# Patient Record
Sex: FEMALE | Race: WHITE | Employment: OTHER | URBAN - METROPOLITAN AREA
[De-identification: names, ages, dates, MRNs, and addresses within clinical notes are randomized per-mention and may not be internally consistent; named-entity substitution may affect disease eponyms.]

---

## 2018-04-03 ENCOUNTER — HOSPITAL ENCOUNTER (OUTPATIENT)
Age: 63
Discharge: HOME | End: 2018-04-05
Payer: COMMERCIAL

## 2018-04-03 PROCEDURE — 87075 CULTR BACTERIA EXCEPT BLOOD: CPT

## 2018-04-03 PROCEDURE — 87070 CULTURE OTHR SPECIMN AEROBIC: CPT

## 2018-04-05 ENCOUNTER — HOSPITAL ENCOUNTER (OUTPATIENT)
Age: 63
Discharge: HOME | End: 2018-04-07
Payer: COMMERCIAL

## 2018-04-05 LAB
ORGANISM: ABNORMAL
WOUND/ABSCESS: ABNORMAL
WOUND/ABSCESS: ABNORMAL

## 2018-04-05 PROCEDURE — 87102 FUNGUS ISOLATION CULTURE: CPT

## 2018-04-05 PROCEDURE — 87075 CULTR BACTERIA EXCEPT BLOOD: CPT

## 2018-04-05 PROCEDURE — 87070 CULTURE OTHR SPECIMN AEROBIC: CPT

## 2018-04-05 PROCEDURE — 87186 SC STD MICRODIL/AGAR DIL: CPT

## 2018-04-05 PROCEDURE — 87205 SMEAR GRAM STAIN: CPT

## 2018-04-06 LAB — GRAM STAIN ORDERABLE: NORMAL

## 2018-04-07 LAB
ANAEROBIC CULTURE: NORMAL
ANAEROBIC CULTURE: NORMAL

## 2018-04-08 LAB
ORGANISM: ABNORMAL
WOUND/ABSCESS: ABNORMAL
WOUND/ABSCESS: ABNORMAL

## 2018-04-09 ENCOUNTER — ANESTHESIA EVENT (OUTPATIENT)
Dept: OPERATING ROOM | Age: 63
DRG: 629 | End: 2018-04-09
Payer: COMMERCIAL

## 2018-04-09 ENCOUNTER — HOSPITAL ENCOUNTER (INPATIENT)
Age: 63
LOS: 2 days | Discharge: HOME HEALTH | DRG: 629 | End: 2018-04-11
Attending: INTERNAL MEDICINE | Admitting: INTERNAL MEDICINE
Payer: COMMERCIAL

## 2018-04-09 PROBLEM — L03.90 CELLULITIS: Status: ACTIVE | Noted: 2018-04-09

## 2018-04-09 LAB
C-REACTIVE PROTEIN: 17.3 MG/DL (ref 0–0.4)
METER GLUCOSE: 201 MG/DL (ref 70–110)
METER GLUCOSE: 232 MG/DL (ref 70–110)
METER GLUCOSE: 295 MG/DL (ref 70–110)
SEDIMENTATION RATE, ERYTHROCYTE: 103 MM/HR (ref 0–20)
TOTAL CK: 81 U/L (ref 20–180)

## 2018-04-09 PROCEDURE — 76937 US GUIDE VASCULAR ACCESS: CPT

## 2018-04-09 PROCEDURE — 86140 C-REACTIVE PROTEIN: CPT

## 2018-04-09 PROCEDURE — C1751 CATH, INF, PER/CENT/MIDLINE: HCPCS

## 2018-04-09 PROCEDURE — 36569 INSJ PICC 5 YR+ W/O IMAGING: CPT

## 2018-04-09 PROCEDURE — 85651 RBC SED RATE NONAUTOMATED: CPT

## 2018-04-09 PROCEDURE — 6370000000 HC RX 637 (ALT 250 FOR IP): Performed by: INTERNAL MEDICINE

## 2018-04-09 PROCEDURE — 82962 GLUCOSE BLOOD TEST: CPT

## 2018-04-09 PROCEDURE — 82550 ASSAY OF CK (CPK): CPT

## 2018-04-09 PROCEDURE — 1200000000 HC SEMI PRIVATE

## 2018-04-09 PROCEDURE — 36415 COLL VENOUS BLD VENIPUNCTURE: CPT

## 2018-04-09 PROCEDURE — 6360000002 HC RX W HCPCS: Performed by: INTERNAL MEDICINE

## 2018-04-09 PROCEDURE — 2500000003 HC RX 250 WO HCPCS: Performed by: SPECIALIST

## 2018-04-09 PROCEDURE — 36592 COLLECT BLOOD FROM PICC: CPT

## 2018-04-09 PROCEDURE — 2580000003 HC RX 258: Performed by: INTERNAL MEDICINE

## 2018-04-09 RX ORDER — LISINOPRIL 2.5 MG/1
2.5 TABLET ORAL DAILY
COMMUNITY
End: 2018-05-14 | Stop reason: ALTCHOICE

## 2018-04-09 RX ORDER — MORPHINE SULFATE 2 MG/ML
2 INJECTION, SOLUTION INTRAMUSCULAR; INTRAVENOUS EVERY 4 HOURS PRN
Status: DISCONTINUED | OUTPATIENT
Start: 2018-04-09 | End: 2018-04-11 | Stop reason: HOSPADM

## 2018-04-09 RX ORDER — DEXTROSE MONOHYDRATE 50 MG/ML
100 INJECTION, SOLUTION INTRAVENOUS PRN
Status: DISCONTINUED | OUTPATIENT
Start: 2018-04-09 | End: 2018-04-11 | Stop reason: HOSPADM

## 2018-04-09 RX ORDER — HEPARIN SODIUM (PORCINE) LOCK FLUSH IV SOLN 100 UNIT/ML 100 UNIT/ML
3 SOLUTION INTRAVENOUS EVERY 12 HOURS SCHEDULED
Status: DISCONTINUED | OUTPATIENT
Start: 2018-04-09 | End: 2018-04-11 | Stop reason: HOSPADM

## 2018-04-09 RX ORDER — SODIUM CHLORIDE 0.9 % (FLUSH) 0.9 %
10 SYRINGE (ML) INJECTION PRN
Status: CANCELLED | OUTPATIENT
Start: 2018-04-12

## 2018-04-09 RX ORDER — GLIPIZIDE 5 MG/1
10 TABLET, FILM COATED, EXTENDED RELEASE ORAL DAILY
Status: DISCONTINUED | OUTPATIENT
Start: 2018-04-09 | End: 2018-04-11 | Stop reason: HOSPADM

## 2018-04-09 RX ORDER — POTASSIUM CHLORIDE 20MEQ/15ML
40 LIQUID (ML) ORAL PRN
Status: DISCONTINUED | OUTPATIENT
Start: 2018-04-09 | End: 2018-04-11 | Stop reason: HOSPADM

## 2018-04-09 RX ORDER — DEXTROSE MONOHYDRATE 25 G/50ML
12.5 INJECTION, SOLUTION INTRAVENOUS PRN
Status: DISCONTINUED | OUTPATIENT
Start: 2018-04-09 | End: 2018-04-11 | Stop reason: HOSPADM

## 2018-04-09 RX ORDER — HEPARIN SODIUM (PORCINE) LOCK FLUSH IV SOLN 100 UNIT/ML 100 UNIT/ML
3 SOLUTION INTRAVENOUS PRN
Status: DISCONTINUED | OUTPATIENT
Start: 2018-04-09 | End: 2018-04-11 | Stop reason: HOSPADM

## 2018-04-09 RX ORDER — HYDROCODONE BITARTRATE AND ACETAMINOPHEN 5; 325 MG/1; MG/1
2 TABLET ORAL EVERY 4 HOURS PRN
Status: DISCONTINUED | OUTPATIENT
Start: 2018-04-09 | End: 2018-04-11 | Stop reason: HOSPADM

## 2018-04-09 RX ORDER — GABAPENTIN 100 MG/1
100 CAPSULE ORAL 2 TIMES DAILY
Status: DISCONTINUED | OUTPATIENT
Start: 2018-04-09 | End: 2018-04-11 | Stop reason: HOSPADM

## 2018-04-09 RX ORDER — SODIUM BICARBONATE 650 MG/1
650 TABLET ORAL 2 TIMES DAILY
Status: DISCONTINUED | OUTPATIENT
Start: 2018-04-09 | End: 2018-04-11 | Stop reason: HOSPADM

## 2018-04-09 RX ORDER — DAPTOMYCIN 50 MG/ML
450 INJECTION, POWDER, LYOPHILIZED, FOR SOLUTION INTRAVENOUS EVERY 24 HOURS
COMMUNITY
End: 2018-06-04 | Stop reason: ALTCHOICE

## 2018-04-09 RX ORDER — NICOTINE POLACRILEX 4 MG
15 LOZENGE BUCCAL PRN
Status: DISCONTINUED | OUTPATIENT
Start: 2018-04-09 | End: 2018-04-11 | Stop reason: HOSPADM

## 2018-04-09 RX ORDER — LISINOPRIL 5 MG/1
2.5 TABLET ORAL DAILY
Status: DISCONTINUED | OUTPATIENT
Start: 2018-04-09 | End: 2018-04-11 | Stop reason: HOSPADM

## 2018-04-09 RX ORDER — HYDROCODONE BITARTRATE AND ACETAMINOPHEN 5; 325 MG/1; MG/1
1 TABLET ORAL EVERY 4 HOURS PRN
Status: DISCONTINUED | OUTPATIENT
Start: 2018-04-09 | End: 2018-04-11 | Stop reason: HOSPADM

## 2018-04-09 RX ORDER — ACETAMINOPHEN 325 MG/1
650 TABLET ORAL EVERY 4 HOURS PRN
Status: DISCONTINUED | OUTPATIENT
Start: 2018-04-09 | End: 2018-04-10 | Stop reason: SDUPTHER

## 2018-04-09 RX ORDER — LIDOCAINE HYDROCHLORIDE 10 MG/ML
5 INJECTION, SOLUTION EPIDURAL; INFILTRATION; INTRACAUDAL; PERINEURAL ONCE
Status: COMPLETED | OUTPATIENT
Start: 2018-04-09 | End: 2018-04-09

## 2018-04-09 RX ORDER — SODIUM CHLORIDE 0.9 % (FLUSH) 0.9 %
10 SYRINGE (ML) INJECTION PRN
Status: DISCONTINUED | OUTPATIENT
Start: 2018-04-09 | End: 2018-04-11 | Stop reason: HOSPADM

## 2018-04-09 RX ORDER — HEPARIN SODIUM (PORCINE) LOCK FLUSH IV SOLN 100 UNIT/ML 100 UNIT/ML
1 SOLUTION INTRAVENOUS PRN
Status: DISCONTINUED | OUTPATIENT
Start: 2018-04-09 | End: 2018-04-11 | Stop reason: HOSPADM

## 2018-04-09 RX ORDER — HEPARIN SODIUM (PORCINE) LOCK FLUSH IV SOLN 100 UNIT/ML 100 UNIT/ML
500 SOLUTION INTRAVENOUS PRN
Status: CANCELLED | OUTPATIENT
Start: 2018-04-12

## 2018-04-09 RX ORDER — SODIUM BICARBONATE 325 MG/1
650 TABLET ORAL 2 TIMES DAILY
COMMUNITY

## 2018-04-09 RX ORDER — SODIUM CHLORIDE 0.9 % (FLUSH) 0.9 %
20 SYRINGE (ML) INJECTION PRN
Status: CANCELLED | OUTPATIENT
Start: 2018-04-12

## 2018-04-09 RX ORDER — SODIUM CHLORIDE 0.9 % (FLUSH) 0.9 %
10 SYRINGE (ML) INJECTION EVERY 12 HOURS SCHEDULED
Status: DISCONTINUED | OUTPATIENT
Start: 2018-04-09 | End: 2018-04-10 | Stop reason: SDUPTHER

## 2018-04-09 RX ORDER — POTASSIUM CHLORIDE 20 MEQ/1
40 TABLET, EXTENDED RELEASE ORAL PRN
Status: DISCONTINUED | OUTPATIENT
Start: 2018-04-09 | End: 2018-04-11 | Stop reason: HOSPADM

## 2018-04-09 RX ORDER — POTASSIUM CHLORIDE 7.45 MG/ML
10 INJECTION INTRAVENOUS PRN
Status: DISCONTINUED | OUTPATIENT
Start: 2018-04-09 | End: 2018-04-11 | Stop reason: HOSPADM

## 2018-04-09 RX ORDER — HEPARIN SODIUM (PORCINE) LOCK FLUSH IV SOLN 100 UNIT/ML 100 UNIT/ML
1 SOLUTION INTRAVENOUS EVERY 12 HOURS SCHEDULED
Status: DISCONTINUED | OUTPATIENT
Start: 2018-04-09 | End: 2018-04-11 | Stop reason: HOSPADM

## 2018-04-09 RX ORDER — ONDANSETRON 2 MG/ML
4 INJECTION INTRAMUSCULAR; INTRAVENOUS EVERY 6 HOURS PRN
Status: DISCONTINUED | OUTPATIENT
Start: 2018-04-09 | End: 2018-04-11 | Stop reason: HOSPADM

## 2018-04-09 RX ORDER — LEVOTHYROXINE SODIUM 0.05 MG/1
50 TABLET ORAL DAILY
Status: DISCONTINUED | OUTPATIENT
Start: 2018-04-09 | End: 2018-04-11 | Stop reason: HOSPADM

## 2018-04-09 RX ORDER — SODIUM CHLORIDE 9 MG/ML
INJECTION, SOLUTION INTRAVENOUS CONTINUOUS
Status: DISCONTINUED | OUTPATIENT
Start: 2018-04-09 | End: 2018-04-11 | Stop reason: HOSPADM

## 2018-04-09 RX ORDER — SODIUM CHLORIDE 0.9 % (FLUSH) 0.9 %
10 SYRINGE (ML) INJECTION PRN
Status: DISCONTINUED | OUTPATIENT
Start: 2018-04-09 | End: 2018-04-10 | Stop reason: SDUPTHER

## 2018-04-09 RX ADMIN — Medication 100 UNITS: at 21:56

## 2018-04-09 RX ADMIN — GABAPENTIN 100 MG: 100 CAPSULE ORAL at 21:49

## 2018-04-09 RX ADMIN — SODIUM CHLORIDE: 9 INJECTION, SOLUTION INTRAVENOUS at 18:12

## 2018-04-09 RX ADMIN — Medication 10 ML: at 21:56

## 2018-04-09 RX ADMIN — METFORMIN HYDROCHLORIDE 1000 MG: 1000 TABLET ORAL at 17:11

## 2018-04-09 RX ADMIN — SODIUM BICARBONATE 650 MG: 650 TABLET ORAL at 21:49

## 2018-04-09 RX ADMIN — INSULIN LISPRO 3 UNITS: 100 INJECTION, SOLUTION INTRAVENOUS; SUBCUTANEOUS at 21:56

## 2018-04-09 RX ADMIN — LIDOCAINE HYDROCHLORIDE 5 ML: 10 INJECTION, SOLUTION EPIDURAL; INFILTRATION; INTRACAUDAL; PERINEURAL at 17:01

## 2018-04-09 RX ADMIN — Medication 10 ML: at 18:16

## 2018-04-09 ASSESSMENT — PAIN DESCRIPTION - ORIENTATION: ORIENTATION: LEFT

## 2018-04-09 ASSESSMENT — PAIN - FUNCTIONAL ASSESSMENT: PAIN_FUNCTIONAL_ASSESSMENT: 0-10

## 2018-04-09 ASSESSMENT — PAIN DESCRIPTION - PAIN TYPE: TYPE: NEUROPATHIC PAIN

## 2018-04-09 ASSESSMENT — PAIN SCALES - GENERAL
PAINLEVEL_OUTOF10: 0
PAINLEVEL_OUTOF10: 0

## 2018-04-09 ASSESSMENT — PAIN DESCRIPTION - LOCATION: LOCATION: FOOT

## 2018-04-09 ASSESSMENT — PAIN DESCRIPTION - DESCRIPTORS: DESCRIPTORS: ACHING;DULL

## 2018-04-10 ENCOUNTER — ANESTHESIA (OUTPATIENT)
Dept: OPERATING ROOM | Age: 63
DRG: 629 | End: 2018-04-10
Payer: COMMERCIAL

## 2018-04-10 VITALS — SYSTOLIC BLOOD PRESSURE: 117 MMHG | DIASTOLIC BLOOD PRESSURE: 66 MMHG | OXYGEN SATURATION: 95 %

## 2018-04-10 LAB
BASOPHILS ABSOLUTE: 0.06 E9/L (ref 0–0.2)
BASOPHILS RELATIVE PERCENT: 0.6 % (ref 0–2)
EOSINOPHILS ABSOLUTE: 0.48 E9/L (ref 0.05–0.5)
EOSINOPHILS RELATIVE PERCENT: 4.5 % (ref 0–6)
HCT VFR BLD CALC: 31.7 % (ref 34–48)
HEMOGLOBIN: 10.4 G/DL (ref 11.5–15.5)
IMMATURE GRANULOCYTES #: 0.07 E9/L
IMMATURE GRANULOCYTES %: 0.7 % (ref 0–5)
LYMPHOCYTES ABSOLUTE: 2.85 E9/L (ref 1.5–4)
LYMPHOCYTES RELATIVE PERCENT: 26.6 % (ref 20–42)
MCH RBC QN AUTO: 32.9 PG (ref 26–35)
MCHC RBC AUTO-ENTMCNC: 32.8 % (ref 32–34.5)
MCV RBC AUTO: 100.3 FL (ref 80–99.9)
METER GLUCOSE: 102 MG/DL (ref 70–110)
METER GLUCOSE: 117 MG/DL (ref 70–110)
METER GLUCOSE: 184 MG/DL (ref 70–110)
METER GLUCOSE: 230 MG/DL (ref 70–110)
MONOCYTES ABSOLUTE: 0.87 E9/L (ref 0.1–0.95)
MONOCYTES RELATIVE PERCENT: 8.1 % (ref 2–12)
NEUTROPHILS ABSOLUTE: 6.38 E9/L (ref 1.8–7.3)
NEUTROPHILS RELATIVE PERCENT: 59.5 % (ref 43–80)
PDW BLD-RTO: 12.1 FL (ref 11.5–15)
PLATELET # BLD: 273 E9/L (ref 130–450)
PMV BLD AUTO: 9.6 FL (ref 7–12)
RBC # BLD: 3.16 E12/L (ref 3.5–5.5)
WBC # BLD: 10.7 E9/L (ref 4.5–11.5)

## 2018-04-10 PROCEDURE — 2709999900 HC NON-CHARGEABLE SUPPLY: Performed by: PODIATRIST

## 2018-04-10 PROCEDURE — 2580000003 HC RX 258: Performed by: INTERNAL MEDICINE

## 2018-04-10 PROCEDURE — 7100000011 HC PHASE II RECOVERY - ADDTL 15 MIN: Performed by: PODIATRIST

## 2018-04-10 PROCEDURE — 85025 COMPLETE CBC W/AUTO DIFF WBC: CPT

## 2018-04-10 PROCEDURE — 6370000000 HC RX 637 (ALT 250 FOR IP): Performed by: INTERNAL MEDICINE

## 2018-04-10 PROCEDURE — 6360000002 HC RX W HCPCS

## 2018-04-10 PROCEDURE — 2580000003 HC RX 258: Performed by: PODIATRIST

## 2018-04-10 PROCEDURE — 2500000003 HC RX 250 WO HCPCS: Performed by: PODIATRIST

## 2018-04-10 PROCEDURE — G8988 SELF CARE GOAL STATUS: HCPCS

## 2018-04-10 PROCEDURE — 88311 DECALCIFY TISSUE: CPT

## 2018-04-10 PROCEDURE — 36592 COLLECT BLOOD FROM PICC: CPT

## 2018-04-10 PROCEDURE — 87205 SMEAR GRAM STAIN: CPT

## 2018-04-10 PROCEDURE — 87077 CULTURE AEROBIC IDENTIFY: CPT

## 2018-04-10 PROCEDURE — 88307 TISSUE EXAM BY PATHOLOGIST: CPT

## 2018-04-10 PROCEDURE — 02HV33Z INSERTION OF INFUSION DEVICE INTO SUPERIOR VENA CAVA, PERCUTANEOUS APPROACH: ICD-10-PCS | Performed by: SPECIALIST

## 2018-04-10 PROCEDURE — 3600000002 HC SURGERY LEVEL 2 BASE: Performed by: PODIATRIST

## 2018-04-10 PROCEDURE — 1200000000 HC SEMI PRIVATE

## 2018-04-10 PROCEDURE — 6360000002 HC RX W HCPCS: Performed by: NURSE ANESTHETIST, CERTIFIED REGISTERED

## 2018-04-10 PROCEDURE — 87070 CULTURE OTHR SPECIMN AEROBIC: CPT

## 2018-04-10 PROCEDURE — 7100000010 HC PHASE II RECOVERY - FIRST 15 MIN: Performed by: PODIATRIST

## 2018-04-10 PROCEDURE — 3600000012 HC SURGERY LEVEL 2 ADDTL 15MIN: Performed by: PODIATRIST

## 2018-04-10 PROCEDURE — C1751 CATH, INF, PER/CENT/MIDLINE: HCPCS

## 2018-04-10 PROCEDURE — 36569 INSJ PICC 5 YR+ W/O IMAGING: CPT

## 2018-04-10 PROCEDURE — 3700000001 HC ADD 15 MINUTES (ANESTHESIA): Performed by: PODIATRIST

## 2018-04-10 PROCEDURE — 2580000003 HC RX 258: Performed by: SPECIALIST

## 2018-04-10 PROCEDURE — 76937 US GUIDE VASCULAR ACCESS: CPT

## 2018-04-10 PROCEDURE — 82962 GLUCOSE BLOOD TEST: CPT

## 2018-04-10 PROCEDURE — 87186 SC STD MICRODIL/AGAR DIL: CPT

## 2018-04-10 PROCEDURE — 0QBM0ZX EXCISION OF LEFT TARSAL, OPEN APPROACH, DIAGNOSTIC: ICD-10-PCS | Performed by: PODIATRIST

## 2018-04-10 PROCEDURE — 3700000000 HC ANESTHESIA ATTENDED CARE: Performed by: PODIATRIST

## 2018-04-10 PROCEDURE — 97165 OT EVAL LOW COMPLEX 30 MIN: CPT

## 2018-04-10 PROCEDURE — G8987 SELF CARE CURRENT STATUS: HCPCS

## 2018-04-10 PROCEDURE — 6360000002 HC RX W HCPCS: Performed by: SPECIALIST

## 2018-04-10 PROCEDURE — 36415 COLL VENOUS BLD VENIPUNCTURE: CPT

## 2018-04-10 PROCEDURE — 2500000003 HC RX 250 WO HCPCS: Performed by: NURSE ANESTHETIST, CERTIFIED REGISTERED

## 2018-04-10 RX ORDER — SODIUM CHLORIDE 0.9 % (FLUSH) 0.9 %
10 SYRINGE (ML) INJECTION PRN
Status: DISCONTINUED | OUTPATIENT
Start: 2018-04-10 | End: 2018-04-11 | Stop reason: HOSPADM

## 2018-04-10 RX ORDER — DOCUSATE SODIUM 100 MG/1
100 CAPSULE, LIQUID FILLED ORAL 2 TIMES DAILY
Status: DISCONTINUED | OUTPATIENT
Start: 2018-04-10 | End: 2018-04-11 | Stop reason: HOSPADM

## 2018-04-10 RX ORDER — MORPHINE SULFATE 2 MG/ML
2 INJECTION, SOLUTION INTRAMUSCULAR; INTRAVENOUS EVERY 5 MIN PRN
Status: DISCONTINUED | OUTPATIENT
Start: 2018-04-10 | End: 2018-04-10 | Stop reason: HOSPADM

## 2018-04-10 RX ORDER — BUPIVACAINE HYDROCHLORIDE 5 MG/ML
INJECTION, SOLUTION EPIDURAL; INTRACAUDAL PRN
Status: DISCONTINUED | OUTPATIENT
Start: 2018-04-10 | End: 2018-04-10 | Stop reason: HOSPADM

## 2018-04-10 RX ORDER — GLYCOPYRROLATE 0.2 MG/ML
INJECTION INTRAMUSCULAR; INTRAVENOUS PRN
Status: DISCONTINUED | OUTPATIENT
Start: 2018-04-10 | End: 2018-04-10 | Stop reason: SDUPTHER

## 2018-04-10 RX ORDER — HYDROCODONE BITARTRATE AND ACETAMINOPHEN 5; 325 MG/1; MG/1
2 TABLET ORAL PRN
Status: DISCONTINUED | OUTPATIENT
Start: 2018-04-10 | End: 2018-04-10 | Stop reason: HOSPADM

## 2018-04-10 RX ORDER — MORPHINE SULFATE 2 MG/ML
1 INJECTION, SOLUTION INTRAMUSCULAR; INTRAVENOUS EVERY 5 MIN PRN
Status: DISCONTINUED | OUTPATIENT
Start: 2018-04-10 | End: 2018-04-10 | Stop reason: HOSPADM

## 2018-04-10 RX ORDER — SODIUM CHLORIDE 0.9 % (FLUSH) 0.9 %
10 SYRINGE (ML) INJECTION EVERY 12 HOURS SCHEDULED
Status: DISCONTINUED | OUTPATIENT
Start: 2018-04-10 | End: 2018-04-11 | Stop reason: HOSPADM

## 2018-04-10 RX ORDER — DOCUSATE SODIUM 100 MG/1
CAPSULE, LIQUID FILLED ORAL
Status: DISPENSED
Start: 2018-04-10 | End: 2018-04-10

## 2018-04-10 RX ORDER — MEPERIDINE HYDROCHLORIDE 25 MG/ML
12.5 INJECTION INTRAMUSCULAR; INTRAVENOUS; SUBCUTANEOUS EVERY 5 MIN PRN
Status: DISCONTINUED | OUTPATIENT
Start: 2018-04-10 | End: 2018-04-10 | Stop reason: HOSPADM

## 2018-04-10 RX ORDER — HYDROCODONE BITARTRATE AND ACETAMINOPHEN 5; 325 MG/1; MG/1
1 TABLET ORAL PRN
Status: DISCONTINUED | OUTPATIENT
Start: 2018-04-10 | End: 2018-04-10 | Stop reason: HOSPADM

## 2018-04-10 RX ORDER — MIDAZOLAM HYDROCHLORIDE 1 MG/ML
INJECTION INTRAMUSCULAR; INTRAVENOUS PRN
Status: DISCONTINUED | OUTPATIENT
Start: 2018-04-10 | End: 2018-04-10 | Stop reason: SDUPTHER

## 2018-04-10 RX ORDER — PROPOFOL 10 MG/ML
INJECTION, EMULSION INTRAVENOUS PRN
Status: DISCONTINUED | OUTPATIENT
Start: 2018-04-10 | End: 2018-04-10 | Stop reason: SDUPTHER

## 2018-04-10 RX ORDER — FENTANYL CITRATE 50 UG/ML
INJECTION, SOLUTION INTRAMUSCULAR; INTRAVENOUS PRN
Status: DISCONTINUED | OUTPATIENT
Start: 2018-04-10 | End: 2018-04-10 | Stop reason: SDUPTHER

## 2018-04-10 RX ORDER — PROPOFOL 10 MG/ML
INJECTION, EMULSION INTRAVENOUS CONTINUOUS PRN
Status: DISCONTINUED | OUTPATIENT
Start: 2018-04-10 | End: 2018-04-10 | Stop reason: SDUPTHER

## 2018-04-10 RX ORDER — ACETAMINOPHEN 325 MG/1
650 TABLET ORAL EVERY 4 HOURS PRN
Status: DISCONTINUED | OUTPATIENT
Start: 2018-04-10 | End: 2018-04-11 | Stop reason: HOSPADM

## 2018-04-10 RX ORDER — PROMETHAZINE HYDROCHLORIDE 25 MG/ML
6.25 INJECTION, SOLUTION INTRAMUSCULAR; INTRAVENOUS EVERY 10 MIN PRN
Status: DISCONTINUED | OUTPATIENT
Start: 2018-04-10 | End: 2018-04-10 | Stop reason: HOSPADM

## 2018-04-10 RX ADMIN — GLIPIZIDE 10 MG: 5 TABLET, FILM COATED, EXTENDED RELEASE ORAL at 09:57

## 2018-04-10 RX ADMIN — GABAPENTIN 100 MG: 100 CAPSULE ORAL at 20:15

## 2018-04-10 RX ADMIN — LISINOPRIL 2.5 MG: 5 TABLET ORAL at 09:33

## 2018-04-10 RX ADMIN — Medication 10 ML: at 09:31

## 2018-04-10 RX ADMIN — DAPTOMYCIN 500 MG: 500 INJECTION, POWDER, LYOPHILIZED, FOR SOLUTION INTRAVENOUS at 06:58

## 2018-04-10 RX ADMIN — PROPOFOL 50 MCG/KG/MIN: 10 INJECTION, EMULSION INTRAVENOUS at 07:36

## 2018-04-10 RX ADMIN — SODIUM BICARBONATE 650 MG: 650 TABLET ORAL at 09:56

## 2018-04-10 RX ADMIN — METFORMIN HYDROCHLORIDE 1000 MG: 1000 TABLET ORAL at 16:34

## 2018-04-10 RX ADMIN — FENTANYL CITRATE 50 MCG: 50 INJECTION, SOLUTION INTRAMUSCULAR; INTRAVENOUS at 07:45

## 2018-04-10 RX ADMIN — SODIUM BICARBONATE 650 MG: 650 TABLET ORAL at 20:16

## 2018-04-10 RX ADMIN — MIDAZOLAM HYDROCHLORIDE 2 MG: 1 INJECTION, SOLUTION INTRAMUSCULAR; INTRAVENOUS at 07:29

## 2018-04-10 RX ADMIN — METFORMIN HYDROCHLORIDE 1000 MG: 1000 TABLET ORAL at 09:33

## 2018-04-10 RX ADMIN — Medication 10 ML: at 09:36

## 2018-04-10 RX ADMIN — ENOXAPARIN SODIUM 40 MG: 40 INJECTION, SOLUTION INTRAVENOUS; SUBCUTANEOUS at 09:58

## 2018-04-10 RX ADMIN — Medication 0.2 MG: at 07:29

## 2018-04-10 RX ADMIN — Medication 10 ML: at 20:22

## 2018-04-10 RX ADMIN — GABAPENTIN 100 MG: 100 CAPSULE ORAL at 09:56

## 2018-04-10 RX ADMIN — PROPOFOL 50 MG: 10 INJECTION, EMULSION INTRAVENOUS at 07:36

## 2018-04-10 RX ADMIN — FENTANYL CITRATE 50 MCG: 50 INJECTION, SOLUTION INTRAMUSCULAR; INTRAVENOUS at 07:36

## 2018-04-10 ASSESSMENT — PULMONARY FUNCTION TESTS
PIF_VALUE: 1
PIF_VALUE: 1
PIF_VALUE: 0
PIF_VALUE: 1
PIF_VALUE: 0
PIF_VALUE: 1
PIF_VALUE: 0
PIF_VALUE: 1

## 2018-04-10 ASSESSMENT — PAIN DESCRIPTION - PAIN TYPE
TYPE: SURGICAL PAIN
TYPE: SURGICAL PAIN

## 2018-04-10 ASSESSMENT — PAIN SCALES - GENERAL
PAINLEVEL_OUTOF10: 0

## 2018-04-11 VITALS
WEIGHT: 196 LBS | RESPIRATION RATE: 16 BRPM | SYSTOLIC BLOOD PRESSURE: 127 MMHG | BODY MASS INDEX: 30.76 KG/M2 | HEIGHT: 67 IN | DIASTOLIC BLOOD PRESSURE: 70 MMHG | OXYGEN SATURATION: 94 % | HEART RATE: 96 BPM | TEMPERATURE: 98.1 F

## 2018-04-11 LAB
ANION GAP SERPL CALCULATED.3IONS-SCNC: 14 MMOL/L (ref 7–16)
BASOPHILS ABSOLUTE: 0.04 E9/L (ref 0–0.2)
BASOPHILS RELATIVE PERCENT: 0.3 % (ref 0–2)
BUN BLDV-MCNC: 18 MG/DL (ref 8–23)
CALCIUM SERPL-MCNC: 9.1 MG/DL (ref 8.6–10.2)
CHLORIDE BLD-SCNC: 102 MMOL/L (ref 98–107)
CO2: 22 MMOL/L (ref 22–29)
CREAT SERPL-MCNC: 1 MG/DL (ref 0.5–1)
EOSINOPHILS ABSOLUTE: 0.5 E9/L (ref 0.05–0.5)
EOSINOPHILS RELATIVE PERCENT: 3.7 % (ref 0–6)
GFR AFRICAN AMERICAN: >60
GFR NON-AFRICAN AMERICAN: 56 ML/MIN/1.73
GLUCOSE BLD-MCNC: 86 MG/DL (ref 74–109)
HCT VFR BLD CALC: 18.7 % (ref 34–48)
HCT VFR BLD CALC: 30.3 % (ref 34–48)
HEMOGLOBIN: 10.1 G/DL (ref 11.5–15.5)
HEMOGLOBIN: 5.9 G/DL (ref 11.5–15.5)
IMMATURE GRANULOCYTES #: 0.12 E9/L
IMMATURE GRANULOCYTES %: 0.9 % (ref 0–5)
LYMPHOCYTES ABSOLUTE: 2.96 E9/L (ref 1.5–4)
LYMPHOCYTES RELATIVE PERCENT: 21.7 % (ref 20–42)
MCH RBC QN AUTO: 32.2 PG (ref 26–35)
MCHC RBC AUTO-ENTMCNC: 31.6 % (ref 32–34.5)
MCV RBC AUTO: 102.2 FL (ref 80–99.9)
METER GLUCOSE: 156 MG/DL (ref 70–110)
METER GLUCOSE: 252 MG/DL (ref 70–110)
METER GLUCOSE: 98 MG/DL (ref 70–110)
MONOCYTES ABSOLUTE: 1.2 E9/L (ref 0.1–0.95)
MONOCYTES RELATIVE PERCENT: 8.8 % (ref 2–12)
NEUTROPHILS ABSOLUTE: 8.81 E9/L (ref 1.8–7.3)
NEUTROPHILS RELATIVE PERCENT: 64.6 % (ref 43–80)
PDW BLD-RTO: 12.1 FL (ref 11.5–15)
PLATELET # BLD: 354 E9/L (ref 130–450)
PMV BLD AUTO: 9.9 FL (ref 7–12)
POTASSIUM SERPL-SCNC: 3.9 MMOL/L (ref 3.5–5)
RBC # BLD: 1.83 E12/L (ref 3.5–5.5)
SODIUM BLD-SCNC: 138 MMOL/L (ref 132–146)
WBC # BLD: 13.6 E9/L (ref 4.5–11.5)

## 2018-04-11 PROCEDURE — G8979 MOBILITY GOAL STATUS: HCPCS

## 2018-04-11 PROCEDURE — 97530 THERAPEUTIC ACTIVITIES: CPT

## 2018-04-11 PROCEDURE — 6370000000 HC RX 637 (ALT 250 FOR IP): Performed by: INTERNAL MEDICINE

## 2018-04-11 PROCEDURE — 6360000002 HC RX W HCPCS: Performed by: SPECIALIST

## 2018-04-11 PROCEDURE — 2580000003 HC RX 258: Performed by: PODIATRIST

## 2018-04-11 PROCEDURE — 2580000003 HC RX 258: Performed by: SPECIALIST

## 2018-04-11 PROCEDURE — 36415 COLL VENOUS BLD VENIPUNCTURE: CPT

## 2018-04-11 PROCEDURE — 2580000003 HC RX 258: Performed by: INTERNAL MEDICINE

## 2018-04-11 PROCEDURE — 85025 COMPLETE CBC W/AUTO DIFF WBC: CPT

## 2018-04-11 PROCEDURE — 6360000002 HC RX W HCPCS: Performed by: INTERNAL MEDICINE

## 2018-04-11 PROCEDURE — 80048 BASIC METABOLIC PNL TOTAL CA: CPT

## 2018-04-11 PROCEDURE — 36592 COLLECT BLOOD FROM PICC: CPT

## 2018-04-11 PROCEDURE — 85018 HEMOGLOBIN: CPT

## 2018-04-11 PROCEDURE — 97162 PT EVAL MOD COMPLEX 30 MIN: CPT

## 2018-04-11 PROCEDURE — 82962 GLUCOSE BLOOD TEST: CPT

## 2018-04-11 PROCEDURE — 97605 NEG PRS WND THER DME<=50SQCM: CPT

## 2018-04-11 PROCEDURE — G8978 MOBILITY CURRENT STATUS: HCPCS

## 2018-04-11 PROCEDURE — 85014 HEMATOCRIT: CPT

## 2018-04-11 RX ADMIN — METFORMIN HYDROCHLORIDE 1000 MG: 1000 TABLET ORAL at 08:23

## 2018-04-11 RX ADMIN — GLIPIZIDE 10 MG: 5 TABLET, FILM COATED, EXTENDED RELEASE ORAL at 08:22

## 2018-04-11 RX ADMIN — SODIUM CHLORIDE: 9 INJECTION, SOLUTION INTRAVENOUS at 16:34

## 2018-04-11 RX ADMIN — DAPTOMYCIN 500 MG: 500 INJECTION, POWDER, LYOPHILIZED, FOR SOLUTION INTRAVENOUS at 06:16

## 2018-04-11 RX ADMIN — SODIUM CHLORIDE: 9 INJECTION, SOLUTION INTRAVENOUS at 02:40

## 2018-04-11 RX ADMIN — LEVOTHYROXINE SODIUM 50 MCG: 50 TABLET ORAL at 06:15

## 2018-04-11 RX ADMIN — ENOXAPARIN SODIUM 40 MG: 40 INJECTION, SOLUTION INTRAVENOUS; SUBCUTANEOUS at 08:33

## 2018-04-11 RX ADMIN — GABAPENTIN 100 MG: 100 CAPSULE ORAL at 08:23

## 2018-04-11 RX ADMIN — SODIUM BICARBONATE 650 MG: 650 TABLET ORAL at 08:23

## 2018-04-11 RX ADMIN — LISINOPRIL 2.5 MG: 5 TABLET ORAL at 08:23

## 2018-04-11 RX ADMIN — Medication 300 UNITS: at 18:38

## 2018-04-11 RX ADMIN — Medication 10 ML: at 18:38

## 2018-04-11 RX ADMIN — METFORMIN HYDROCHLORIDE 1000 MG: 1000 TABLET ORAL at 16:27

## 2018-04-11 ASSESSMENT — PAIN SCALES - GENERAL: PAINLEVEL_OUTOF10: 0

## 2018-04-13 LAB
CULTURE SURGICAL: ABNORMAL
GRAM STAIN RESULT: ABNORMAL
GRAM STAIN RESULT: ABNORMAL
ORGANISM: ABNORMAL
ORGANISM: ABNORMAL

## 2018-05-07 ENCOUNTER — HOSPITAL ENCOUNTER (OUTPATIENT)
Dept: WOUND CARE | Age: 63
Discharge: HOME | End: 2018-05-07
Payer: COMMERCIAL

## 2018-05-07 ENCOUNTER — HOSPITAL ENCOUNTER (OUTPATIENT)
Age: 63
Discharge: HOME | End: 2018-05-09
Payer: COMMERCIAL

## 2018-05-07 VITALS
BODY MASS INDEX: 26.53 KG/M2 | RESPIRATION RATE: 16 BRPM | DIASTOLIC BLOOD PRESSURE: 80 MMHG | TEMPERATURE: 98.5 F | WEIGHT: 169 LBS | SYSTOLIC BLOOD PRESSURE: 142 MMHG | HEIGHT: 67 IN | HEART RATE: 74 BPM

## 2018-05-07 DIAGNOSIS — M86.9 OSTEOMYELITIS OF ANKLE AND FOOT (HCC): Primary | ICD-10-CM

## 2018-05-07 LAB
BASOPHILS ABSOLUTE: 0.1 E9/L (ref 0–0.2)
BASOPHILS RELATIVE PERCENT: 0.9 % (ref 0–2)
EOSINOPHILS ABSOLUTE: 0.73 E9/L (ref 0.05–0.5)
EOSINOPHILS RELATIVE PERCENT: 6.4 % (ref 0–6)
FUNGUS (MYCOLOGY) CULTURE: NORMAL
FUNGUS STAIN: NORMAL
HCT VFR BLD CALC: 36.7 % (ref 34–48)
HEMOGLOBIN: 11.8 G/DL (ref 11.5–15.5)
IMMATURE GRANULOCYTES #: 0.11 E9/L
IMMATURE GRANULOCYTES %: 1 % (ref 0–5)
LYMPHOCYTES ABSOLUTE: 2.43 E9/L (ref 1.5–4)
LYMPHOCYTES RELATIVE PERCENT: 21.2 % (ref 20–42)
MCH RBC QN AUTO: 31.8 PG (ref 26–35)
MCHC RBC AUTO-ENTMCNC: 32.2 % (ref 32–34.5)
MCV RBC AUTO: 98.9 FL (ref 80–99.9)
MONOCYTES ABSOLUTE: 0.9 E9/L (ref 0.1–0.95)
MONOCYTES RELATIVE PERCENT: 7.8 % (ref 2–12)
NEUTROPHILS ABSOLUTE: 7.2 E9/L (ref 1.8–7.3)
NEUTROPHILS RELATIVE PERCENT: 62.7 % (ref 43–80)
PDW BLD-RTO: 12.2 FL (ref 11.5–15)
PLATELET # BLD: 344 E9/L (ref 130–450)
PMV BLD AUTO: 9.4 FL (ref 7–12)
RBC # BLD: 3.71 E12/L (ref 3.5–5.5)
WBC # BLD: 11.5 E9/L (ref 4.5–11.5)

## 2018-05-07 PROCEDURE — 82565 ASSAY OF CREATININE: CPT

## 2018-05-07 PROCEDURE — 11042 DBRDMT SUBQ TIS 1ST 20SQCM/<: CPT

## 2018-05-07 PROCEDURE — 85025 COMPLETE CBC W/AUTO DIFF WBC: CPT

## 2018-05-07 PROCEDURE — 99214 OFFICE O/P EST MOD 30 MIN: CPT

## 2018-05-07 PROCEDURE — 84520 ASSAY OF UREA NITROGEN: CPT

## 2018-05-07 PROCEDURE — 85651 RBC SED RATE NONAUTOMATED: CPT

## 2018-05-07 PROCEDURE — 97607 NEG PRS WND THR NDME<=50SQCM: CPT

## 2018-05-07 PROCEDURE — 86140 C-REACTIVE PROTEIN: CPT

## 2018-05-07 PROCEDURE — 80076 HEPATIC FUNCTION PANEL: CPT

## 2018-05-07 PROCEDURE — 82550 ASSAY OF CK (CPK): CPT

## 2018-05-08 LAB
ALBUMIN SERPL-MCNC: 3.7 G/DL (ref 3.5–5.2)
ALP BLD-CCNC: 76 U/L (ref 35–104)
ALT SERPL-CCNC: 16 U/L (ref 0–32)
AST SERPL-CCNC: 21 U/L (ref 0–31)
BILIRUB SERPL-MCNC: 0.3 MG/DL (ref 0–1.2)
BILIRUBIN DIRECT: <0.2 MG/DL (ref 0–0.3)
BILIRUBIN, INDIRECT: NORMAL MG/DL (ref 0–1)
BUN BLDV-MCNC: 19 MG/DL (ref 8–23)
C-REACTIVE PROTEIN: 4.2 MG/DL (ref 0–0.4)
CREAT SERPL-MCNC: 1.2 MG/DL (ref 0.5–1)
GFR AFRICAN AMERICAN: 55
GFR NON-AFRICAN AMERICAN: 45 ML/MIN/1.73
SEDIMENTATION RATE, ERYTHROCYTE: 97 MM/HR (ref 0–20)
TOTAL CK: 235 U/L (ref 20–180)
TOTAL PROTEIN: 7.1 G/DL (ref 6.4–8.3)

## 2018-05-09 ENCOUNTER — HOSPITAL ENCOUNTER (OUTPATIENT)
Dept: HYPERBARIC MEDICINE | Age: 63
Setting detail: THERAPIES SERIES
Discharge: HOME | End: 2018-05-09
Payer: COMMERCIAL

## 2018-05-09 VITALS
SYSTOLIC BLOOD PRESSURE: 155 MMHG | HEART RATE: 84 BPM | TEMPERATURE: 98.7 F | DIASTOLIC BLOOD PRESSURE: 77 MMHG | RESPIRATION RATE: 20 BRPM

## 2018-05-09 DIAGNOSIS — L97.426 DIABETIC ULCER OF LEFT HEEL ASSOCIATED WITH TYPE 2 DIABETES MELLITUS, WITH BONE INVOLVEMENT WITHOUT EVIDENCE OF NECROSIS (HCC): ICD-10-CM

## 2018-05-09 DIAGNOSIS — E11.621 DIABETIC ULCER OF LEFT HEEL ASSOCIATED WITH TYPE 2 DIABETES MELLITUS, WITH BONE INVOLVEMENT WITHOUT EVIDENCE OF NECROSIS (HCC): ICD-10-CM

## 2018-05-09 PROCEDURE — 99244 OFF/OP CNSLTJ NEW/EST MOD 40: CPT | Performed by: NURSE PRACTITIONER

## 2018-05-09 PROCEDURE — 99203 OFFICE O/P NEW LOW 30 MIN: CPT

## 2018-05-11 ENCOUNTER — HOSPITAL ENCOUNTER (OUTPATIENT)
Dept: INTERVENTIONAL RADIOLOGY/VASCULAR | Age: 63
Discharge: HOME | End: 2018-05-13
Payer: COMMERCIAL

## 2018-05-11 ENCOUNTER — HOSPITAL ENCOUNTER (OUTPATIENT)
Age: 63
Discharge: HOME | End: 2018-05-13
Payer: COMMERCIAL

## 2018-05-11 DIAGNOSIS — M86.9 OSTEOMYELITIS OF ANKLE AND FOOT (HCC): ICD-10-CM

## 2018-05-11 LAB
ALBUMIN SERPL-MCNC: 3.7 G/DL (ref 3.5–5.2)
ALP BLD-CCNC: 66 U/L (ref 35–104)
ALT SERPL-CCNC: 17 U/L (ref 0–32)
ANION GAP SERPL CALCULATED.3IONS-SCNC: 15 MMOL/L (ref 7–16)
AST SERPL-CCNC: 23 U/L (ref 0–31)
BASOPHILS ABSOLUTE: 0.08 E9/L (ref 0–0.2)
BASOPHILS RELATIVE PERCENT: 0.7 % (ref 0–2)
BILIRUB SERPL-MCNC: 0.3 MG/DL (ref 0–1.2)
BILIRUBIN DIRECT: <0.2 MG/DL (ref 0–0.3)
BILIRUBIN, INDIRECT: NORMAL MG/DL (ref 0–1)
BUN BLDV-MCNC: 17 MG/DL (ref 8–23)
CALCIUM SERPL-MCNC: 10.5 MG/DL (ref 8.6–10.2)
CHLORIDE BLD-SCNC: 97 MMOL/L (ref 98–107)
CO2: 27 MMOL/L (ref 22–29)
CREAT SERPL-MCNC: 1.1 MG/DL (ref 0.5–1)
EOSINOPHILS ABSOLUTE: 0.54 E9/L (ref 0.05–0.5)
EOSINOPHILS RELATIVE PERCENT: 4.7 % (ref 0–6)
GFR AFRICAN AMERICAN: >60
GFR NON-AFRICAN AMERICAN: 50 ML/MIN/1.73
GLUCOSE BLD-MCNC: 209 MG/DL (ref 74–109)
HCT VFR BLD CALC: 36.8 % (ref 34–48)
HEMOGLOBIN: 11.8 G/DL (ref 11.5–15.5)
IMMATURE GRANULOCYTES #: 0.06 E9/L
IMMATURE GRANULOCYTES %: 0.5 % (ref 0–5)
LYMPHOCYTES ABSOLUTE: 1.79 E9/L (ref 1.5–4)
LYMPHOCYTES RELATIVE PERCENT: 15.5 % (ref 20–42)
MCH RBC QN AUTO: 31.6 PG (ref 26–35)
MCHC RBC AUTO-ENTMCNC: 32.1 % (ref 32–34.5)
MCV RBC AUTO: 98.7 FL (ref 80–99.9)
MONOCYTES ABSOLUTE: 0.71 E9/L (ref 0.1–0.95)
MONOCYTES RELATIVE PERCENT: 6.2 % (ref 2–12)
NEUTROPHILS ABSOLUTE: 8.35 E9/L (ref 1.8–7.3)
NEUTROPHILS RELATIVE PERCENT: 72.4 % (ref 43–80)
PDW BLD-RTO: 12.4 FL (ref 11.5–15)
PLATELET # BLD: 323 E9/L (ref 130–450)
PMV BLD AUTO: 9.9 FL (ref 7–12)
POTASSIUM SERPL-SCNC: 4.6 MMOL/L (ref 3.5–5)
RBC # BLD: 3.73 E12/L (ref 3.5–5.5)
SODIUM BLD-SCNC: 139 MMOL/L (ref 132–146)
TOTAL CK: 263 U/L (ref 20–180)
TOTAL PROTEIN: 7 G/DL (ref 6.4–8.3)
WBC # BLD: 11.5 E9/L (ref 4.5–11.5)

## 2018-05-11 PROCEDURE — 80076 HEPATIC FUNCTION PANEL: CPT

## 2018-05-11 PROCEDURE — 82550 ASSAY OF CK (CPK): CPT

## 2018-05-11 PROCEDURE — 85025 COMPLETE CBC W/AUTO DIFF WBC: CPT

## 2018-05-11 PROCEDURE — 80048 BASIC METABOLIC PNL TOTAL CA: CPT

## 2018-05-11 PROCEDURE — 93923 UPR/LXTR ART STDY 3+ LVLS: CPT

## 2018-05-14 ENCOUNTER — HOSPITAL ENCOUNTER (OUTPATIENT)
Dept: WOUND CARE | Age: 63
Discharge: HOME | End: 2018-05-14
Payer: COMMERCIAL

## 2018-05-14 VITALS
HEIGHT: 67 IN | TEMPERATURE: 98.2 F | WEIGHT: 170 LBS | DIASTOLIC BLOOD PRESSURE: 80 MMHG | BODY MASS INDEX: 26.68 KG/M2 | RESPIRATION RATE: 20 BRPM | HEART RATE: 104 BPM | SYSTOLIC BLOOD PRESSURE: 154 MMHG

## 2018-05-14 DIAGNOSIS — L97.426 DIABETIC ULCER OF LEFT HEEL ASSOCIATED WITH TYPE 2 DIABETES MELLITUS, WITH BONE INVOLVEMENT WITHOUT EVIDENCE OF NECROSIS (HCC): ICD-10-CM

## 2018-05-14 DIAGNOSIS — E11.621 DIABETIC ULCER OF LEFT HEEL ASSOCIATED WITH TYPE 2 DIABETES MELLITUS, WITH BONE INVOLVEMENT WITHOUT EVIDENCE OF NECROSIS (HCC): ICD-10-CM

## 2018-05-14 PROCEDURE — 11042 DBRDMT SUBQ TIS 1ST 20SQCM/<: CPT

## 2018-05-14 PROCEDURE — 97607 NEG PRS WND THR NDME<=50SQCM: CPT

## 2018-05-14 RX ORDER — LACTOBACILLUS RHAMNOSUS GG 15B CELL
1 CAPSULE, SPRINKLE ORAL DAILY
COMMUNITY
End: 2019-01-14 | Stop reason: ALTCHOICE

## 2018-05-21 ENCOUNTER — HOSPITAL ENCOUNTER (OUTPATIENT)
Dept: WOUND CARE | Age: 63
Discharge: HOME | End: 2018-05-21
Payer: COMMERCIAL

## 2018-05-21 VITALS
SYSTOLIC BLOOD PRESSURE: 150 MMHG | BODY MASS INDEX: 26.68 KG/M2 | HEART RATE: 92 BPM | RESPIRATION RATE: 16 BRPM | WEIGHT: 170 LBS | DIASTOLIC BLOOD PRESSURE: 80 MMHG | TEMPERATURE: 98.5 F | HEIGHT: 67 IN

## 2018-05-21 DIAGNOSIS — E11.621 DIABETIC ULCER OF LEFT HEEL ASSOCIATED WITH TYPE 2 DIABETES MELLITUS, WITH BONE INVOLVEMENT WITHOUT EVIDENCE OF NECROSIS (HCC): ICD-10-CM

## 2018-05-21 DIAGNOSIS — L97.426 DIABETIC ULCER OF LEFT HEEL ASSOCIATED WITH TYPE 2 DIABETES MELLITUS, WITH BONE INVOLVEMENT WITHOUT EVIDENCE OF NECROSIS (HCC): ICD-10-CM

## 2018-05-21 PROCEDURE — 97607 NEG PRS WND THR NDME<=50SQCM: CPT

## 2018-05-21 PROCEDURE — 11042 DBRDMT SUBQ TIS 1ST 20SQCM/<: CPT

## 2018-05-28 ENCOUNTER — HOSPITAL ENCOUNTER (OUTPATIENT)
Age: 63
Discharge: HOME | End: 2018-05-30
Payer: COMMERCIAL

## 2018-05-28 PROCEDURE — 80076 HEPATIC FUNCTION PANEL: CPT

## 2018-05-28 PROCEDURE — 85025 COMPLETE CBC W/AUTO DIFF WBC: CPT

## 2018-05-28 PROCEDURE — 82565 ASSAY OF CREATININE: CPT

## 2018-05-28 PROCEDURE — 84520 ASSAY OF UREA NITROGEN: CPT

## 2018-05-28 PROCEDURE — 85651 RBC SED RATE NONAUTOMATED: CPT

## 2018-05-28 PROCEDURE — 82550 ASSAY OF CK (CPK): CPT

## 2018-05-28 PROCEDURE — 86140 C-REACTIVE PROTEIN: CPT

## 2018-05-29 ENCOUNTER — HOSPITAL ENCOUNTER (OUTPATIENT)
Dept: HYPERBARIC MEDICINE | Age: 63
Setting detail: THERAPIES SERIES
Discharge: HOME | End: 2018-05-29
Payer: COMMERCIAL

## 2018-05-29 VITALS
HEART RATE: 92 BPM | RESPIRATION RATE: 20 BRPM | SYSTOLIC BLOOD PRESSURE: 168 MMHG | DIASTOLIC BLOOD PRESSURE: 88 MMHG | TEMPERATURE: 98.8 F

## 2018-05-29 DIAGNOSIS — E11.621 DIABETIC ULCER OF LEFT HEEL ASSOCIATED WITH TYPE 2 DIABETES MELLITUS, WITH BONE INVOLVEMENT WITHOUT EVIDENCE OF NECROSIS (HCC): ICD-10-CM

## 2018-05-29 DIAGNOSIS — L97.426 DIABETIC ULCER OF LEFT HEEL ASSOCIATED WITH TYPE 2 DIABETES MELLITUS, WITH BONE INVOLVEMENT WITHOUT EVIDENCE OF NECROSIS (HCC): ICD-10-CM

## 2018-05-29 LAB
ALBUMIN SERPL-MCNC: 3.8 G/DL (ref 3.5–5.2)
ALP BLD-CCNC: 70 U/L (ref 35–104)
ALT SERPL-CCNC: 13 U/L (ref 0–32)
AST SERPL-CCNC: 16 U/L (ref 0–31)
BASOPHILS ABSOLUTE: 0.07 E9/L (ref 0–0.2)
BASOPHILS RELATIVE PERCENT: 0.6 % (ref 0–2)
BILIRUB SERPL-MCNC: 0.2 MG/DL (ref 0–1.2)
BILIRUBIN DIRECT: <0.2 MG/DL (ref 0–0.3)
BILIRUBIN, INDIRECT: NORMAL MG/DL (ref 0–1)
BUN BLDV-MCNC: 15 MG/DL (ref 8–23)
C-REACTIVE PROTEIN: 1.6 MG/DL (ref 0–0.4)
CREAT SERPL-MCNC: 1 MG/DL (ref 0.5–1)
EOSINOPHILS ABSOLUTE: 0.41 E9/L (ref 0.05–0.5)
EOSINOPHILS RELATIVE PERCENT: 3.8 % (ref 0–6)
GFR AFRICAN AMERICAN: >60
GFR NON-AFRICAN AMERICAN: 56 ML/MIN/1.73
HCT VFR BLD CALC: 35.8 % (ref 34–48)
HEMOGLOBIN: 11.6 G/DL (ref 11.5–15.5)
IMMATURE GRANULOCYTES #: 0.05 E9/L
IMMATURE GRANULOCYTES %: 0.5 % (ref 0–5)
LYMPHOCYTES ABSOLUTE: 2.81 E9/L (ref 1.5–4)
LYMPHOCYTES RELATIVE PERCENT: 26 % (ref 20–42)
MCH RBC QN AUTO: 32.4 PG (ref 26–35)
MCHC RBC AUTO-ENTMCNC: 32.4 % (ref 32–34.5)
MCV RBC AUTO: 100 FL (ref 80–99.9)
METER GLUCOSE: 115 MG/DL (ref 70–110)
METER GLUCOSE: 134 MG/DL (ref 70–110)
MONOCYTES ABSOLUTE: 0.84 E9/L (ref 0.1–0.95)
MONOCYTES RELATIVE PERCENT: 7.8 % (ref 2–12)
NEUTROPHILS ABSOLUTE: 6.62 E9/L (ref 1.8–7.3)
NEUTROPHILS RELATIVE PERCENT: 61.3 % (ref 43–80)
PDW BLD-RTO: 12.6 FL (ref 11.5–15)
PLATELET # BLD: 282 E9/L (ref 130–450)
PMV BLD AUTO: 9.7 FL (ref 7–12)
RBC # BLD: 3.58 E12/L (ref 3.5–5.5)
SEDIMENTATION RATE, ERYTHROCYTE: 68 MM/HR (ref 0–20)
TOTAL CK: 93 U/L (ref 20–180)
TOTAL PROTEIN: 7.1 G/DL (ref 6.4–8.3)
WBC # BLD: 10.8 E9/L (ref 4.5–11.5)

## 2018-05-29 PROCEDURE — 82962 GLUCOSE BLOOD TEST: CPT

## 2018-05-29 PROCEDURE — 99183 HYPERBARIC OXYGEN THERAPY: CPT | Performed by: SURGERY

## 2018-05-29 PROCEDURE — G0277 HBOT, FULL BODY CHAMBER, 30M: HCPCS

## 2018-05-30 ENCOUNTER — HOSPITAL ENCOUNTER (OUTPATIENT)
Dept: HYPERBARIC MEDICINE | Age: 63
Setting detail: THERAPIES SERIES
Discharge: HOME | End: 2018-05-30
Payer: COMMERCIAL

## 2018-05-30 VITALS
RESPIRATION RATE: 20 BRPM | HEART RATE: 76 BPM | SYSTOLIC BLOOD PRESSURE: 175 MMHG | TEMPERATURE: 98.5 F | DIASTOLIC BLOOD PRESSURE: 95 MMHG

## 2018-05-30 DIAGNOSIS — L97.426 DIABETIC ULCER OF LEFT HEEL ASSOCIATED WITH TYPE 2 DIABETES MELLITUS, WITH BONE INVOLVEMENT WITHOUT EVIDENCE OF NECROSIS (HCC): ICD-10-CM

## 2018-05-30 DIAGNOSIS — E11.621 DIABETIC ULCER OF LEFT HEEL ASSOCIATED WITH TYPE 2 DIABETES MELLITUS, WITH BONE INVOLVEMENT WITHOUT EVIDENCE OF NECROSIS (HCC): ICD-10-CM

## 2018-05-30 LAB
METER GLUCOSE: 126 MG/DL (ref 70–110)
METER GLUCOSE: 150 MG/DL (ref 70–110)

## 2018-05-30 PROCEDURE — G0277 HBOT, FULL BODY CHAMBER, 30M: HCPCS

## 2018-05-30 PROCEDURE — 99183 HYPERBARIC OXYGEN THERAPY: CPT | Performed by: SURGERY

## 2018-05-30 PROCEDURE — 82962 GLUCOSE BLOOD TEST: CPT

## 2018-05-31 ENCOUNTER — HOSPITAL ENCOUNTER (OUTPATIENT)
Dept: HYPERBARIC MEDICINE | Age: 63
Setting detail: THERAPIES SERIES
Discharge: HOME | End: 2018-05-31
Payer: COMMERCIAL

## 2018-05-31 VITALS
SYSTOLIC BLOOD PRESSURE: 162 MMHG | RESPIRATION RATE: 20 BRPM | HEART RATE: 84 BPM | DIASTOLIC BLOOD PRESSURE: 84 MMHG | TEMPERATURE: 98.5 F

## 2018-05-31 DIAGNOSIS — E11.621 DIABETIC ULCER OF LEFT HEEL ASSOCIATED WITH TYPE 2 DIABETES MELLITUS, WITH BONE INVOLVEMENT WITHOUT EVIDENCE OF NECROSIS (HCC): ICD-10-CM

## 2018-05-31 DIAGNOSIS — L97.426 DIABETIC ULCER OF LEFT HEEL ASSOCIATED WITH TYPE 2 DIABETES MELLITUS, WITH BONE INVOLVEMENT WITHOUT EVIDENCE OF NECROSIS (HCC): ICD-10-CM

## 2018-05-31 LAB
METER GLUCOSE: 133 MG/DL (ref 70–110)
METER GLUCOSE: 201 MG/DL (ref 70–110)

## 2018-05-31 PROCEDURE — 82962 GLUCOSE BLOOD TEST: CPT

## 2018-05-31 PROCEDURE — G0277 HBOT, FULL BODY CHAMBER, 30M: HCPCS

## 2018-05-31 PROCEDURE — 99183 HYPERBARIC OXYGEN THERAPY: CPT | Performed by: SURGERY

## 2018-06-01 ENCOUNTER — HOSPITAL ENCOUNTER (OUTPATIENT)
Dept: HYPERBARIC MEDICINE | Age: 63
Setting detail: THERAPIES SERIES
Discharge: HOME | End: 2018-06-01
Payer: COMMERCIAL

## 2018-06-01 VITALS
RESPIRATION RATE: 20 BRPM | HEART RATE: 84 BPM | SYSTOLIC BLOOD PRESSURE: 159 MMHG | TEMPERATURE: 98.3 F | DIASTOLIC BLOOD PRESSURE: 84 MMHG

## 2018-06-01 DIAGNOSIS — E11.621 DIABETIC ULCER OF LEFT HEEL ASSOCIATED WITH TYPE 2 DIABETES MELLITUS, WITH BONE INVOLVEMENT WITHOUT EVIDENCE OF NECROSIS (HCC): ICD-10-CM

## 2018-06-01 DIAGNOSIS — L97.426 DIABETIC ULCER OF LEFT HEEL ASSOCIATED WITH TYPE 2 DIABETES MELLITUS, WITH BONE INVOLVEMENT WITHOUT EVIDENCE OF NECROSIS (HCC): ICD-10-CM

## 2018-06-01 LAB
METER GLUCOSE: 134 MG/DL (ref 70–110)
METER GLUCOSE: 195 MG/DL (ref 70–110)

## 2018-06-01 PROCEDURE — G0277 HBOT, FULL BODY CHAMBER, 30M: HCPCS

## 2018-06-01 PROCEDURE — 99183 HYPERBARIC OXYGEN THERAPY: CPT | Performed by: SURGERY

## 2018-06-01 PROCEDURE — 82962 GLUCOSE BLOOD TEST: CPT

## 2018-06-01 PROCEDURE — 99183 HYPERBARIC OXYGEN THERAPY: CPT

## 2018-06-04 ENCOUNTER — HOSPITAL ENCOUNTER (OUTPATIENT)
Dept: HYPERBARIC MEDICINE | Age: 63
Setting detail: THERAPIES SERIES
Discharge: HOME | End: 2018-06-04
Payer: COMMERCIAL

## 2018-06-04 ENCOUNTER — HOSPITAL ENCOUNTER (OUTPATIENT)
Dept: WOUND CARE | Age: 63
Discharge: HOME | End: 2018-06-04
Payer: COMMERCIAL

## 2018-06-04 VITALS
BODY MASS INDEX: 27.03 KG/M2 | TEMPERATURE: 98.3 F | RESPIRATION RATE: 18 BRPM | SYSTOLIC BLOOD PRESSURE: 138 MMHG | WEIGHT: 170 LBS | HEART RATE: 80 BPM | DIASTOLIC BLOOD PRESSURE: 80 MMHG

## 2018-06-04 VITALS
HEART RATE: 84 BPM | RESPIRATION RATE: 20 BRPM | SYSTOLIC BLOOD PRESSURE: 178 MMHG | DIASTOLIC BLOOD PRESSURE: 97 MMHG | TEMPERATURE: 98.5 F

## 2018-06-04 DIAGNOSIS — L97.426 DIABETIC ULCER OF LEFT HEEL ASSOCIATED WITH TYPE 2 DIABETES MELLITUS, WITH BONE INVOLVEMENT WITHOUT EVIDENCE OF NECROSIS (HCC): ICD-10-CM

## 2018-06-04 DIAGNOSIS — E11.621 DIABETIC ULCER OF LEFT HEEL ASSOCIATED WITH TYPE 2 DIABETES MELLITUS, WITH BONE INVOLVEMENT WITHOUT EVIDENCE OF NECROSIS (HCC): ICD-10-CM

## 2018-06-04 LAB
METER GLUCOSE: 218 MG/DL (ref 70–110)
METER GLUCOSE: 287 MG/DL (ref 70–110)

## 2018-06-04 PROCEDURE — 97607 NEG PRS WND THR NDME<=50SQCM: CPT

## 2018-06-04 PROCEDURE — G0277 HBOT, FULL BODY CHAMBER, 30M: HCPCS

## 2018-06-04 PROCEDURE — 11042 DBRDMT SUBQ TIS 1ST 20SQCM/<: CPT

## 2018-06-04 PROCEDURE — 99183 HYPERBARIC OXYGEN THERAPY: CPT

## 2018-06-04 PROCEDURE — 99183 HYPERBARIC OXYGEN THERAPY: CPT | Performed by: SURGERY

## 2018-06-04 PROCEDURE — 82962 GLUCOSE BLOOD TEST: CPT

## 2018-06-04 RX ORDER — PRAVASTATIN SODIUM 20 MG
40 TABLET ORAL NIGHTLY
COMMUNITY
End: 2018-09-10 | Stop reason: ALTCHOICE

## 2018-06-05 ENCOUNTER — HOSPITAL ENCOUNTER (OUTPATIENT)
Dept: HYPERBARIC MEDICINE | Age: 63
Setting detail: THERAPIES SERIES
Discharge: HOME | End: 2018-06-05
Payer: COMMERCIAL

## 2018-06-05 VITALS
HEART RATE: 84 BPM | DIASTOLIC BLOOD PRESSURE: 98 MMHG | SYSTOLIC BLOOD PRESSURE: 168 MMHG | TEMPERATURE: 98.2 F | RESPIRATION RATE: 20 BRPM

## 2018-06-05 DIAGNOSIS — L97.426 DIABETIC ULCER OF LEFT HEEL ASSOCIATED WITH TYPE 2 DIABETES MELLITUS, WITH BONE INVOLVEMENT WITHOUT EVIDENCE OF NECROSIS (HCC): ICD-10-CM

## 2018-06-05 DIAGNOSIS — E11.621 DIABETIC ULCER OF LEFT HEEL ASSOCIATED WITH TYPE 2 DIABETES MELLITUS, WITH BONE INVOLVEMENT WITHOUT EVIDENCE OF NECROSIS (HCC): ICD-10-CM

## 2018-06-05 LAB
METER GLUCOSE: 213 MG/DL (ref 70–110)
METER GLUCOSE: 284 MG/DL (ref 70–110)

## 2018-06-05 PROCEDURE — G0277 HBOT, FULL BODY CHAMBER, 30M: HCPCS

## 2018-06-05 PROCEDURE — 82962 GLUCOSE BLOOD TEST: CPT

## 2018-06-05 PROCEDURE — 99183 HYPERBARIC OXYGEN THERAPY: CPT | Performed by: SURGERY

## 2018-06-05 PROCEDURE — 99183 HYPERBARIC OXYGEN THERAPY: CPT

## 2018-06-06 ENCOUNTER — HOSPITAL ENCOUNTER (OUTPATIENT)
Dept: HYPERBARIC MEDICINE | Age: 63
Setting detail: THERAPIES SERIES
Discharge: HOME | End: 2018-06-06
Payer: COMMERCIAL

## 2018-06-06 VITALS
RESPIRATION RATE: 20 BRPM | SYSTOLIC BLOOD PRESSURE: 149 MMHG | DIASTOLIC BLOOD PRESSURE: 77 MMHG | TEMPERATURE: 98.1 F | HEART RATE: 84 BPM

## 2018-06-06 DIAGNOSIS — E11.621 DIABETIC ULCER OF LEFT HEEL ASSOCIATED WITH TYPE 2 DIABETES MELLITUS, WITH BONE INVOLVEMENT WITHOUT EVIDENCE OF NECROSIS (HCC): ICD-10-CM

## 2018-06-06 DIAGNOSIS — L97.426 DIABETIC ULCER OF LEFT HEEL ASSOCIATED WITH TYPE 2 DIABETES MELLITUS, WITH BONE INVOLVEMENT WITHOUT EVIDENCE OF NECROSIS (HCC): ICD-10-CM

## 2018-06-06 LAB
METER GLUCOSE: 166 MG/DL (ref 70–110)
METER GLUCOSE: 267 MG/DL (ref 70–110)

## 2018-06-06 PROCEDURE — 99183 HYPERBARIC OXYGEN THERAPY: CPT

## 2018-06-06 PROCEDURE — 99183 HYPERBARIC OXYGEN THERAPY: CPT | Performed by: SURGERY

## 2018-06-06 PROCEDURE — G0277 HBOT, FULL BODY CHAMBER, 30M: HCPCS

## 2018-06-06 PROCEDURE — 82962 GLUCOSE BLOOD TEST: CPT

## 2018-06-07 ENCOUNTER — HOSPITAL ENCOUNTER (OUTPATIENT)
Dept: HYPERBARIC MEDICINE | Age: 63
Setting detail: THERAPIES SERIES
Discharge: HOME | End: 2018-06-07
Payer: COMMERCIAL

## 2018-06-07 VITALS
HEART RATE: 84 BPM | SYSTOLIC BLOOD PRESSURE: 160 MMHG | RESPIRATION RATE: 20 BRPM | TEMPERATURE: 98.9 F | DIASTOLIC BLOOD PRESSURE: 80 MMHG

## 2018-06-07 DIAGNOSIS — L97.426 DIABETIC ULCER OF LEFT HEEL ASSOCIATED WITH TYPE 2 DIABETES MELLITUS, WITH BONE INVOLVEMENT WITHOUT EVIDENCE OF NECROSIS (HCC): ICD-10-CM

## 2018-06-07 DIAGNOSIS — E11.621 DIABETIC ULCER OF LEFT HEEL ASSOCIATED WITH TYPE 2 DIABETES MELLITUS, WITH BONE INVOLVEMENT WITHOUT EVIDENCE OF NECROSIS (HCC): ICD-10-CM

## 2018-06-07 LAB
METER GLUCOSE: 140 MG/DL (ref 70–110)
METER GLUCOSE: 151 MG/DL (ref 70–110)

## 2018-06-07 PROCEDURE — G0277 HBOT, FULL BODY CHAMBER, 30M: HCPCS

## 2018-06-07 PROCEDURE — 99183 HYPERBARIC OXYGEN THERAPY: CPT | Performed by: SURGERY

## 2018-06-07 PROCEDURE — 99183 HYPERBARIC OXYGEN THERAPY: CPT

## 2018-06-07 PROCEDURE — 82962 GLUCOSE BLOOD TEST: CPT

## 2018-06-08 ENCOUNTER — HOSPITAL ENCOUNTER (OUTPATIENT)
Dept: HYPERBARIC MEDICINE | Age: 63
Setting detail: THERAPIES SERIES
Discharge: HOME | End: 2018-06-08
Payer: COMMERCIAL

## 2018-06-08 VITALS
RESPIRATION RATE: 20 BRPM | TEMPERATURE: 98.5 F | SYSTOLIC BLOOD PRESSURE: 147 MMHG | DIASTOLIC BLOOD PRESSURE: 78 MMHG | HEART RATE: 84 BPM

## 2018-06-08 DIAGNOSIS — E11.621 DIABETIC ULCER OF LEFT HEEL ASSOCIATED WITH TYPE 2 DIABETES MELLITUS, WITH BONE INVOLVEMENT WITHOUT EVIDENCE OF NECROSIS (HCC): ICD-10-CM

## 2018-06-08 DIAGNOSIS — L97.426 DIABETIC ULCER OF LEFT HEEL ASSOCIATED WITH TYPE 2 DIABETES MELLITUS, WITH BONE INVOLVEMENT WITHOUT EVIDENCE OF NECROSIS (HCC): ICD-10-CM

## 2018-06-08 LAB
METER GLUCOSE: 201 MG/DL (ref 70–110)
METER GLUCOSE: 232 MG/DL (ref 70–110)

## 2018-06-08 PROCEDURE — 82962 GLUCOSE BLOOD TEST: CPT

## 2018-06-08 PROCEDURE — G0277 HBOT, FULL BODY CHAMBER, 30M: HCPCS

## 2018-06-08 PROCEDURE — 99183 HYPERBARIC OXYGEN THERAPY: CPT | Performed by: SURGERY

## 2018-06-08 PROCEDURE — 99183 HYPERBARIC OXYGEN THERAPY: CPT

## 2018-06-11 ENCOUNTER — HOSPITAL ENCOUNTER (OUTPATIENT)
Dept: WOUND CARE | Age: 63
Discharge: HOME | End: 2018-06-11
Payer: COMMERCIAL

## 2018-06-11 ENCOUNTER — HOSPITAL ENCOUNTER (OUTPATIENT)
Dept: HYPERBARIC MEDICINE | Age: 63
Setting detail: THERAPIES SERIES
Discharge: HOME | End: 2018-06-11
Payer: COMMERCIAL

## 2018-06-11 VITALS
RESPIRATION RATE: 16 BRPM | BODY MASS INDEX: 26.68 KG/M2 | TEMPERATURE: 98.2 F | HEIGHT: 67 IN | WEIGHT: 170 LBS | HEART RATE: 84 BPM | SYSTOLIC BLOOD PRESSURE: 106 MMHG | DIASTOLIC BLOOD PRESSURE: 66 MMHG

## 2018-06-11 VITALS
SYSTOLIC BLOOD PRESSURE: 154 MMHG | HEART RATE: 84 BPM | DIASTOLIC BLOOD PRESSURE: 83 MMHG | TEMPERATURE: 98.7 F | RESPIRATION RATE: 20 BRPM

## 2018-06-11 DIAGNOSIS — L97.426 DIABETIC ULCER OF LEFT HEEL ASSOCIATED WITH TYPE 2 DIABETES MELLITUS, WITH BONE INVOLVEMENT WITHOUT EVIDENCE OF NECROSIS (HCC): ICD-10-CM

## 2018-06-11 DIAGNOSIS — E11.621 DIABETIC ULCER OF LEFT HEEL ASSOCIATED WITH TYPE 2 DIABETES MELLITUS, WITH BONE INVOLVEMENT WITHOUT EVIDENCE OF NECROSIS (HCC): ICD-10-CM

## 2018-06-11 LAB
METER GLUCOSE: 180 MG/DL (ref 70–110)
METER GLUCOSE: 220 MG/DL (ref 70–110)

## 2018-06-11 PROCEDURE — 97607 NEG PRS WND THR NDME<=50SQCM: CPT

## 2018-06-11 PROCEDURE — G0277 HBOT, FULL BODY CHAMBER, 30M: HCPCS

## 2018-06-11 PROCEDURE — 82962 GLUCOSE BLOOD TEST: CPT

## 2018-06-11 PROCEDURE — 11042 DBRDMT SUBQ TIS 1ST 20SQCM/<: CPT

## 2018-06-11 PROCEDURE — 99183 HYPERBARIC OXYGEN THERAPY: CPT

## 2018-06-11 PROCEDURE — 99183 HYPERBARIC OXYGEN THERAPY: CPT | Performed by: SURGERY

## 2018-06-12 ENCOUNTER — HOSPITAL ENCOUNTER (OUTPATIENT)
Dept: HYPERBARIC MEDICINE | Age: 63
Setting detail: THERAPIES SERIES
Discharge: HOME | End: 2018-06-12
Payer: COMMERCIAL

## 2018-06-12 VITALS
SYSTOLIC BLOOD PRESSURE: 123 MMHG | TEMPERATURE: 98 F | DIASTOLIC BLOOD PRESSURE: 73 MMHG | HEART RATE: 84 BPM | RESPIRATION RATE: 20 BRPM

## 2018-06-12 DIAGNOSIS — E11.621 DIABETIC ULCER OF LEFT HEEL ASSOCIATED WITH TYPE 2 DIABETES MELLITUS, WITH BONE INVOLVEMENT WITHOUT EVIDENCE OF NECROSIS (HCC): ICD-10-CM

## 2018-06-12 DIAGNOSIS — L97.426 DIABETIC ULCER OF LEFT HEEL ASSOCIATED WITH TYPE 2 DIABETES MELLITUS, WITH BONE INVOLVEMENT WITHOUT EVIDENCE OF NECROSIS (HCC): ICD-10-CM

## 2018-06-12 LAB
METER GLUCOSE: 172 MG/DL (ref 70–110)
METER GLUCOSE: 206 MG/DL (ref 70–110)

## 2018-06-12 PROCEDURE — 99183 HYPERBARIC OXYGEN THERAPY: CPT

## 2018-06-12 PROCEDURE — 99183 HYPERBARIC OXYGEN THERAPY: CPT | Performed by: SURGERY

## 2018-06-12 PROCEDURE — 82962 GLUCOSE BLOOD TEST: CPT

## 2018-06-12 PROCEDURE — G0277 HBOT, FULL BODY CHAMBER, 30M: HCPCS

## 2018-06-13 ENCOUNTER — HOSPITAL ENCOUNTER (OUTPATIENT)
Dept: HYPERBARIC MEDICINE | Age: 63
Setting detail: THERAPIES SERIES
Discharge: HOME | End: 2018-06-13
Payer: COMMERCIAL

## 2018-06-13 VITALS
DIASTOLIC BLOOD PRESSURE: 80 MMHG | TEMPERATURE: 98.2 F | RESPIRATION RATE: 20 BRPM | HEART RATE: 80 BPM | SYSTOLIC BLOOD PRESSURE: 119 MMHG

## 2018-06-13 DIAGNOSIS — E11.621 DIABETIC ULCER OF LEFT HEEL ASSOCIATED WITH TYPE 2 DIABETES MELLITUS, WITH BONE INVOLVEMENT WITHOUT EVIDENCE OF NECROSIS (HCC): ICD-10-CM

## 2018-06-13 DIAGNOSIS — L97.426 DIABETIC ULCER OF LEFT HEEL ASSOCIATED WITH TYPE 2 DIABETES MELLITUS, WITH BONE INVOLVEMENT WITHOUT EVIDENCE OF NECROSIS (HCC): ICD-10-CM

## 2018-06-13 LAB
METER GLUCOSE: 162 MG/DL (ref 70–110)
METER GLUCOSE: 239 MG/DL (ref 70–110)

## 2018-06-13 PROCEDURE — 99183 HYPERBARIC OXYGEN THERAPY: CPT | Performed by: SURGERY

## 2018-06-13 PROCEDURE — G0277 HBOT, FULL BODY CHAMBER, 30M: HCPCS

## 2018-06-13 PROCEDURE — 82962 GLUCOSE BLOOD TEST: CPT

## 2018-06-13 PROCEDURE — 99183 HYPERBARIC OXYGEN THERAPY: CPT

## 2018-06-14 ENCOUNTER — HOSPITAL ENCOUNTER (OUTPATIENT)
Dept: HYPERBARIC MEDICINE | Age: 63
Setting detail: THERAPIES SERIES
Discharge: HOME | End: 2018-06-14
Payer: COMMERCIAL

## 2018-06-14 VITALS
HEART RATE: 84 BPM | SYSTOLIC BLOOD PRESSURE: 90 MMHG | DIASTOLIC BLOOD PRESSURE: 77 MMHG | RESPIRATION RATE: 20 BRPM | TEMPERATURE: 98.1 F

## 2018-06-14 DIAGNOSIS — L97.426 DIABETIC ULCER OF LEFT HEEL ASSOCIATED WITH TYPE 2 DIABETES MELLITUS, WITH BONE INVOLVEMENT WITHOUT EVIDENCE OF NECROSIS (HCC): ICD-10-CM

## 2018-06-14 DIAGNOSIS — E11.621 DIABETIC ULCER OF LEFT HEEL ASSOCIATED WITH TYPE 2 DIABETES MELLITUS, WITH BONE INVOLVEMENT WITHOUT EVIDENCE OF NECROSIS (HCC): ICD-10-CM

## 2018-06-14 LAB
METER GLUCOSE: 131 MG/DL (ref 70–110)
METER GLUCOSE: 155 MG/DL (ref 70–110)

## 2018-06-14 PROCEDURE — G0277 HBOT, FULL BODY CHAMBER, 30M: HCPCS

## 2018-06-14 PROCEDURE — 99183 HYPERBARIC OXYGEN THERAPY: CPT

## 2018-06-14 PROCEDURE — 82962 GLUCOSE BLOOD TEST: CPT

## 2018-06-14 PROCEDURE — 99183 HYPERBARIC OXYGEN THERAPY: CPT | Performed by: SURGERY

## 2018-06-15 ENCOUNTER — HOSPITAL ENCOUNTER (OUTPATIENT)
Dept: HYPERBARIC MEDICINE | Age: 63
Setting detail: THERAPIES SERIES
Discharge: HOME | End: 2018-06-15
Payer: COMMERCIAL

## 2018-06-15 VITALS
RESPIRATION RATE: 20 BRPM | DIASTOLIC BLOOD PRESSURE: 80 MMHG | HEART RATE: 76 BPM | TEMPERATURE: 98.3 F | SYSTOLIC BLOOD PRESSURE: 151 MMHG

## 2018-06-15 DIAGNOSIS — E11.621 DIABETIC ULCER OF LEFT HEEL ASSOCIATED WITH TYPE 2 DIABETES MELLITUS, WITH BONE INVOLVEMENT WITHOUT EVIDENCE OF NECROSIS (HCC): ICD-10-CM

## 2018-06-15 DIAGNOSIS — L97.426 DIABETIC ULCER OF LEFT HEEL ASSOCIATED WITH TYPE 2 DIABETES MELLITUS, WITH BONE INVOLVEMENT WITHOUT EVIDENCE OF NECROSIS (HCC): ICD-10-CM

## 2018-06-15 LAB
METER GLUCOSE: 152 MG/DL (ref 70–110)
METER GLUCOSE: 224 MG/DL (ref 70–110)

## 2018-06-15 PROCEDURE — 99183 HYPERBARIC OXYGEN THERAPY: CPT

## 2018-06-15 PROCEDURE — 82962 GLUCOSE BLOOD TEST: CPT

## 2018-06-15 PROCEDURE — 99183 HYPERBARIC OXYGEN THERAPY: CPT | Performed by: SURGERY

## 2018-06-15 PROCEDURE — G0277 HBOT, FULL BODY CHAMBER, 30M: HCPCS

## 2018-06-18 ENCOUNTER — HOSPITAL ENCOUNTER (OUTPATIENT)
Dept: WOUND CARE | Age: 63
Discharge: HOME | End: 2018-06-18
Payer: COMMERCIAL

## 2018-06-18 ENCOUNTER — HOSPITAL ENCOUNTER (OUTPATIENT)
Dept: HYPERBARIC MEDICINE | Age: 63
Setting detail: THERAPIES SERIES
Discharge: HOME | End: 2018-06-18
Payer: COMMERCIAL

## 2018-06-18 VITALS
DIASTOLIC BLOOD PRESSURE: 86 MMHG | RESPIRATION RATE: 20 BRPM | HEART RATE: 80 BPM | SYSTOLIC BLOOD PRESSURE: 131 MMHG | TEMPERATURE: 98.4 F

## 2018-06-18 DIAGNOSIS — L97.426 DIABETIC ULCER OF LEFT HEEL ASSOCIATED WITH TYPE 2 DIABETES MELLITUS, WITH BONE INVOLVEMENT WITHOUT EVIDENCE OF NECROSIS (HCC): ICD-10-CM

## 2018-06-18 DIAGNOSIS — E11.621 DIABETIC ULCER OF LEFT HEEL ASSOCIATED WITH TYPE 2 DIABETES MELLITUS, WITH BONE INVOLVEMENT WITHOUT EVIDENCE OF NECROSIS (HCC): ICD-10-CM

## 2018-06-18 LAB
METER GLUCOSE: 164 MG/DL (ref 70–110)
METER GLUCOSE: 233 MG/DL (ref 70–110)

## 2018-06-18 PROCEDURE — 97607 NEG PRS WND THR NDME<=50SQCM: CPT

## 2018-06-18 PROCEDURE — 82962 GLUCOSE BLOOD TEST: CPT

## 2018-06-18 PROCEDURE — 11042 DBRDMT SUBQ TIS 1ST 20SQCM/<: CPT

## 2018-06-18 PROCEDURE — 99183 HYPERBARIC OXYGEN THERAPY: CPT | Performed by: SURGERY

## 2018-06-18 PROCEDURE — 99183 HYPERBARIC OXYGEN THERAPY: CPT

## 2018-06-18 PROCEDURE — G0277 HBOT, FULL BODY CHAMBER, 30M: HCPCS

## 2018-06-19 ENCOUNTER — HOSPITAL ENCOUNTER (OUTPATIENT)
Dept: HYPERBARIC MEDICINE | Age: 63
Setting detail: THERAPIES SERIES
Discharge: HOME | End: 2018-06-19
Payer: COMMERCIAL

## 2018-06-19 VITALS
HEART RATE: 84 BPM | DIASTOLIC BLOOD PRESSURE: 85 MMHG | SYSTOLIC BLOOD PRESSURE: 147 MMHG | RESPIRATION RATE: 20 BRPM | TEMPERATURE: 97.8 F

## 2018-06-19 DIAGNOSIS — E11.621 DIABETIC ULCER OF LEFT HEEL ASSOCIATED WITH TYPE 2 DIABETES MELLITUS, WITH BONE INVOLVEMENT WITHOUT EVIDENCE OF NECROSIS (HCC): ICD-10-CM

## 2018-06-19 DIAGNOSIS — L97.426 DIABETIC ULCER OF LEFT HEEL ASSOCIATED WITH TYPE 2 DIABETES MELLITUS, WITH BONE INVOLVEMENT WITHOUT EVIDENCE OF NECROSIS (HCC): ICD-10-CM

## 2018-06-19 LAB
METER GLUCOSE: 127 MG/DL (ref 70–110)
METER GLUCOSE: 184 MG/DL (ref 70–110)

## 2018-06-19 PROCEDURE — G0277 HBOT, FULL BODY CHAMBER, 30M: HCPCS

## 2018-06-19 PROCEDURE — 99183 HYPERBARIC OXYGEN THERAPY: CPT

## 2018-06-19 PROCEDURE — 82962 GLUCOSE BLOOD TEST: CPT

## 2018-06-19 RX ORDER — ASCORBIC ACID 500 MG
500 TABLET ORAL DAILY
COMMUNITY

## 2018-06-20 ENCOUNTER — HOSPITAL ENCOUNTER (OUTPATIENT)
Dept: HYPERBARIC MEDICINE | Age: 63
Setting detail: THERAPIES SERIES
Discharge: HOME | End: 2018-06-20
Payer: COMMERCIAL

## 2018-06-20 VITALS
RESPIRATION RATE: 20 BRPM | DIASTOLIC BLOOD PRESSURE: 80 MMHG | HEART RATE: 76 BPM | TEMPERATURE: 98.5 F | SYSTOLIC BLOOD PRESSURE: 147 MMHG

## 2018-06-20 DIAGNOSIS — L97.426 DIABETIC ULCER OF LEFT HEEL ASSOCIATED WITH TYPE 2 DIABETES MELLITUS, WITH BONE INVOLVEMENT WITHOUT EVIDENCE OF NECROSIS (HCC): ICD-10-CM

## 2018-06-20 DIAGNOSIS — E11.621 DIABETIC ULCER OF LEFT HEEL ASSOCIATED WITH TYPE 2 DIABETES MELLITUS, WITH BONE INVOLVEMENT WITHOUT EVIDENCE OF NECROSIS (HCC): ICD-10-CM

## 2018-06-20 LAB
METER GLUCOSE: 120 MG/DL (ref 70–110)
METER GLUCOSE: 183 MG/DL (ref 70–110)

## 2018-06-20 PROCEDURE — 99183 HYPERBARIC OXYGEN THERAPY: CPT

## 2018-06-20 PROCEDURE — G0277 HBOT, FULL BODY CHAMBER, 30M: HCPCS

## 2018-06-20 PROCEDURE — 82962 GLUCOSE BLOOD TEST: CPT

## 2018-06-20 PROCEDURE — 99183 HYPERBARIC OXYGEN THERAPY: CPT | Performed by: SURGERY

## 2018-06-21 ENCOUNTER — HOSPITAL ENCOUNTER (OUTPATIENT)
Dept: HYPERBARIC MEDICINE | Age: 63
Setting detail: THERAPIES SERIES
Discharge: HOME | End: 2018-06-21
Payer: COMMERCIAL

## 2018-06-21 VITALS
RESPIRATION RATE: 20 BRPM | HEART RATE: 80 BPM | TEMPERATURE: 98.3 F | DIASTOLIC BLOOD PRESSURE: 75 MMHG | SYSTOLIC BLOOD PRESSURE: 156 MMHG

## 2018-06-21 DIAGNOSIS — L97.426 DIABETIC ULCER OF LEFT HEEL ASSOCIATED WITH TYPE 2 DIABETES MELLITUS, WITH BONE INVOLVEMENT WITHOUT EVIDENCE OF NECROSIS (HCC): ICD-10-CM

## 2018-06-21 DIAGNOSIS — E11.621 DIABETIC ULCER OF LEFT HEEL ASSOCIATED WITH TYPE 2 DIABETES MELLITUS, WITH BONE INVOLVEMENT WITHOUT EVIDENCE OF NECROSIS (HCC): ICD-10-CM

## 2018-06-21 LAB
METER GLUCOSE: 119 MG/DL (ref 70–110)
METER GLUCOSE: 165 MG/DL (ref 70–110)

## 2018-06-21 PROCEDURE — 82962 GLUCOSE BLOOD TEST: CPT

## 2018-06-21 PROCEDURE — 99183 HYPERBARIC OXYGEN THERAPY: CPT

## 2018-06-21 PROCEDURE — G0277 HBOT, FULL BODY CHAMBER, 30M: HCPCS

## 2018-06-21 PROCEDURE — 99183 HYPERBARIC OXYGEN THERAPY: CPT | Performed by: SURGERY

## 2018-06-22 ENCOUNTER — HOSPITAL ENCOUNTER (OUTPATIENT)
Dept: HYPERBARIC MEDICINE | Age: 63
Setting detail: THERAPIES SERIES
Discharge: HOME | End: 2018-06-22
Payer: COMMERCIAL

## 2018-06-22 VITALS
DIASTOLIC BLOOD PRESSURE: 77 MMHG | TEMPERATURE: 98.2 F | SYSTOLIC BLOOD PRESSURE: 147 MMHG | RESPIRATION RATE: 16 BRPM | HEART RATE: 76 BPM

## 2018-06-22 DIAGNOSIS — E11.621 DIABETIC ULCER OF LEFT HEEL ASSOCIATED WITH TYPE 2 DIABETES MELLITUS, WITH BONE INVOLVEMENT WITHOUT EVIDENCE OF NECROSIS (HCC): ICD-10-CM

## 2018-06-22 DIAGNOSIS — L97.426 DIABETIC ULCER OF LEFT HEEL ASSOCIATED WITH TYPE 2 DIABETES MELLITUS, WITH BONE INVOLVEMENT WITHOUT EVIDENCE OF NECROSIS (HCC): ICD-10-CM

## 2018-06-22 LAB — METER GLUCOSE: 175 MG/DL (ref 70–110)

## 2018-06-22 PROCEDURE — 99183 HYPERBARIC OXYGEN THERAPY: CPT

## 2018-06-22 PROCEDURE — G0277 HBOT, FULL BODY CHAMBER, 30M: HCPCS

## 2018-06-22 PROCEDURE — 82962 GLUCOSE BLOOD TEST: CPT

## 2018-06-22 PROCEDURE — 99183 HYPERBARIC OXYGEN THERAPY: CPT | Performed by: SURGERY

## 2018-06-25 ENCOUNTER — HOSPITAL ENCOUNTER (OUTPATIENT)
Dept: HYPERBARIC MEDICINE | Age: 63
Setting detail: THERAPIES SERIES
Discharge: HOME | End: 2018-06-25
Payer: COMMERCIAL

## 2018-06-25 ENCOUNTER — HOSPITAL ENCOUNTER (OUTPATIENT)
Dept: WOUND CARE | Age: 63
Discharge: HOME | End: 2018-06-25
Payer: COMMERCIAL

## 2018-06-25 VITALS
TEMPERATURE: 98 F | DIASTOLIC BLOOD PRESSURE: 74 MMHG | HEART RATE: 76 BPM | WEIGHT: 170 LBS | BODY MASS INDEX: 27.32 KG/M2 | SYSTOLIC BLOOD PRESSURE: 124 MMHG | HEIGHT: 66 IN | RESPIRATION RATE: 18 BRPM

## 2018-06-25 VITALS
SYSTOLIC BLOOD PRESSURE: 165 MMHG | DIASTOLIC BLOOD PRESSURE: 80 MMHG | HEART RATE: 72 BPM | TEMPERATURE: 98.3 F | RESPIRATION RATE: 20 BRPM

## 2018-06-25 DIAGNOSIS — L97.426 DIABETIC ULCER OF LEFT HEEL ASSOCIATED WITH TYPE 2 DIABETES MELLITUS, WITH BONE INVOLVEMENT WITHOUT EVIDENCE OF NECROSIS (HCC): ICD-10-CM

## 2018-06-25 DIAGNOSIS — E11.621 DIABETIC ULCER OF LEFT HEEL ASSOCIATED WITH TYPE 2 DIABETES MELLITUS, WITH BONE INVOLVEMENT WITHOUT EVIDENCE OF NECROSIS (HCC): ICD-10-CM

## 2018-06-25 DIAGNOSIS — L97.422 NON-PRESSURE CHRONIC ULCER OF LEFT HEEL AND MIDFOOT WITH FAT LAYER EXPOSED (HCC): ICD-10-CM

## 2018-06-25 DIAGNOSIS — E11.42 DIABETIC POLYNEUROPATHY ASSOCIATED WITH TYPE 2 DIABETES MELLITUS (HCC): ICD-10-CM

## 2018-06-25 LAB
METER GLUCOSE: 130 MG/DL (ref 70–110)
METER GLUCOSE: 154 MG/DL (ref 70–110)

## 2018-06-25 PROCEDURE — G0277 HBOT, FULL BODY CHAMBER, 30M: HCPCS

## 2018-06-25 PROCEDURE — 99183 HYPERBARIC OXYGEN THERAPY: CPT

## 2018-06-25 PROCEDURE — 99183 HYPERBARIC OXYGEN THERAPY: CPT | Performed by: SURGERY

## 2018-06-25 PROCEDURE — 97607 NEG PRS WND THR NDME<=50SQCM: CPT

## 2018-06-25 PROCEDURE — 11042 DBRDMT SUBQ TIS 1ST 20SQCM/<: CPT

## 2018-06-25 PROCEDURE — 82962 GLUCOSE BLOOD TEST: CPT

## 2018-06-26 ENCOUNTER — HOSPITAL ENCOUNTER (OUTPATIENT)
Dept: HYPERBARIC MEDICINE | Age: 63
Setting detail: THERAPIES SERIES
Discharge: HOME | End: 2018-06-26
Payer: COMMERCIAL

## 2018-06-26 VITALS
RESPIRATION RATE: 20 BRPM | TEMPERATURE: 97.9 F | DIASTOLIC BLOOD PRESSURE: 77 MMHG | SYSTOLIC BLOOD PRESSURE: 140 MMHG | HEART RATE: 80 BPM

## 2018-06-26 DIAGNOSIS — E11.621 DIABETIC ULCER OF LEFT HEEL ASSOCIATED WITH TYPE 2 DIABETES MELLITUS, WITH BONE INVOLVEMENT WITHOUT EVIDENCE OF NECROSIS (HCC): ICD-10-CM

## 2018-06-26 DIAGNOSIS — L97.426 DIABETIC ULCER OF LEFT HEEL ASSOCIATED WITH TYPE 2 DIABETES MELLITUS, WITH BONE INVOLVEMENT WITHOUT EVIDENCE OF NECROSIS (HCC): ICD-10-CM

## 2018-06-26 LAB
METER GLUCOSE: 126 MG/DL (ref 70–110)
METER GLUCOSE: 176 MG/DL (ref 70–110)

## 2018-06-26 PROCEDURE — G0277 HBOT, FULL BODY CHAMBER, 30M: HCPCS

## 2018-06-26 PROCEDURE — 82962 GLUCOSE BLOOD TEST: CPT

## 2018-06-26 PROCEDURE — 99183 HYPERBARIC OXYGEN THERAPY: CPT | Performed by: SURGERY

## 2018-06-26 PROCEDURE — 99183 HYPERBARIC OXYGEN THERAPY: CPT

## 2018-06-27 ENCOUNTER — HOSPITAL ENCOUNTER (OUTPATIENT)
Dept: HYPERBARIC MEDICINE | Age: 63
Setting detail: THERAPIES SERIES
Discharge: HOME | End: 2018-06-27
Payer: COMMERCIAL

## 2018-06-27 VITALS
DIASTOLIC BLOOD PRESSURE: 84 MMHG | HEART RATE: 84 BPM | TEMPERATURE: 98.2 F | SYSTOLIC BLOOD PRESSURE: 149 MMHG | RESPIRATION RATE: 20 BRPM

## 2018-06-27 DIAGNOSIS — L97.426 DIABETIC ULCER OF LEFT HEEL ASSOCIATED WITH TYPE 2 DIABETES MELLITUS, WITH BONE INVOLVEMENT WITHOUT EVIDENCE OF NECROSIS (HCC): ICD-10-CM

## 2018-06-27 DIAGNOSIS — E11.621 DIABETIC ULCER OF LEFT HEEL ASSOCIATED WITH TYPE 2 DIABETES MELLITUS, WITH BONE INVOLVEMENT WITHOUT EVIDENCE OF NECROSIS (HCC): ICD-10-CM

## 2018-06-27 LAB
METER GLUCOSE: 124 MG/DL (ref 70–110)
METER GLUCOSE: 191 MG/DL (ref 70–110)

## 2018-06-27 PROCEDURE — 99183 HYPERBARIC OXYGEN THERAPY: CPT

## 2018-06-27 PROCEDURE — 99183 HYPERBARIC OXYGEN THERAPY: CPT | Performed by: SURGERY

## 2018-06-27 PROCEDURE — 82962 GLUCOSE BLOOD TEST: CPT

## 2018-06-27 PROCEDURE — G0277 HBOT, FULL BODY CHAMBER, 30M: HCPCS

## 2018-06-28 ENCOUNTER — HOSPITAL ENCOUNTER (OUTPATIENT)
Dept: HYPERBARIC MEDICINE | Age: 63
Setting detail: THERAPIES SERIES
Discharge: HOME | End: 2018-06-28
Payer: COMMERCIAL

## 2018-06-28 VITALS
RESPIRATION RATE: 20 BRPM | HEART RATE: 72 BPM | DIASTOLIC BLOOD PRESSURE: 85 MMHG | SYSTOLIC BLOOD PRESSURE: 148 MMHG | WEIGHT: 170 LBS | BODY MASS INDEX: 27.32 KG/M2 | HEIGHT: 66 IN | TEMPERATURE: 98.5 F

## 2018-06-28 DIAGNOSIS — E11.621 DIABETIC ULCER OF LEFT HEEL ASSOCIATED WITH TYPE 2 DIABETES MELLITUS, WITH BONE INVOLVEMENT WITHOUT EVIDENCE OF NECROSIS (HCC): ICD-10-CM

## 2018-06-28 DIAGNOSIS — L97.426 DIABETIC ULCER OF LEFT HEEL ASSOCIATED WITH TYPE 2 DIABETES MELLITUS, WITH BONE INVOLVEMENT WITHOUT EVIDENCE OF NECROSIS (HCC): ICD-10-CM

## 2018-06-28 LAB
METER GLUCOSE: 148 MG/DL (ref 70–110)
METER GLUCOSE: 220 MG/DL (ref 70–110)

## 2018-06-28 PROCEDURE — 99183 HYPERBARIC OXYGEN THERAPY: CPT | Performed by: SURGERY

## 2018-06-28 PROCEDURE — 82962 GLUCOSE BLOOD TEST: CPT

## 2018-06-28 PROCEDURE — 99183 HYPERBARIC OXYGEN THERAPY: CPT

## 2018-06-28 PROCEDURE — G0277 HBOT, FULL BODY CHAMBER, 30M: HCPCS

## 2018-06-29 ENCOUNTER — HOSPITAL ENCOUNTER (OUTPATIENT)
Dept: HYPERBARIC MEDICINE | Age: 63
Setting detail: THERAPIES SERIES
Discharge: HOME | End: 2018-06-29
Payer: COMMERCIAL

## 2018-06-29 VITALS
SYSTOLIC BLOOD PRESSURE: 99 MMHG | HEART RATE: 84 BPM | RESPIRATION RATE: 20 BRPM | TEMPERATURE: 98.3 F | DIASTOLIC BLOOD PRESSURE: 75 MMHG

## 2018-06-29 DIAGNOSIS — L97.426 DIABETIC ULCER OF LEFT HEEL ASSOCIATED WITH TYPE 2 DIABETES MELLITUS, WITH BONE INVOLVEMENT WITHOUT EVIDENCE OF NECROSIS (HCC): ICD-10-CM

## 2018-06-29 DIAGNOSIS — E11.621 DIABETIC ULCER OF LEFT HEEL ASSOCIATED WITH TYPE 2 DIABETES MELLITUS, WITH BONE INVOLVEMENT WITHOUT EVIDENCE OF NECROSIS (HCC): ICD-10-CM

## 2018-06-29 LAB
METER GLUCOSE: 132 MG/DL (ref 70–110)
METER GLUCOSE: 188 MG/DL (ref 70–110)

## 2018-06-29 PROCEDURE — 82962 GLUCOSE BLOOD TEST: CPT

## 2018-06-29 PROCEDURE — 99183 HYPERBARIC OXYGEN THERAPY: CPT | Performed by: SURGERY

## 2018-06-29 PROCEDURE — G0277 HBOT, FULL BODY CHAMBER, 30M: HCPCS

## 2018-06-29 PROCEDURE — 99183 HYPERBARIC OXYGEN THERAPY: CPT

## 2018-07-02 ENCOUNTER — HOSPITAL ENCOUNTER (OUTPATIENT)
Dept: WOUND CARE | Age: 63
Discharge: HOME | End: 2018-07-02
Payer: COMMERCIAL

## 2018-07-02 ENCOUNTER — HOSPITAL ENCOUNTER (OUTPATIENT)
Dept: HYPERBARIC MEDICINE | Age: 63
Setting detail: THERAPIES SERIES
Discharge: HOME | End: 2018-07-02
Payer: COMMERCIAL

## 2018-07-02 VITALS
WEIGHT: 170 LBS | HEART RATE: 88 BPM | TEMPERATURE: 98 F | RESPIRATION RATE: 18 BRPM | SYSTOLIC BLOOD PRESSURE: 136 MMHG | HEIGHT: 66 IN | DIASTOLIC BLOOD PRESSURE: 74 MMHG | BODY MASS INDEX: 27.32 KG/M2

## 2018-07-02 VITALS
RESPIRATION RATE: 20 BRPM | DIASTOLIC BLOOD PRESSURE: 89 MMHG | SYSTOLIC BLOOD PRESSURE: 165 MMHG | HEART RATE: 80 BPM | TEMPERATURE: 98.6 F

## 2018-07-02 DIAGNOSIS — E11.621 DIABETIC ULCER OF LEFT HEEL ASSOCIATED WITH TYPE 2 DIABETES MELLITUS, WITH BONE INVOLVEMENT WITHOUT EVIDENCE OF NECROSIS (HCC): ICD-10-CM

## 2018-07-02 DIAGNOSIS — L97.426 DIABETIC ULCER OF LEFT HEEL ASSOCIATED WITH TYPE 2 DIABETES MELLITUS, WITH BONE INVOLVEMENT WITHOUT EVIDENCE OF NECROSIS (HCC): ICD-10-CM

## 2018-07-02 LAB
METER GLUCOSE: 124 MG/DL (ref 70–110)
METER GLUCOSE: 197 MG/DL (ref 70–110)

## 2018-07-02 PROCEDURE — 11042 DBRDMT SUBQ TIS 1ST 20SQCM/<: CPT

## 2018-07-02 PROCEDURE — G0277 HBOT, FULL BODY CHAMBER, 30M: HCPCS

## 2018-07-02 PROCEDURE — 82962 GLUCOSE BLOOD TEST: CPT

## 2018-07-02 PROCEDURE — 99183 HYPERBARIC OXYGEN THERAPY: CPT | Performed by: SURGERY

## 2018-07-02 NOTE — PROGRESS NOTES
Wound Healing Center Followup Visit Note    Referring Physician : Kye Fernandez DO  Rajwinder Hernandez  MEDICAL RECORD NUMBER:  [id number]  AGE: 70 y.o. GENDER: female  : 1955  EPISODE DATE:  2018    Subjective:     Chief Complaint   Patient presents with    Wound Check     left heel      HISTORY of PRESENT ILLNESS JANE Viera is a 70 y.o. female who presents today for wound/ulcer evaluation. History of Wound Context:  Follow up visit and debridement.      Wound/Ulcer Pain Timing/Severity: none  Quality of pain: N/A  Severity:  0 / 10   Modifying Factors: None  Associated Signs/Symptoms: drainage and numbness    Ulcer Identification:  Ulcer Type: diabetic and non-healing surgical  Contributing Factors: diabetes    Diabetic/Pressure/Non Pressure Ulcers only:  Ulcer: Non-Pressure ulcer, fat layer exposed    Wound: Nonhealing surgical due to infection        PAST MEDICAL HISTORY      Diagnosis Date    Abnormal EKG     Diabetes mellitus ([address]     Hx of blood clots     Hyperlipemia     Thyroid disease      Past Surgical History:   Procedure Laterality Date    DILATION AND CURETTAGE OF UTERUS      ECHO COMPL W DOP COLOR FLOW  2013         ECHOCARDIOGRAM TRANSESOPHAGEAL  2013         FOOT SURGERY  13    i&d left foot and ankle with bone biopsy    HYSTERECTOMY      OTHER SURGICAL HISTORY Left 04/10/2018    debridement and bone biopsy heel    MN DEEP DISSEC FOOT INFEC,1 BURSA Left 4/10/2018    LEFT HEEL DEBRIDEMENT, BONE BIOPSY performed by Juan José Hendrickson DPM at [facility] OR    WISDOM TOOTH EXTRACTION       Family History   Problem Relation Age of Onset    Alzheimer's Disease Mother     Other Father     Other Maternal Grandfather         aneurysm     Social History   Substance Use Topics    Smoking status: Never Smoker    Smokeless tobacco: Never Used    Alcohol use No     Allergies   Allergen Reactions    Codeine Nausea And Vomiting     Current Outpatient Prescriptions on File Prior to Encounter   Medication Sig Dispense Refill    vitamin C (ASCORBIC ACID) 500 MG tablet Take 500 mg by mouth 2 times daily       pravastatin (PRAVACHOL) 20 MG tablet Take 20 mg by mouth daily      Lactobacillus Rhamnosus, GG, ([facility]MMUNITY SUPPORT) CAPS Take 1 capsule by mouth daily      sodium bicarbonate 325 MG tablet Take 650 mg by mouth 2 times daily      Cholecalciferol (VITAMIN D3) 2000 UNITS CAPS Take 4,000 Units by mouth daily       glipiZIDE (GLUCOTROL) 2.5 MG CR tablet Take 10 mg by mouth daily       gabapentin (NEURONTIN) 100 MG capsule Take 100 mg by mouth 2 times daily. After breakfast      acetaminophen 650 MG TABS Take 650 mg by mouth every 4 hours as needed (Fever >100.5 F (38 C)). 120 tablet     metformin (GLUCOPHAGE) 500 MG tablet Take 1,000 mg by mouth 2 times daily (with meals)       levothyroxine (SYNTHROID) 25 MCG tablet Take 50 mcg by mouth daily        No current facility-administered medications on file prior to encounter. REVIEW OF SYSTEMS See HPI    Objective:    /74   Pulse 88   Temp 98 °F (36.7 °C) (Oral)   Resp 18   Ht 5' 6\" (1.676 m)   Wt 170 lb (77.1 kg)   BMI 27.44 kg/m²   Wt Readings from Last 3 Encounters:   07/02/18 170 lb (77.1 kg)   06/28/18 170 lb (77.1 kg)   06/25/18 170 lb (77.1 kg)     PHYSICAL EXAM  CONSTITUTIONAL:   Awake, alert, cooperative   EYES:  lids and lashes normal   ENT: external ears and nose without lesions   NECK:  supple, symmetrical, trachea midline   SKIN:  Open wound Present    Assessment:     Problem List Items Addressed This Visit     None        Procedure Note  Indications:  Based on my examination of this patient's wound(s)/ulcer(s) today, debridement is required to promote healing and evaluate the wound base.     Performed by: Abby Barnett DPM    Consent obtained:  Yes    Time out taken:  Yes    Pain Control: Anesthetic  Anesthetic: None     Debridement:Excisional Debridement    Using curette and tissue nippers the wound(s)/ulcer(s) was/were sharply debrided down through and including the removal of subcutaneous tissue. Devitalized Tissue Debrided:  fibrin, biofilm, slough, exudate and callus to stimulate bleeding to promote healing, post debridement good bleeding base and wound edges noted    Pre Debridement Measurements:  Are located in the Wound/Ulcer Documentation Flow Sheet    Wound/Ulcer #: 1    Post Debridement Measurements:  Wound/Ulcer Descriptions are Pre Debridement except measurements:    Negative Pressure Wound Therapy Foot Left; Lower (Active)   Number of days: 1687       Negative Pressure Wound Therapy Heel Left (Active)   Number of days: 81       Wound 08/20/13 Heel Left (Active)   Number of days: 6707       Wound 11/16/13 Other (Comment) Ankle Left; Outer Red, swollen, shiny, hot area of outer L ankle (Active)   Number of days: 1689       Wound 04/09/18 Heel Left small round  (Active)   Number of days: 84       Wound 05/07/18 Other (Comment) Heel Plantar #1 (acquired 4-6-18) Grade 3 (Active)   Wound Image   7/2/2018  1:18 PM   Wound Type Wound 5/7/2018  2:09 PM   Wound Diabetic Lopez 3 5/7/2018  2:09 PM   Dressing Status Clean;Dry; Intact 6/25/2018  2:11 PM   Dressing Changed Changed/New 6/25/2018  2:11 PM   Dressing/Treatment Vacuum dressing 6/25/2018  2:11 PM   Wound Cleansed Rinsed/Irrigated with saline 6/25/2018  2:11 PM   Wound Length (cm) 3.3 cm 7/2/2018  2:03 PM   Wound Width (cm) 1.9 cm 7/2/2018  2:03 PM   Wound Depth (cm)  2.3 7/2/2018  2:03 PM   Calculated Wound Size (cm^2) (l*w) 6.27 cm^2 7/2/2018  2:03 PM   Change in Wound Size % (l*w) 18.04 7/2/2018  2:03 PM   Distance Tunneling (cm) 2.5 cm 6/11/2018  1:22 PM   Tunneling Position ___ O'Clock 6 6/11/2018  1:22 PM   Undermining Starts ___ O'Clock 9 7/2/2018  1:18 PM   Undermining Ends___ O'Clock 3 7/2/2018  1:18 PM   Undermining Maxium Distance (cm) 0.7 7/2/2018  1:18 PM   Wound Assessment Red;Yellow 7/2/2018 give a 24 hour notice. Thank you.)    Physician signature:__________________________      If you experience any of the following, please call the [facility] [address] during business hours:    * Increase in Pain  * Temperature over 101  * Increase in drainage from your wound  * Drainage with a foul odor  * Bleeding  * Increase in swelling  * Need for compression bandage changes due to slippage, breakthrough drainage. If you need medical attention outside of the business hours of the [address] [place] [address] [address] please contact your PCP or go to the nearest emergency room.         Electronically signed by Barbi Milan DPM on 7/2/2018 at 2:07 PM

## 2018-07-02 NOTE — PLAN OF CARE
Problem: Wound:  Goal: Will show signs of wound healing; wound closure and no evidence of infection  Will show signs of wound healing; wound closure and no evidence of infection   Outcome: Ongoing      Problem: Falls - Risk of:  Goal: Will remain free from falls  Will remain free from falls   Outcome: Ongoing      Problem: Blood Glucose:  Goal: Ability to maintain appropriate glucose levels will improve  Ability to maintain appropriate glucose levels will improve   Outcome: Ongoing

## 2018-07-03 ENCOUNTER — HOSPITAL ENCOUNTER (OUTPATIENT)
Dept: HYPERBARIC MEDICINE | Age: 63
Setting detail: THERAPIES SERIES
Discharge: HOME | End: 2018-07-03
Payer: COMMERCIAL

## 2018-07-03 VITALS
SYSTOLIC BLOOD PRESSURE: 166 MMHG | RESPIRATION RATE: 20 BRPM | DIASTOLIC BLOOD PRESSURE: 96 MMHG | HEART RATE: 80 BPM | TEMPERATURE: 98 F

## 2018-07-03 DIAGNOSIS — L97.426 DIABETIC ULCER OF LEFT HEEL ASSOCIATED WITH TYPE 2 DIABETES MELLITUS, WITH BONE INVOLVEMENT WITHOUT EVIDENCE OF NECROSIS (HCC): ICD-10-CM

## 2018-07-03 DIAGNOSIS — E11.621 DIABETIC ULCER OF LEFT HEEL ASSOCIATED WITH TYPE 2 DIABETES MELLITUS, WITH BONE INVOLVEMENT WITHOUT EVIDENCE OF NECROSIS (HCC): ICD-10-CM

## 2018-07-03 LAB
METER GLUCOSE: 228 MG/DL (ref 70–110)
METER GLUCOSE: 342 MG/DL (ref 70–110)

## 2018-07-03 PROCEDURE — 99183 HYPERBARIC OXYGEN THERAPY: CPT | Performed by: SURGERY

## 2018-07-03 PROCEDURE — 82962 GLUCOSE BLOOD TEST: CPT

## 2018-07-03 PROCEDURE — G0277 HBOT, FULL BODY CHAMBER, 30M: HCPCS

## 2018-07-04 ENCOUNTER — APPOINTMENT (OUTPATIENT)
Dept: HYPERBARIC MEDICINE | Age: 63
End: 2018-07-04
Payer: COMMERCIAL

## 2018-07-05 ENCOUNTER — HOSPITAL ENCOUNTER (OUTPATIENT)
Dept: HYPERBARIC MEDICINE | Age: 63
Setting detail: THERAPIES SERIES
Discharge: HOME | End: 2018-07-05
Payer: COMMERCIAL

## 2018-07-05 VITALS
DIASTOLIC BLOOD PRESSURE: 75 MMHG | RESPIRATION RATE: 20 BRPM | TEMPERATURE: 98.2 F | SYSTOLIC BLOOD PRESSURE: 147 MMHG | HEART RATE: 88 BPM

## 2018-07-05 DIAGNOSIS — E11.621 DIABETIC ULCER OF LEFT HEEL ASSOCIATED WITH TYPE 2 DIABETES MELLITUS, WITH BONE INVOLVEMENT WITHOUT EVIDENCE OF NECROSIS (HCC): ICD-10-CM

## 2018-07-05 DIAGNOSIS — L97.426 DIABETIC ULCER OF LEFT HEEL ASSOCIATED WITH TYPE 2 DIABETES MELLITUS, WITH BONE INVOLVEMENT WITHOUT EVIDENCE OF NECROSIS (HCC): ICD-10-CM

## 2018-07-05 LAB — METER GLUCOSE: 241 MG/DL (ref 70–110)

## 2018-07-05 PROCEDURE — 99183 HYPERBARIC OXYGEN THERAPY: CPT | Performed by: SURGERY

## 2018-07-05 PROCEDURE — 82962 GLUCOSE BLOOD TEST: CPT

## 2018-07-05 PROCEDURE — G0277 HBOT, FULL BODY CHAMBER, 30M: HCPCS

## 2018-07-05 NOTE — PROGRESS NOTES
[facility]  Hyperbaric Oxygen Therapy   Progress Note      NAME: Elder Martell  MEDICAL RECORD NUMBER:  [id number]  AGE: 70 y.o. GENDER: female  : 1955  EPISODE DATE:  2018     Subjective     HBO Treatment Number: 26 out of Total Treatments: 40    HBO Diagnosis:               Indications: Lower Extremity Diabetic Wound ___(site) (Left Foot)    Safety checks performed prior to treatment. See doc flowsheets for documentation. Objective           Recent Labs      18   1220  18   0930   GLUMET  228*  241*       Pre treatment Vital Signs       Temp: 98.6 °F (37 °C)     Pulse: 92     Resp: 20     BP: (!) 162/90       Post treatment Vital Signs  Temp: 98.2 °F (36.8 °C)  Pulse: 88  Resp: 20  BP: (!) 147/75      Assessment        Physical Exam:  General Appearance:  alert and oriented to person, place and time, well-developed and well-nourished, in no acute distress    ENT:  tympanic membranes intact bilaterally    Post Assessment per Physician/Provider  Right Tympanic Membrane Normal    Left Tympanic Membrane Normal    Pulmonary/Chest:  clear to auscultation bilaterally- no wheezes, rales or rhonchi, normal air movement, no respiratory distress    Cardiovascular:  regular rate and rhythm    Chamber #: [id number]    Treatment Start Time: 1001     Pressure Reached Time: 1009    RANDY Rate: 2  Number of Air Breaks:  Treatment Status: No Air break          Decompression Time: 1139   Treatment End Time: 1156    Length of Treatment: 90 Minutes    Symptoms Noted During Treatment: None    Adverse Event: no      I was present on these premises and immediately available to furnish assistance & direction throughout the procedure. Plan          Elder Martell is a 70 y.o. female  did successfully complete today's hyperbaric oxygen treatment at [address] [place] [address].     In my clinical judgement, ongoing HBO therapy is  necessary at this time, given a

## 2018-07-06 ENCOUNTER — HOSPITAL ENCOUNTER (OUTPATIENT)
Dept: HYPERBARIC MEDICINE | Age: 63
Setting detail: THERAPIES SERIES
Discharge: HOME | End: 2018-07-06
Payer: COMMERCIAL

## 2018-07-06 VITALS
SYSTOLIC BLOOD PRESSURE: 176 MMHG | DIASTOLIC BLOOD PRESSURE: 97 MMHG | RESPIRATION RATE: 20 BRPM | HEART RATE: 108 BPM | TEMPERATURE: 97.9 F

## 2018-07-06 DIAGNOSIS — L97.426 DIABETIC ULCER OF LEFT HEEL ASSOCIATED WITH TYPE 2 DIABETES MELLITUS, WITH BONE INVOLVEMENT WITHOUT EVIDENCE OF NECROSIS (HCC): ICD-10-CM

## 2018-07-06 DIAGNOSIS — E11.621 DIABETIC ULCER OF LEFT HEEL ASSOCIATED WITH TYPE 2 DIABETES MELLITUS, WITH BONE INVOLVEMENT WITHOUT EVIDENCE OF NECROSIS (HCC): ICD-10-CM

## 2018-07-06 LAB
METER GLUCOSE: 191 MG/DL (ref 70–110)
METER GLUCOSE: 292 MG/DL (ref 70–110)

## 2018-07-06 PROCEDURE — 82962 GLUCOSE BLOOD TEST: CPT

## 2018-07-06 PROCEDURE — 99183 HYPERBARIC OXYGEN THERAPY: CPT | Performed by: SURGERY

## 2018-07-06 PROCEDURE — G0277 HBOT, FULL BODY CHAMBER, 30M: HCPCS

## 2018-07-06 NOTE — PROGRESS NOTES
[facility]  Hyperbaric Oxygen Therapy   Progress Note    NAME: Ana Wynn  MEDICAL RECORD NUMBER:  [id number]  AGE: 70 y.o. GENDER: female  : 1955  EPISODE DATE:  2018   Subjective   HBO Treatment Number: 27 out of Total Treatments: 40  HBO Diagnosis:   Jamari Marvin 3  Indications: Lower Extremity Diabetic Wound ___(site) (Left Foot)  Safety checks performed prior to treatment. See doc flowsheets for documentation. Objective         Recent Labs      18   0927  18   1212   GLUMET  292*  191*     Pre treatment Vital Signs       Temp: 97.8 °F (36.6 °C)     Pulse: 84     Resp: 16     BP: 125/77     Post treatment Vital Signs  Temp: 97.9 °F (36.6 °C)  Pulse: 108  Resp: 20  BP: (!) 176/97  Assessment      Physical Exam:  General Appearance:  alert and oriented to person, place and time, well-developed and well-nourished, in no acute distress  ENT:  tympanic membranes intact bilaterally  Pulmonary/Chest:  clear to auscultation bilaterally- no wheezes, rales or rhonchi, normal air movement, no respiratory distress  Cardiovascular:  regular rate and rhythm  Chamber #: [id number]  Treatment Start Time: 1019     Pressure Reached Time: 1028  RANDY Rate: 2  Number of Air Breaks:  Treatment Status: No Air break     Decompression Time: 1159      Length of Treatment: 90 Minutes       Adverse Event: no    I was present on these premises and immediately available to furnish assistance & direction throughout the procedure. Plan      Ana Wynn is a 70 y.o. female  did successfully complete today's hyperbaric oxygen treatment at [address] [place] [address]. In my clinical judgement, ongoing HBO therapy is  necessary at this time, given a threat to patient function, limb or life from the current condition. Supervision and attendance of Hyperbaric Oxygen Therapy provided. Continue HBO treatment as outlined in the treatment plan.     Hyperbaric Oxygen: Barber Veda Hernandez tolerated Treatment Number: 32 well today without complications. Discharge Instructions were explained and given to Ms. Hernandez     Electronically signed by Cassidy Trotter MD on 7/6/2018 at 2:14 PM

## 2018-07-09 ENCOUNTER — HOSPITAL ENCOUNTER (OUTPATIENT)
Dept: WOUND CARE | Age: 63
Discharge: HOME | End: 2018-07-09
Payer: COMMERCIAL

## 2018-07-09 ENCOUNTER — HOSPITAL ENCOUNTER (OUTPATIENT)
Dept: HYPERBARIC MEDICINE | Age: 63
Setting detail: THERAPIES SERIES
Discharge: HOME | End: 2018-07-09
Payer: COMMERCIAL

## 2018-07-09 VITALS
RESPIRATION RATE: 20 BRPM | HEART RATE: 88 BPM | DIASTOLIC BLOOD PRESSURE: 88 MMHG | TEMPERATURE: 98.5 F | SYSTOLIC BLOOD PRESSURE: 160 MMHG

## 2018-07-09 VITALS
HEIGHT: 66 IN | RESPIRATION RATE: 20 BRPM | HEART RATE: 84 BPM | SYSTOLIC BLOOD PRESSURE: 122 MMHG | WEIGHT: 170 LBS | DIASTOLIC BLOOD PRESSURE: 68 MMHG | TEMPERATURE: 98.1 F | BODY MASS INDEX: 27.32 KG/M2

## 2018-07-09 DIAGNOSIS — E11.621 DIABETIC ULCER OF LEFT HEEL ASSOCIATED WITH TYPE 2 DIABETES MELLITUS, WITH BONE INVOLVEMENT WITHOUT EVIDENCE OF NECROSIS (HCC): ICD-10-CM

## 2018-07-09 DIAGNOSIS — L97.426 DIABETIC ULCER OF LEFT HEEL ASSOCIATED WITH TYPE 2 DIABETES MELLITUS, WITH BONE INVOLVEMENT WITHOUT EVIDENCE OF NECROSIS (HCC): ICD-10-CM

## 2018-07-09 LAB
METER GLUCOSE: 156 MG/DL (ref 70–110)
METER GLUCOSE: 213 MG/DL (ref 70–110)

## 2018-07-09 PROCEDURE — 99183 HYPERBARIC OXYGEN THERAPY: CPT | Performed by: SURGERY

## 2018-07-09 PROCEDURE — 11042 DBRDMT SUBQ TIS 1ST 20SQCM/<: CPT

## 2018-07-09 PROCEDURE — 82962 GLUCOSE BLOOD TEST: CPT

## 2018-07-09 PROCEDURE — G0277 HBOT, FULL BODY CHAMBER, 30M: HCPCS

## 2018-07-09 NOTE — PROGRESS NOTES
Wound Healing Center Followup Visit Note    Referring Physician : DO Brett Hall Radha Hernandez  MEDICAL RECORD NUMBER:  [id number]  AGE: 70 y.o. GENDER: female  : 1955  EPISODE DATE:  2018    Subjective:     Chief Complaint   Patient presents with    Wound Check     patient here for treatment of left heel ulcer      HISTORY of PRESENT ILLNESS HPI   Maurita Dance is a 70 y.o. female who presents today for wound/ulcer evaluation. History of Wound Context:  Follow up visit and debridement.      Wound/Ulcer Pain Timing/Severity: none  Quality of pain: N/A  Severity:  0 / 10   Modifying Factors: None  Associated Signs/Symptoms: none    Ulcer Identification:  Ulcer Type: non-healing surgical, neuropathic and refractory osteomyelitis  Contributing Factors: diabetes    Diabetic/Pressure/Non Pressure Ulcers only:  Ulcer: Diabetic ulcer, fat layer exposed    Wound: External surgical dehiscence        PAST MEDICAL HISTORY      Diagnosis Date    Abnormal EKG     Diabetes mellitus ([address]     Hx of blood clots     Hyperlipemia     Thyroid disease      Past Surgical History:   Procedure Laterality Date    DILATION AND CURETTAGE OF UTERUS      ECHO COMPL W DOP COLOR FLOW  2013         ECHOCARDIOGRAM TRANSESOPHAGEAL  2013         FOOT SURGERY  13    i&d left foot and ankle with bone biopsy    HYSTERECTOMY      OTHER SURGICAL HISTORY Left 04/10/2018    debridement and bone biopsy heel    CT DEEP DISSEC FOOT INFEC,1 BURSA Left 4/10/2018    LEFT HEEL DEBRIDEMENT, BONE BIOPSY performed [facility] OR    WISDOM TOOTH EXTRACTION       Family History   Problem Relation Age of Onset    Alzheimer's Disease Mother     Other Father     Other Maternal Grandfather         aneurysm     Social History   Substance Use Topics    Smoking status: Never Smoker    Smokeless tobacco: Never Used    Alcohol use No     Allergies   Allergen Reactions    Codeine Nausea PM   Drainage Amount Large 7/9/2018  1:08 PM   Drainage Description Serosanguinous 7/9/2018  1:08 PM   Odor None 7/9/2018  1:08 PM   Kaya-wound Assessment Maceration 7/9/2018  1:08 PM   Time out Yes 7/9/2018  1:47 PM   Procedural Pain 1 7/9/2018  1:47 PM   Post procedural Pain 0 7/9/2018  1:47 PM   Number of days: 63       Diabetic Ulcer 11/16/13 Foot Other (Comment) small reddened circular area with pinpoint opening with drainage (Active)   Number of days: 1696       Incision 11/17/13 Foot Left (Active)   Number of days: 1695       Incision 04/10/18 Foot Left (Active)   Number of days: 90     Percent of Wound/Ulcer Debrided: 100%    Total Surface Area Debrided:  20 sq cm     Estimated Blood Loss:  Minimal  Hemostasis Achieved:  by pressure    Procedural Pain:  0  / 10   Post Procedural Pain:  0 / 10     Response to treatment:  Well tolerated by patient. Plan:   Treatment Note please see attached Discharge Instructions    Written patient dismissal instructions given to patient and signed by patient or POA. Discharge Instructions       Visit Discharge/Physician Orders     Discharge condition: Stable     Assessment of pain at discharge:no     Anesthetic used: 2% lidocaine gel     Discharge to: Home     Left via:Private automobile     Accompanied by:  spouse     ECF/HHA: [facility]     Dressing Orders: LEFT HEEL ULCER-Cleanse with vashe when available, apply KCI wound vac at 125 mmhg continuous. Change  Mon- Wed- Fri.    PLEASE INCLUDE AREA OF DEPTH  AND UNDERMINIG (no white foam)      Treatment Orders:Eat a diet high in protein and vitamin C. Take a multiple vitamin daily unless contraindicated.     Continue Extended HBO     Keep all pressure off of heel     Keep heel clean and dry      Dr Sharmin Medrano ID     [id number] [facility] [facility] followup visit :  1 WEEK ___________________________________  (Please note your next appointment above and if you are unable to keep, kindly give a 24 hour notice.  Thank you.)     Physician signature:__________________________        If you experience any of the following, please call the [facility] [address] during business hours:     * Increase in Pain  * Temperature over 101  * Increase in drainage from your wound  * Drainage with a foul odor  * Bleeding  * Increase in swelling  * Need for compression bandage changes due to slippage, breakthrough drainage.     If you need medical attention outside of the business hours of the [address] please contact your PCP or go to the nearest emergency room.         Electronically signed by Jayashree Hebert DPM on 7/9/2018 at 1:56 PM

## 2018-07-10 ENCOUNTER — HOSPITAL ENCOUNTER (OUTPATIENT)
Dept: HYPERBARIC MEDICINE | Age: 63
Setting detail: THERAPIES SERIES
Discharge: HOME | End: 2018-07-10
Payer: COMMERCIAL

## 2018-07-10 VITALS
RESPIRATION RATE: 20 BRPM | TEMPERATURE: 98 F | DIASTOLIC BLOOD PRESSURE: 95 MMHG | HEART RATE: 84 BPM | SYSTOLIC BLOOD PRESSURE: 163 MMHG

## 2018-07-10 DIAGNOSIS — E11.621 DIABETIC ULCER OF LEFT HEEL ASSOCIATED WITH TYPE 2 DIABETES MELLITUS, WITH BONE INVOLVEMENT WITHOUT EVIDENCE OF NECROSIS (HCC): ICD-10-CM

## 2018-07-10 DIAGNOSIS — L97.426 DIABETIC ULCER OF LEFT HEEL ASSOCIATED WITH TYPE 2 DIABETES MELLITUS, WITH BONE INVOLVEMENT WITHOUT EVIDENCE OF NECROSIS (HCC): ICD-10-CM

## 2018-07-10 LAB
METER GLUCOSE: 186 MG/DL (ref 70–110)
METER GLUCOSE: 270 MG/DL (ref 70–110)

## 2018-07-10 PROCEDURE — 99183 HYPERBARIC OXYGEN THERAPY: CPT | Performed by: SURGERY

## 2018-07-10 PROCEDURE — 82962 GLUCOSE BLOOD TEST: CPT

## 2018-07-10 PROCEDURE — G0277 HBOT, FULL BODY CHAMBER, 30M: HCPCS

## 2018-07-10 NOTE — PROGRESS NOTES
[facility]  Hyperbaric Oxygen Therapy   Progress Note    NAME: Elder Martell  MEDICAL RECORD NUMBER:  [id number]  AGE: 70 y.o. GENDER: female  : 1955  EPISODE DATE:  2018   Subjective   HBO Treatment Number: 28 out of Total Treatments: 40  HBO Diagnosis:   Ashia Andrew  Indications: Lower Extremity Diabetic Wound ___(site) (left foot)  Safety checks performed prior to treatment. See doc flowsheets for documentation. Objective         Recent Labs      18   1206  07/10/18   0930   GLUMET  156*  270*     Pre treatment Vital Signs       Temp: 98.5 °F (36.9 °C)     Pulse: 84     Resp: 20     BP: (!) 158/88     Post treatment Vital Signs  Temp: 98.5 °F (36.9 °C)  Pulse: 88  Resp: 20  BP: (!) 160/88  Assessment      Physical Exam:  General Appearance:  alert and oriented to person, place and time, well-developed and well-nourished, in no acute distress  ENT:  tympanic membranes intact bilaterally  Pulmonary/Chest:  clear to auscultation bilaterally- no wheezes, rales or rhonchi, normal air movement, no respiratory distress  Cardiovascular:  regular rate and rhythm  Chamber #: [id number]  Treatment Start Time: 1013     Pressure Reached Time: 1025  RANDY Rate: 2  Number of Air Breaks:  Treatment Status: No Air break     Decompression Time: 1156   Treatment End Time: 1210  Length of Treatment: 90 Minutes  Symptoms Noted During Treatment: None    Adverse Event: no    I was present on these premises and immediately available to furnish assistance & direction throughout the procedure. Plan      Elder Martell is a 70 y.o. female  did successfully complete today's hyperbaric oxygen treatment at [address] [place] [address]. In my clinical judgement, ongoing HBO therapy is  necessary at this time, given a threat to patient function, limb or life from the current condition. Supervision and attendance of Hyperbaric Oxygen Therapy provided.   Continue HBO

## 2018-07-10 NOTE — PROGRESS NOTES
attendance of Hyperbaric Oxygen Therapy provided. Continue HBO treatment as outlined in the treatment plan. Hyperbaric Oxygen: Charlotte Hernandez tolerated Treatment Number: 34 well today without complications. Discharge Instructions were explained and given to Ms. Hernandez by HBOT staff    Electronically signed by Jade Pantoja MD on 7/10/2018 at 4:49 PM

## 2018-07-11 ENCOUNTER — HOSPITAL ENCOUNTER (OUTPATIENT)
Dept: HYPERBARIC MEDICINE | Age: 63
Setting detail: THERAPIES SERIES
Discharge: HOME | End: 2018-07-11
Payer: COMMERCIAL

## 2018-07-11 VITALS
RESPIRATION RATE: 20 BRPM | TEMPERATURE: 98.1 F | HEART RATE: 84 BPM | SYSTOLIC BLOOD PRESSURE: 146 MMHG | DIASTOLIC BLOOD PRESSURE: 76 MMHG

## 2018-07-11 DIAGNOSIS — E11.621 DIABETIC ULCER OF LEFT HEEL ASSOCIATED WITH TYPE 2 DIABETES MELLITUS, WITH BONE INVOLVEMENT WITHOUT EVIDENCE OF NECROSIS (HCC): ICD-10-CM

## 2018-07-11 DIAGNOSIS — L97.426 DIABETIC ULCER OF LEFT HEEL ASSOCIATED WITH TYPE 2 DIABETES MELLITUS, WITH BONE INVOLVEMENT WITHOUT EVIDENCE OF NECROSIS (HCC): ICD-10-CM

## 2018-07-11 LAB
METER GLUCOSE: 190 MG/DL (ref 70–110)
METER GLUCOSE: 253 MG/DL (ref 70–110)

## 2018-07-11 PROCEDURE — 99183 HYPERBARIC OXYGEN THERAPY: CPT | Performed by: SURGERY

## 2018-07-11 PROCEDURE — G0277 HBOT, FULL BODY CHAMBER, 30M: HCPCS

## 2018-07-11 PROCEDURE — 82962 GLUCOSE BLOOD TEST: CPT

## 2018-07-12 ENCOUNTER — HOSPITAL ENCOUNTER (OUTPATIENT)
Dept: HYPERBARIC MEDICINE | Age: 63
Setting detail: THERAPIES SERIES
Discharge: HOME | End: 2018-07-12
Payer: COMMERCIAL

## 2018-07-12 VITALS
DIASTOLIC BLOOD PRESSURE: 80 MMHG | HEART RATE: 72 BPM | SYSTOLIC BLOOD PRESSURE: 175 MMHG | TEMPERATURE: 97.9 F | RESPIRATION RATE: 20 BRPM

## 2018-07-12 DIAGNOSIS — L97.426 DIABETIC ULCER OF LEFT HEEL ASSOCIATED WITH TYPE 2 DIABETES MELLITUS, WITH BONE INVOLVEMENT WITHOUT EVIDENCE OF NECROSIS (HCC): ICD-10-CM

## 2018-07-12 DIAGNOSIS — E11.621 DIABETIC ULCER OF LEFT HEEL ASSOCIATED WITH TYPE 2 DIABETES MELLITUS, WITH BONE INVOLVEMENT WITHOUT EVIDENCE OF NECROSIS (HCC): ICD-10-CM

## 2018-07-12 LAB
METER GLUCOSE: 206 MG/DL (ref 70–110)
METER GLUCOSE: 287 MG/DL (ref 70–110)

## 2018-07-12 PROCEDURE — G0277 HBOT, FULL BODY CHAMBER, 30M: HCPCS

## 2018-07-12 PROCEDURE — 99183 HYPERBARIC OXYGEN THERAPY: CPT | Performed by: SURGERY

## 2018-07-12 PROCEDURE — 82962 GLUCOSE BLOOD TEST: CPT

## 2018-07-13 ENCOUNTER — HOSPITAL ENCOUNTER (OUTPATIENT)
Dept: HYPERBARIC MEDICINE | Age: 63
Setting detail: THERAPIES SERIES
Discharge: HOME | End: 2018-07-13
Payer: COMMERCIAL

## 2018-07-13 VITALS
RESPIRATION RATE: 20 BRPM | HEART RATE: 84 BPM | TEMPERATURE: 98.6 F | SYSTOLIC BLOOD PRESSURE: 160 MMHG | DIASTOLIC BLOOD PRESSURE: 72 MMHG

## 2018-07-13 DIAGNOSIS — E11.621 DIABETIC ULCER OF LEFT HEEL ASSOCIATED WITH TYPE 2 DIABETES MELLITUS, WITH BONE INVOLVEMENT WITHOUT EVIDENCE OF NECROSIS (HCC): ICD-10-CM

## 2018-07-13 DIAGNOSIS — L97.426 DIABETIC ULCER OF LEFT HEEL ASSOCIATED WITH TYPE 2 DIABETES MELLITUS, WITH BONE INVOLVEMENT WITHOUT EVIDENCE OF NECROSIS (HCC): ICD-10-CM

## 2018-07-13 LAB
METER GLUCOSE: 156 MG/DL (ref 70–110)
METER GLUCOSE: 226 MG/DL (ref 70–110)

## 2018-07-13 PROCEDURE — 82962 GLUCOSE BLOOD TEST: CPT

## 2018-07-13 PROCEDURE — 99183 HYPERBARIC OXYGEN THERAPY: CPT | Performed by: SURGERY

## 2018-07-13 PROCEDURE — G0277 HBOT, FULL BODY CHAMBER, 30M: HCPCS

## 2018-07-13 NOTE — PROGRESS NOTES
attendance of Hyperbaric Oxygen Therapy provided. Continue HBO treatment as outlined in the treatment plan. Hyperbaric Oxygen: Karis Hernandez tolerated Treatment Number: 28 well today without complications. Discharge Instructions were explained and given to Ms. Hernandez by HBOT staff    Electronically signed by Pee Maya MD on 7/12/2018 at 9:09 PM

## 2018-07-14 NOTE — PROGRESS NOTES
[facility]  Hyperbaric Oxygen Therapy   Progress Note    NAME: Maty Mancia  MEDICAL RECORD NUMBER:  [id number]  AGE: 70 y.o. GENDER: female  : 1955  EPISODE DATE:  2018   Subjective   HBO Treatment Number: 33 out of Total Treatments: 40  HBO Diagnosis:   Abigail Crews: Abigail Crews 3  Indications:  (non healing diabetic wound of the left foot)  Safety checks performed prior to treatment by HBOT staff. See doc flowsheets for documentation. Objective         Recent Labs      18   0936  18   1220   GLUMET  226*  156*     Pre treatment Vital Signs       Temp: 98.5 °F (36.9 °C)     Pulse: 76     Resp: 20     BP: (!) 152/96     Post treatment Vital Signs  Temp: 98.6 °F (37 °C)  Pulse: 84  Resp: 20  BP: (!) 160/72  Assessment      Physical Exam:  General Appearance:  alert and oriented to person, place and time, well-developed and well-nourished, in no acute distress  ENT:  tympanic membranes intact bilaterally, normal color, normal light reflex bilaterally  Pulmonary/Chest:  clear to auscultation bilaterally- no wheezes, rales or rhonchi, normal air movement, no respiratory distress  Cardiovascular:  regular rate and rhythm  Chamber #: [id number]  Treatment Start Time: 1023     Pressure Reached Time: 1033  RANDY Rate: 2  Number of Air Breaks:  Treatment Status: No Air break     Decompression Time: 1207   Treatment End Time: 1227  Length of Treatment: 90 Minutes  Symptoms Noted During Treatment: None    Adverse Event: no    I was present on these premises and immediately available to furnish assistance & direction throughout the procedure. Plan      Maty Mancia is a 70 y.o. female  did successfully complete today's hyperbaric oxygen treatment at [address] [place] [address]. In my clinical judgement, ongoing HBO therapy is  necessary at this time, given a threat to patient function, limb or life from the current condition.       Supervision and attendance

## 2018-07-16 ENCOUNTER — HOSPITAL ENCOUNTER (OUTPATIENT)
Dept: WOUND CARE | Age: 63
Discharge: HOME | End: 2018-07-16
Payer: COMMERCIAL

## 2018-07-16 ENCOUNTER — HOSPITAL ENCOUNTER (OUTPATIENT)
Dept: HYPERBARIC MEDICINE | Age: 63
Setting detail: THERAPIES SERIES
Discharge: HOME | End: 2018-07-16
Payer: COMMERCIAL

## 2018-07-16 VITALS
HEIGHT: 66 IN | RESPIRATION RATE: 20 BRPM | HEART RATE: 96 BPM | TEMPERATURE: 99.2 F | WEIGHT: 170 LBS | BODY MASS INDEX: 27.32 KG/M2 | SYSTOLIC BLOOD PRESSURE: 120 MMHG | DIASTOLIC BLOOD PRESSURE: 60 MMHG

## 2018-07-16 VITALS
TEMPERATURE: 97.8 F | DIASTOLIC BLOOD PRESSURE: 91 MMHG | SYSTOLIC BLOOD PRESSURE: 116 MMHG | HEART RATE: 76 BPM | RESPIRATION RATE: 20 BRPM

## 2018-07-16 DIAGNOSIS — L97.426 DIABETIC ULCER OF LEFT HEEL ASSOCIATED WITH TYPE 2 DIABETES MELLITUS, WITH BONE INVOLVEMENT WITHOUT EVIDENCE OF NECROSIS (HCC): ICD-10-CM

## 2018-07-16 DIAGNOSIS — E11.621 DIABETIC ULCER OF LEFT HEEL ASSOCIATED WITH TYPE 2 DIABETES MELLITUS, WITH BONE INVOLVEMENT WITHOUT EVIDENCE OF NECROSIS (HCC): ICD-10-CM

## 2018-07-16 LAB
METER GLUCOSE: 244 MG/DL (ref 70–110)
METER GLUCOSE: 262 MG/DL (ref 70–110)

## 2018-07-16 PROCEDURE — 99183 HYPERBARIC OXYGEN THERAPY: CPT | Performed by: SURGERY

## 2018-07-16 PROCEDURE — G0277 HBOT, FULL BODY CHAMBER, 30M: HCPCS

## 2018-07-16 PROCEDURE — 82962 GLUCOSE BLOOD TEST: CPT

## 2018-07-16 PROCEDURE — 97607 NEG PRS WND THR NDME<=50SQCM: CPT

## 2018-07-16 PROCEDURE — 11042 DBRDMT SUBQ TIS 1ST 20SQCM/<: CPT

## 2018-07-16 NOTE — PROGRESS NOTES
[facility]  Hyperbaric Oxygen Therapy   Progress Note    NAME: Pamella Loving  MEDICAL RECORD NUMBER:  [id number]  AGE: 70 y.o. GENDER: female  : 1955  EPISODE DATE:  2018   Subjective   HBO Treatment Number: 33 out of Total Treatments: 40  HBO Diagnosis:   Wolf Andrew  Indications: Lower Extremity Diabetic Wound ___(site) (Left Foot)  Safety checks performed prior to treatment. See doc flowsheets for documentation. Objective         Recent Labs      18   0933  18   1210   GLUMET  262*  244*     Pre treatment Vital Signs       Temp: 99.2 °F (37.3 °C)     Pulse: 76     Resp: 20     BP: 135/84     Post treatment Vital Signs  Temp: 97.8 °F (36.6 °C)  Pulse: 76  Resp: 20  BP: (!) 116/91  Assessment      Physical Exam:  General Appearance:  alert and oriented to person, place and time, well-developed and well-nourished, in no acute distress  ENT:  tympanic membranes intact bilaterally  Pulmonary/Chest:  clear to auscultation bilaterally- no wheezes, rales or rhonchi, normal air movement, no respiratory distress  Cardiovascular:  regular rate and rhythm  Chamber #: [id number]  Treatment Start Time: 1018     Pressure Reached Time: 1025  RANDY Rate: 2  Number of Air Breaks:  Treatment Status: No Air break     Decompression Time: 1157   Treatment End Time: 1215  Length of Treatment: 90 Minutes  Symptoms Noted During Treatment: None    Adverse Event: yes    I was present on these premises and immediately available to furnish assistance & direction throughout the procedure. Plan      Pamella Loving is a 70 y.o. female  did successfully complete today's hyperbaric oxygen treatment at [address] [place] [address]. In my clinical judgement, ongoing HBO therapy is  necessary at this time, given a threat to patient function, limb or life from the current condition. Supervision and attendance of Hyperbaric Oxygen Therapy provided.   Continue HBO treatment as outlined in the treatment plan. Hyperbaric Oxygen: Sharla Hernandez tolerated Treatment Number: 35 well today without complications. Discharge Instructions were explained and given to Ms.  Mary     Electronically signed by Nilda Eaton MD on 7/16/2018 at 5:11 PM

## 2018-07-16 NOTE — PROGRESS NOTES
Wound Healing Center Followup Visit Note    Referring Physician : DO Svitlana Pretty Mary  MEDICAL RECORD NUMBER:  [id number]  AGE: 70 y.o. GENDER: female  : 1955  EPISODE DATE:  2018    Subjective:     Chief Complaint   Patient presents with    Wound Check     patient here for treatment of left plantar heel ulcer      HISTORY of PRESENT ILLNESS JANE Rodas is a 70 y.o. female who presents today for wound/ulcer evaluation. History of Wound Context:  Follow up and debridement.      Wound/Ulcer Pain Timing/Severity: none  Quality of pain: N/A  Severity:  0 / 10   Modifying Factors: None  Associated Signs/Symptoms: drainage    Ulcer Identification:  Ulcer Type: non-healing surgical, neuropathic and refractory osteomyelitis  Contributing Factors: diabetes and     Diabetic/Pressure/Non Pressure Ulcers only:  Ulcer: Non-Pressure ulcer, fat layer exposed    Wound: Nonhealing surgical due to infection        PAST MEDICAL HISTORY      Diagnosis Date    Abnormal EKG     Diabetes mellitus ([address]     Hx of blood clots     Hyperlipemia     Thyroid disease      Past Surgical History:   Procedure Laterality Date    DILATION AND CURETTAGE OF UTERUS      ECHO COMPL W DOP COLOR FLOW  2013         ECHOCARDIOGRAM TRANSESOPHAGEAL  2013         FOOT SURGERY  13    i&d left foot and ankle with bone biopsy    HYSTERECTOMY      OTHER SURGICAL HISTORY Left 04/10/2018    debridement and bone biopsy heel    CT DEEP DISSEC FOOT INFEC,1 BURSA Left 4/10/2018    LEFT HEEL DEBRIDEMENT, BONE BIOPSY performed [facility] Francois Kaplan DPM [facility] OR    WISDOM TOOTH EXTRACTION       Family History   Problem Relation Age of Onset    Alzheimer's Disease Mother     Other Father     Other Maternal Grandfather         aneurysm     Social History   Substance Use Topics    Smoking status: Never Smoker    Smokeless tobacco: Never Used    Alcohol use No     Allergies   Allergen Reactions    Codeine Nausea And Vomiting     Current Outpatient Prescriptions on File Prior to Encounter   Medication Sig Dispense Refill    vitamin C (ASCORBIC ACID) 500 MG tablet Take 500 mg by mouth 2 times daily       pravastatin (PRAVACHOL) 20 MG tablet Take 20 mg by mouth daily      Lactobacillus Rhamnosus, GG, (CULTUREE IMMUNITY SUPPORT) CAPS Take 1 capsule by mouth daily      sodium bicarbonate 325 MG tablet Take 650 mg by mouth 2 times daily      Cholecalciferol (VITAMIN D3) 2000 UNITS CAPS Take 4,000 Units by mouth daily       glipiZIDE (GLUCOTROL) 2.5 MG CR tablet Take 10 mg by mouth daily       gabapentin (NEURONTIN) 100 MG capsule Take 100 mg by mouth 2 times daily. After breakfast      acetaminophen 650 MG TABS Take 650 mg by mouth every 4 hours as needed (Fever >100.5 F (38 C)). 120 tablet     metformin (GLUCOPHAGE) 500 MG tablet Take 1,000 mg by mouth 2 times daily (with meals)       levothyroxine (SYNTHROID) 25 MCG tablet Take 50 mcg by mouth daily        No current facility-administered medications on file prior to encounter.         REVIEW OF SYSTEMS See HPI    Objective:    /60   Pulse 96   Temp 99.2 °F (37.3 °C) (Oral)   Resp 20   Ht 5' 6\" (1.676 m)   Wt 170 lb (77.1 kg)   BMI 27.44 kg/m²   Wt Readings from Last 3 Encounters:   07/16/18 170 lb (77.1 kg)   07/09/18 170 lb (77.1 kg)   07/02/18 170 lb (77.1 kg)     PHYSICAL EXAM  CONSTITUTIONAL:   Awake, alert, cooperative   EYES:  lids and lashes normal   ENT: external ears and nose without lesions   NECK:  supple, symmetrical, trachea midline   SKIN:  Open wound Present    Assessment:     Problem List Items Addressed This Visit     Diabetic ulcer of left heel associated with type 2 diabetes mellitus, with bone involvement without evidence of necrosis [facility])        Procedure Note  Indications:  Based on my examination of this patient's wound(s)/ulcer(s) today, debridement is required to promote healing and evaluate the wound base.    Performed by: Radha Gaffney DPM    Consent obtained:  Yes    Time out taken:  Yes    Pain Control: Anesthetic  Anesthetic: None     Debridement:Excisional Debridement    Using tissue nippers the wound(s)/ulcer(s) was/were sharply debrided down through and including the removal of subcutaneous tissue. Devitalized Tissue Debrided:  fibrin, biofilm and slough to stimulate bleeding to promote healing, post debridement good bleeding base and wound edges noted    Pre Debridement Measurements:  Are located in the Wound/Ulcer Documentation Flow Sheet    Wound/Ulcer #: 1    Post Debridement Measurements:  Wound/Ulcer Descriptions are Pre Debridement except measurements:    Negative Pressure Wound Therapy Foot Left; Lower (Active)   Number of days: 1701       Negative Pressure Wound Therapy Heel Left (Active)   Number of days: 95       Wound 08/20/13 Heel Left (Active)   Number of days: 5348       Wound 11/16/13 Other (Comment) Ankle Left; Outer Red, swollen, shiny, hot area of outer L ankle (Active)   Number of days: 1703       Wound 04/09/18 Heel Left small round  (Active)   Number of days: 98       Wound 05/07/18 Other (Comment) Heel Plantar;Left #1 (acquired 4-6-18) Grade 3 (Active)   Wound Image   7/2/2018  1:18 PM   Wound Type Wound 5/7/2018  2:09 PM   Wound Diabetic Lopez 3 5/7/2018  2:09 PM   Dressing Status Clean;Dry; Intact 7/9/2018  2:12 PM   Dressing Changed Changed/New 7/9/2018  2:12 PM   Dressing/Treatment Vacuum dressing 7/9/2018  2:12 PM   Wound Cleansed Wound cleanser 7/16/2018  1:10 PM   Wound Length (cm) 3.3 cm 7/16/2018  1:42 PM   Wound Width (cm) 2.4 cm 7/16/2018  1:42 PM   Wound Depth (cm)  1.8 7/16/2018  1:42 PM   Calculated Wound Size (cm^2) (l*w) 7.92 cm^2 7/16/2018  1:42 PM   Change in Wound Size % (l*w) -3.53 7/16/2018  1:42 PM   Distance Tunneling (cm) 2.5 cm 6/11/2018  1:22 PM   Tunneling Position ___ O'Clock 6 6/11/2018  1:22 PM   Undermining Starts ___ O'Clock 8 7/16/2018  1:10 PM   Undermining Ends___ O'Clock 11 7/16/2018  1:10 PM   Undermining Maxium Distance (cm) 1.5 7/16/2018  1:10 PM   Wound Assessment Pink;Red;Yellow 7/16/2018  1:10 PM   Drainage Amount Large 7/16/2018  1:10 PM   Drainage Description Serosanguinous 7/16/2018  1:10 PM   Odor None 7/16/2018  1:10 PM   Kaya-wound Assessment Dark edges; Maceration; White 7/16/2018  1:10 PM   Time out Yes 7/16/2018  1:41 PM   Procedural Pain 1 7/9/2018  1:47 PM   Post procedural Pain 0 7/9/2018  1:47 PM   Number of days: 70       Diabetic Ulcer 11/16/13 Foot Other (Comment) small reddened circular area with pinpoint opening with drainage (Active)   Number of days: 1703       Incision 11/17/13 Foot Left (Active)   Number of days: 1702       Incision 04/10/18 Foot Left (Active)   Number of days: 97     Percent of Wound/Ulcer Debrided: 100%    Total Surface Area Debrided:  20 sq cm     Estimated Blood Loss:  Minimal  Hemostasis Achieved:  by pressure    Procedural Pain:  0  / 10   Post Procedural Pain:  0 / 10     Response to treatment:  Well tolerated by patient. Plan:   Treatment Note please see attached Discharge Instructions    Written patient dismissal instructions given to patient and signed by patient or POA. Discharge Instructions       Visit Discharge/Physician Orders     Discharge condition: Stable     Assessment of pain at discharge:no     Anesthetic used: 2% lidocaine gel     Discharge to: Home     Left via:Private automobile     Accompanied by:  spouse     ECF/HHA: [facility]     Dressing Orders: LEFT HEEL ULCER-Cleanse with vashe when available, apply KCI wound vac at 125 mmhg continuous. Change  Mon- Wed- Fri.    PLEASE INCLUDE AREA OF DEPTH  AND UNDERMINIG (no white foam)      Treatment Orders:Eat a diet high in protein and vitamin C.  Take a multiple vitamin daily unless contraindicated.     Continue Extended HBO     Keep all pressure off of heel     Keep heel clean and dry      Dr Odell Weathers ID 8/6/18     [address] [place] [address],[address]

## 2018-07-17 ENCOUNTER — HOSPITAL ENCOUNTER (OUTPATIENT)
Dept: HYPERBARIC MEDICINE | Age: 63
Setting detail: THERAPIES SERIES
Discharge: HOME | End: 2018-07-17
Payer: COMMERCIAL

## 2018-07-17 VITALS
SYSTOLIC BLOOD PRESSURE: 139 MMHG | TEMPERATURE: 99.2 F | DIASTOLIC BLOOD PRESSURE: 72 MMHG | RESPIRATION RATE: 20 BRPM | HEART RATE: 88 BPM

## 2018-07-17 DIAGNOSIS — L97.426 DIABETIC ULCER OF LEFT HEEL ASSOCIATED WITH TYPE 2 DIABETES MELLITUS, WITH BONE INVOLVEMENT WITHOUT EVIDENCE OF NECROSIS (HCC): ICD-10-CM

## 2018-07-17 DIAGNOSIS — E11.621 DIABETIC ULCER OF LEFT HEEL ASSOCIATED WITH TYPE 2 DIABETES MELLITUS, WITH BONE INVOLVEMENT WITHOUT EVIDENCE OF NECROSIS (HCC): ICD-10-CM

## 2018-07-17 LAB
METER GLUCOSE: 327 MG/DL (ref 70–110)
METER GLUCOSE: 345 MG/DL (ref 70–110)

## 2018-07-17 PROCEDURE — 99183 HYPERBARIC OXYGEN THERAPY: CPT | Performed by: SURGERY

## 2018-07-17 PROCEDURE — G0277 HBOT, FULL BODY CHAMBER, 30M: HCPCS

## 2018-07-17 PROCEDURE — 82962 GLUCOSE BLOOD TEST: CPT

## 2018-07-17 NOTE — PROGRESS NOTES
Hyperbaric Oxygen Therapy provided. Continue HBO treatment as outlined in the treatment plan. Hyperbaric Oxygen: Baldo Hernandez tolerated Treatment Number: 28 well today without complications. Discharge Instructions were explained and given to Ms. Hernandez by [facility] staff    Electronically signed by Hari Chase MD on 7/17/2018 at 12:38 PM

## 2018-07-18 ENCOUNTER — HOSPITAL ENCOUNTER (OUTPATIENT)
Dept: HYPERBARIC MEDICINE | Age: 63
Setting detail: THERAPIES SERIES
Discharge: HOME | End: 2018-07-18
Payer: COMMERCIAL

## 2018-07-18 VITALS
DIASTOLIC BLOOD PRESSURE: 89 MMHG | RESPIRATION RATE: 20 BRPM | HEART RATE: 104 BPM | TEMPERATURE: 99.3 F | SYSTOLIC BLOOD PRESSURE: 150 MMHG

## 2018-07-18 DIAGNOSIS — L97.426 DIABETIC ULCER OF LEFT HEEL ASSOCIATED WITH TYPE 2 DIABETES MELLITUS, WITH BONE INVOLVEMENT WITHOUT EVIDENCE OF NECROSIS (HCC): ICD-10-CM

## 2018-07-18 DIAGNOSIS — E11.621 DIABETIC ULCER OF LEFT HEEL ASSOCIATED WITH TYPE 2 DIABETES MELLITUS, WITH BONE INVOLVEMENT WITHOUT EVIDENCE OF NECROSIS (HCC): ICD-10-CM

## 2018-07-18 LAB
METER GLUCOSE: 246 MG/DL (ref 70–110)
METER GLUCOSE: 247 MG/DL (ref 70–110)

## 2018-07-18 PROCEDURE — 82962 GLUCOSE BLOOD TEST: CPT

## 2018-07-18 PROCEDURE — 99183 HYPERBARIC OXYGEN THERAPY: CPT | Performed by: SURGERY

## 2018-07-18 PROCEDURE — G0277 HBOT, FULL BODY CHAMBER, 30M: HCPCS

## 2018-07-19 ENCOUNTER — HOSPITAL ENCOUNTER (OUTPATIENT)
Dept: HYPERBARIC MEDICINE | Age: 63
Setting detail: THERAPIES SERIES
Discharge: HOME | End: 2018-07-19
Payer: COMMERCIAL

## 2018-07-19 VITALS
DIASTOLIC BLOOD PRESSURE: 80 MMHG | TEMPERATURE: 99.4 F | RESPIRATION RATE: 20 BRPM | HEART RATE: 84 BPM | SYSTOLIC BLOOD PRESSURE: 127 MMHG

## 2018-07-19 DIAGNOSIS — E11.621 DIABETIC ULCER OF LEFT HEEL ASSOCIATED WITH TYPE 2 DIABETES MELLITUS, WITH BONE INVOLVEMENT WITHOUT EVIDENCE OF NECROSIS (HCC): ICD-10-CM

## 2018-07-19 DIAGNOSIS — L97.426 DIABETIC ULCER OF LEFT HEEL ASSOCIATED WITH TYPE 2 DIABETES MELLITUS, WITH BONE INVOLVEMENT WITHOUT EVIDENCE OF NECROSIS (HCC): ICD-10-CM

## 2018-07-19 LAB
METER GLUCOSE: 199 MG/DL (ref 70–110)
METER GLUCOSE: 207 MG/DL (ref 70–110)

## 2018-07-19 PROCEDURE — 99183 HYPERBARIC OXYGEN THERAPY: CPT | Performed by: SURGERY

## 2018-07-19 PROCEDURE — 82962 GLUCOSE BLOOD TEST: CPT

## 2018-07-19 PROCEDURE — G0277 HBOT, FULL BODY CHAMBER, 30M: HCPCS

## 2018-07-19 NOTE — PROGRESS NOTES
Hyperbaric Oxygen Therapy provided. Continue HBO treatment as outlined in the treatment plan. Hyperbaric Oxygen: Emmie Guicho Hernandez tolerated Treatment Number: 40 well today without complications. Discharge Instructions were explained and given to Ms. Hernandez by [facility] staff    Electronically signed by Cathy Kam MD on 7/19/2018 at 3:08 PM

## 2018-07-20 ENCOUNTER — HOSPITAL ENCOUNTER (OUTPATIENT)
Dept: HYPERBARIC MEDICINE | Age: 63
Setting detail: THERAPIES SERIES
Discharge: HOME | End: 2018-07-20
Payer: COMMERCIAL

## 2018-07-20 VITALS
TEMPERATURE: 99.2 F | DIASTOLIC BLOOD PRESSURE: 71 MMHG | SYSTOLIC BLOOD PRESSURE: 115 MMHG | RESPIRATION RATE: 20 BRPM | HEART RATE: 84 BPM

## 2018-07-20 DIAGNOSIS — E11.621 DIABETIC ULCER OF LEFT HEEL ASSOCIATED WITH TYPE 2 DIABETES MELLITUS, WITH BONE INVOLVEMENT WITHOUT EVIDENCE OF NECROSIS (HCC): ICD-10-CM

## 2018-07-20 DIAGNOSIS — L97.426 DIABETIC ULCER OF LEFT HEEL ASSOCIATED WITH TYPE 2 DIABETES MELLITUS, WITH BONE INVOLVEMENT WITHOUT EVIDENCE OF NECROSIS (HCC): ICD-10-CM

## 2018-07-20 LAB
METER GLUCOSE: 132 MG/DL (ref 70–110)
METER GLUCOSE: 172 MG/DL (ref 70–110)

## 2018-07-20 PROCEDURE — G0277 HBOT, FULL BODY CHAMBER, 30M: HCPCS

## 2018-07-20 PROCEDURE — 82962 GLUCOSE BLOOD TEST: CPT

## 2018-07-20 NOTE — PROGRESS NOTES
[facility]  Hyperbaric Oxygen Therapy   Progress Note    NAME: Renee Enriquez  MEDICAL RECORD NUMBER:  [id number]  AGE: 70 y.o. GENDER: female  : 1955  EPISODE DATE:  2018   Subjective   HBO Treatment Number: 36 out of Total Treatments: 40  HBO Diagnosis:   Deya Can: Deya Can 3  Indications:  (non healing diabetic wound of theleft foot)  Safety checks performed prior to treatment. See doc flowsheets for documentation. Objective         Recent Labs      18   0949  18   1217   GLUMET  172*  132*     Pre treatment Vital Signs       Temp: 98.8 °F (37.1 °C)     Pulse: 76     Resp: 20     BP: 123/82     Post treatment Vital Signs  Temp: 99.3 °F (37.4 °C)  Pulse: 104  Resp: 20  BP: (!) 150/89  Assessment      Physical Exam:  General Appearance:  alert and oriented to person, place and time, well-developed and well-nourished, in no acute distress  ENT:  tympanic membranes intact bilaterally  Pulmonary/Chest:  clear to auscultation bilaterally- no wheezes, rales or rhonchi, normal air movement, no respiratory distress  Cardiovascular:  regular rate and rhythm  Chamber #: [id number]  Treatment Start Time: 0943     Pressure Reached Time: 0954  RANDY Rate: 2  Number of Air Breaks:        Decompression Time: 1125   Treatment End Time: 4037  Length of Treatment: 90 Minutes  Symptoms Noted During Treatment: None    Adverse Event: no    I was present on these premises and immediately available to furnish assistance & direction throughout the procedure. Plan      Renee Enriquez is a 70 y.o. female  did successfully complete today's hyperbaric oxygen treatment at [address] [place] [address]. In my clinical judgement, ongoing HBO therapy is  necessary at this time, given a threat to patient function, limb or life from the current condition. Supervision and attendance of Hyperbaric Oxygen Therapy provided.   Continue HBO treatment as outlined in the treatment

## 2018-07-23 ENCOUNTER — HOSPITAL ENCOUNTER (OUTPATIENT)
Dept: WOUND CARE | Age: 63
Discharge: HOME | End: 2018-07-23
Payer: COMMERCIAL

## 2018-07-23 ENCOUNTER — HOSPITAL ENCOUNTER (OUTPATIENT)
Dept: HYPERBARIC MEDICINE | Age: 63
Setting detail: THERAPIES SERIES
Discharge: HOME | End: 2018-07-23
Payer: COMMERCIAL

## 2018-07-23 VITALS
WEIGHT: 170 LBS | HEIGHT: 66 IN | HEART RATE: 98 BPM | DIASTOLIC BLOOD PRESSURE: 70 MMHG | SYSTOLIC BLOOD PRESSURE: 120 MMHG | RESPIRATION RATE: 18 BRPM | TEMPERATURE: 97 F | BODY MASS INDEX: 27.32 KG/M2

## 2018-07-23 VITALS
SYSTOLIC BLOOD PRESSURE: 133 MMHG | DIASTOLIC BLOOD PRESSURE: 72 MMHG | RESPIRATION RATE: 20 BRPM | HEART RATE: 76 BPM | TEMPERATURE: 98.7 F

## 2018-07-23 DIAGNOSIS — L97.426 DIABETIC ULCER OF LEFT HEEL ASSOCIATED WITH TYPE 2 DIABETES MELLITUS, WITH BONE INVOLVEMENT WITHOUT EVIDENCE OF NECROSIS (HCC): ICD-10-CM

## 2018-07-23 DIAGNOSIS — E11.621 DIABETIC ULCER OF LEFT HEEL ASSOCIATED WITH TYPE 2 DIABETES MELLITUS, WITH BONE INVOLVEMENT WITHOUT EVIDENCE OF NECROSIS (HCC): ICD-10-CM

## 2018-07-23 LAB
METER GLUCOSE: 135 MG/DL (ref 70–110)
METER GLUCOSE: 147 MG/DL (ref 70–110)

## 2018-07-23 PROCEDURE — 97607 NEG PRS WND THR NDME<=50SQCM: CPT

## 2018-07-23 PROCEDURE — 82962 GLUCOSE BLOOD TEST: CPT

## 2018-07-23 PROCEDURE — G0277 HBOT, FULL BODY CHAMBER, 30M: HCPCS

## 2018-07-23 PROCEDURE — 11042 DBRDMT SUBQ TIS 1ST 20SQCM/<: CPT

## 2018-07-23 PROCEDURE — 99183 HYPERBARIC OXYGEN THERAPY: CPT | Performed by: SURGERY

## 2018-07-23 ASSESSMENT — PAIN SCALES - GENERAL: PAINLEVEL_OUTOF10: 0

## 2018-07-23 NOTE — PROGRESS NOTES
Wound Healing Center Followup Visit Note    Referring Physician : DO Abhinav More Loganas Mary  MEDICAL RECORD NUMBER:  [id number]  AGE: 70 y.o. GENDER: female  : 1955  EPISODE DATE:  2018    Subjective:     Chief Complaint   Patient presents with    Wound Check     lt heel      HISTORY of PRESENT ILLNESS HPI   Ned Smith is a 70 y.o. female who presents today for wound/ulcer evaluation.    History of Wound Context:  Follow up and debridement     Wound/Ulcer Pain Timing/Severity: none  Quality of pain: N/A  Severity:  0 / 10   Modifying Factors: None  Associated Signs/Symptoms: drainage    Ulcer Identification:  Ulcer Type: diabetic and non-healing surgical  Contributing Factors: diabetes and Chronic refractory osteomyelitis    Diabetic/Pressure/Non Pressure Ulcers only:  Ulcer: Diabetic ulcer, fat layer exposed    Wound: Nonhealing surgical due to infection        PAST MEDICAL HISTORY      Diagnosis Date    Abnormal EKG     Diabetes mellitus ([address]     Hx of blood clots     Hyperlipemia     Thyroid disease      Past Surgical History:   Procedure Laterality Date    DILATION AND CURETTAGE OF UTERUS      ECHO COMPL W DOP COLOR FLOW  2013         ECHOCARDIOGRAM TRANSESOPHAGEAL  2013         FOOT SURGERY  13    i&d left foot and ankle with bone biopsy    HYSTERECTOMY      OTHER SURGICAL HISTORY Left 04/10/2018    debridement and bone biopsy heel    LA DEEP DISSEC FOOT INFEC,1 BURSA Left 4/10/2018    LEFT HEEL DEBRIDEMENT, BONE BIOPSY performed by Cherri Antony DPM at [facility] OR    WISDOM TOOTH EXTRACTION       Family History   Problem Relation Age of Onset    Alzheimer's Disease Mother     Other Father     Other Maternal Grandfather         aneurysm     Social History   Substance Use Topics    Smoking status: Never Smoker    Smokeless tobacco: Never Used    Alcohol use No     Allergies   Allergen Reactions    Codeine Nausea And Vomiting     Current Outpatient Prescriptions on File Prior to Encounter   Medication Sig Dispense Refill    vitamin C (ASCORBIC ACID) 500 MG tablet Take 500 mg by mouth 2 times daily       pravastatin (PRAVACHOL) 20 MG tablet Take 20 mg by mouth daily      Lactobacillus Rhamnosus, GG, (CULTUREE IMMUNITY SUPPORT) CAPS Take 1 capsule by mouth daily      sodium bicarbonate 325 MG tablet Take 650 mg by mouth 2 times daily      Cholecalciferol (VITAMIN D3) 2000 UNITS CAPS Take 4,000 Units by mouth daily       glipiZIDE (GLUCOTROL) 2.5 MG CR tablet Take 10 mg by mouth daily       gabapentin (NEURONTIN) 100 MG capsule Take 100 mg by mouth 2 times daily. After breakfast      acetaminophen 650 MG TABS Take 650 mg by mouth every 4 hours as needed (Fever >100.5 F (38 C)). 120 tablet     metformin (GLUCOPHAGE) 500 MG tablet Take 1,000 mg by mouth 2 times daily (with meals)       levothyroxine (SYNTHROID) 25 MCG tablet Take 50 mcg by mouth daily        No current facility-administered medications on file prior to encounter. 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     Performed by: RENATO Guillermo obtained:  Yes    Time out taken:  Yes    Pain Control: Anesthetic  Anesthetic: 2% Lidocaine Gel Topical     Debridement:Excisional Debridement    Using curette and tissue nippers the wound(s)/ulcer(s) was/were sharply debrided down through and including the removal of subcutaneous tissue. Devitalized Tissue Debrided:  fibrin, biofilm, slough and necrotic/eschar to stimulate bleeding to promote healing, post debridement good bleeding base and wound edges noted    Pre Debridement Measurements:  Are located in the Wound/Ulcer Documentation Flow Sheet    Wound/Ulcer #: 1    Post Debridement Measurements:  Wound/Ulcer Descriptions are Pre Debridement except measurements:    Negative Pressure Wound Therapy Foot Left; Lower (Active)   Number of days: 1708       Negative Pressure Wound Therapy Heel Left (Active)   Number of days: 102       Wound 08/20/13 Heel Left (Active)   Number of days: 5578       Wound 11/16/13 Other (Comment) Ankle Left; Outer Red, swollen, shiny, hot area of outer L ankle (Active)   Number of days: 1710       Wound 04/09/18 Heel Left small round  (Active)   Number of days: 105       Wound 05/07/18 Other (Comment) Heel Plantar;Left #1 (acquired 4-6-18) Grade 3 (Active)   Wound Image   7/23/2018  1:31 PM   Wound Type Wound 5/7/2018  2:09 PM   Wound Diabetic Lopez 3 5/7/2018  2:09 PM   Dressing Status Clean;Dry; Intact 7/16/2018  2:14 PM   Dressing Changed Changed/New 7/16/2018  2:14 PM   Dressing/Treatment Vacuum dressing 7/16/2018  2:14 PM   Wound Cleansed Rinsed/Irrigated with saline 7/16/2018  2:14 PM   Wound Length (cm) 3.8 cm 7/23/2018  1:52 PM   Wound Width (cm) 2.6 cm 7/23/2018  1:52 PM   Wound Depth (cm)  1.6 7/23/2018  1:52 PM   Calculated Wound Size (cm^2) (l*w) 9.88 cm^2 7/23/2018  1:52 PM   Change in Wound Size % (l*w) -29.15 7/23/2018  1:52 PM   Distance Tunneling (cm) 2.5 cm 6/11/2018  1:22 PM   Tunneling Position ___ O'Clock 6 6/11/2018  1:22 PM   Undermining Starts ___ O'Clock 6 7/23/2018  1:31 PM   Undermining Ends___ O'Clock 8 7/23/2018  1:31 PM   Undermining Maxium Distance (cm) 0.6 7/23/2018  1:31 PM   Wound Assessment Black; Bleeding;Dark edges;Fragile;Red;Pink 7/23/2018  1:31 PM   Drainage Amount Large 7/23/2018  1:31 PM   Drainage Description Serosanguinous 7/23/2018  1:31 PM   Odor None 7/23/2018  1:31 PM   Exposed structure Bone 7/23/2018  1:31 PM   Kaya-wound Assessment Dark edges; Maceration; White 7/23/2018  1:31 PM   Time out Yes 7/23/2018  1:52 PM   Procedural Pain 1 7/9/2018  1:47 PM   Post procedural Pain 0 7/9/2018  1:47 PM   Number of days: 77       Diabetic Ulcer 11/16/13 Foot Other (Comment) small reddened circular area with pinpoint opening with drainage (Active)   Number of days: 1710       Incision 11/17/13 Foot Left (Active)   Number of days: 1709       Incision 04/10/18 Foot Left (Active)   Number of days: 104     Percent of Wound/Ulcer Debrided: 100%    Total Surface Area Debrided:  30 sq cm     Estimated Blood Loss:  Minimal  Hemostasis Achieved:  by pressure    Procedural Pain:  0  / 10   Post Procedural Pain:  0 / 10     Response to treatment:  Well tolerated by patient. Plan:   Treatment Note please see attached Discharge Instructions    Written patient dismissal instructions given to patient and signed by patient or POA. Discharge Instructions       Visit Discharge/Physician Orders     Discharge condition: Stable     Assessment of pain at discharge:no     Anesthetic used: 2% lidocaine gel     Discharge to: Home     Left via:Private automobile     Accompanied by:  spouse     ECF/HHA: [facility]     Dressing Orders: LEFT HEEL ULCER-Cleanse with vashe when available, apply KCI wound vac at 125 mmhg continuous. Change  Mon- Wed- Fri.    PLEASE INCLUDE AREA OF DEPTH  AND UNDERMINIG (no white foam)      Treatment Orders:Eat a diet high in protein and vitamin C.  Take a multiple vitamin daily unless contraindicated.     Continue Extended HBO     Keep all pressure off of heel     Keep heel clean and dry      Dr Eliane Puente ID 8/6/18     [facility] followup visit :  1 WEEK ___________________________________  (Please note your next appointment above and if you are unable to keep, kindly give a 24 hour notice. Thank you.)     Physician signature:__________________________        If you experience any of the following, please call the [facility] during business hours:     * Increase in Pain  * Temperature over 101  * Increase in drainage from your wound  * Drainage with a foul odor  * Bleeding  * Increase in swelling  * Need for compression bandage changes due to slippage, breakthrough drainage.     If you need medical attention outside of the business hours of the [facility] please contact your PCP or go to the nearest emergency room.         Electronically signed by Victor Manuel Powell DPM on 7/23/2018 at 1:57 PM

## 2018-07-23 NOTE — PROGRESS NOTES
treatment as outlined in the treatment plan. Hyperbaric Oxygen: Baldo BEATTY Billbhumi tolerated Treatment Number: 44 well today without complications. Discharge Instructions were explained and given to Ms.  Rashadbhumi     Electronically signed by Anola Burkitt, MD on 7/23/2018 at 3:04 PM

## 2018-07-24 ENCOUNTER — HOSPITAL ENCOUNTER (OUTPATIENT)
Dept: HYPERBARIC MEDICINE | Age: 63
Setting detail: THERAPIES SERIES
Discharge: HOME | End: 2018-07-24
Payer: COMMERCIAL

## 2018-07-24 VITALS
SYSTOLIC BLOOD PRESSURE: 135 MMHG | RESPIRATION RATE: 20 BRPM | HEART RATE: 80 BPM | TEMPERATURE: 98.6 F | DIASTOLIC BLOOD PRESSURE: 73 MMHG

## 2018-07-24 DIAGNOSIS — E11.621 DIABETIC ULCER OF LEFT HEEL ASSOCIATED WITH TYPE 2 DIABETES MELLITUS, WITH BONE INVOLVEMENT WITHOUT EVIDENCE OF NECROSIS (HCC): ICD-10-CM

## 2018-07-24 DIAGNOSIS — L97.426 DIABETIC ULCER OF LEFT HEEL ASSOCIATED WITH TYPE 2 DIABETES MELLITUS, WITH BONE INVOLVEMENT WITHOUT EVIDENCE OF NECROSIS (HCC): ICD-10-CM

## 2018-07-24 LAB
METER GLUCOSE: 137 MG/DL (ref 70–110)
METER GLUCOSE: 175 MG/DL (ref 70–110)

## 2018-07-24 PROCEDURE — G0277 HBOT, FULL BODY CHAMBER, 30M: HCPCS

## 2018-07-24 PROCEDURE — 99183 HYPERBARIC OXYGEN THERAPY: CPT | Performed by: SURGERY

## 2018-07-24 PROCEDURE — 82962 GLUCOSE BLOOD TEST: CPT

## 2018-07-25 ENCOUNTER — HOSPITAL ENCOUNTER (OUTPATIENT)
Dept: HYPERBARIC MEDICINE | Age: 63
Setting detail: THERAPIES SERIES
Discharge: HOME | End: 2018-07-25
Payer: COMMERCIAL

## 2018-07-25 VITALS
TEMPERATURE: 98.4 F | DIASTOLIC BLOOD PRESSURE: 73 MMHG | RESPIRATION RATE: 20 BRPM | HEART RATE: 76 BPM | SYSTOLIC BLOOD PRESSURE: 138 MMHG

## 2018-07-25 DIAGNOSIS — E11.621 DIABETIC ULCER OF LEFT HEEL ASSOCIATED WITH TYPE 2 DIABETES MELLITUS, WITH BONE INVOLVEMENT WITHOUT EVIDENCE OF NECROSIS (HCC): ICD-10-CM

## 2018-07-25 DIAGNOSIS — L97.426 DIABETIC ULCER OF LEFT HEEL ASSOCIATED WITH TYPE 2 DIABETES MELLITUS, WITH BONE INVOLVEMENT WITHOUT EVIDENCE OF NECROSIS (HCC): ICD-10-CM

## 2018-07-25 LAB
METER GLUCOSE: 149 MG/DL (ref 70–110)
METER GLUCOSE: 190 MG/DL (ref 70–110)

## 2018-07-25 PROCEDURE — 82962 GLUCOSE BLOOD TEST: CPT

## 2018-07-25 PROCEDURE — 99183 HYPERBARIC OXYGEN THERAPY: CPT | Performed by: SURGERY

## 2018-07-25 PROCEDURE — G0277 HBOT, FULL BODY CHAMBER, 30M: HCPCS

## 2018-07-25 NOTE — PROGRESS NOTES
[facility]  Hyperbaric Oxygen Therapy   Progress Note    NAME: Eloise Levin  MEDICAL RECORD NUMBER:  [id number]  AGE: 70 y.o. GENDER: female  : 1955  EPISODE DATE:  2018   Subjective   HBO Treatment Number: 40 out of Total Treatments: 40  HBO Diagnosis:   Katerina Andrew  Indications: Lower Extremity Diabetic Wound ___(site) (Left Foot)  Safety checks performed prior to treatment by [facility] staff. See doc flowsheets for documentation. Objective         Recent Labs      18   0939  18   1210   GLUMET  175*  137*     Pre treatment Vital Signs       Temp: 98.7 °F (37.1 °C)     Pulse: 84     Resp: 20     BP: (!) 150/71     Post treatment Vital Signs  Temp: 98.6 °F (37 °C)  Pulse: 80  Resp: 20  BP: 135/73  Assessment      Physical Exam:  General Appearance:  alert and oriented to person, place and time, well-developed and well-nourished, in no acute distress  ENT:  tympanic membranes intact bilaterally, normal color, normal light reflex bilaterally  Pulmonary/Chest:  clear to auscultation bilaterally- no wheezes, rales or rhonchi, normal air movement, no respiratory distress  Cardiovascular:  regular rate and rhythm  Chamber #: [id number]  Treatment Start Time: 1014     Pressure Reached Time: 1025  RANDY Rate: 2  Number of Air Breaks:  Treatment Status: No Air break     Decompression Time: 1155   Treatment End Time: 7316  Length of Treatment: 90 Minutes  Symptoms Noted During Treatment: None    Adverse Event: no    I was present on these premises and immediately available to furnish assistance & direction throughout the procedure. Plan      Eloise Levin is a 70 y.o. female  did successfully complete today's hyperbaric oxygen treatment at [address] [place] [address]. In my clinical judgement, ongoing HBO therapy is  necessary at this time, given a threat to patient function, limb or life from the current condition.       Supervision and attendance of Hyperbaric Oxygen Therapy provided. Continue HBO treatment as outlined in the treatment plan. Hyperbaric Oxygen: Diana Leigha Hernandez tolerated Treatment Number: 36 well today without complications. Discharge Instructions were explained and given to Ms. Hrenandez by [facility] staff    Electronically signed by Rosana De MD on 7/24/2018 at 6:24 AM

## 2018-07-25 NOTE — PROGRESS NOTES
Sally Rizo Corycelestine Leon 6  Hyperbaric Oxygen Therapy   Progress Note    NAME: Lorin Cisneros  MEDICAL RECORD NUMBER:  [id number]  AGE: 70 y.o. GENDER: female  : 1955  EPISODE DATE:  2018   Subjective   HBO Treatment Number: 45 out of Total Treatments: 40  HBO Diagnosis:   Elida Mark: Elida Mark 3  Indications:  (non healing diabetic wound of theleft foot)  Safety checks performed prior to treatment by HBOT staff. See doc flowsheets for documentation. Objective         Recent Labs      18   0939  18   1210   GLUMET  175*  137*     Pre treatment Vital Signs       Temp: 99.2 °F (37.3 °C)     Pulse: 84     Resp: 20     BP: 115/71     Post treatment Vital Signs  Temp: 99.2 °F (37.3 °C)  Pulse: 84  Resp: 20  BP: 115/71  Assessment      Physical Exam:  General Appearance:  alert and oriented to person, place and time, well-developed and well-nourished, in no acute distress  ENT:  tympanic membranes intact bilaterally, normal color, normal light reflex bilaterally  Pulmonary/Chest:  clear to auscultation bilaterally- no wheezes, rales or rhonchi, normal air movement, no respiratory distress  Cardiovascular:  regular rate and rhythm  Chamber #: [id number]  Treatment Start Time: 1025     Pressure Reached Time: 1033  RANDY Rate: 2  Number of Air Breaks:  Treatment Status: No Air break     Decompression Time: 1204      Length of Treatment: 90 Minutes       Adverse Event: no    I was present on these premises and immediately available to furnish assistance & direction throughout the procedure. Plan      Lorin Cisneros is a 70 y.o. female  did successfully complete today's hyperbaric oxygen treatment at [address] [place] [address]. In my clinical judgement, ongoing HBO therapy is  necessary at this time, given a threat to patient function, limb or life from the current condition. Supervision and attendance of Hyperbaric Oxygen Therapy provided.   Continue HBO treatment as outlined in the treatment plan. Hyperbaric Oxygen: Sandrayl Remy Hernandez tolerated Treatment Number: 45 well today without complications. Discharge Instructions were explained and given to Ms. Hernandez by HBOT staff    Electronically signed by Mimi Serna MD on 7/20/2018 at 6:28 AM

## 2018-07-26 ENCOUNTER — HOSPITAL ENCOUNTER (OUTPATIENT)
Dept: HYPERBARIC MEDICINE | Age: 63
Setting detail: THERAPIES SERIES
Discharge: HOME | End: 2018-07-26
Payer: COMMERCIAL

## 2018-07-26 VITALS
DIASTOLIC BLOOD PRESSURE: 69 MMHG | TEMPERATURE: 98.7 F | SYSTOLIC BLOOD PRESSURE: 137 MMHG | RESPIRATION RATE: 20 BRPM | HEART RATE: 80 BPM

## 2018-07-26 DIAGNOSIS — E11.621 DIABETIC ULCER OF LEFT HEEL ASSOCIATED WITH TYPE 2 DIABETES MELLITUS, WITH BONE INVOLVEMENT WITHOUT EVIDENCE OF NECROSIS (HCC): ICD-10-CM

## 2018-07-26 DIAGNOSIS — L97.426 DIABETIC ULCER OF LEFT HEEL ASSOCIATED WITH TYPE 2 DIABETES MELLITUS, WITH BONE INVOLVEMENT WITHOUT EVIDENCE OF NECROSIS (HCC): ICD-10-CM

## 2018-07-26 LAB
METER GLUCOSE: 156 MG/DL (ref 70–110)
METER GLUCOSE: 165 MG/DL (ref 70–110)

## 2018-07-26 PROCEDURE — 82962 GLUCOSE BLOOD TEST: CPT

## 2018-07-26 PROCEDURE — G0277 HBOT, FULL BODY CHAMBER, 30M: HCPCS

## 2018-07-27 ENCOUNTER — HOSPITAL ENCOUNTER (OUTPATIENT)
Dept: HYPERBARIC MEDICINE | Age: 63
Setting detail: THERAPIES SERIES
Discharge: HOME | End: 2018-07-27
Payer: COMMERCIAL

## 2018-07-27 VITALS
RESPIRATION RATE: 20 BRPM | HEART RATE: 84 BPM | TEMPERATURE: 98.6 F | DIASTOLIC BLOOD PRESSURE: 78 MMHG | SYSTOLIC BLOOD PRESSURE: 144 MMHG

## 2018-07-27 DIAGNOSIS — E11.621 DIABETIC ULCER OF LEFT HEEL ASSOCIATED WITH TYPE 2 DIABETES MELLITUS, WITH BONE INVOLVEMENT WITHOUT EVIDENCE OF NECROSIS (HCC): ICD-10-CM

## 2018-07-27 DIAGNOSIS — L97.426 DIABETIC ULCER OF LEFT HEEL ASSOCIATED WITH TYPE 2 DIABETES MELLITUS, WITH BONE INVOLVEMENT WITHOUT EVIDENCE OF NECROSIS (HCC): ICD-10-CM

## 2018-07-27 LAB
METER GLUCOSE: 212 MG/DL (ref 70–110)
METER GLUCOSE: 242 MG/DL (ref 70–110)

## 2018-07-27 PROCEDURE — 99183 HYPERBARIC OXYGEN THERAPY: CPT | Performed by: SURGERY

## 2018-07-27 PROCEDURE — G0277 HBOT, FULL BODY CHAMBER, 30M: HCPCS

## 2018-07-27 PROCEDURE — 82962 GLUCOSE BLOOD TEST: CPT

## 2018-07-27 NOTE — PROGRESS NOTES
Darin Sotomayor is a 70 y.o. female has been receiving hyperbaric oxygen treatment for management of: Indication  Indications: Lower Extremity Diabetic Wound ___(site) (right foot)  Rolan DoloresSheree Homans 3. Ms. Fanny Peralta has completed Treatment Number: 43 out of a treatment protocol of Total Treatments: 43.    Problem List:  Patient Active Problem List   Diagnosis Code    Hypothyroidism E03.9    Diabetic foot ulcer ([address] E11.621, L97.509    Osteomyelitis of ankle and foot ([address] M86.9    Diabetes mellitus, type 2 ([address] E11.9    Staphylococcus aureus bacteremia R78.81    Hypokalemia E87.6    Abnormal EKG R94.31    Thyroid disease E07.9    Diabetes mellitus ([address] E11.9    Hyperlipemia E78.5    Cellulitis L03.90    Diabetic ulcer of left heel associated with type 2 diabetes mellitus, with bone involvement without evidence of necrosis ([address] E11.621, L97.426    Non-pressure chronic ulcer of left heel and midfoot with fat layer exposed ([address] L97.422    Diabetic polyneuropathy ([address] E11.42       Patients ID was verified, Hyperbaric oxygen therapy plan of care,  Patient history, outpatient fall risk assessment, nutritional needs and daily medications were reviewed, addressed and updated as needed. Pt is tolerating hyperbaric oxygen therapy well, without complications.          Isidro Salinas  7/26/2018  5:24 PM
Oxygen Therapy provided. Continue HBO treatment as outlined in the treatment plan. Hyperbaric Oxygen: Baldo Hernandez tolerated Treatment Number: 15 well today without complications. Discharge Instructions were explained and given to Ms. Hernandez by HBOT staff    Electronically signed by Hari Chase MD on 7/26/2018 at 11:17 PM

## 2018-07-27 NOTE — PROGRESS NOTES
Sally Leon 6  Hyperbaric Oxygen Therapy   Progress Note    NAME: Ana Wynn  MEDICAL RECORD NUMBER:  [id number]  AGE: 70 y.o. GENDER: female  : 1955  EPISODE DATE:  2018   Subjective   HBO Treatment Number: 37 out of Total Treatments: 43  HBO Diagnosis:   Jamari Kraft: Jamari Kraft 3  Indications:  (non healing diabetic wound of theleft foot)  Safety checks performed prior to treatment by HBOT staff. See doc flowsheets for documentation. Objective         Recent Labs      18   0950  18   1209   GLUMET  242*  212*     Pre treatment Vital Signs       Temp: 98.6 °F (37 °C)     Pulse: 84     Resp: 20     BP: (!) 144/78     Post treatment Vital Signs  Temp: 98.6 °F (37 °C)  Pulse: 84  Resp: 20  BP: (!) 144/78  Assessment      Physical Exam:  General Appearance:  alert and oriented to person, place and time, well-developed and well-nourished, in no acute distress  ENT:  tympanic membranes intact bilaterally, normal color, normal light reflex bilaterally  Pulmonary/Chest:  clear to auscultation bilaterally- no wheezes, rales or rhonchi, normal air movement, no respiratory distress  Cardiovascular:  regular rate and rhythm  Chamber #: [id number]  Treatment Start Time: 1020     Pressure Reached Time: 1030  RANDY Rate: 2  Number of Air Breaks:  Treatment Status: No Air break     Decompression Time: 1159   Treatment End Time: 9194  Length of Treatment: 90 Minutes  Symptoms Noted During Treatment: None    Adverse Event: no    I was present on these premises and immediately available to furnish assistance & direction throughout the procedure. Plan      Ana Wynn is a 70 y.o. female  did successfully complete today's hyperbaric oxygen treatment at [address] [place] [address]. In my clinical judgement, ongoing HBO therapy is  necessary at this time, given a threat to patient function, limb or life from the current condition.       Supervision and attendance of Hyperbaric Oxygen Therapy provided. Continue HBO treatment as outlined in the treatment plan. Hyperbaric Oxygen: Jim Brittle A Billock tolerated Treatment Number: 37 well today without complications. Discharge Instructions were explained and given to Ms. Hernandez by HBOT staff    Electronically signed by Contreras Rose MD on 7/27/2018 at 1:39 PM

## 2018-07-30 ENCOUNTER — HOSPITAL ENCOUNTER (OUTPATIENT)
Dept: WOUND CARE | Age: 63
Discharge: HOME | End: 2018-07-30
Payer: COMMERCIAL

## 2018-07-30 ENCOUNTER — HOSPITAL ENCOUNTER (OUTPATIENT)
Dept: GENERAL RADIOLOGY | Age: 63
Discharge: HOME | End: 2018-08-01
Payer: COMMERCIAL

## 2018-07-30 ENCOUNTER — HOSPITAL ENCOUNTER (OUTPATIENT)
Age: 63
Discharge: HOME | End: 2018-08-01
Payer: COMMERCIAL

## 2018-07-30 VITALS
DIASTOLIC BLOOD PRESSURE: 68 MMHG | HEART RATE: 88 BPM | TEMPERATURE: 98.7 F | SYSTOLIC BLOOD PRESSURE: 142 MMHG | HEIGHT: 66 IN | RESPIRATION RATE: 18 BRPM

## 2018-07-30 DIAGNOSIS — E11.621 DIABETIC ULCER OF LEFT HEEL ASSOCIATED WITH TYPE 2 DIABETES MELLITUS, WITH BONE INVOLVEMENT WITHOUT EVIDENCE OF NECROSIS (HCC): ICD-10-CM

## 2018-07-30 DIAGNOSIS — M86.9 OSTEOMYELITIS OF ANKLE AND FOOT (HCC): ICD-10-CM

## 2018-07-30 DIAGNOSIS — L97.426 DIABETIC ULCER OF LEFT HEEL ASSOCIATED WITH TYPE 2 DIABETES MELLITUS, WITH BONE INVOLVEMENT WITHOUT EVIDENCE OF NECROSIS (HCC): ICD-10-CM

## 2018-07-30 DIAGNOSIS — M86.9 OSTEOMYELITIS OF ANKLE AND FOOT (HCC): Primary | ICD-10-CM

## 2018-07-30 PROCEDURE — 88307 TISSUE EXAM BY PATHOLOGIST: CPT

## 2018-07-30 PROCEDURE — 88311 DECALCIFY TISSUE: CPT

## 2018-07-30 PROCEDURE — 73630 X-RAY EXAM OF FOOT: CPT

## 2018-07-30 PROCEDURE — 11044 DBRDMT BONE 1ST 20 SQ CM/<: CPT

## 2018-07-30 NOTE — PROGRESS NOTES
Yes    Pain Control:       Debridement:Excisional Debridement    Using tissue nippers and rongeur the wound(s)/ulcer(s) was/were sharply debrided down through and including the removal of bone. Devitalized Tissue Debrided:  slough and necrotic/eschar to stimulate bleeding to promote healing, post debridement good bleeding base and wound edges noted    Pre Debridement Measurements:  Are located in the Wound/Ulcer Documentation Flow Sheet    Wound/Ulcer #: 1    Post Debridement Measurements:  Wound/Ulcer Descriptions are Pre Debridement except measurements:    Negative Pressure Wound Therapy Foot Left; Lower (Active)   Number of days: 1715       Negative Pressure Wound Therapy Heel Left (Active)   Number of days: 109       Wound 08/20/13 Heel Left (Active)   Number of days: 2924       Wound 11/16/13 Other (Comment) Ankle Left; Outer Red, swollen, shiny, hot area of outer L ankle (Active)   Number of days: 1717       Wound 04/09/18 Heel Left small round  (Active)   Number of days: 112       Wound 05/07/18 Other (Comment) Heel Plantar;Left #1 (acquired 4-6-18) Grade 3 (Active)   Wound Image   7/23/2018  1:31 PM   Wound Type Wound 5/7/2018  2:09 PM   Wound Diabetic Lopez 3 5/7/2018  2:09 PM   Dressing Status Clean;Dry; Intact 7/23/2018  2:38 PM   Dressing Changed Changed/New 7/23/2018  2:38 PM   Dressing/Treatment Vacuum dressing 7/23/2018  2:38 PM   Wound Cleansed Rinsed/Irrigated with saline; Wound cleanser 7/23/2018  2:38 PM   Wound Length (cm) 4.3 cm 7/30/2018  1:45 PM   Wound Width (cm) 3.5 cm 7/30/2018  1:45 PM   Wound Depth (cm)  1.6 7/30/2018  1:45 PM   Calculated Wound Size (cm^2) (l*w) 15.05 cm^2 7/30/2018  1:45 PM   Change in Wound Size % (l*w) -96.73 7/30/2018  1:45 PM   Distance Tunneling (cm) 2.5 cm 6/11/2018  1:22 PM   Tunneling Position ___ O'Clock 6 6/11/2018  1:22 PM   Undermining Starts ___ O'Clock 6 7/30/2018  1:19 PM   Undermining Ends___ O'Clock 8 7/30/2018  1:19 PM   Undermining Maxium Distance (cm) 1.3 7/30/2018  1:19 PM   Wound Assessment Dutta;Red 7/30/2018  1:19 PM   Drainage Amount Large 7/30/2018  1:19 PM   Drainage Description Serosanguinous 7/30/2018  1:19 PM   Odor Mild 7/30/2018  1:19 PM   Exposed structure Bone 7/30/2018  1:19 PM   Kaya-wound Assessment Calloused; White 7/30/2018  1:19 PM   Time out Yes 7/30/2018  1:42 PM   Procedural Pain 1 7/9/2018  1:47 PM   Post procedural Pain 0 7/9/2018  1:47 PM   Number of days: 84       Diabetic Ulcer 11/16/13 Foot Other (Comment) small reddened circular area with pinpoint opening with drainage (Active)   Number of days: 1717       Incision 11/17/13 Foot Left (Active)   Number of days: 1716       Incision 04/10/18 Foot Left (Active)   Number of days: 111     Percent of Wound/Ulcer Debrided: 100%    Total Surface Area Debrided:  20 sq cm     Estimated Blood Loss:  Minimal  Hemostasis Achieved:  by pressure    Procedural Pain:  0  / 10   Post Procedural Pain:  0 / 10     Response to treatment:  Well tolerated by patient. Plan:   Treatment Note please see attached Discharge Instructions  Bone biopsy taken on debridement. Xrays ordered and patient pending partial calcanectomy with attempt to partially close the wound. Written patient dismissal instructions given to patient and signed by patient or POA. Discharge Instructions         Visit Discharge/Physician Orders     Discharge condition: Stable     Assessment of pain at discharge:no     Anesthetic used: 2% lidocaine gel     Discharge to: Home     Left via:Private automobile     Accompanied by:  spouse     ECF/HHA: [facility]     Dressing Orders: LEFT HEEL ULCER-Cleanse with vashe when available, apply KCI wound vac at 125 mmhg continuous. Change  Mon- Wed- Fri.    PLEASE INCLUDE AREA OF DEPTH  AND UNDERMINIG (no white foam)      Treatment Orders:Eat a diet high in protein and vitamin C.  Take a multiple vitamin daily unless contraindicated.     BONE BIOPSY OBTAINED AT [address] [place] [address],[address] 7/30/18     SCHEDULE

## 2018-07-30 NOTE — PLAN OF CARE
Problem: Falls - Risk of:  Goal: Will remain free from falls  Will remain free from falls   Outcome: Ongoing      Problem: Wound:  Goal: Will show signs of wound healing; wound closure and no evidence of infection  Will show signs of wound healing; wound closure and no evidence of infection   Outcome: Ongoing      Problem: Blood Glucose:  Goal: Ability to maintain appropriate glucose levels will improve  Ability to maintain appropriate glucose levels will improve   Outcome: Ongoing

## 2018-07-31 ENCOUNTER — HOSPITAL ENCOUNTER (OUTPATIENT)
Age: 63
Discharge: HOME | End: 2018-08-02
Payer: COMMERCIAL

## 2018-07-31 ENCOUNTER — HOSPITAL ENCOUNTER (OUTPATIENT)
Dept: HYPERBARIC MEDICINE | Age: 63
Setting detail: THERAPIES SERIES
Discharge: HOME | End: 2018-07-31
Payer: COMMERCIAL

## 2018-07-31 ENCOUNTER — HOSPITAL ENCOUNTER (OUTPATIENT)
Dept: ULTRASOUND IMAGING | Age: 63
Discharge: HOME | End: 2018-08-02
Payer: COMMERCIAL

## 2018-07-31 VITALS
HEART RATE: 88 BPM | RESPIRATION RATE: 20 BRPM | TEMPERATURE: 98.6 F | SYSTOLIC BLOOD PRESSURE: 134 MMHG | DIASTOLIC BLOOD PRESSURE: 74 MMHG

## 2018-07-31 DIAGNOSIS — L97.426 DIABETIC ULCER OF LEFT HEEL ASSOCIATED WITH TYPE 2 DIABETES MELLITUS, WITH BONE INVOLVEMENT WITHOUT EVIDENCE OF NECROSIS (HCC): ICD-10-CM

## 2018-07-31 DIAGNOSIS — E11.621 DIABETIC ULCER OF LEFT HEEL ASSOCIATED WITH TYPE 2 DIABETES MELLITUS, WITH BONE INVOLVEMENT WITHOUT EVIDENCE OF NECROSIS (HCC): ICD-10-CM

## 2018-07-31 DIAGNOSIS — M79.669 PAIN OF LOWER LEG, UNSPECIFIED LATERALITY: ICD-10-CM

## 2018-07-31 LAB
METER GLUCOSE: 135 MG/DL (ref 70–110)
METER GLUCOSE: 185 MG/DL (ref 70–110)

## 2018-07-31 PROCEDURE — 82962 GLUCOSE BLOOD TEST: CPT

## 2018-07-31 PROCEDURE — 99183 HYPERBARIC OXYGEN THERAPY: CPT | Performed by: SURGERY

## 2018-07-31 PROCEDURE — 93970 EXTREMITY STUDY: CPT

## 2018-07-31 PROCEDURE — G0277 HBOT, FULL BODY CHAMBER, 30M: HCPCS

## 2018-07-31 NOTE — PROGRESS NOTES
Sally Leon 6  Hyperbaric Oxygen Therapy   Progress Note    NAME: Darin Sotomayor  MEDICAL RECORD NUMBER:  [id number]  AGE: 70 y.o. GENDER: female  : 1955  EPISODE DATE:  2018   Subjective   HBO Treatment Number: 40 out of Total Treatments: 60  HBO Diagnosis:   Gorge Heather: Gorge Heather 3  Indications:  (non healing diabetic wound of the left foot)  Safety checks performed prior to treatment by HBOT staff. See doc flowsheets for documentation. Objective         Recent Labs      18   0948  18   1155   GLUMET  185*  135*     Pre treatment Vital Signs       Temp: 98.4 °F (36.9 °C)     Pulse: 96     Resp: 20     BP: (!) 140/69     Post treatment Vital Signs  Temp: 98.6 °F (37 °C)  Pulse: 88  Resp: 20  BP: 134/74  Assessment      Physical Exam:  General Appearance:  alert and oriented to person, place and time, well-developed and well-nourished, in no acute distress  ENT:  tympanic membranes intact bilaterally, normal color, normal light reflex bilaterally  Pulmonary/Chest:  clear to auscultation bilaterally- no wheezes, rales or rhonchi, normal air movement, no respiratory distress  Cardiovascular:  regular rate and rhythm  Chamber #: [id number]  Treatment Start Time: 1004        RANDY Rate: 2  Number of Air Breaks:  Treatment Status: No Air break     Decompression Time: 1144   Treatment End Time: 5073  Length of Treatment: 90 Minutes  Symptoms Noted During Treatment: None    Adverse Event: no    I was present on these premises and immediately available to furnish assistance & direction throughout the procedure. Plan      Darin Sotomayor is a 70 y.o. female  did successfully complete today's hyperbaric oxygen treatment at [address] [place] [address]. In my clinical judgement, ongoing HBO therapy is  necessary at this time, given a threat to patient function, limb or life from the current condition.       Supervision and attendance of Hyperbaric Oxygen Therapy

## 2018-08-01 ENCOUNTER — TELEPHONE (OUTPATIENT)
Dept: CARDIOLOGY CLINIC | Age: 63
End: 2018-08-01

## 2018-08-01 ENCOUNTER — HOSPITAL ENCOUNTER (OUTPATIENT)
Dept: HYPERBARIC MEDICINE | Age: 63
Setting detail: THERAPIES SERIES
Discharge: HOME | End: 2018-08-01
Payer: COMMERCIAL

## 2018-08-01 VITALS
RESPIRATION RATE: 20 BRPM | HEART RATE: 96 BPM | DIASTOLIC BLOOD PRESSURE: 94 MMHG | SYSTOLIC BLOOD PRESSURE: 118 MMHG | TEMPERATURE: 98.9 F

## 2018-08-01 DIAGNOSIS — L97.426 DIABETIC ULCER OF LEFT HEEL ASSOCIATED WITH TYPE 2 DIABETES MELLITUS, WITH BONE INVOLVEMENT WITHOUT EVIDENCE OF NECROSIS (HCC): ICD-10-CM

## 2018-08-01 DIAGNOSIS — E11.621 DIABETIC ULCER OF LEFT HEEL ASSOCIATED WITH TYPE 2 DIABETES MELLITUS, WITH BONE INVOLVEMENT WITHOUT EVIDENCE OF NECROSIS (HCC): ICD-10-CM

## 2018-08-01 LAB
METER GLUCOSE: 175 MG/DL (ref 70–110)
METER GLUCOSE: 232 MG/DL (ref 70–110)

## 2018-08-01 PROCEDURE — G0277 HBOT, FULL BODY CHAMBER, 30M: HCPCS

## 2018-08-01 PROCEDURE — 82962 GLUCOSE BLOOD TEST: CPT

## 2018-08-01 PROCEDURE — 99183 HYPERBARIC OXYGEN THERAPY: CPT

## 2018-08-02 ENCOUNTER — OFFICE VISIT (OUTPATIENT)
Dept: CARDIOLOGY CLINIC | Age: 63
End: 2018-08-02
Payer: COMMERCIAL

## 2018-08-02 ENCOUNTER — HOSPITAL ENCOUNTER (OUTPATIENT)
Dept: HYPERBARIC MEDICINE | Age: 63
Setting detail: THERAPIES SERIES
Discharge: HOME | End: 2018-08-02
Payer: COMMERCIAL

## 2018-08-02 VITALS
HEART RATE: 94 BPM | WEIGHT: 170 LBS | BODY MASS INDEX: 27.32 KG/M2 | HEIGHT: 66 IN | SYSTOLIC BLOOD PRESSURE: 122 MMHG | DIASTOLIC BLOOD PRESSURE: 70 MMHG | RESPIRATION RATE: 16 BRPM

## 2018-08-02 VITALS
TEMPERATURE: 98.5 F | HEART RATE: 76 BPM | DIASTOLIC BLOOD PRESSURE: 74 MMHG | RESPIRATION RATE: 20 BRPM | SYSTOLIC BLOOD PRESSURE: 137 MMHG

## 2018-08-02 DIAGNOSIS — M86.9 OSTEOMYELITIS, UNSPECIFIED SITE, UNSPECIFIED TYPE (HCC): ICD-10-CM

## 2018-08-02 DIAGNOSIS — L97.426 DIABETIC ULCER OF LEFT HEEL ASSOCIATED WITH TYPE 2 DIABETES MELLITUS, WITH BONE INVOLVEMENT WITHOUT EVIDENCE OF NECROSIS (HCC): ICD-10-CM

## 2018-08-02 DIAGNOSIS — E11.621 DIABETIC ULCER OF LEFT HEEL ASSOCIATED WITH TYPE 2 DIABETES MELLITUS, WITH BONE INVOLVEMENT WITHOUT EVIDENCE OF NECROSIS (HCC): ICD-10-CM

## 2018-08-02 DIAGNOSIS — R94.31 ABNORMAL EKG: ICD-10-CM

## 2018-08-02 DIAGNOSIS — Z01.810 PRE-OPERATIVE CARDIOVASCULAR EXAMINATION: Primary | ICD-10-CM

## 2018-08-02 LAB
METER GLUCOSE: 109 MG/DL (ref 70–110)
METER GLUCOSE: 132 MG/DL (ref 70–110)

## 2018-08-02 PROCEDURE — 99183 HYPERBARIC OXYGEN THERAPY: CPT

## 2018-08-02 PROCEDURE — 99183 HYPERBARIC OXYGEN THERAPY: CPT | Performed by: NURSE PRACTITIONER

## 2018-08-02 PROCEDURE — 82962 GLUCOSE BLOOD TEST: CPT

## 2018-08-02 PROCEDURE — G0277 HBOT, FULL BODY CHAMBER, 30M: HCPCS

## 2018-08-02 PROCEDURE — 99204 OFFICE O/P NEW MOD 45 MIN: CPT | Performed by: INTERNAL MEDICINE

## 2018-08-02 PROCEDURE — 93000 ELECTROCARDIOGRAM COMPLETE: CPT | Performed by: INTERNAL MEDICINE

## 2018-08-02 NOTE — PROGRESS NOTES
Sally Rizo Corycorrine Leon 6  Hyperbaric Oxygen Therapy   Progress Note    NAME: Alfonso Harley  MEDICAL RECORD NUMBER:  [id number]  AGE: 70 y.o. GENDER: female  : 1955  EPISODE DATE:  2018   Subjective   HBO Treatment Number: 55 out of Total Treatments: 60  HBO Diagnosis:   Noris Hubbard Many 3  Indications: Lower Extremity Diabetic Wound ___(site) (Left foot)  Safety checks performed prior to treatment. See doc flowsheets for documentation. Objective         Recent Labs      18   1215  18   1419   GLUMET  109  132*     Pre treatment Vital Signs       Temp: 98.1 °F (36.7 °C)     Pulse: 88     Resp: 16     BP: 124/70     Post treatment Vital Signs  Temp: 98.5 °F (36.9 °C)  Pulse: 76  Resp: 20  BP: 137/74  Assessment      Physical Exam:  General Appearance:  alert and oriented to person, place and time, well-developed and well-nourished, in no acute distress  ENT:  tympanic membranes intact bilaterally  Pulmonary/Chest:  clear to auscultation bilaterally- no wheezes, rales or rhonchi, normal air movement, no respiratory distress  Cardiovascular:  normal  Chamber #: [id number]  Treatment Start Time: 1225     Pressure Reached Time: 1235  RANDY Rate: 2  Number of Air Breaks:  Treatment Status: No Air break     Decompression Time: 1406   Treatment End Time: 1416  Length of Treatment: 90 Minutes  Symptoms Noted During Treatment: None    Adverse Event: no    I was present on these premises and immediately available to furnish assistance & direction throughout the procedure. Plan      Alfonso Harley is a 70 y.o. female  did successfully complete today's hyperbaric oxygen treatment at [address] [place] [address]. In my clinical judgement, ongoing HBO therapy is  necessary at this time, given a threat to patient function, limb or life from the current condition. Supervision and attendance of Hyperbaric Oxygen Therapy provided.   Continue HBO treatment as outlined in

## 2018-08-03 ENCOUNTER — HOSPITAL ENCOUNTER (OUTPATIENT)
Dept: HYPERBARIC MEDICINE | Age: 63
Setting detail: THERAPIES SERIES
Discharge: HOME | End: 2018-08-03
Payer: COMMERCIAL

## 2018-08-03 VITALS
SYSTOLIC BLOOD PRESSURE: 152 MMHG | HEART RATE: 76 BPM | DIASTOLIC BLOOD PRESSURE: 76 MMHG | TEMPERATURE: 98.7 F | RESPIRATION RATE: 20 BRPM

## 2018-08-03 DIAGNOSIS — L97.426 DIABETIC ULCER OF LEFT HEEL ASSOCIATED WITH TYPE 2 DIABETES MELLITUS, WITH BONE INVOLVEMENT WITHOUT EVIDENCE OF NECROSIS (HCC): ICD-10-CM

## 2018-08-03 DIAGNOSIS — E11.621 DIABETIC ULCER OF LEFT HEEL ASSOCIATED WITH TYPE 2 DIABETES MELLITUS, WITH BONE INVOLVEMENT WITHOUT EVIDENCE OF NECROSIS (HCC): ICD-10-CM

## 2018-08-03 LAB
METER GLUCOSE: 130 MG/DL (ref 70–110)
METER GLUCOSE: 160 MG/DL (ref 70–110)

## 2018-08-03 PROCEDURE — 99183 HYPERBARIC OXYGEN THERAPY: CPT

## 2018-08-03 PROCEDURE — 99183 HYPERBARIC OXYGEN THERAPY: CPT | Performed by: SURGERY

## 2018-08-03 PROCEDURE — 82962 GLUCOSE BLOOD TEST: CPT

## 2018-08-03 PROCEDURE — G0277 HBOT, FULL BODY CHAMBER, 30M: HCPCS

## 2018-08-06 ENCOUNTER — HOSPITAL ENCOUNTER (OUTPATIENT)
Dept: HYPERBARIC MEDICINE | Age: 63
Setting detail: THERAPIES SERIES
Discharge: HOME | End: 2018-08-06
Payer: COMMERCIAL

## 2018-08-06 ENCOUNTER — HOSPITAL ENCOUNTER (OUTPATIENT)
Dept: WOUND CARE | Age: 63
Discharge: HOME | End: 2018-08-06
Payer: COMMERCIAL

## 2018-08-06 VITALS
BODY MASS INDEX: 27.32 KG/M2 | TEMPERATURE: 98.1 F | RESPIRATION RATE: 18 BRPM | HEART RATE: 88 BPM | SYSTOLIC BLOOD PRESSURE: 132 MMHG | HEIGHT: 66 IN | WEIGHT: 170 LBS | DIASTOLIC BLOOD PRESSURE: 80 MMHG

## 2018-08-06 VITALS
SYSTOLIC BLOOD PRESSURE: 158 MMHG | TEMPERATURE: 98.5 F | RESPIRATION RATE: 20 BRPM | HEART RATE: 80 BPM | DIASTOLIC BLOOD PRESSURE: 87 MMHG

## 2018-08-06 DIAGNOSIS — L97.426 DIABETIC ULCER OF LEFT HEEL ASSOCIATED WITH TYPE 2 DIABETES MELLITUS, WITH BONE INVOLVEMENT WITHOUT EVIDENCE OF NECROSIS (HCC): ICD-10-CM

## 2018-08-06 DIAGNOSIS — E11.621 DIABETIC ULCER OF LEFT HEEL ASSOCIATED WITH TYPE 2 DIABETES MELLITUS, WITH BONE INVOLVEMENT WITHOUT EVIDENCE OF NECROSIS (HCC): ICD-10-CM

## 2018-08-06 LAB
METER GLUCOSE: 133 MG/DL (ref 70–110)
METER GLUCOSE: 153 MG/DL (ref 70–110)

## 2018-08-06 PROCEDURE — G0277 HBOT, FULL BODY CHAMBER, 30M: HCPCS

## 2018-08-06 PROCEDURE — 11042 DBRDMT SUBQ TIS 1ST 20SQCM/<: CPT

## 2018-08-06 PROCEDURE — 82962 GLUCOSE BLOOD TEST: CPT

## 2018-08-06 PROCEDURE — 99183 HYPERBARIC OXYGEN THERAPY: CPT | Performed by: SURGERY

## 2018-08-06 PROCEDURE — 99183 HYPERBARIC OXYGEN THERAPY: CPT

## 2018-08-06 PROCEDURE — 11044 DBRDMT BONE 1ST 20 SQ CM/<: CPT

## 2018-08-06 NOTE — PROGRESS NOTES
Social History   Substance Use Topics    Smoking status: Never Smoker    Smokeless tobacco: Never Used    Alcohol use No     Allergies   Allergen Reactions    Codeine Nausea And Vomiting     Current Outpatient Prescriptions on File Prior to Encounter   Medication Sig Dispense Refill    gabapentin (NEURONTIN) 300 MG capsule Take 300 mg by mouth 2 times daily. Orval Opitz glipiZIDE (GLUCOTROL XL) 10 MG extended release tablet Take 1 tablet by mouth daily      VELTASSA 8.4 g PACK Take 1 packet by mouth daily  1    vitamin C (ASCORBIC ACID) 500 MG tablet Take 500 mg by mouth daily       pravastatin (PRAVACHOL) 20 MG tablet Take 40 mg by mouth nightly ALTERNATES 20MG AND 40MG EVERY OTHER DAY      Lactobacillus Rhamnosus, GG, ([facility] IMMUNITY SUPPORT) CAPS Take 1 capsule by mouth daily      sodium bicarbonate 325 MG tablet Take 650 mg by mouth 2 times daily       Cholecalciferol (VITAMIN D3) 2000 UNITS CAPS Take 4,000 Units by mouth daily       acetaminophen 650 MG TABS Take 650 mg by mouth every 4 hours as needed (Fever >100.5 F (38 C)). 120 tablet     metformin (GLUCOPHAGE) 500 MG tablet Take 1,000 mg by mouth 2 times daily (with meals)       levothyroxine (SYNTHROID) 25 MCG tablet Take 50 mcg by mouth daily        No current facility-administered medications on file prior to encounter. REVIEW OF SYSTEMS See HPI    Objective:    /80   Pulse 88   Temp 98.1 °F (36.7 °C) (Oral)   Resp 18   Ht 5' 6\" (1.676 m)   Wt 170 lb (77.1 kg)   BMI 27.44 kg/m²   Wt Readings from Last 3 Encounters:   08/06/18 170 lb (77.1 kg)   08/02/18 170 lb (77.1 kg)   07/23/18 170 lb (77.1 kg)     PHYSICAL EXAM  CONSTITUTIONAL:   Awake, alert, cooperative   EYES:  lids and lashes normal   ENT: external ears and nose without lesions   NECK:  supple, symmetrical, trachea midline   SKIN:  Open wound Plantar left heel with exposed bone, devitalized nonviable soft tissue and bone appreciated approximately 20%.  There is no purulence however there is a slight odor. No fluctuance or crepitus. Vascular intact. Protective sensation diminished. Assessment:     Diabetic with left heel ulcer. Neuropathy. Procedure Note  Indications:  Based on my examination of this patient's wound(s)/ulcer(s) today, debridement is required to promote healing and evaluate the wound base. Performed by: Blue Miller DPM    Consent obtained:  Yes    Time out taken:  Yes    Pain Control: Anesthetic  Anesthetic: None     Debridement:Excisional Debridement    Using curette the wound(s)/ulcer(s) was/were sharply debrided down through and including the removal of bone. Devitalized Tissue Debrided:  fibrin, biofilm and slough to stimulate bleeding to promote healing, post debridement good bleeding base and wound edges noted    Pre Debridement Measurements:  Are located in the Wound/Ulcer Documentation Flow Sheet    Wound/Ulcer #: 1    Post Debridement Measurements:  Wound/Ulcer Descriptions are Pre Debridement except measurements:    Negative Pressure Wound Therapy Foot Left; Lower (Active)   Number of days: 1722       Negative Pressure Wound Therapy Heel Left (Active)   Number of days: 116       Wound 08/20/13 Heel Left (Active)   Number of days: 3401       Wound 11/16/13 Other (Comment) Ankle Left; Outer Red, swollen, shiny, hot area of outer L ankle (Active)   Number of days: 1724       Wound 04/09/18 Heel Left small round  (Active)   Number of days: 119       Wound 05/07/18 Other (Comment) Heel Plantar;Left #1 (acquired 4-6-18) Grade 3 (Active)   Wound Image   7/23/2018  1:31 PM   Wound Type Wound 5/7/2018  2:09 PM   Wound Diabetic Lopez 3 5/7/2018  2:09 PM   Dressing Status Intact 7/30/2018  2:40 PM   Dressing Changed Changed/New 7/30/2018  2:40 PM   Dressing/Treatment Vacuum dressing 7/30/2018  2:40 PM   Wound Cleansed Rinsed/Irrigated with saline 7/30/2018  2:40 PM   Wound Length (cm) 4.3 cm 8/6/2018  1:34 PM   Wound Width (cm) 2.8 cm

## 2018-08-06 NOTE — PLAN OF CARE
Problem: Wound:  Goal: Will show signs of wound healing; wound closure and no evidence of infection  Will show signs of wound healing; wound closure and no evidence of infection   Outcome: Ongoing      Problem: Blood Glucose:  Goal: Ability to maintain appropriate glucose levels will improve  Ability to maintain appropriate glucose levels will improve   Outcome: Ongoing      Problem: Falls - Risk of:  Goal: Will remain free from falls  Will remain free from falls   Outcome: Ongoing

## 2018-08-07 ENCOUNTER — HOSPITAL ENCOUNTER (OUTPATIENT)
Dept: HYPERBARIC MEDICINE | Age: 63
Setting detail: THERAPIES SERIES
Discharge: HOME | End: 2018-08-07
Payer: COMMERCIAL

## 2018-08-07 VITALS
TEMPERATURE: 98.2 F | HEART RATE: 80 BPM | RESPIRATION RATE: 20 BRPM | SYSTOLIC BLOOD PRESSURE: 144 MMHG | DIASTOLIC BLOOD PRESSURE: 77 MMHG

## 2018-08-07 DIAGNOSIS — L97.426 DIABETIC ULCER OF LEFT HEEL ASSOCIATED WITH TYPE 2 DIABETES MELLITUS, WITH BONE INVOLVEMENT WITHOUT EVIDENCE OF NECROSIS (HCC): ICD-10-CM

## 2018-08-07 DIAGNOSIS — E11.621 DIABETIC ULCER OF LEFT HEEL ASSOCIATED WITH TYPE 2 DIABETES MELLITUS, WITH BONE INVOLVEMENT WITHOUT EVIDENCE OF NECROSIS (HCC): ICD-10-CM

## 2018-08-07 LAB
METER GLUCOSE: 170 MG/DL (ref 70–110)
METER GLUCOSE: 203 MG/DL (ref 70–110)

## 2018-08-07 PROCEDURE — 99183 HYPERBARIC OXYGEN THERAPY: CPT | Performed by: SURGERY

## 2018-08-07 PROCEDURE — 82962 GLUCOSE BLOOD TEST: CPT

## 2018-08-07 PROCEDURE — G0277 HBOT, FULL BODY CHAMBER, 30M: HCPCS

## 2018-08-07 PROCEDURE — 99183 HYPERBARIC OXYGEN THERAPY: CPT

## 2018-08-07 NOTE — PROGRESS NOTES
treatment as outlined in the treatment plan. Hyperbaric Oxygen: Cassidy Rosaura Hernandez tolerated Treatment Number: 39 well today without complications. Discharge Instructions were explained and given to Ms. Hernandez     Electronically signed by Deandra Gillespie MD on 8/1/2018 at 6:30 PM

## 2018-08-08 ENCOUNTER — HOSPITAL ENCOUNTER (OUTPATIENT)
Dept: HYPERBARIC MEDICINE | Age: 63
Setting detail: THERAPIES SERIES
Discharge: HOME | End: 2018-08-08
Payer: COMMERCIAL

## 2018-08-08 VITALS
HEART RATE: 72 BPM | RESPIRATION RATE: 20 BRPM | TEMPERATURE: 98.3 F | SYSTOLIC BLOOD PRESSURE: 124 MMHG | DIASTOLIC BLOOD PRESSURE: 90 MMHG

## 2018-08-08 DIAGNOSIS — L97.426 DIABETIC ULCER OF LEFT HEEL ASSOCIATED WITH TYPE 2 DIABETES MELLITUS, WITH BONE INVOLVEMENT WITHOUT EVIDENCE OF NECROSIS (HCC): ICD-10-CM

## 2018-08-08 DIAGNOSIS — E11.621 DIABETIC ULCER OF LEFT HEEL ASSOCIATED WITH TYPE 2 DIABETES MELLITUS, WITH BONE INVOLVEMENT WITHOUT EVIDENCE OF NECROSIS (HCC): ICD-10-CM

## 2018-08-08 LAB
METER GLUCOSE: 215 MG/DL (ref 70–110)
METER GLUCOSE: 247 MG/DL (ref 70–110)

## 2018-08-08 PROCEDURE — 82962 GLUCOSE BLOOD TEST: CPT

## 2018-08-08 PROCEDURE — G0277 HBOT, FULL BODY CHAMBER, 30M: HCPCS

## 2018-08-08 PROCEDURE — 99183 HYPERBARIC OXYGEN THERAPY: CPT

## 2018-08-08 PROCEDURE — 99183 HYPERBARIC OXYGEN THERAPY: CPT | Performed by: SURGERY

## 2018-08-08 NOTE — PROGRESS NOTES
Sally Rizo Corycorrine Leon 6  Hyperbaric Oxygen Therapy   Progress Note    NAME: Maurita Dance  MEDICAL RECORD NUMBER:  [id number]  AGE: 70 y.o. GENDER: female  : 1955  EPISODE DATE:  2018   Subjective   HBO Treatment Number: 52 out of Total Treatments: 60  HBO Diagnosis:   Ellamae Motto: Ellamae Motto 3  Indications:  (non healing diabetic wound of the left foot)  Safety checks performed prior to treatment. See doc flowsheets for documentation. Objective         Recent Labs      18   0937  18   1204   GLUMET  247*  215*     Pre treatment Vital Signs       Temp: 98.5 °F (36.9 °C)     Pulse: 80     Resp: 20     BP: (!) 158/87     Post treatment Vital Signs  Temp: 98.5 °F (36.9 °C)  Pulse: 80  Resp: 20  BP: (!) 158/87  Assessment      Physical Exam:  General Appearance:  alert and oriented to person, place and time, well-developed and well-nourished, in no acute distress  ENT:  tympanic membranes intact bilaterally  Pulmonary/Chest:  clear to auscultation bilaterally- no wheezes, rales or rhonchi, normal air movement, no respiratory distress  Cardiovascular:  regular rate and rhythm  Chamber #: [id number]  Treatment Start Time: 1011     Pressure Reached Time: 1024  RANDY Rate: 2  Number of Air Breaks:  Treatment Status: No Air break     Decompression Time: 1156      Length of Treatment: 90 Minutes       Adverse Event: no    I was present on these premises and immediately available to furnish assistance & direction throughout the procedure. Plan      Maurita Dance is a 70 y.o. female  did successfully complete today's hyperbaric oxygen treatment at [address] [place] [address]. In my clinical judgement, ongoing HBO therapy is  necessary at this time, given a threat to patient function, limb or life from the current condition. Supervision and attendance of Hyperbaric Oxygen Therapy provided. Continue HBO treatment as outlined in the treatment plan.     Hyperbaric Oxygen: Yee Rangel ROGERIO Hernandez tolerated Treatment Number: 52 well today without complications. Discharge Instructions were explained and given to Ms. Hernandez     Electronically signed by Elier Anderson MD on 8/6/2018 at 4:53 PM

## 2018-08-08 NOTE — PROGRESS NOTES
Sally Leon 6  Hyperbaric Oxygen Therapy   Progress Note    NAME: Eavns John  MEDICAL RECORD NUMBER:  [id number]  AGE: 70 y.o. GENDER: female  : 1955  EPISODE DATE:  2018   Subjective   HBO Treatment Number: 48 out of Total Treatments: 60  HBO Diagnosis:   Tamar Calix Xiomaras 3  Indications: Lower Extremity Diabetic Wound ___(site) (Left Heel)  Safety checks performed prior to treatment by HBOT staff. See doc flowsheets for documentation. Objective         Recent Labs      18   0933  18   1204   GLUMET  203*  170*     Pre treatment Vital Signs       Temp: 98 °F (36.7 °C)     Pulse: 76     Resp: 20     BP: (!) 148/86     Post treatment Vital Signs  Temp: 98.2 °F (36.8 °C)  Pulse: 80  Resp: 20  BP: (!) 144/77  Assessment      Physical Exam:  General Appearance:  alert and oriented to person, place and time, well-developed and well-nourished, in no acute distress  ENT:  tympanic membranes intact bilaterally, normal color, normal light reflex bilaterally  Pulmonary/Chest:  clear to auscultation bilaterally- no wheezes, rales or rhonchi, normal air movement, no respiratory distress  Cardiovascular:  regular rate and rhythm  Chamber #: [id number]  Treatment Start Time: 1010     Pressure Reached Time: 1023  RANDY Rate: 2  Number of Air Breaks:  Treatment Status: No Air break     Decompression Time: 1154   Treatment End Time: 1205  Length of Treatment: 90 Minutes  Symptoms Noted During Treatment: None    Adverse Event: no    I was present on these premises and immediately available to furnish assistance & direction throughout the procedure. Plan      Evans John is a 70 y.o. female  did successfully complete today's hyperbaric oxygen treatment at [address] [place] [address]. In my clinical judgement, ongoing HBO therapy is  necessary at this time, given a threat to patient function, limb or life from the current condition.       Supervision and

## 2018-08-09 ENCOUNTER — HOSPITAL ENCOUNTER (OUTPATIENT)
Dept: HYPERBARIC MEDICINE | Age: 63
Setting detail: THERAPIES SERIES
Discharge: HOME | End: 2018-08-09
Payer: COMMERCIAL

## 2018-08-09 VITALS
DIASTOLIC BLOOD PRESSURE: 77 MMHG | HEART RATE: 96 BPM | RESPIRATION RATE: 20 BRPM | TEMPERATURE: 98.9 F | SYSTOLIC BLOOD PRESSURE: 145 MMHG

## 2018-08-09 DIAGNOSIS — E11.621 DIABETIC ULCER OF LEFT HEEL ASSOCIATED WITH TYPE 2 DIABETES MELLITUS, WITH BONE INVOLVEMENT WITHOUT EVIDENCE OF NECROSIS (HCC): ICD-10-CM

## 2018-08-09 DIAGNOSIS — L97.426 DIABETIC ULCER OF LEFT HEEL ASSOCIATED WITH TYPE 2 DIABETES MELLITUS, WITH BONE INVOLVEMENT WITHOUT EVIDENCE OF NECROSIS (HCC): ICD-10-CM

## 2018-08-09 LAB
METER GLUCOSE: 178 MG/DL (ref 70–110)
METER GLUCOSE: 190 MG/DL (ref 70–110)
METER GLUCOSE: 194 MG/DL (ref 70–110)

## 2018-08-09 PROCEDURE — G0277 HBOT, FULL BODY CHAMBER, 30M: HCPCS

## 2018-08-09 PROCEDURE — 99183 HYPERBARIC OXYGEN THERAPY: CPT

## 2018-08-09 PROCEDURE — 82962 GLUCOSE BLOOD TEST: CPT

## 2018-08-10 ENCOUNTER — HOSPITAL ENCOUNTER (OUTPATIENT)
Dept: CARDIOLOGY | Age: 63
Discharge: HOME | End: 2018-08-10
Payer: COMMERCIAL

## 2018-08-10 ENCOUNTER — HOSPITAL ENCOUNTER (OUTPATIENT)
Dept: HYPERBARIC MEDICINE | Age: 63
Setting detail: THERAPIES SERIES
Discharge: HOME | End: 2018-08-10
Payer: COMMERCIAL

## 2018-08-10 VITALS
DIASTOLIC BLOOD PRESSURE: 70 MMHG | SYSTOLIC BLOOD PRESSURE: 116 MMHG | HEART RATE: 110 BPM | WEIGHT: 170 LBS | HEIGHT: 66 IN | BODY MASS INDEX: 27.32 KG/M2

## 2018-08-10 VITALS
DIASTOLIC BLOOD PRESSURE: 76 MMHG | HEART RATE: 84 BPM | RESPIRATION RATE: 20 BRPM | TEMPERATURE: 97.8 F | SYSTOLIC BLOOD PRESSURE: 133 MMHG

## 2018-08-10 DIAGNOSIS — L97.426 DIABETIC ULCER OF LEFT HEEL ASSOCIATED WITH TYPE 2 DIABETES MELLITUS, WITH BONE INVOLVEMENT WITHOUT EVIDENCE OF NECROSIS (HCC): ICD-10-CM

## 2018-08-10 DIAGNOSIS — E11.621 DIABETIC ULCER OF LEFT HEEL ASSOCIATED WITH TYPE 2 DIABETES MELLITUS, WITH BONE INVOLVEMENT WITHOUT EVIDENCE OF NECROSIS (HCC): ICD-10-CM

## 2018-08-10 DIAGNOSIS — M86.9 OSTEOMYELITIS, UNSPECIFIED SITE, UNSPECIFIED TYPE (HCC): ICD-10-CM

## 2018-08-10 DIAGNOSIS — Z01.810 PRE-OPERATIVE CARDIOVASCULAR EXAMINATION: Primary | ICD-10-CM

## 2018-08-10 DIAGNOSIS — R94.31 ABNORMAL EKG: ICD-10-CM

## 2018-08-10 LAB
METER GLUCOSE: 223 MG/DL (ref 70–110)
METER GLUCOSE: 305 MG/DL (ref 70–110)

## 2018-08-10 PROCEDURE — A9500 TC99M SESTAMIBI: HCPCS | Performed by: INTERNAL MEDICINE

## 2018-08-10 PROCEDURE — 78452 HT MUSCLE IMAGE SPECT MULT: CPT

## 2018-08-10 PROCEDURE — 3430000000 HC RX DIAGNOSTIC RADIOPHARMACEUTICAL: Performed by: INTERNAL MEDICINE

## 2018-08-10 PROCEDURE — 99183 HYPERBARIC OXYGEN THERAPY: CPT

## 2018-08-10 PROCEDURE — 93017 CV STRESS TEST TRACING ONLY: CPT

## 2018-08-10 PROCEDURE — 2580000003 HC RX 258: Performed by: INTERNAL MEDICINE

## 2018-08-10 PROCEDURE — G0277 HBOT, FULL BODY CHAMBER, 30M: HCPCS

## 2018-08-10 PROCEDURE — 6360000002 HC RX W HCPCS: Performed by: INTERNAL MEDICINE

## 2018-08-10 PROCEDURE — 99183 HYPERBARIC OXYGEN THERAPY: CPT | Performed by: SURGERY

## 2018-08-10 PROCEDURE — 82962 GLUCOSE BLOOD TEST: CPT

## 2018-08-10 PROCEDURE — 93306 TTE W/DOPPLER COMPLETE: CPT | Performed by: PSYCHIATRY & NEUROLOGY

## 2018-08-10 RX ORDER — SODIUM CHLORIDE 0.9 % (FLUSH) 0.9 %
10 SYRINGE (ML) INJECTION PRN
Status: DISCONTINUED | OUTPATIENT
Start: 2018-08-10 | End: 2018-08-11 | Stop reason: HOSPADM

## 2018-08-10 RX ADMIN — Medication 31.2 MILLICURIE: at 08:05

## 2018-08-10 RX ADMIN — Medication 9.7 MILLICURIE: at 06:45

## 2018-08-10 RX ADMIN — Medication 10 ML: at 06:46

## 2018-08-10 RX ADMIN — REGADENOSON 0.4 MG: 0.08 INJECTION, SOLUTION INTRAVENOUS at 08:05

## 2018-08-10 RX ADMIN — Medication 10 ML: at 08:05

## 2018-08-10 RX ADMIN — Medication 10 ML: at 08:06

## 2018-08-10 NOTE — PROGRESS NOTES
[facility]  Hyperbaric Oxygen Therapy   Progress Note    NAME: Pamella Loving  MEDICAL RECORD NUMBER:  [id number]  AGE: 70 y.o. GENDER: female  : 1955  EPISODE DATE:  2018   Subjective   HBO Treatment Number: 52 out of Total Treatments: 60  HBO Diagnosis:   Wolf Andrew  Indications: Lower Extremity Diabetic Wound ___(site) (left foot)  Safety checks performed prior to treatment. See doc flowsheets for documentation. Objective         Recent Labs      18   1156  08/10/18   1122   GLUMET  190*  305*     Pre treatment Vital Signs       Temp: 98.5 °F (36.9 °C)     Pulse: 88     Resp: 20     BP: 106/81     Post treatment Vital Signs  Temp: 98.3 °F (36.8 °C)  Pulse: 72  Resp: 20  BP: (!) 124/90  Assessment      Physical Exam:  General Appearance:  alert and oriented to person, place and time, well-developed and well-nourished, in no acute distress  ENT:  tympanic membranes intact bilaterally  Pulmonary/Chest:  clear to auscultation bilaterally- no wheezes, rales or rhonchi, normal air movement, no respiratory distress  Cardiovascular:  regular rate and rhythm  Chamber #: [id number]  Treatment Start Time: 1012     Pressure Reached Time: 1020  RANDY Rate: 2  Number of Air Breaks:  Treatment Status: No Air break     Decompression Time: 1152   Treatment End Time: 1202  Length of Treatment: 90 Minutes  Symptoms Noted During Treatment: None    Adverse Event: no    I was present on these premises and immediately available to furnish assistance & direction throughout the procedure. Plan      Pamella Loving is a 70 y.o. female  did successfully complete today's hyperbaric oxygen treatment at [address] [place] [address]. In my clinical judgement, ongoing HBO therapy is  necessary at this time, given a threat to patient function, limb or life from the current condition. Supervision and attendance of Hyperbaric Oxygen Therapy provided.   Continue HBO

## 2018-08-10 NOTE — PROCEDURES
abnormal extracardiac radioactivity and soft tissue diaphragmatic attenuation. The gated SPECT stress imaging in the short, vertical long, and horizontal long axis demonstrated normal homogeneous tracer distribution throughout the myocardium. The resting images show no change. Gated SPECT left ventricular ejection fraction was calculated to be 81%, with normal myocardial thickening and wall motion. Impression:    1. ECG during the infusion did not change. The myocardial perfusion imaging was normal.  2. Overall left ventricular systolic function was normal without regional wall motion abnormalities. 3. Low risk pre-operative pharmacologic stress test.  4. Compared to prior study results of 1/15/2015, no significant changes noted. [facility]      Electronically signed by Dong Martínez MD on 8/10/18 at 3:21 PM

## 2018-08-13 ENCOUNTER — HOSPITAL ENCOUNTER (OUTPATIENT)
Dept: WOUND CARE | Age: 63
Discharge: HOME | End: 2018-08-13
Payer: COMMERCIAL

## 2018-08-13 ENCOUNTER — HOSPITAL ENCOUNTER (OUTPATIENT)
Dept: HYPERBARIC MEDICINE | Age: 63
Setting detail: THERAPIES SERIES
Discharge: HOME | End: 2018-08-13
Payer: COMMERCIAL

## 2018-08-13 VITALS
DIASTOLIC BLOOD PRESSURE: 75 MMHG | HEART RATE: 84 BPM | RESPIRATION RATE: 20 BRPM | SYSTOLIC BLOOD PRESSURE: 135 MMHG | TEMPERATURE: 98.8 F

## 2018-08-13 VITALS
DIASTOLIC BLOOD PRESSURE: 70 MMHG | BODY MASS INDEX: 27.32 KG/M2 | RESPIRATION RATE: 20 BRPM | HEIGHT: 66 IN | SYSTOLIC BLOOD PRESSURE: 130 MMHG | HEART RATE: 80 BPM | TEMPERATURE: 99.5 F | WEIGHT: 170 LBS

## 2018-08-13 DIAGNOSIS — E11.621 DIABETIC ULCER OF LEFT HEEL ASSOCIATED WITH TYPE 2 DIABETES MELLITUS, WITH BONE INVOLVEMENT WITHOUT EVIDENCE OF NECROSIS (HCC): ICD-10-CM

## 2018-08-13 DIAGNOSIS — L97.426 DIABETIC ULCER OF LEFT HEEL ASSOCIATED WITH TYPE 2 DIABETES MELLITUS, WITH BONE INVOLVEMENT WITHOUT EVIDENCE OF NECROSIS (HCC): ICD-10-CM

## 2018-08-13 LAB
METER GLUCOSE: 151 MG/DL (ref 70–110)
METER GLUCOSE: 175 MG/DL (ref 70–110)

## 2018-08-13 PROCEDURE — 82962 GLUCOSE BLOOD TEST: CPT

## 2018-08-13 PROCEDURE — 87075 CULTR BACTERIA EXCEPT BLOOD: CPT

## 2018-08-13 PROCEDURE — 11042 DBRDMT SUBQ TIS 1ST 20SQCM/<: CPT

## 2018-08-13 PROCEDURE — 99183 HYPERBARIC OXYGEN THERAPY: CPT | Performed by: SURGERY

## 2018-08-13 PROCEDURE — 99183 HYPERBARIC OXYGEN THERAPY: CPT

## 2018-08-13 PROCEDURE — 87205 SMEAR GRAM STAIN: CPT

## 2018-08-13 PROCEDURE — G0277 HBOT, FULL BODY CHAMBER, 30M: HCPCS

## 2018-08-13 PROCEDURE — 87186 SC STD MICRODIL/AGAR DIL: CPT

## 2018-08-13 PROCEDURE — 87070 CULTURE OTHR SPECIMN AEROBIC: CPT

## 2018-08-13 NOTE — PROGRESS NOTES
W[facility] Healing Center Followup Visit Note    Referring Physician : DO America Bob [facility]  MEDICAL RECORD NUMBER:  [id number]  AGE: 70 y.o. GENDER: female  : 1955  EPISODE DATE:  2018    Subjective:     Chief Complaint   Patient presents with    Wound Check     pateint here for treatment of left foot ulcer      HISTORY of PRESENT ILLNESS HPI   Eloise Levin is a 70 y.o. female who presents today for wound/ulcer evaluation.    History of Wound Context:  Follow up and debridement     Wound/Ulcer Pain Timing/Severity: none  Quality of pain: N/A  Severity:  0 / 10   Modifying Factors: None  Associated Signs/Symptoms: drainage and odor    Ulcer Identification:  Ulcer Type: refractory osteomyelitis  Contributing Factors: none    Diabetic/Pressure/Non Pressure Ulcers only:  Ulcer: Diabetic ulcer, bone necrosis    Wound: Nonhealing surgical due to infection        PAST MEDICAL HISTORY      Diagnosis Date    Abnormal EKG     Diabetes mellitus ([address]     Hx of blood clots     PE, FROM TAKING BC PILLS    Hyperlipemia     Thyroid disease      Past Surgical History:   Procedure Laterality Date    COLONOSCOPY      DILATION AND CURETTAGE OF UTERUS      ECHO COMPL W DOP COLOR FLOW  2013         ECHOCARDIOGRAM TRANSESOPHAGEAL  2013         FOOT SURGERY  13    i&d left foot and ankle with bone biopsy    HYSTERECTOMY      OTHER SURGICAL HISTORY Left 04/10/2018    debridement and bone biopsy heel    UT DEEP DISSEC FOOT INFEC,1 BURSA Left 4/10/2018    LEFT HEEL DEBRIDEMENT, BONE BIOPSY performed by Zully Robles DPM at [facility] OR    WISDOM TOOTH EXTRACTION       Family History   Problem Relation Age of Onset    Alzheimer's Disease Mother     Other Father         pacemaker    Other Maternal Grandfather         aneurysm     Social History   Substance Use Topics    Smoking status: Never Smoker    Smokeless tobacco: Never Used    Alcohol use No     Allergies Allergen Reactions    Codeine Nausea And Vomiting     Current Outpatient Prescriptions on File Prior to Encounter   Medication Sig Dispense Refill    gabapentin (NEURONTIN) 300 MG capsule Take 300 mg by mouth 2 times daily. Lorren Lesches glipiZIDE (GLUCOTROL XL) 10 MG extended release tablet Take 1 tablet by mouth daily      VELTASSA 8.4 g PACK Take 1 packet by mouth daily  1    vitamin C (ASCORBIC ACID) 500 MG tablet Take 500 mg by mouth daily       pravastatin (PRAVACHOL) 20 MG tablet Take 40 mg by mouth nightly ALTERNATES 20MG AND 40MG EVERY OTHER DAY      Lactobacillus Rhamnosus, GG, ([facility] IMMUNITY SUPPORT) CAPS Take 1 capsule by mouth daily      sodium bicarbonate 325 MG tablet Take 650 mg by mouth 2 times daily       Cholecalciferol (VITAMIN D3) 2000 UNITS CAPS Take 4,000 Units by mouth daily       acetaminophen 650 MG TABS Take 650 mg by mouth every 4 hours as needed (Fever >100.5 F (38 C)). 120 tablet     metformin (GLUCOPHAGE) 500 MG tablet Take 1,000 mg by mouth 2 times daily (with meals)       levothyroxine (SYNTHROID) 25 MCG tablet Take 50 mcg by mouth daily        No current facility-administered medications on file prior to encounter.         REVIEW OF SYSTEMS See HPI    Objective:    /70   Pulse 80   Temp 99.5 °F (37.5 °C) (Oral)   Resp 20   Ht 5' 6\" (1.676 m)   Wt 170 lb (77.1 kg)   BMI 27.44 kg/m²   Wt Readings from Last 3 Encounters:   08/13/18 170 lb (77.1 kg)   08/10/18 170 lb (77.1 kg)   08/06/18 170 lb (77.1 kg)     PHYSICAL EXAM  CONSTITUTIONAL:   Awake, alert, cooperative   EYES:  lids and lashes normal   ENT: external ears and nose without lesions   NECK:  supple, symmetrical, trachea midline   SKIN:  Open wound Present    Assessment:     Problem List Items Addressed This Visit     None        Procedure Note  Indications:  Based on my examination of this patient's wound(s)/ulcer(s) today, debridement is required to promote healing and evaluate the wound contraindicated.   CULTURE OBTAINED AT WOUND CARE 8/13/18    DR. CHAPMAN 8/9/18- POSSIBLE PICC LINE     SCHEDULE OUT PATIENT SURGERY WITH DR. RODRIGUES WHEN CLEARED BY CARDIOLOGY     Continue Extended HBO     Keep all pressure off of heel     Keep heel clean and dry     WCC followup visit :  1 WEEK - LILIA______________________  (Please note your next appointment above and if you are unable to keep, kindly give a 24 hour notice.  Thank you.)     Physician signature:__________________________        If you experience any of the following, please call the [facility] during business hours:     * Increase in Pain  * Temperature over 101  * Increase in drainage from your wound  * Drainage with a foul odor  * Bleeding  * Increase in swelling  * Need for compression bandage changes due to slippage, breakthrough drainage.     If you need medical attention outside of the business hours of the [facility] please contact your PCP or go to the nearest emergency room.                    Electronically signed by Radha Gaffney DPM on 8/13/2018 at 2:22 PM

## 2018-08-14 ENCOUNTER — HOSPITAL ENCOUNTER (OUTPATIENT)
Dept: HYPERBARIC MEDICINE | Age: 63
Setting detail: THERAPIES SERIES
Discharge: HOME | End: 2018-08-14
Payer: COMMERCIAL

## 2018-08-14 VITALS
RESPIRATION RATE: 20 BRPM | TEMPERATURE: 99 F | HEART RATE: 80 BPM | DIASTOLIC BLOOD PRESSURE: 63 MMHG | SYSTOLIC BLOOD PRESSURE: 115 MMHG

## 2018-08-14 DIAGNOSIS — E11.621 DIABETIC ULCER OF LEFT HEEL ASSOCIATED WITH TYPE 2 DIABETES MELLITUS, WITH BONE INVOLVEMENT WITHOUT EVIDENCE OF NECROSIS (HCC): ICD-10-CM

## 2018-08-14 DIAGNOSIS — L97.426 DIABETIC ULCER OF LEFT HEEL ASSOCIATED WITH TYPE 2 DIABETES MELLITUS, WITH BONE INVOLVEMENT WITHOUT EVIDENCE OF NECROSIS (HCC): ICD-10-CM

## 2018-08-14 LAB — METER GLUCOSE: 198 MG/DL (ref 70–110)

## 2018-08-14 PROCEDURE — 99183 HYPERBARIC OXYGEN THERAPY: CPT | Performed by: SURGERY

## 2018-08-14 PROCEDURE — 99183 HYPERBARIC OXYGEN THERAPY: CPT

## 2018-08-14 PROCEDURE — 82962 GLUCOSE BLOOD TEST: CPT

## 2018-08-14 PROCEDURE — G0277 HBOT, FULL BODY CHAMBER, 30M: HCPCS

## 2018-08-14 NOTE — PROGRESS NOTES
remainder of the day due to having had anesthesia. PARKING INSTRUCTIONS:   [x] Arrival Time 0515  · [x] Parking lot I is located on [facility] (the corner of [facility] and [facility]). To enter, press the button and the gate will lift. A free token will be provided to exit the lot. One car per patient is allowed to park in this lot. All other cars are to park on [address] [place] [address] Street either in the parking garage or the handicap lot. [] Free  parking is available on [address] [place] [address]. · [] To reach the [facility] lobby from [address] [place] [address], upon entering the hospital, take elevator B to the 3rd floor. EDUCATION INSTRUCTIONS:      [] Knee or hip replacement booklet & exercise pamphlets given. [] Leigh Murphy placed in chart. [] Pre-admission Testing educational folder given  [] Incentive Spirometry,coughing & deep breathing exercises reviewed. []Medication information sheet(s)   []Fluoroscopy-Xray used in surgery reviewed with patient. Educational pamphlet placed in chart. []Pain: Post-op pain is normal and to be expected. You will be asked to rate your pain from 0-10(a zero is not acceptable-education is needed). Your post-op pain goal is:  [] Ask your nurse for your pain medication. [] Joint camp offered. [] Joint replacement booklets given. []Other     MEDICATION INSTRUCTIONS:   [x]Bring a complete list of your medications, please write the last time you took the medicine, give this list to the nurse. [x] Take the following medications the morning of surgery with 1-2 ounces of water: see medication list  [x] Stop herbal supplements and vitamins 5 days before your surgery. [] DO NOT take any diabetic medicine the morning of surgery. Follow instructions for insulin the day before surgery. [] If you are diabetic and your blood sugar is low or you feel symptomatic, you may drink 1-2 ounces of apple juice or take a glucose tablet.   The morning of your procedure, you may call the pre-op area if you have concerns about your blood sugar [phone]. [] Use your inhalers the morning of surgery. Bring your emergency inhaler with you day of surgery. [] Follow physician instructions regarding any blood thinners you may be taking. WHAT TO EXPECT:  [x] The day of surgery you will be greeted and  checked in by the [facility] .  A nurse will greet you in accordance to the time you are needed in the pre-op area to prepare you for surgery. Please do not be discouraged if you are not greeted in the order you arrive as there are many variables that are involved in patient preparation. Your patience is greatly appreciated as you wait for your nurse. Please bring in items such as: books, magazines, newspapers, electronics, or any other items  to occupy your time in the waiting area. [x]  Delays may occur with surgery and staff will make a sincere effort to keep you informed of delays. If any delays occur with your procedure, we apologize ahead of time for your inconvenience as we recognize the value of your time.

## 2018-08-15 ENCOUNTER — HOSPITAL ENCOUNTER (OUTPATIENT)
Dept: HYPERBARIC MEDICINE | Age: 63
Setting detail: THERAPIES SERIES
Discharge: HOME | End: 2018-08-15
Payer: COMMERCIAL

## 2018-08-15 ENCOUNTER — ANESTHESIA EVENT (OUTPATIENT)
Dept: OPERATING ROOM | Age: 63
End: 2018-08-15
Payer: COMMERCIAL

## 2018-08-15 VITALS
DIASTOLIC BLOOD PRESSURE: 73 MMHG | TEMPERATURE: 98.7 F | RESPIRATION RATE: 20 BRPM | HEART RATE: 80 BPM | SYSTOLIC BLOOD PRESSURE: 140 MMHG

## 2018-08-15 DIAGNOSIS — E11.621 DIABETIC ULCER OF LEFT HEEL ASSOCIATED WITH TYPE 2 DIABETES MELLITUS, WITH BONE INVOLVEMENT WITHOUT EVIDENCE OF NECROSIS (HCC): ICD-10-CM

## 2018-08-15 DIAGNOSIS — L97.426 DIABETIC ULCER OF LEFT HEEL ASSOCIATED WITH TYPE 2 DIABETES MELLITUS, WITH BONE INVOLVEMENT WITHOUT EVIDENCE OF NECROSIS (HCC): ICD-10-CM

## 2018-08-15 LAB
ANAEROBIC CULTURE: NORMAL
METER GLUCOSE: 172 MG/DL (ref 70–110)
METER GLUCOSE: 187 MG/DL (ref 70–110)

## 2018-08-15 PROCEDURE — 99183 HYPERBARIC OXYGEN THERAPY: CPT | Performed by: SURGERY

## 2018-08-15 PROCEDURE — 82962 GLUCOSE BLOOD TEST: CPT

## 2018-08-15 PROCEDURE — G0277 HBOT, FULL BODY CHAMBER, 30M: HCPCS

## 2018-08-15 PROCEDURE — 99183 HYPERBARIC OXYGEN THERAPY: CPT

## 2018-08-16 ENCOUNTER — HOSPITAL ENCOUNTER (OUTPATIENT)
Age: 63
Setting detail: OUTPATIENT SURGERY
Discharge: HOME | End: 2018-08-16
Attending: PODIATRIST | Admitting: PODIATRIST
Payer: COMMERCIAL

## 2018-08-16 ENCOUNTER — ANESTHESIA (OUTPATIENT)
Dept: OPERATING ROOM | Age: 63
End: 2018-08-16
Payer: COMMERCIAL

## 2018-08-16 ENCOUNTER — PREP FOR PROCEDURE (OUTPATIENT)
Dept: SURGERY | Age: 63
End: 2018-08-16

## 2018-08-16 ENCOUNTER — HOSPITAL ENCOUNTER (OUTPATIENT)
Dept: HYPERBARIC MEDICINE | Age: 63
Setting detail: THERAPIES SERIES
Discharge: HOME | End: 2018-08-16
Payer: COMMERCIAL

## 2018-08-16 VITALS — SYSTOLIC BLOOD PRESSURE: 118 MMHG | OXYGEN SATURATION: 100 % | DIASTOLIC BLOOD PRESSURE: 65 MMHG

## 2018-08-16 VITALS
SYSTOLIC BLOOD PRESSURE: 118 MMHG | HEART RATE: 84 BPM | RESPIRATION RATE: 20 BRPM | DIASTOLIC BLOOD PRESSURE: 65 MMHG | TEMPERATURE: 98.5 F

## 2018-08-16 VITALS
TEMPERATURE: 96.9 F | HEIGHT: 66 IN | WEIGHT: 170 LBS | RESPIRATION RATE: 16 BRPM | HEART RATE: 94 BPM | OXYGEN SATURATION: 97 % | SYSTOLIC BLOOD PRESSURE: 117 MMHG | DIASTOLIC BLOOD PRESSURE: 67 MMHG | BODY MASS INDEX: 27.32 KG/M2

## 2018-08-16 DIAGNOSIS — E11.621 DIABETIC ULCER OF LEFT HEEL ASSOCIATED WITH TYPE 2 DIABETES MELLITUS, WITH BONE INVOLVEMENT WITHOUT EVIDENCE OF NECROSIS (HCC): ICD-10-CM

## 2018-08-16 DIAGNOSIS — Z01.818 PREOP TESTING: Primary | ICD-10-CM

## 2018-08-16 DIAGNOSIS — L97.426 DIABETIC ULCER OF LEFT HEEL ASSOCIATED WITH TYPE 2 DIABETES MELLITUS, WITH BONE INVOLVEMENT WITHOUT EVIDENCE OF NECROSIS (HCC): ICD-10-CM

## 2018-08-16 LAB
METER GLUCOSE: 206 MG/DL (ref 70–110)
METER GLUCOSE: 274 MG/DL (ref 70–110)
METER GLUCOSE: 294 MG/DL (ref 70–110)

## 2018-08-16 PROCEDURE — 82962 GLUCOSE BLOOD TEST: CPT

## 2018-08-16 PROCEDURE — 87070 CULTURE OTHR SPECIMN AEROBIC: CPT

## 2018-08-16 PROCEDURE — 87102 FUNGUS ISOLATION CULTURE: CPT

## 2018-08-16 PROCEDURE — 99183 HYPERBARIC OXYGEN THERAPY: CPT

## 2018-08-16 PROCEDURE — 2500000003 HC RX 250 WO HCPCS: Performed by: PODIATRIST

## 2018-08-16 PROCEDURE — 87186 SC STD MICRODIL/AGAR DIL: CPT

## 2018-08-16 PROCEDURE — 2709999900 HC NON-CHARGEABLE SUPPLY: Performed by: PODIATRIST

## 2018-08-16 PROCEDURE — 87205 SMEAR GRAM STAIN: CPT

## 2018-08-16 PROCEDURE — 7100000010 HC PHASE II RECOVERY - FIRST 15 MIN: Performed by: PODIATRIST

## 2018-08-16 PROCEDURE — 6360000002 HC RX W HCPCS

## 2018-08-16 PROCEDURE — 87075 CULTR BACTERIA EXCEPT BLOOD: CPT

## 2018-08-16 PROCEDURE — 2580000003 HC RX 258: Performed by: ANESTHESIOLOGY

## 2018-08-16 PROCEDURE — 87077 CULTURE AEROBIC IDENTIFY: CPT

## 2018-08-16 PROCEDURE — G0277 HBOT, FULL BODY CHAMBER, 30M: HCPCS

## 2018-08-16 PROCEDURE — 88311 DECALCIFY TISSUE: CPT

## 2018-08-16 PROCEDURE — 3700000001 HC ADD 15 MINUTES (ANESTHESIA): Performed by: PODIATRIST

## 2018-08-16 PROCEDURE — 3600000005 HC SURGERY LEVEL 5 BASE: Performed by: PODIATRIST

## 2018-08-16 PROCEDURE — 3700000000 HC ANESTHESIA ATTENDED CARE: Performed by: PODIATRIST

## 2018-08-16 PROCEDURE — 7100000011 HC PHASE II RECOVERY - ADDTL 15 MIN: Performed by: PODIATRIST

## 2018-08-16 PROCEDURE — 99183 HYPERBARIC OXYGEN THERAPY: CPT | Performed by: SURGERY

## 2018-08-16 PROCEDURE — 3600000015 HC SURGERY LEVEL 5 ADDTL 15MIN: Performed by: PODIATRIST

## 2018-08-16 PROCEDURE — 88304 TISSUE EXAM BY PATHOLOGIST: CPT

## 2018-08-16 RX ORDER — ONDANSETRON 2 MG/ML
4 INJECTION INTRAMUSCULAR; INTRAVENOUS
Status: DISCONTINUED | OUTPATIENT
Start: 2018-08-16 | End: 2018-08-16 | Stop reason: HOSPADM

## 2018-08-16 RX ORDER — FENTANYL CITRATE 50 UG/ML
25 INJECTION, SOLUTION INTRAMUSCULAR; INTRAVENOUS EVERY 5 MIN PRN
Status: DISCONTINUED | OUTPATIENT
Start: 2018-08-16 | End: 2018-08-16 | Stop reason: HOSPADM

## 2018-08-16 RX ORDER — MIDAZOLAM HYDROCHLORIDE 1 MG/ML
INJECTION INTRAMUSCULAR; INTRAVENOUS PRN
Status: DISCONTINUED | OUTPATIENT
Start: 2018-08-16 | End: 2018-08-16 | Stop reason: SDUPTHER

## 2018-08-16 RX ORDER — SODIUM CHLORIDE 9 MG/ML
INJECTION, SOLUTION INTRAVENOUS CONTINUOUS
Status: DISCONTINUED | OUTPATIENT
Start: 2018-08-16 | End: 2018-08-16 | Stop reason: HOSPADM

## 2018-08-16 RX ORDER — CEFAZOLIN SODIUM 1 G/3ML
INJECTION, POWDER, FOR SOLUTION INTRAMUSCULAR; INTRAVENOUS PRN
Status: DISCONTINUED | OUTPATIENT
Start: 2018-08-16 | End: 2018-08-16 | Stop reason: SDUPTHER

## 2018-08-16 RX ORDER — SODIUM CHLORIDE 0.9 % (FLUSH) 0.9 %
10 SYRINGE (ML) INJECTION EVERY 12 HOURS SCHEDULED
Status: CANCELLED | OUTPATIENT
Start: 2018-08-16 | End: 2019-08-16

## 2018-08-16 RX ORDER — BUPIVACAINE HYDROCHLORIDE 5 MG/ML
INJECTION, SOLUTION EPIDURAL; INTRACAUDAL PRN
Status: DISCONTINUED | OUTPATIENT
Start: 2018-08-16 | End: 2018-08-16 | Stop reason: HOSPADM

## 2018-08-16 RX ORDER — FENTANYL CITRATE 50 UG/ML
50 INJECTION, SOLUTION INTRAMUSCULAR; INTRAVENOUS EVERY 5 MIN PRN
Status: DISCONTINUED | OUTPATIENT
Start: 2018-08-16 | End: 2018-08-16 | Stop reason: HOSPADM

## 2018-08-16 RX ORDER — ONDANSETRON 2 MG/ML
4 INJECTION INTRAMUSCULAR; INTRAVENOUS EVERY 6 HOURS PRN
Status: CANCELLED | OUTPATIENT
Start: 2018-08-16 | End: 2019-08-16

## 2018-08-16 RX ORDER — SODIUM CHLORIDE 0.9 % (FLUSH) 0.9 %
10 SYRINGE (ML) INJECTION PRN
Status: CANCELLED | OUTPATIENT
Start: 2018-08-16 | End: 2019-08-16

## 2018-08-16 RX ORDER — ACETAMINOPHEN 325 MG/1
650 TABLET ORAL EVERY 4 HOURS PRN
Status: CANCELLED | OUTPATIENT
Start: 2018-08-16 | End: 2019-08-16

## 2018-08-16 RX ORDER — PROPOFOL 10 MG/ML
INJECTION, EMULSION INTRAVENOUS CONTINUOUS PRN
Status: DISCONTINUED | OUTPATIENT
Start: 2018-08-16 | End: 2018-08-16 | Stop reason: SDUPTHER

## 2018-08-16 RX ORDER — LIDOCAINE HYDROCHLORIDE 10 MG/ML
INJECTION, SOLUTION INFILTRATION; PERINEURAL PRN
Status: DISCONTINUED | OUTPATIENT
Start: 2018-08-16 | End: 2018-08-16 | Stop reason: HOSPADM

## 2018-08-16 RX ORDER — FENTANYL CITRATE 50 UG/ML
INJECTION, SOLUTION INTRAMUSCULAR; INTRAVENOUS PRN
Status: DISCONTINUED | OUTPATIENT
Start: 2018-08-16 | End: 2018-08-16 | Stop reason: SDUPTHER

## 2018-08-16 RX ADMIN — FENTANYL CITRATE 25 MCG: 50 INJECTION, SOLUTION INTRAMUSCULAR; INTRAVENOUS at 07:03

## 2018-08-16 RX ADMIN — MIDAZOLAM HYDROCHLORIDE 2 MG: 1 INJECTION, SOLUTION INTRAMUSCULAR; INTRAVENOUS at 06:57

## 2018-08-16 RX ADMIN — CEFAZOLIN 2000 MG: 1 INJECTION, POWDER, FOR SOLUTION INTRAVENOUS at 07:03

## 2018-08-16 RX ADMIN — SODIUM CHLORIDE: 9 INJECTION, SOLUTION INTRAVENOUS at 06:00

## 2018-08-16 RX ADMIN — PROPOFOL 100 MCG/KG/MIN: 10 INJECTION, EMULSION INTRAVENOUS at 07:03

## 2018-08-16 ASSESSMENT — PULMONARY FUNCTION TESTS
PIF_VALUE: 0

## 2018-08-16 ASSESSMENT — PAIN SCALES - GENERAL: PAINLEVEL_OUTOF10: 0

## 2018-08-16 ASSESSMENT — LIFESTYLE VARIABLES: SMOKING_STATUS: 0

## 2018-08-16 ASSESSMENT — PAIN DESCRIPTION - PAIN TYPE: TYPE: SURGICAL PAIN

## 2018-08-16 ASSESSMENT — PAIN - FUNCTIONAL ASSESSMENT: PAIN_FUNCTIONAL_ASSESSMENT: 0-10

## 2018-08-16 NOTE — ANESTHESIA PRE PROCEDURE
Department of Anesthesiology  Preprocedure Note       Name:  Lizeth Members   Age:  70 y.o.  :  1955                                          MRN:  [id number]         Date:  2018      Surgeon: Primitivo [place]):  Finesse Green DPM    Procedure: Procedure(s):  PARTIAL CALCANECTOMY LEFT FOOT, EXCISIONAL DEBRIDEMENT MUSCLE LEFT FOOT    Medications prior to admission:   Prior to Admission medications    Medication Sig Start Date End Date Taking? Authorizing Provider   gabapentin (NEURONTIN) 300 MG capsule Take 300 mg by mouth 2 times daily. . 5/3/18  Yes Historical Provider, MD   glipiZIDE (GLUCOTROL XL) 10 MG extended release tablet Take 1 tablet by mouth daily 18  Yes Historical Provider, MD   VELTASSA 8.4 g PACK Take 1 packet by mouth daily 18  Yes Historical Provider, MD   vitamin C (ASCORBIC ACID) 500 MG tablet Take 500 mg by mouth daily    Yes Historical Provider, MD   pravastatin (PRAVACHOL) 20 MG tablet Take 40 mg by mouth nightly ALTERNATES 20MG AND 40MG EVERY OTHER DAY   Yes Historical Provider, MD   Lactobacillus Rhamnosus, GG, (CULTURELLE IMMUNITY SUPPORT) CAPS Take 1 capsule by mouth daily   Yes Historical Provider, MD   sodium bicarbonate 325 MG tablet Take 650 mg by mouth 2 times daily    Yes Historical Provider, MD   Cholecalciferol (VITAMIN D3) 2000 UNITS CAPS Take 4,000 Units by mouth daily    Yes Historical Provider, MD   acetaminophen 650 MG TABS Take 650 mg by mouth every 4 hours as needed (Fever >100.5 F (38 C)).  13  Yes Flora Szymanski MD   metformin (GLUCOPHAGE) 500 MG tablet Take 1,000 mg by mouth 2 times daily (with meals)    Yes Historical Provider, MD   levothyroxine (SYNTHROID) 25 MCG tablet Take 50 mcg by mouth daily    Yes Historical Provider, MD       Current medications:    Current Facility-Administered Medications   Medication Dose Route Frequency Provider Last Rate Last Dose    0.9 % sodium chloride infusion   Intravenous Continuous Deborah Doty

## 2018-08-16 NOTE — PROGRESS NOTES
[facility]  Hyperbaric Oxygen Therapy   Progress Note    NAME: Talib Pizarro  MEDICAL RECORD NUMBER:  [id number]  AGE: 70 y.o. GENDER: female  : 1955  EPISODE DATE:  8/10/2018   Subjective   HBO Treatment Number: 46 out of Total Treatments: 60  HBO Diagnosis:   Rolanda Inman: Rolanda Inman 3  Indications:  (non healing diabetic wound of the left foot heal area)  Safety checks performed prior to treatment by HBOT staff. See doc flowsheets for documentation. Objective         Recent Labs      18   0937  18   1219   GLUMET  274*  294*     Pre treatment Vital Signs       Temp: 99.2 °F (37.3 °C)     Pulse: 84     Resp: 20     BP: 124/77     Post treatment Vital Signs  Temp: 97.8 °F (36.6 °C)  Pulse: 84  Resp: 20  BP: 133/76  Assessment      Physical Exam:  General Appearance:  alert and oriented to person, place and time, well-developed and well-nourished, in no acute distress  ENT:  tympanic membranes intact bilaterally, normal color, normal light reflex bilaterally  Pulmonary/Chest:  clear to auscultation bilaterally- no wheezes, rales or rhonchi, normal air movement, no respiratory distress  Cardiovascular:  regular rate and rhythm  Chamber #: [id number]  Treatment Start Time: 1218     Pressure Reached Time: 1227  RANDY Rate: 2  Number of Air Breaks:  Treatment Status: No Air break     Decompression Time: 1358   Treatment End Time: 1415  Length of Treatment: 90 Minutes  Symptoms Noted During Treatment: None    Adverse Event: no    I was present on these premises and immediately available to furnish assistance & direction throughout the procedure. Plan      Talib Pizarro is a 70 y.o. female  did successfully complete today's hyperbaric oxygen treatment at [address] [place] [address]. In my clinical judgement, ongoing HBO therapy is  necessary at this time, given a threat to patient function, limb or life from the current condition.       Supervision and attendance of Hyperbaric Oxygen Therapy provided. Continue HBO treatment as outlined in the treatment plan. Hyperbaric Oxygen: Sharla Hernandez tolerated Treatment Number: 00 well today without complications. Discharge Instructions were explained and given to Ms. Hernandez by HBOT staff    Electronically signed by Taco Vargas MD on 8/10/2018 at 2:53 PM

## 2018-08-16 NOTE — PROGRESS NOTES
[facility]  Hyperbaric Oxygen Therapy   Progress Note    NAME: Ady Haddad  MEDICAL RECORD NUMBER:  [id number]  AGE: 70 y.o. GENDER: female  : 1955  EPISODE DATE:  2018   Subjective   HBO Treatment Number: 48 out of Total Treatments: 60  HBO Diagnosis:   Ale Benzer: Ale Frazier 3  Indications:  (non healing diabetic wound of the left foot. heel area)  Safety checks performed prior to treatment by HBOT staff. See doc flowsheets for documentation. Objective         Recent Labs      18   0937  18   1219   GLUMET  274*  294*     Pre treatment Vital Signs       Temp: 99.3 °F (37.4 °C)     Pulse: 80     Resp: 20     BP: (!) 140/77     Post treatment Vital Signs  Temp: 99 °F (37.2 °C)  Pulse: 80  Resp: 20  BP: 115/63  Assessment      Physical Exam:  General Appearance:  alert and oriented to person, place and time, well-developed and well-nourished, in no acute distress  ENT:  tympanic membranes intact bilaterally, normal color, normal light reflex bilaterally  Pulmonary/Chest:  clear to auscultation bilaterally- no wheezes, rales or rhonchi, normal air movement, no respiratory distress  Cardiovascular:  regular rate and rhythm  Chamber #: [id number]  Treatment Start Time: 1029     Pressure Reached Time: 1038  RANDY Rate: 2  Number of Air Breaks:  Treatment Status: No Air break     Decompression Time: 1206   Treatment End Time: 1229  Length of Treatment: 90 Minutes  Symptoms Noted During Treatment: None    Adverse Event: no    I was present on these premises and immediately available to furnish assistance & direction throughout the procedure. Plan      Ady Haddad is a 70 y.o. female  did successfully complete today's hyperbaric oxygen treatment at [address] [place] [address]. In my clinical judgement, ongoing HBO therapy is  necessary at this time, given a threat to patient function, limb or life from the current condition.       Supervision and attendance of Hyperbaric Oxygen Therapy provided. Continue HBO treatment as outlined in the treatment plan. Hyperbaric Oxygen: Tess Hernandez tolerated Treatment Number: 48 well today without complications. Discharge Instructions were explained and given to Ms. Hernandez by HBOT staff    Electronically signed by Holley Velazco MD on 8/14/2018 at 2:49 PM

## 2018-08-17 ENCOUNTER — HOSPITAL ENCOUNTER (OUTPATIENT)
Dept: HYPERBARIC MEDICINE | Age: 63
Setting detail: THERAPIES SERIES
Discharge: HOME | End: 2018-08-17
Payer: COMMERCIAL

## 2018-08-17 VITALS
DIASTOLIC BLOOD PRESSURE: 72 MMHG | SYSTOLIC BLOOD PRESSURE: 142 MMHG | HEART RATE: 80 BPM | RESPIRATION RATE: 20 BRPM | TEMPERATURE: 98.3 F

## 2018-08-17 DIAGNOSIS — E11.621 DIABETIC ULCER OF LEFT HEEL ASSOCIATED WITH TYPE 2 DIABETES MELLITUS, WITH BONE INVOLVEMENT WITHOUT EVIDENCE OF NECROSIS (HCC): ICD-10-CM

## 2018-08-17 DIAGNOSIS — L97.426 DIABETIC ULCER OF LEFT HEEL ASSOCIATED WITH TYPE 2 DIABETES MELLITUS, WITH BONE INVOLVEMENT WITHOUT EVIDENCE OF NECROSIS (HCC): ICD-10-CM

## 2018-08-17 LAB
GRAM STAIN ORDERABLE: NORMAL
GRAM STAIN RESULT: ABNORMAL
METER GLUCOSE: 254 MG/DL (ref 70–110)
ORGANISM: ABNORMAL
WOUND/ABSCESS: ABNORMAL

## 2018-08-17 PROCEDURE — 99183 HYPERBARIC OXYGEN THERAPY: CPT

## 2018-08-17 PROCEDURE — 82962 GLUCOSE BLOOD TEST: CPT

## 2018-08-17 PROCEDURE — 99183 HYPERBARIC OXYGEN THERAPY: CPT | Performed by: SURGERY

## 2018-08-17 PROCEDURE — G0277 HBOT, FULL BODY CHAMBER, 30M: HCPCS

## 2018-08-18 LAB — ANAEROBIC CULTURE: NORMAL

## 2018-08-20 ENCOUNTER — HOSPITAL ENCOUNTER (OUTPATIENT)
Dept: WOUND CARE | Age: 63
Discharge: HOME | End: 2018-08-20
Payer: COMMERCIAL

## 2018-08-20 ENCOUNTER — HOSPITAL ENCOUNTER (OUTPATIENT)
Dept: HYPERBARIC MEDICINE | Age: 63
Setting detail: THERAPIES SERIES
Discharge: HOME | End: 2018-08-20
Payer: COMMERCIAL

## 2018-08-20 VITALS
DIASTOLIC BLOOD PRESSURE: 70 MMHG | WEIGHT: 170 LBS | TEMPERATURE: 98.3 F | SYSTOLIC BLOOD PRESSURE: 138 MMHG | HEIGHT: 66 IN | BODY MASS INDEX: 27.32 KG/M2 | RESPIRATION RATE: 18 BRPM | HEART RATE: 72 BPM

## 2018-08-20 VITALS
SYSTOLIC BLOOD PRESSURE: 101 MMHG | DIASTOLIC BLOOD PRESSURE: 55 MMHG | HEART RATE: 72 BPM | RESPIRATION RATE: 20 BRPM | TEMPERATURE: 97.8 F

## 2018-08-20 DIAGNOSIS — L97.426 DIABETIC ULCER OF LEFT HEEL ASSOCIATED WITH TYPE 2 DIABETES MELLITUS, WITH BONE INVOLVEMENT WITHOUT EVIDENCE OF NECROSIS (HCC): ICD-10-CM

## 2018-08-20 DIAGNOSIS — E11.621 DIABETIC ULCER OF LEFT HEEL ASSOCIATED WITH TYPE 2 DIABETES MELLITUS, WITH BONE INVOLVEMENT WITHOUT EVIDENCE OF NECROSIS (HCC): ICD-10-CM

## 2018-08-20 LAB
CULTURE SURGICAL: ABNORMAL
GRAM STAIN RESULT: ABNORMAL
METER GLUCOSE: 170 MG/DL (ref 70–110)
ORGANISM: ABNORMAL

## 2018-08-20 PROCEDURE — 99183 HYPERBARIC OXYGEN THERAPY: CPT

## 2018-08-20 PROCEDURE — 82962 GLUCOSE BLOOD TEST: CPT

## 2018-08-20 PROCEDURE — G0277 HBOT, FULL BODY CHAMBER, 30M: HCPCS

## 2018-08-20 PROCEDURE — 99213 OFFICE O/P EST LOW 20 MIN: CPT

## 2018-08-20 PROCEDURE — 99183 HYPERBARIC OXYGEN THERAPY: CPT | Performed by: SURGERY

## 2018-08-20 NOTE — PLAN OF CARE
Problem: Falls - Risk of:  Goal: Will remain free from falls  Will remain free from falls   Outcome: Ongoing    Goal: Absence of physical injury  Absence of physical injury   Outcome: Ongoing      Problem: Blood Glucose:  Goal: Ability to maintain appropriate glucose levels will improve  Ability to maintain appropriate glucose levels will improve   Outcome: Ongoing      Problem: Wound:  Goal: Will show signs of wound healing; wound closure and no evidence of infection  Will show signs of wound healing; wound closure and no evidence of infection   Outcome: Ongoing

## 2018-08-20 NOTE — PROGRESS NOTES
involvement without evidence of necrosis [facility])        Procedure Note  Indications:  Based on my examination of this patient's wound(s)/ulcer(s) today, debridement is not required to promote healing and evaluate the wound base. Performed by: Mag Clinton DPM    Consent obtained:  Yes    Time out taken:  Yes    Pain Control:       Debridement:Other (comment)      Wound/Ulcer #: 1    Post Debridement Measurements:  Wound/Ulcer Descriptions are Pre Debridement except measurements:    Negative Pressure Wound Therapy Foot Left; Lower (Active)   Number of days: 1736       Negative Pressure Wound Therapy Heel Left (Active)   Number of days: 130       Wound 08/20/13 Heel Left (Active)   Number of days: 4217       Wound 11/16/13 Other (Comment) Ankle Left; Outer Red, swollen, shiny, hot area of outer L ankle (Active)   Number of days: 1738       Wound 04/09/18 Heel Left small round  (Active)   Number of days: 133       Wound 05/07/18 Other (Comment) Heel Plantar;Left #1 (acquired 4-6-18) Grade 3 (Active)   Wound Image   8/13/2018  2:20 PM   Wound Type Wound 5/7/2018  2:09 PM   Wound Diabetic Lopez 3 5/7/2018  2:09 PM   Dressing Status Clean;Dry; Intact 8/13/2018  2:57 PM   Dressing Changed Changed/New 8/13/2018  2:57 PM   Dressing/Treatment Vacuum dressing 8/6/2018  2:16 PM   Wound Cleansed Rinsed/Irrigated with saline 8/13/2018  2:57 PM   Wound Length (cm) 11.5 cm 8/20/2018  1:45 PM   Wound Width (cm) 0 cm 8/20/2018  1:45 PM   Wound Depth (cm)  0 8/20/2018  1:45 PM   Calculated Wound Size (cm^2) (l*w) 0 cm^2 8/20/2018  1:45 PM   Change in Wound Size % (l*w) 100 8/20/2018  1:45 PM   Distance Tunneling (cm) 7.9 cm 8/20/2018  1:03 PM   Tunneling Position ___ O'Clock 12 8/20/2018  1:03 PM   Undermining Starts ___ O'Clock 6 8/13/2018  1:18 PM   Undermining Ends___ O'Clock 8 8/13/2018  1:18 PM   Undermining Maxium Distance (cm) 2.5 8/13/2018  1:18 PM   Wound Assessment Dutta;Pale;Pink 8/13/2018  1:18 PM   Drainage Amount you.)     Physician signature:__________________________        If you experience any of the following, please call the [facility] [address] during business hours:     * Increase in Pain  * Temperature over 101  * Increase in drainage from your wound  * Drainage with a foul odor  * Bleeding  * Increase in swelling  * Need for compression bandage changes due to slippage, breakthrough drainage.     If you need medical attention outside of the business hours of the [facility] [address] please contact your PCP or go to the nearest emergency room.                       Electronically signed by Kip Carr DPM on 8/20/2018 at 2:10 PM

## 2018-08-21 ENCOUNTER — HOSPITAL ENCOUNTER (OUTPATIENT)
Dept: HYPERBARIC MEDICINE | Age: 63
Setting detail: THERAPIES SERIES
Discharge: HOME | End: 2018-08-21
Payer: COMMERCIAL

## 2018-08-21 VITALS
RESPIRATION RATE: 20 BRPM | TEMPERATURE: 98.5 F | SYSTOLIC BLOOD PRESSURE: 133 MMHG | HEART RATE: 68 BPM | DIASTOLIC BLOOD PRESSURE: 67 MMHG

## 2018-08-21 DIAGNOSIS — L97.426 DIABETIC ULCER OF LEFT HEEL ASSOCIATED WITH TYPE 2 DIABETES MELLITUS, WITH BONE INVOLVEMENT WITHOUT EVIDENCE OF NECROSIS (HCC): ICD-10-CM

## 2018-08-21 DIAGNOSIS — E11.621 DIABETIC ULCER OF LEFT HEEL ASSOCIATED WITH TYPE 2 DIABETES MELLITUS, WITH BONE INVOLVEMENT WITHOUT EVIDENCE OF NECROSIS (HCC): ICD-10-CM

## 2018-08-21 LAB
METER GLUCOSE: 150 MG/DL (ref 70–110)
METER GLUCOSE: 193 MG/DL (ref 70–110)

## 2018-08-21 PROCEDURE — 99183 HYPERBARIC OXYGEN THERAPY: CPT | Performed by: SURGERY

## 2018-08-21 PROCEDURE — 99183 HYPERBARIC OXYGEN THERAPY: CPT

## 2018-08-21 PROCEDURE — G0277 HBOT, FULL BODY CHAMBER, 30M: HCPCS

## 2018-08-21 PROCEDURE — 82962 GLUCOSE BLOOD TEST: CPT

## 2018-08-21 NOTE — PROGRESS NOTES
[facility]  Hyperbaric Oxygen Therapy   Progress Note    NAME: Ady Haddad  MEDICAL RECORD NUMBER:  [id number]  AGE: 70 y.o. GENDER: female  : 1955  EPISODE DATE:  2018   Subjective   HBO Treatment Number: 62 out of Total Treatments: 60  HBO Diagnosis:   Ale Salvador 3  Indications: Lower Extremity Diabetic Wound ___(site) (Left Foot)  Safety checks performed prior to treatment. See doc flowsheets for documentation. Objective         Recent Labs      18   1002   GLUMET  170*     Pre treatment Vital Signs       Temp: 98.3 °F (36.8 °C)     Pulse: 84     Resp: 20     BP: (!) 160/98     Post treatment Vital Signs  Temp: 97.8 °F (36.6 °C)  Pulse: 72  Resp: 20  BP: (!) 101/55  Assessment      Physical Exam:  General Appearance:  alert and oriented to person, place and time, well-developed and well-nourished, in no acute distress  ENT:  tympanic membranes intact bilaterally  Pulmonary/Chest:  clear to auscultation bilaterally- no wheezes, rales or rhonchi, normal air movement, no respiratory distress  Cardiovascular:  regular rate and rhythm  Chamber #: [id number]  Treatment Start Time: 1010     Pressure Reached Time: 1019  RANDY Rate: 2  Number of Air Breaks:  Treatment Status: No Air break     Decompression Time: 1151   Treatment End Time: 1158  Length of Treatment: 90 Minutes  Symptoms Noted During Treatment: None    Adverse Event: no    I was present on these premises and immediately available to furnish assistance & direction throughout the procedure. Plan      Ady Haddad is a 70 y.o. female  did successfully complete today's hyperbaric oxygen treatment at [address] [place] [address]. In my clinical judgement, ongoing HBO therapy is  necessary at this time, given a threat to patient function, limb or life from the current condition. Supervision and attendance of Hyperbaric Oxygen Therapy provided.   Continue HBO treatment as outlined in the treatment plan. Hyperbaric Oxygen: Tess Hernandez tolerated Treatment Number: 62 well today without complications. Discharge Instructions were explained and given to Ms.  Rashadbhumi     Electronically signed by Tim Johnston MD on 8/20/2018 at 7:25 AM

## 2018-08-21 NOTE — PROGRESS NOTES
[facility]  Hyperbaric Oxygen Therapy   Progress Note    NAME: Isabel Banegas  MEDICAL RECORD NUMBER:  [id number]  AGE: 70 y.o. GENDER: female  : 1955  EPISODE DATE:  2018   Subjective   HBO Treatment Number: 56 out of Total Treatments: 60  HBO Diagnosis:         Safety checks performed prior to treatment by HBOT staff. See doc flowsheets for documentation. Objective         Recent Labs      18   1002   GLUMET  170*     Pre treatment Vital Signs       Temp: 98.9 °F (37.2 °C)     Pulse: 84     Resp: 20     BP: (!) 155/81     Post treatment Vital Signs  Temp: 98.3 °F (36.8 °C)  Pulse: 80  Resp: 20  BP: (!) 142/72  Assessment      Physical Exam:  General Appearance:  alert and oriented to person, place and time, well-developed and well-nourished, in no acute distress  ENT:  tympanic membranes intact bilaterally, normal color, normal light reflex bilaterally  Pulmonary/Chest:  clear to auscultation bilaterally- no wheezes, rales or rhonchi, normal air movement, no respiratory distress  Cardiovascular:  regular rate and rhythm  Chamber #: [id number]  Treatment Start Time: 1028     Pressure Reached Time: 1037  RANDY Rate: 2  Number of Air Breaks:  Treatment Status: No Air break     Decompression Time: 1208      Length of Treatment: 90 Minutes       Adverse Event: no    I was present on these premises and immediately available to furnish assistance & direction throughout the procedure. Plan      Isabel Banegas is a 70 y.o. female  did successfully complete today's hyperbaric oxygen treatment at [address] [place] [address]. In my clinical judgement, ongoing HBO therapy is  necessary at this time, given a threat to patient function, limb or life from the current condition. Supervision and attendance of Hyperbaric Oxygen Therapy provided. Continue HBO treatment as outlined in the treatment plan. Hyperbaric Oxygen:  Mary Hernandez tolerated Treatment Number: 64 well today without complications. Discharge Instructions were explained [facility]    Electronically signed by Masha Roman MD on 8/17/2018 at 9:26 AM

## 2018-08-22 ENCOUNTER — HOSPITAL ENCOUNTER (OUTPATIENT)
Dept: HYPERBARIC MEDICINE | Age: 63
Setting detail: THERAPIES SERIES
Discharge: HOME | End: 2018-08-22
Payer: COMMERCIAL

## 2018-08-22 VITALS
TEMPERATURE: 98.3 F | RESPIRATION RATE: 20 BRPM | SYSTOLIC BLOOD PRESSURE: 130 MMHG | DIASTOLIC BLOOD PRESSURE: 70 MMHG | HEART RATE: 76 BPM

## 2018-08-22 DIAGNOSIS — L97.426 DIABETIC ULCER OF LEFT HEEL ASSOCIATED WITH TYPE 2 DIABETES MELLITUS, WITH BONE INVOLVEMENT WITHOUT EVIDENCE OF NECROSIS (HCC): ICD-10-CM

## 2018-08-22 DIAGNOSIS — E11.621 DIABETIC ULCER OF LEFT HEEL ASSOCIATED WITH TYPE 2 DIABETES MELLITUS, WITH BONE INVOLVEMENT WITHOUT EVIDENCE OF NECROSIS (HCC): ICD-10-CM

## 2018-08-22 LAB
METER GLUCOSE: 114 MG/DL (ref 70–110)
METER GLUCOSE: 159 MG/DL (ref 70–110)

## 2018-08-22 PROCEDURE — 82962 GLUCOSE BLOOD TEST: CPT

## 2018-08-22 PROCEDURE — 99183 HYPERBARIC OXYGEN THERAPY: CPT | Performed by: SURGERY

## 2018-08-22 PROCEDURE — G0277 HBOT, FULL BODY CHAMBER, 30M: HCPCS

## 2018-08-22 PROCEDURE — 99183 HYPERBARIC OXYGEN THERAPY: CPT

## 2018-08-23 ENCOUNTER — HOSPITAL ENCOUNTER (OUTPATIENT)
Dept: HYPERBARIC MEDICINE | Age: 63
Setting detail: THERAPIES SERIES
Discharge: HOME | End: 2018-08-23
Payer: COMMERCIAL

## 2018-08-23 VITALS
DIASTOLIC BLOOD PRESSURE: 65 MMHG | TEMPERATURE: 97.8 F | HEART RATE: 76 BPM | RESPIRATION RATE: 20 BRPM | SYSTOLIC BLOOD PRESSURE: 134 MMHG

## 2018-08-23 DIAGNOSIS — L97.426 DIABETIC ULCER OF LEFT HEEL ASSOCIATED WITH TYPE 2 DIABETES MELLITUS, WITH BONE INVOLVEMENT WITHOUT EVIDENCE OF NECROSIS (HCC): ICD-10-CM

## 2018-08-23 DIAGNOSIS — E11.621 DIABETIC ULCER OF LEFT HEEL ASSOCIATED WITH TYPE 2 DIABETES MELLITUS, WITH BONE INVOLVEMENT WITHOUT EVIDENCE OF NECROSIS (HCC): ICD-10-CM

## 2018-08-23 LAB
METER GLUCOSE: 103 MG/DL (ref 70–110)
METER GLUCOSE: 103 MG/DL (ref 70–110)

## 2018-08-23 PROCEDURE — G0277 HBOT, FULL BODY CHAMBER, 30M: HCPCS

## 2018-08-23 PROCEDURE — 99183 HYPERBARIC OXYGEN THERAPY: CPT

## 2018-08-23 PROCEDURE — 82962 GLUCOSE BLOOD TEST: CPT

## 2018-08-23 PROCEDURE — 99183 HYPERBARIC OXYGEN THERAPY: CPT | Performed by: SURGERY

## 2018-08-23 NOTE — PROGRESS NOTES
[facility]  Hyperbaric Oxygen Therapy   Progress Note    NAME: Darin Sotomayor  MEDICAL RECORD NUMBER:  [id number]  AGE: 70 y.o. GENDER: female  : 1955  EPISODE DATE:  2018   Subjective   HBO Treatment Number: 16 out of Total Treatments: 65  HBO Diagnosis:   Rolan DoloresSheree Homans 3  Indications: Lower Extremity Diabetic Wound ___(site) (Left Foot)  Safety checks performed prior to treatment by HBOT staff. See doc flowsheets for documentation. Objective         Recent Labs      18   0932  18   1151   GLUMET  103  103     Pre treatment Vital Signs       Temp: 98.3 °F (36.8 °C)     Pulse: 84     Resp: 18     BP: 137/74     Post treatment Vital Signs  Temp: 97.8 °F (36.6 °C)  Pulse: 76  Resp: 20  BP: 134/65  Assessment      Physical Exam:  General Appearance:  alert and oriented to person, place and time, well-developed and well-nourished, in no acute distress  ENT:  tympanic membranes intact bilaterally, normal color, normal light reflex bilaterally  Pulmonary/Chest:  clear to auscultation bilaterally- no wheezes, rales or rhonchi, normal air movement, no respiratory distress  Cardiovascular:  regular rate and rhythm  Chamber #: [id number]  Treatment Start Time: 0958     Pressure Reached Time: 1009  RANDY Rate: 2  Number of Air Breaks:  Treatment Status: No Air break     Decompression Time: 1140   Treatment End Time: 0368  Length of Treatment: 90 Minutes  Symptoms Noted During Treatment: None    Adverse Event: no    I was present on these premises and immediately available to furnish assistance & direction throughout the procedure. Plan      Darin Sotomayor is a 70 y.o. female  did successfully complete today's hyperbaric oxygen treatment at [address] [place] [address]. In my clinical judgement, ongoing HBO therapy is  necessary at this time, given a threat to patient function, limb or life from the current condition.       Supervision and attendance of Hyperbaric Oxygen Therapy provided. Continue HBO treatment as outlined in the treatment plan. Hyperbaric Oxygen: Paula Hernandez tolerated Treatment Number: 48 well today without complications. Discharge Instructions were explained and given to Ms. Hernandez by HBOT staff    Electronically signed by Cassie Elizabeth MD on 8/23/2018 at 2:00 PM

## 2018-08-24 ENCOUNTER — HOSPITAL ENCOUNTER (OUTPATIENT)
Dept: HYPERBARIC MEDICINE | Age: 63
Setting detail: THERAPIES SERIES
Discharge: HOME | End: 2018-08-24
Payer: COMMERCIAL

## 2018-08-24 VITALS
HEART RATE: 72 BPM | RESPIRATION RATE: 20 BRPM | DIASTOLIC BLOOD PRESSURE: 72 MMHG | TEMPERATURE: 98 F | SYSTOLIC BLOOD PRESSURE: 135 MMHG

## 2018-08-24 DIAGNOSIS — L97.426 DIABETIC ULCER OF LEFT HEEL ASSOCIATED WITH TYPE 2 DIABETES MELLITUS, WITH BONE INVOLVEMENT WITHOUT EVIDENCE OF NECROSIS (HCC): ICD-10-CM

## 2018-08-24 DIAGNOSIS — E11.621 DIABETIC ULCER OF LEFT HEEL ASSOCIATED WITH TYPE 2 DIABETES MELLITUS, WITH BONE INVOLVEMENT WITHOUT EVIDENCE OF NECROSIS (HCC): ICD-10-CM

## 2018-08-24 LAB
METER GLUCOSE: 116 MG/DL (ref 70–110)
METER GLUCOSE: 158 MG/DL (ref 70–110)

## 2018-08-24 PROCEDURE — 99183 HYPERBARIC OXYGEN THERAPY: CPT

## 2018-08-24 PROCEDURE — 99183 HYPERBARIC OXYGEN THERAPY: CPT | Performed by: SURGERY

## 2018-08-24 PROCEDURE — 82962 GLUCOSE BLOOD TEST: CPT

## 2018-08-24 PROCEDURE — G0277 HBOT, FULL BODY CHAMBER, 30M: HCPCS

## 2018-08-24 NOTE — OP NOTE
Spoke with Jeffrey [place] from [address] regarding PICC line placement. Notified home care of appointment time on Monday 8/27 at 1230 pm.  Jeffrey [place] to call and inform patient of PICC line placement and time after hyperbarics appointment.

## 2018-08-27 ENCOUNTER — HOSPITAL ENCOUNTER (OUTPATIENT)
Dept: INFUSION THERAPY | Age: 63
Setting detail: INFUSION SERIES
Discharge: HOME | End: 2018-08-27
Payer: COMMERCIAL

## 2018-08-27 ENCOUNTER — HOSPITAL ENCOUNTER (OUTPATIENT)
Dept: WOUND CARE | Age: 63
Discharge: HOME | End: 2018-08-27
Payer: COMMERCIAL

## 2018-08-27 ENCOUNTER — HOSPITAL ENCOUNTER (OUTPATIENT)
Dept: HYPERBARIC MEDICINE | Age: 63
Setting detail: THERAPIES SERIES
Discharge: HOME | End: 2018-08-27
Payer: COMMERCIAL

## 2018-08-27 VITALS
TEMPERATURE: 98.4 F | HEART RATE: 72 BPM | SYSTOLIC BLOOD PRESSURE: 124 MMHG | DIASTOLIC BLOOD PRESSURE: 72 MMHG | RESPIRATION RATE: 20 BRPM

## 2018-08-27 VITALS
WEIGHT: 170 LBS | RESPIRATION RATE: 16 BRPM | TEMPERATURE: 98.5 F | DIASTOLIC BLOOD PRESSURE: 72 MMHG | BODY MASS INDEX: 27.32 KG/M2 | HEART RATE: 88 BPM | SYSTOLIC BLOOD PRESSURE: 128 MMHG | HEIGHT: 66 IN

## 2018-08-27 VITALS
DIASTOLIC BLOOD PRESSURE: 78 MMHG | TEMPERATURE: 98.7 F | RESPIRATION RATE: 18 BRPM | SYSTOLIC BLOOD PRESSURE: 125 MMHG | HEART RATE: 99 BPM

## 2018-08-27 DIAGNOSIS — E11.621 DIABETIC ULCER OF LEFT HEEL ASSOCIATED WITH TYPE 2 DIABETES MELLITUS, WITH BONE INVOLVEMENT WITHOUT EVIDENCE OF NECROSIS (HCC): ICD-10-CM

## 2018-08-27 DIAGNOSIS — L97.426 DIABETIC ULCER OF LEFT HEEL ASSOCIATED WITH TYPE 2 DIABETES MELLITUS, WITH BONE INVOLVEMENT WITHOUT EVIDENCE OF NECROSIS (HCC): ICD-10-CM

## 2018-08-27 LAB
METER GLUCOSE: 117 MG/DL (ref 70–110)
METER GLUCOSE: 172 MG/DL (ref 70–110)

## 2018-08-27 PROCEDURE — 82962 GLUCOSE BLOOD TEST: CPT

## 2018-08-27 PROCEDURE — 11042 DBRDMT SUBQ TIS 1ST 20SQCM/<: CPT

## 2018-08-27 PROCEDURE — 99183 HYPERBARIC OXYGEN THERAPY: CPT

## 2018-08-27 PROCEDURE — G0277 HBOT, FULL BODY CHAMBER, 30M: HCPCS

## 2018-08-27 PROCEDURE — 36569 INSJ PICC 5 YR+ W/O IMAGING: CPT

## 2018-08-27 PROCEDURE — C1751 CATH, INF, PER/CENT/MIDLINE: HCPCS

## 2018-08-27 PROCEDURE — 76937 US GUIDE VASCULAR ACCESS: CPT

## 2018-08-27 PROCEDURE — 99183 HYPERBARIC OXYGEN THERAPY: CPT | Performed by: SURGERY

## 2018-08-27 NOTE — PROGRESS NOTES
POWER PICC LINE Placement 8/27/2018    Product number: [id number]   Lot Number: [id number]      Ultrasound: YES WITH VPS   L Brachial      Upper Arm Circumference: 28 CM    Size: 5 FR DL    Exposed Length: 1 CM    Internal Length: 39 CM   Cut: 10 CM   Vein Measurement: 0.55 CM    Ramone Tamez  8/27/2018  1:55 PM        PICC PLACED WITH VPS TIP CONFIRMATION. Connie Sida PICC TIP IN LOWER 1/3 SVC/CAJ.

## 2018-08-27 NOTE — PROGRESS NOTES
Picc line inserted without difficulty with IV team. Patient discharged with instructions and stats on line. [facility] contacted about IV antibiotics and they will see patient this evening for her dose of medications.

## 2018-08-27 NOTE — PLAN OF CARE
Problem: Falls - Risk of:  Goal: Will remain free from falls  Will remain free from falls   Outcome: Ongoing    Goal: Absence of physical injury  Absence of physical injury   Outcome: Ongoing      Problem: Wound:  Goal: Will show signs of wound healing; wound closure and no evidence of infection  Will show signs of wound healing; wound closure and no evidence of infection   Outcome: Ongoing      Problem: Blood Glucose:  Goal: Ability to maintain appropriate glucose levels will improve  Ability to maintain appropriate glucose levels will improve   Outcome: Ongoing

## 2018-08-27 NOTE — PROGRESS NOTES
Distance Tunneling (cm) 7.9 cm 8/20/2018  1:03 PM   Tunneling Position ___ O'Clock 12 8/20/2018  1:03 PM   Undermining Starts ___ O'Clock 6 8/13/2018  1:18 PM   Undermining Ends___ O'Clock 8 8/13/2018  1:18 PM   Undermining Maxium Distance (cm) 2.5 8/13/2018  1:18 PM   Wound Assessment Dutta;Pale;Pink 8/13/2018  1:18 PM   Drainage Amount Large 8/27/2018  2:47 PM   Drainage Description Serosanguinous; Serous 8/27/2018  2:47 PM   Odor None 8/27/2018  2:47 PM   Exposed structure Bone 8/20/2018  1:03 PM   Kaya-wound Assessment Pink; Maceration 8/27/2018  2:47 PM   Time out Yes 8/27/2018  3:24 PM   Procedural Pain 1 7/9/2018  1:47 PM   Post procedural Pain 0 7/9/2018  1:47 PM   Number of days: 112       Incision 11/17/13 Foot Left (Active)   Number of days: 1744       Incision 04/10/18 Foot Left (Active)   Number of days: 139       Incision 08/16/18 Foot Left (Active)   Drainage Amount None 8/16/2018  8:03 AM   Dressing/Treatment Ace Wrap 8/16/2018  8:03 AM   Dressing Status Dry; Intact 8/16/2018  8:03 AM   Number of days: 11     Percent of Wound/Ulcer Debrided: 100%    Total Surface Area Debrided:  40 sq cm     Estimated Blood Loss:  Minimal  Hemostasis Achieved:  by pressure    Procedural Pain:  0  / 10   Post Procedural Pain:  0 / 10     Response to treatment:  Well tolerated by patient. Plan:   Treatment Note please see attached Discharge Instructions    Written patient dismissal instructions given to patient and signed by patient or POA.          Discharge Instructions         Visit Discharge/Physician Orders     Discharge condition: Stable     Assessment of pain at discharge:no     Anesthetic used: 2% lidocaine gel     Discharge to: Home     Left via:Private automobile     Accompanied by:  spouse     ECF/HHA: [facility]- HOLD VAC UNTIL FURTHER NOTICE     Dressing Orders: LEFT HEEL ULCER-Cleanse with vashe when available, PACK with drawtex and dry dressing daily and as needed.     Treatment Orders:Eat a diet high in protein and vitamin C. Take a multiple vitamin daily unless contraindicated.     Dr. Zita Persaud- IV daptomycin     Continue Extended HBO     Keep all pressure off of heel     Keep heel clean and dry     [facility] followup visit :  1 WEEK - BARRETT__at his office, 2 weeks here____________________  (Please note your next appointment above and if you are unable to keep, kindly give a 24 hour notice.  Thank you.)     Physician signature:__________________________        If you experience any of the following, please call the [facility] during business hours:     * Increase in Pain  * Temperature over 101  * Increase in drainage from your wound  * Drainage with a foul odor  * Bleeding  * Increase in swelling  * Need for compression bandage changes due to slippage, breakthrough drainage.     If you need medical attention outside of the business hours of the [facility] please contact your PCP or go to the nearest emergency room.                          Electronically signed by Dilan Xiao DPM on 8/27/2018 at 3:32 PM

## 2018-08-28 ENCOUNTER — HOSPITAL ENCOUNTER (OUTPATIENT)
Dept: HYPERBARIC MEDICINE | Age: 63
Setting detail: THERAPIES SERIES
Discharge: HOME | End: 2018-08-28
Payer: COMMERCIAL

## 2018-08-28 VITALS
HEART RATE: 72 BPM | DIASTOLIC BLOOD PRESSURE: 80 MMHG | RESPIRATION RATE: 20 BRPM | SYSTOLIC BLOOD PRESSURE: 127 MMHG | TEMPERATURE: 97.9 F

## 2018-08-28 DIAGNOSIS — L97.426 DIABETIC ULCER OF LEFT HEEL ASSOCIATED WITH TYPE 2 DIABETES MELLITUS, WITH BONE INVOLVEMENT WITHOUT EVIDENCE OF NECROSIS (HCC): ICD-10-CM

## 2018-08-28 DIAGNOSIS — E11.621 DIABETIC ULCER OF LEFT HEEL ASSOCIATED WITH TYPE 2 DIABETES MELLITUS, WITH BONE INVOLVEMENT WITHOUT EVIDENCE OF NECROSIS (HCC): ICD-10-CM

## 2018-08-28 LAB — METER GLUCOSE: 177 MG/DL (ref 70–110)

## 2018-08-28 PROCEDURE — G0277 HBOT, FULL BODY CHAMBER, 30M: HCPCS

## 2018-08-28 PROCEDURE — 99183 HYPERBARIC OXYGEN THERAPY: CPT

## 2018-08-28 PROCEDURE — 99183 HYPERBARIC OXYGEN THERAPY: CPT | Performed by: SURGERY

## 2018-08-28 PROCEDURE — 82962 GLUCOSE BLOOD TEST: CPT

## 2018-08-28 NOTE — PROGRESS NOTES
[facility]  Hyperbaric Oxygen Therapy   Progress Note    NAME: Erich Fabry  MEDICAL RECORD NUMBER:  [id number]  AGE: 70 y.o. GENDER: female  : 1955  EPISODE DATE:  2018   Subjective   HBO Treatment Number: 20 out of Total Treatments: 80  HBO Diagnosis:   Dixie Whittington 3  Indications: Lower Extremity Diabetic Wound ___(site) (Left Foor)  Safety checks performed prior to treatment. See doc flowsheets for documentation. Objective         Recent Labs      18   1211  18   0931   GLUMET  117*  177*     Pre treatment Vital Signs       Temp: 98.8 °F (37.1 °C)     Pulse: 80     Resp: 20     BP: 118/76     Post treatment Vital Signs  Temp: 97.9 °F (36.6 °C)  Pulse: 72  Resp: 20  BP: 127/80  Assessment      Physical Exam:  General Appearance:  alert and oriented to person, place and time, well-developed and well-nourished, in no acute distress  ENT:  tympanic membranes intact bilaterally  Pulmonary/Chest:  clear to auscultation bilaterally- no wheezes, rales or rhonchi, normal air movement, no respiratory distress  Cardiovascular:  regular rate and rhythm  Chamber #: [id number]  Treatment Start Time: 0947     Pressure Reached Time: 0957  RANDY Rate: 2  Number of Air Breaks:  Treatment Status: No Air break     Decompression Time: 1128   Treatment End Time: 1139  Length of Treatment: 90 Minutes  Symptoms Noted During Treatment: None    Adverse Event: no    I was present on these premises and immediately available to furnish assistance & direction throughout the procedure. Plan      Erich Fabry is a 70 y.o. female  did successfully complete today's hyperbaric oxygen treatment at [address] [place] [address]. In my clinical judgement, ongoing HBO therapy is  necessary at this time, given a threat to patient function, limb or life from the current condition. Supervision and attendance of Hyperbaric Oxygen Therapy provided.   Continue HBO treatment as outlined in the treatment plan. Hyperbaric Oxygen: Mary BEATTY Billbhumi tolerated Treatment Number: 03 well today without complications. Discharge Instructions were explained and given to Ms. Hernandez     Electronically signed by Gifty Weber MD on 8/28/2018 at 1:11 PM

## 2018-08-28 NOTE — PROGRESS NOTES
tolerated Treatment Number: 29 well today without complications. Discharge Instructions were explained and given to Ms. Hernandez     Electronically signed by Shanell Christiansen MD on 8/27/2018 at 8:54 PM

## 2018-08-28 NOTE — PROGRESS NOTES
Oxygen: Charlotte Hernandez tolerated Treatment Number: 68 well today without complications. Discharge Instructions were explained and given to Ms. Hernandez     Electronically signed by Berny Danielson MD on 8/22/2018 at 12:07 AM

## 2018-08-29 ENCOUNTER — HOSPITAL ENCOUNTER (OUTPATIENT)
Dept: HYPERBARIC MEDICINE | Age: 63
Setting detail: THERAPIES SERIES
Discharge: HOME | End: 2018-08-29
Payer: COMMERCIAL

## 2018-08-29 VITALS
RESPIRATION RATE: 20 BRPM | HEART RATE: 76 BPM | TEMPERATURE: 97.6 F | SYSTOLIC BLOOD PRESSURE: 119 MMHG | DIASTOLIC BLOOD PRESSURE: 79 MMHG

## 2018-08-29 DIAGNOSIS — L97.426 DIABETIC ULCER OF LEFT HEEL ASSOCIATED WITH TYPE 2 DIABETES MELLITUS, WITH BONE INVOLVEMENT WITHOUT EVIDENCE OF NECROSIS (HCC): ICD-10-CM

## 2018-08-29 DIAGNOSIS — E11.621 DIABETIC ULCER OF LEFT HEEL ASSOCIATED WITH TYPE 2 DIABETES MELLITUS, WITH BONE INVOLVEMENT WITHOUT EVIDENCE OF NECROSIS (HCC): ICD-10-CM

## 2018-08-29 LAB
METER GLUCOSE: 154 MG/DL (ref 70–110)
METER GLUCOSE: 157 MG/DL (ref 70–110)

## 2018-08-29 PROCEDURE — 99183 HYPERBARIC OXYGEN THERAPY: CPT | Performed by: SURGERY

## 2018-08-29 PROCEDURE — 82962 GLUCOSE BLOOD TEST: CPT

## 2018-08-29 PROCEDURE — G0277 HBOT, FULL BODY CHAMBER, 30M: HCPCS

## 2018-08-29 PROCEDURE — 99183 HYPERBARIC OXYGEN THERAPY: CPT

## 2018-08-30 ENCOUNTER — HOSPITAL ENCOUNTER (OUTPATIENT)
Dept: HYPERBARIC MEDICINE | Age: 63
Setting detail: THERAPIES SERIES
Discharge: HOME | End: 2018-08-30
Payer: COMMERCIAL

## 2018-08-30 VITALS
DIASTOLIC BLOOD PRESSURE: 78 MMHG | TEMPERATURE: 98.1 F | SYSTOLIC BLOOD PRESSURE: 142 MMHG | RESPIRATION RATE: 20 BRPM | HEART RATE: 88 BPM

## 2018-08-30 DIAGNOSIS — L97.426 DIABETIC ULCER OF LEFT HEEL ASSOCIATED WITH TYPE 2 DIABETES MELLITUS, WITH BONE INVOLVEMENT WITHOUT EVIDENCE OF NECROSIS (HCC): ICD-10-CM

## 2018-08-30 DIAGNOSIS — E11.621 DIABETIC ULCER OF LEFT HEEL ASSOCIATED WITH TYPE 2 DIABETES MELLITUS, WITH BONE INVOLVEMENT WITHOUT EVIDENCE OF NECROSIS (HCC): ICD-10-CM

## 2018-08-30 LAB
METER GLUCOSE: 149 MG/DL (ref 70–110)
METER GLUCOSE: 160 MG/DL (ref 70–110)

## 2018-08-30 PROCEDURE — 99183 HYPERBARIC OXYGEN THERAPY: CPT | Performed by: SURGERY

## 2018-08-30 PROCEDURE — 99183 HYPERBARIC OXYGEN THERAPY: CPT

## 2018-08-30 PROCEDURE — 82962 GLUCOSE BLOOD TEST: CPT

## 2018-08-30 PROCEDURE — G0277 HBOT, FULL BODY CHAMBER, 30M: HCPCS

## 2018-08-30 RX ORDER — CEFTRIAXONE 2 G/1
INJECTION, POWDER, FOR SOLUTION INTRAMUSCULAR; INTRAVENOUS DAILY
COMMUNITY
End: 2019-01-22 | Stop reason: ALTCHOICE

## 2018-08-30 NOTE — PLAN OF CARE
Problem: Physical Regulation:  Goal: Tolerate HBO therapy and barotrauma will be prevented  Tolerate HBO therapy and barotrauma will be prevented   Outcome: Ongoing

## 2018-08-31 ENCOUNTER — HOSPITAL ENCOUNTER (OUTPATIENT)
Dept: HYPERBARIC MEDICINE | Age: 63
Setting detail: THERAPIES SERIES
Discharge: HOME | End: 2018-08-31
Payer: COMMERCIAL

## 2018-08-31 VITALS
DIASTOLIC BLOOD PRESSURE: 90 MMHG | TEMPERATURE: 97.6 F | RESPIRATION RATE: 20 BRPM | HEART RATE: 88 BPM | SYSTOLIC BLOOD PRESSURE: 145 MMHG

## 2018-08-31 DIAGNOSIS — E11.621 DIABETIC ULCER OF LEFT HEEL ASSOCIATED WITH TYPE 2 DIABETES MELLITUS, WITH BONE INVOLVEMENT WITHOUT EVIDENCE OF NECROSIS (HCC): ICD-10-CM

## 2018-08-31 DIAGNOSIS — L97.426 DIABETIC ULCER OF LEFT HEEL ASSOCIATED WITH TYPE 2 DIABETES MELLITUS, WITH BONE INVOLVEMENT WITHOUT EVIDENCE OF NECROSIS (HCC): ICD-10-CM

## 2018-08-31 LAB
METER GLUCOSE: 103 MG/DL (ref 70–110)
METER GLUCOSE: 175 MG/DL (ref 70–110)

## 2018-08-31 PROCEDURE — G0277 HBOT, FULL BODY CHAMBER, 30M: HCPCS

## 2018-08-31 PROCEDURE — 99183 HYPERBARIC OXYGEN THERAPY: CPT

## 2018-08-31 PROCEDURE — 82962 GLUCOSE BLOOD TEST: CPT

## 2018-08-31 PROCEDURE — 99183 HYPERBARIC OXYGEN THERAPY: CPT | Performed by: SURGERY

## 2018-08-31 NOTE — PROGRESS NOTES
[facility]  Hyperbaric Oxygen Therapy   Progress Note    NAME: Evans John  MEDICAL RECORD NUMBER:  [id number]  AGE: 70 y.o. GENDER: female  : 1955  EPISODE DATE:  2018   Subjective   HBO Treatment Number: 65 out of Total Treatments: 80  HBO Diagnosis:   Tamar Calix Rene 3  Indications: Lower Extremity Diabetic Wound ___(site) (Left Heel)  Safety checks performed prior to treatment. See doc flowsheets for documentation. Objective         Recent Labs      18   0924  18   1204   GLUMET  175*  103     Pre treatment Vital Signs       Temp: 98.5 °F (36.9 °C)     Pulse: 84     Resp: 20     BP: 124/74     Post treatment Vital Signs  Temp: 97.6 °F (36.4 °C)  Pulse: 76  Resp: 20  BP: 119/79  Assessment      Physical Exam:  General Appearance:  alert and oriented to person, place and time, well-developed and well-nourished, in no acute distress  ENT:  tympanic membranes intact bilaterally  Pulmonary/Chest:  clear to auscultation bilaterally- no wheezes, rales or rhonchi, normal air movement, no respiratory distress  Cardiovascular:  regular rate and rhythm  Chamber #: [id number]  Treatment Start Time: 1010     Pressure Reached Time: 1019  RANDY Rate: 2  Number of Air Breaks:  Treatment Status: No Air break     Decompression Time: 1149   Treatment End Time: 1201  Length of Treatment: 90 Minutes  Symptoms Noted During Treatment: None    Adverse Event: no    I was present on these premises and immediately available to furnish assistance & direction throughout the procedure. Plan      Evans John is a 70 y.o. female  did successfully complete today's hyperbaric oxygen treatment at [address] [place] [address]. In my clinical judgement, ongoing HBO therapy is  necessary at this time, given a threat to patient function, limb or life from the current condition. Supervision and attendance of Hyperbaric Oxygen Therapy provided.   Continue HBO treatment as outlined in the treatment plan. Hyperbaric Oxygen: Maria Eugenia BEATTY Billbhumi tolerated Treatment Number: 72 well today without complications. Discharge Instructions were explained and given to Ms. Hernandez     Electronically signed by Ajay Dozier MD on 8/29/2018 at 5:22 PM

## 2018-08-31 NOTE — PROGRESS NOTES
[facility]  Hyperbaric Oxygen Therapy   Progress Note      NAME: Isabel Banegas  MEDICAL RECORD NUMBER:  [id number]  AGE: 70 y.o. GENDER: female  : 1955  EPISODE DATE:  2018     Subjective     HBO Treatment Number: 79 out of Total Treatments: 80    HBO Diagnosis:               Indications: Lower Extremity Diabetic Wound ___(site) (Left Foot)    Safety checks performed prior to treatment. See doc flowsheets for documentation. Objective           Recent Labs      18   0924  18   1204   GLUMET  175*  103       Pre treatment Vital Signs       Temp: 98 °F (36.7 °C)     Pulse: 84     Resp: 20     BP: (!) 148/84       Post treatment Vital Signs  Temp: 97.6 °F (36.4 °C)  Pulse: 88  Resp: 20  BP: (!) 145/90      Assessment        Physical Exam:  General Appearance:  alert and oriented to person, place and time, well-developed and well-nourished, in no acute distress    ENT:  tympanic membranes intact bilaterally    Post Assessment per Physician/Provider  Right Tympanic Membrane Normal    Left Tympanic Membrane Normal    Pulmonary/Chest:  clear to auscultation bilaterally- no wheezes, rales or rhonchi, normal air movement, no respiratory distress    Cardiovascular:  regular rate and rhythm    Chamber #: [id number]    Treatment Start Time: 1013     Pressure Reached Time: 1022    RANDY Rate: 2  Number of Air Breaks:  Treatment Status: No Air break          Decompression Time: 1153   Treatment End Time: 1205    Length of Treatment: 90 Minutes    Symptoms Noted During Treatment: None    Adverse Event: no      I was present on these premises and immediately available to furnish assistance & direction throughout the procedure. Plan          Isabel Banegas is a 70 y.o. female  did successfully complete today's hyperbaric oxygen treatment at [address] [place] [address].     In my clinical judgement, ongoing HBO therapy is  necessary at this time, given a threat to patient function, limb or life from the current condition. Supervision and attendance of Hyperbaric Oxygen Therapy provided. Continue HBO treatment as outlined in the treatment plan. Hyperbaric Oxygen: Yamilex Hernandez tolerated Treatment Number: 79 well today without complications. Discharge Instructions were explained and given to Ms. Hernandez     Electronically signed by Bill Chavez MD on 8/31/2018 at 2:43 PM

## 2018-09-03 ENCOUNTER — APPOINTMENT (OUTPATIENT)
Dept: HYPERBARIC MEDICINE | Age: 63
End: 2018-09-03
Payer: COMMERCIAL

## 2018-09-04 ENCOUNTER — HOSPITAL ENCOUNTER (OUTPATIENT)
Dept: HYPERBARIC MEDICINE | Age: 63
Setting detail: THERAPIES SERIES
Discharge: HOME | End: 2018-09-04
Payer: COMMERCIAL

## 2018-09-04 VITALS
TEMPERATURE: 98.4 F | SYSTOLIC BLOOD PRESSURE: 159 MMHG | HEART RATE: 72 BPM | RESPIRATION RATE: 20 BRPM | DIASTOLIC BLOOD PRESSURE: 84 MMHG

## 2018-09-04 DIAGNOSIS — L97.426 DIABETIC ULCER OF LEFT HEEL ASSOCIATED WITH TYPE 2 DIABETES MELLITUS, WITH BONE INVOLVEMENT WITHOUT EVIDENCE OF NECROSIS (HCC): ICD-10-CM

## 2018-09-04 DIAGNOSIS — E11.621 DIABETIC ULCER OF LEFT HEEL ASSOCIATED WITH TYPE 2 DIABETES MELLITUS, WITH BONE INVOLVEMENT WITHOUT EVIDENCE OF NECROSIS (HCC): ICD-10-CM

## 2018-09-04 LAB — METER GLUCOSE: 130 MG/DL (ref 70–110)

## 2018-09-04 PROCEDURE — 99183 HYPERBARIC OXYGEN THERAPY: CPT | Performed by: SURGERY

## 2018-09-04 PROCEDURE — G0277 HBOT, FULL BODY CHAMBER, 30M: HCPCS

## 2018-09-04 PROCEDURE — 82962 GLUCOSE BLOOD TEST: CPT

## 2018-09-04 NOTE — PROGRESS NOTES
[facility]  Hyperbaric Oxygen Therapy   Progress Note    NAME: John Dickey  MEDICAL RECORD NUMBER:  [id number]  AGE: 70 y.o. GENDER: female  : 1955  EPISODE DATE:  2018   Subjective   HBO Treatment Number: 47 out of Total Treatments: 80  HBO Diagnosis:   Arthur Reich 3  Indications: Lower Extremity Diabetic Wound ___(site) (Left Foot)  Safety checks performed prior to treatment by HBOT staff. See doc flowsheets for documentation. Objective         Recent Labs      18   0927   GLUMET  130*     Pre treatment Vital Signs       Temp: 98.4 °F (36.9 °C)     Pulse: 76     Resp: 20     BP: (!) 159/87     Post treatment Vital Signs  Temp: 98 °F (36.7 °C)  Pulse: 72  Resp: 20  BP: 135/72  Assessment      Physical Exam:  General Appearance:  alert and oriented to person, place and time, well-developed and well-nourished, in no acute distress  ENT:  tympanic membranes intact bilaterally, normal color, normal light reflex bilaterally  Pulmonary/Chest:  clear to auscultation bilaterally- no wheezes, rales or rhonchi, normal air movement, no respiratory distress  Cardiovascular:  regular rate and rhythm  Chamber #: [id number]  Treatment Start Time: 1004     Pressure Reached Time: 1014  RANDY Rate: 2  Number of Air Breaks:  Treatment Status: No Air break     Decompression Time: 1146   Treatment End Time: 4665  Length of Treatment: 90 Minutes  Symptoms Noted During Treatment: None    Adverse Event: no    I was present on these premises and immediately available to furnish assistance & direction throughout the procedure. Plan      John Dickey is a 70 y.o. female  did successfully complete today's hyperbaric oxygen treatment at [address] [place] [address]. In my clinical judgement, ongoing HBO therapy is  necessary at this time, given a threat to patient function, limb or life from the current condition.       Supervision and attendance of Hyperbaric Oxygen

## 2018-09-05 ENCOUNTER — HOSPITAL ENCOUNTER (OUTPATIENT)
Dept: HYPERBARIC MEDICINE | Age: 63
Setting detail: THERAPIES SERIES
Discharge: HOME | End: 2018-09-05
Payer: COMMERCIAL

## 2018-09-05 VITALS
DIASTOLIC BLOOD PRESSURE: 86 MMHG | RESPIRATION RATE: 20 BRPM | HEART RATE: 88 BPM | SYSTOLIC BLOOD PRESSURE: 144 MMHG | TEMPERATURE: 98.1 F

## 2018-09-05 DIAGNOSIS — E11.621 DIABETIC ULCER OF LEFT HEEL ASSOCIATED WITH TYPE 2 DIABETES MELLITUS, WITH BONE INVOLVEMENT WITHOUT EVIDENCE OF NECROSIS (HCC): ICD-10-CM

## 2018-09-05 DIAGNOSIS — L97.426 DIABETIC ULCER OF LEFT HEEL ASSOCIATED WITH TYPE 2 DIABETES MELLITUS, WITH BONE INVOLVEMENT WITHOUT EVIDENCE OF NECROSIS (HCC): ICD-10-CM

## 2018-09-05 LAB
METER GLUCOSE: 115 MG/DL (ref 70–110)
METER GLUCOSE: 165 MG/DL (ref 70–110)

## 2018-09-05 PROCEDURE — 82962 GLUCOSE BLOOD TEST: CPT

## 2018-09-05 PROCEDURE — 99183 HYPERBARIC OXYGEN THERAPY: CPT | Performed by: SURGERY

## 2018-09-06 ENCOUNTER — HOSPITAL ENCOUNTER (OUTPATIENT)
Dept: HYPERBARIC MEDICINE | Age: 63
Setting detail: THERAPIES SERIES
Discharge: HOME | End: 2018-09-06
Payer: COMMERCIAL

## 2018-09-06 VITALS
SYSTOLIC BLOOD PRESSURE: 140 MMHG | DIASTOLIC BLOOD PRESSURE: 80 MMHG | HEART RATE: 80 BPM | RESPIRATION RATE: 20 BRPM | TEMPERATURE: 98.2 F

## 2018-09-06 DIAGNOSIS — L97.426 DIABETIC ULCER OF LEFT HEEL ASSOCIATED WITH TYPE 2 DIABETES MELLITUS, WITH BONE INVOLVEMENT WITHOUT EVIDENCE OF NECROSIS (HCC): ICD-10-CM

## 2018-09-06 DIAGNOSIS — E11.621 DIABETIC ULCER OF LEFT HEEL ASSOCIATED WITH TYPE 2 DIABETES MELLITUS, WITH BONE INVOLVEMENT WITHOUT EVIDENCE OF NECROSIS (HCC): ICD-10-CM

## 2018-09-06 LAB
METER GLUCOSE: 117 MG/DL (ref 70–110)
METER GLUCOSE: 138 MG/DL (ref 70–110)

## 2018-09-06 PROCEDURE — 82962 GLUCOSE BLOOD TEST: CPT

## 2018-09-06 PROCEDURE — G0277 HBOT, FULL BODY CHAMBER, 30M: HCPCS

## 2018-09-06 PROCEDURE — 99183 HYPERBARIC OXYGEN THERAPY: CPT | Performed by: SURGERY

## 2018-09-07 ENCOUNTER — HOSPITAL ENCOUNTER (OUTPATIENT)
Dept: HYPERBARIC MEDICINE | Age: 63
Setting detail: THERAPIES SERIES
Discharge: HOME | End: 2018-09-07
Payer: COMMERCIAL

## 2018-09-07 VITALS
SYSTOLIC BLOOD PRESSURE: 157 MMHG | DIASTOLIC BLOOD PRESSURE: 82 MMHG | RESPIRATION RATE: 20 BRPM | TEMPERATURE: 97.7 F | HEART RATE: 76 BPM

## 2018-09-07 DIAGNOSIS — E11.621 DIABETIC ULCER OF LEFT HEEL ASSOCIATED WITH TYPE 2 DIABETES MELLITUS, WITH BONE INVOLVEMENT WITHOUT EVIDENCE OF NECROSIS (HCC): ICD-10-CM

## 2018-09-07 DIAGNOSIS — L97.426 DIABETIC ULCER OF LEFT HEEL ASSOCIATED WITH TYPE 2 DIABETES MELLITUS, WITH BONE INVOLVEMENT WITHOUT EVIDENCE OF NECROSIS (HCC): ICD-10-CM

## 2018-09-07 LAB
METER GLUCOSE: 111 MG/DL (ref 70–110)
METER GLUCOSE: 138 MG/DL (ref 70–110)

## 2018-09-07 PROCEDURE — 99183 HYPERBARIC OXYGEN THERAPY: CPT | Performed by: SURGERY

## 2018-09-07 PROCEDURE — 82962 GLUCOSE BLOOD TEST: CPT

## 2018-09-07 PROCEDURE — G0277 HBOT, FULL BODY CHAMBER, 30M: HCPCS

## 2018-09-07 NOTE — PROGRESS NOTES
[facility]  Hyperbaric Oxygen Therapy   Progress Note    NAME: Benja Sanchez  MEDICAL RECORD NUMBER:  [id number]  AGE: 70 y.o. GENDER: female  : 1955  EPISODE DATE:  2018   Subjective   HBO Treatment Number: 70 out of Total Treatments: 80  HBO Diagnosis:   Gloriann Neth: Gloriann Neth 3  Indications:  (non healing diabetic wound of the left heal)  Safety checks performed prior to treatment by HBOT staff. See doc flowsheets for documentation. Objective         Recent Labs      18   0934  18   1210   GLUMET  138*  117*     Pre treatment Vital Signs       Temp: 98 °F (36.7 °C)     Pulse: 76     Resp: 20     BP: 137/76     Post treatment Vital Signs  Temp: 98.2 °F (36.8 °C)  Pulse: 80  Resp: 20  BP: (!) 140/80  Assessment      Physical Exam:  General Appearance:  alert and oriented to person, place and time, well-developed and well-nourished, in no acute distress  ENT:  tympanic membranes intact bilaterally, normal color, normal light reflex bilaterally  Pulmonary/Chest:  clear to auscultation bilaterally- no wheezes, rales or rhonchi, normal air movement, no respiratory distress  Cardiovascular:  regular rate and rhythm  Chamber #: [id number]  Treatment Start Time: 1012     Pressure Reached Time: 1023  RANDY Rate: 2  Number of Air Breaks:  Treatment Status: No Air break     Decompression Time: 1153   Treatment End Time: 1209  Length of Treatment: 90 Minutes  Symptoms Noted During Treatment: None    Adverse Event: no    I was present on these premises and immediately available to furnish assistance & direction throughout the procedure. Plan      Benja Sanchez is a 70 y.o. female  did successfully complete today's hyperbaric oxygen treatment at [address] [place] [address]. In my clinical judgement, ongoing HBO therapy is  necessary at this time, given a threat to patient function, limb or life from the current condition.       Supervision and attendance of

## 2018-09-10 ENCOUNTER — HOSPITAL ENCOUNTER (OUTPATIENT)
Dept: HYPERBARIC MEDICINE | Age: 63
Setting detail: THERAPIES SERIES
Discharge: HOME | End: 2018-09-10
Payer: COMMERCIAL

## 2018-09-10 ENCOUNTER — TELEPHONE (OUTPATIENT)
Dept: CARDIOLOGY CLINIC | Age: 63
End: 2018-09-10

## 2018-09-10 ENCOUNTER — HOSPITAL ENCOUNTER (OUTPATIENT)
Dept: WOUND CARE | Age: 63
Discharge: HOME | End: 2018-09-10
Payer: COMMERCIAL

## 2018-09-10 VITALS
HEIGHT: 66 IN | TEMPERATURE: 99.6 F | HEART RATE: 88 BPM | SYSTOLIC BLOOD PRESSURE: 154 MMHG | BODY MASS INDEX: 27.32 KG/M2 | DIASTOLIC BLOOD PRESSURE: 90 MMHG | RESPIRATION RATE: 20 BRPM | WEIGHT: 170 LBS

## 2018-09-10 VITALS
TEMPERATURE: 98.3 F | RESPIRATION RATE: 20 BRPM | SYSTOLIC BLOOD PRESSURE: 139 MMHG | HEART RATE: 96 BPM | DIASTOLIC BLOOD PRESSURE: 78 MMHG

## 2018-09-10 DIAGNOSIS — E11.621 DIABETIC ULCER OF LEFT HEEL ASSOCIATED WITH TYPE 2 DIABETES MELLITUS, WITH BONE INVOLVEMENT WITHOUT EVIDENCE OF NECROSIS (HCC): ICD-10-CM

## 2018-09-10 DIAGNOSIS — L97.426 DIABETIC ULCER OF LEFT HEEL ASSOCIATED WITH TYPE 2 DIABETES MELLITUS, WITH BONE INVOLVEMENT WITHOUT EVIDENCE OF NECROSIS (HCC): ICD-10-CM

## 2018-09-10 LAB
METER GLUCOSE: 107 MG/DL (ref 70–110)
METER GLUCOSE: 115 MG/DL (ref 70–110)
METER GLUCOSE: 122 MG/DL (ref 70–110)
METER GLUCOSE: 153 MG/DL (ref 70–110)

## 2018-09-10 PROCEDURE — G0277 HBOT, FULL BODY CHAMBER, 30M: HCPCS

## 2018-09-10 PROCEDURE — 97607 NEG PRS WND THR NDME<=50SQCM: CPT

## 2018-09-10 PROCEDURE — 99213 OFFICE O/P EST LOW 20 MIN: CPT

## 2018-09-10 PROCEDURE — 82962 GLUCOSE BLOOD TEST: CPT

## 2018-09-10 PROCEDURE — 99183 HYPERBARIC OXYGEN THERAPY: CPT | Performed by: SURGERY

## 2018-09-10 ASSESSMENT — PAIN SCALES - GENERAL: PAINLEVEL_OUTOF10: 0

## 2018-09-10 NOTE — PROGRESS NOTES
Wound Healing Center Followup Visit Note    Referring Physician : DO Riya Andersen Namanna Mary  MEDICAL RECORD NUMBER:  [id number]  AGE: 70 y.o. GENDER: female  : 1955  EPISODE DATE:  9/10/2018    Subjective:     Chief Complaint   Patient presents with    Wound Check     patient here for treatment of left heel ulcer      HISTORY of PRESENT ILLNESS HPI   María Leal is a 70 y.o. female who presents today for wound/ulcer evaluation.    History of Wound Context:  Follow up with debridement     Wound/Ulcer Pain Timing/Severity: none  Quality of pain: N/A  Severity:  0 / 10   Modifying Factors: None  Associated Signs/Symptoms: drainage    Ulcer Identification:  Ulcer Type: diabetic, non-healing surgical and refractory osteomyelitis  Contributing Factors: edema and poor glucose control    Diabetic/Pressure/Non Pressure Ulcers only:  Ulcer: Non-Pressure ulcer, fat layer exposed    Wound: N/A        PAST MEDICAL HISTORY      Diagnosis Date    Abnormal EKG     Diabetes mellitus (HCC)     Hx of blood clots     PE, FROM TAKING BC PILLS    Hyperlipemia     Thyroid disease      Past Surgical History:   Procedure Laterality Date    COLONOSCOPY      DILATION AND CURETTAGE OF UTERUS      ECHO COMPL W DOP COLOR FLOW  2013         ECHOCARDIOGRAM TRANSESOPHAGEAL  2013         FOOT SURGERY  13    i&d left foot and ankle with bone biopsy    HYSTERECTOMY      OPEN TX TRIMALLEOLAR ANKLE FX W FIX PST LIP Left 2018    PARTIAL CALCANECTOMY LEFT FOOT, EXCISIONAL DEBRIDEMENT MUSCLE LEFT FOOT performed by Amara Mauro DPM at [address] [place] [address] Left 04/10/2018    debridement and bone biopsy heel    GA DEEP DISSEC FOOT INFEC,1 BURSA Left 4/10/2018    LEFT HEEL DEBRIDEMENT, BONE BIOPSY performed by Librado Canavan, DPM at [facility] OR    WISDOM TOOTH EXTRACTION       Family History   Problem Relation Age of Onset    Alzheimer's Disease Mother    [facility] Other Father Addressed This Visit     None        Procedure Note  Indications:  Based on my examination of this patient's wound(s)/ulcer(s) today, debridement is required to promote healing and evaluate the wound base. Performed by: Lars Bae DPM    Consent obtained:  Yes    Time out taken:  Yes    Pain Control: Anesthetic  Anesthetic: None     Debridement:Excisional Debridement    Using tissue nippers the wound(s)/ulcer(s) was/were sharply debrided down through and including the removal of subcutaneous tissue. Devitalized Tissue Debrided:  biofilm, slough and callus to stimulate bleeding to promote healing, post debridement good bleeding base and wound edges noted    Pre Debridement Measurements:  Are located in the Wound/Ulcer Documentation Flow Sheet    Wound/Ulcer #: 1    Post Debridement Measurements:  Wound/Ulcer Descriptions are Pre Debridement except measurements:    Negative Pressure Wound Therapy Foot Left; Lower (Active)   Number of days: 1757       Negative Pressure Wound Therapy Heel Left (Active)   Number of days: 152       Wound 08/20/13 Heel Left (Active)   Number of days: 1846       Wound 11/16/13 Other (Comment) Ankle Left; Outer Red, swollen, shiny, hot area of outer L ankle (Active)   Number of days: 1759       Wound 04/09/18 Heel Left small round  (Active)   Number of days: 154       Wound 05/07/18 Other (Comment) Heel Plantar;Left #1 (acquired 4-6-18) Grade 3 (Active)   Wound Image   8/20/2018  2:13 PM   Wound Type Wound 5/7/2018  2:09 PM   Wound Diabetic Lopez 3 5/7/2018  2:09 PM   Dressing Status Clean;Dry; Intact 8/27/2018  3:42 PM   Dressing Changed Changed/New 8/27/2018  3:42 PM   Dressing/Treatment ABD;Dry dressing 8/27/2018  3:42 PM   Wound Cleansed Rinsed/Irrigated with saline 8/27/2018  3:42 PM   Wound Length (cm) 7.5 cm 9/10/2018  1:22 PM   Wound Width (cm) 1.2 cm 9/10/2018  1:22 PM   Wound Depth (cm)  3.0 9/10/2018  1:22 PM   Calculated Wound Size (cm^2) (l*w) 9 cm^2 9/10/2018  1:22

## 2018-09-10 NOTE — PROGRESS NOTES
[facility]  Hyperbaric Oxygen Therapy   Progress Note    NAME: Ady Haddad  MEDICAL RECORD NUMBER:  [id number]  AGE: 70 y.o. GENDER: female  : 1955  EPISODE DATE:  9/10/2018   Subjective   HBO Treatment Number: 67 out of Total Treatments: 80  HBO Diagnosis:   Ale Salvador 3  Indications: Lower Extremity Diabetic Wound ___(site) (left heel)  Safety checks performed prior to treatment. See doc flowsheets for documentation. Objective         Recent Labs      09/10/18   1003  09/10/18   1020   GLUMET  107  122*     Pre treatment Vital Signs       Temp: 98.4 °F (36.9 °C)     Pulse: 76     Resp: 16     BP: (!) 150/88     Post treatment Vital Signs  Temp: 98.3 °F (36.8 °C)  Pulse: 96  Resp: 20  BP: 139/78  Assessment      Physical Exam:  General Appearance:  alert and oriented to person, place and time, well-developed and well-nourished, in no acute distress  ENT:  tympanic membranes intact bilaterally  Pulmonary/Chest:  clear to auscultation bilaterally- no wheezes, rales or rhonchi, normal air movement, no respiratory distress  Cardiovascular:  regular rate and rhythm  Chamber #: [id number]  Treatment Start Time: 1024     Pressure Reached Time: 1034  RANDY Rate: 2  Number of Air Breaks:  Treatment Status: No Air break     Decompression Time: 1203   Treatment End Time: 1212  Length of Treatment: 90 Minutes  Symptoms Noted During Treatment: None    Adverse Event: no    I was present on these premises and immediately available to furnish assistance & direction throughout the procedure. Plan      Ady Haddad is a 70 y.o. female  did successfully complete today's hyperbaric oxygen treatment at [address] [place] [address]. In my clinical judgement, ongoing HBO therapy is  necessary at this time, given a threat to patient function, limb or life from the current condition. Supervision and attendance of Hyperbaric Oxygen Therapy provided.   Continue HBO

## 2018-09-11 ENCOUNTER — HOSPITAL ENCOUNTER (OUTPATIENT)
Dept: HYPERBARIC MEDICINE | Age: 63
Setting detail: THERAPIES SERIES
Discharge: HOME | End: 2018-09-11
Payer: COMMERCIAL

## 2018-09-11 VITALS
TEMPERATURE: 98.2 F | HEART RATE: 76 BPM | DIASTOLIC BLOOD PRESSURE: 77 MMHG | SYSTOLIC BLOOD PRESSURE: 115 MMHG | RESPIRATION RATE: 20 BRPM

## 2018-09-11 DIAGNOSIS — E11.621 DIABETIC ULCER OF LEFT HEEL ASSOCIATED WITH TYPE 2 DIABETES MELLITUS, WITH BONE INVOLVEMENT WITHOUT EVIDENCE OF NECROSIS (HCC): ICD-10-CM

## 2018-09-11 DIAGNOSIS — L97.426 DIABETIC ULCER OF LEFT HEEL ASSOCIATED WITH TYPE 2 DIABETES MELLITUS, WITH BONE INVOLVEMENT WITHOUT EVIDENCE OF NECROSIS (HCC): ICD-10-CM

## 2018-09-11 LAB
METER GLUCOSE: 143 MG/DL (ref 70–110)
METER GLUCOSE: 179 MG/DL (ref 70–110)

## 2018-09-11 PROCEDURE — G0277 HBOT, FULL BODY CHAMBER, 30M: HCPCS

## 2018-09-11 PROCEDURE — 99183 HYPERBARIC OXYGEN THERAPY: CPT | Performed by: SURGERY

## 2018-09-11 PROCEDURE — 82962 GLUCOSE BLOOD TEST: CPT

## 2018-09-12 ENCOUNTER — HOSPITAL ENCOUNTER (OUTPATIENT)
Dept: HYPERBARIC MEDICINE | Age: 63
Setting detail: THERAPIES SERIES
Discharge: HOME | End: 2018-09-12
Payer: COMMERCIAL

## 2018-09-12 VITALS
DIASTOLIC BLOOD PRESSURE: 76 MMHG | SYSTOLIC BLOOD PRESSURE: 143 MMHG | HEART RATE: 80 BPM | RESPIRATION RATE: 20 BRPM | TEMPERATURE: 97.9 F

## 2018-09-12 DIAGNOSIS — L97.426 DIABETIC ULCER OF LEFT HEEL ASSOCIATED WITH TYPE 2 DIABETES MELLITUS, WITH BONE INVOLVEMENT WITHOUT EVIDENCE OF NECROSIS (HCC): ICD-10-CM

## 2018-09-12 DIAGNOSIS — E11.621 DIABETIC ULCER OF LEFT HEEL ASSOCIATED WITH TYPE 2 DIABETES MELLITUS, WITH BONE INVOLVEMENT WITHOUT EVIDENCE OF NECROSIS (HCC): ICD-10-CM

## 2018-09-12 LAB
METER GLUCOSE: 128 MG/DL (ref 70–110)
METER GLUCOSE: 181 MG/DL (ref 70–110)

## 2018-09-12 PROCEDURE — 99183 HYPERBARIC OXYGEN THERAPY: CPT | Performed by: SURGERY

## 2018-09-12 PROCEDURE — 82962 GLUCOSE BLOOD TEST: CPT

## 2018-09-12 PROCEDURE — G0277 HBOT, FULL BODY CHAMBER, 30M: HCPCS

## 2018-09-12 NOTE — PROGRESS NOTES
[facility]  Hyperbaric Oxygen Therapy   Progress Note    NAME: Danial Barnett  MEDICAL RECORD NUMBER:  [id number]  AGE: 70 y.o. GENDER: female  : 1955  EPISODE DATE:  2018   Subjective   HBO Treatment Number: 86 out of Total Treatments: 80  HBO Diagnosis:   Robert Khoury Dougherty 3  Indications: Lower Extremity Diabetic Wound ___(site) (Right Foot)  Safety checks performed prior to treatment by HBOT staff. See doc flowsheets for documentation. Objective         Recent Labs      18   1216  18   0924   GLUMET  143*  181*     Pre treatment Vital Signs       Temp: 98.6 °F (37 °C)     Pulse: 76     Resp: 20     BP: (!) 150/70     Post treatment Vital Signs  Temp: 98.2 °F (36.8 °C)  Pulse: 76  Resp: 20  BP: 115/77  Assessment      Physical Exam:  General Appearance:  alert and oriented to person, place and time, well-developed and well-nourished, in no acute distress  ENT:  tympanic membranes intact bilaterally, normal color, normal light reflex bilaterally  Pulmonary/Chest:  clear to auscultation bilaterally- no wheezes, rales or rhonchi, normal air movement, no respiratory distress  Cardiovascular:  regular rate and rhythm  Chamber #: [id number]  Treatment Start Time: 1011     Pressure Reached Time: 1026  RANDY Rate: 2  Number of Air Breaks:  Treatment Status: No Air break     Decompression Time: 1148   Treatment End Time: 8805  Length of Treatment: 90 Minutes  Symptoms Noted During Treatment: None    Adverse Event: no    I was present on these premises and immediately available to furnish assistance & direction throughout the procedure. Plan      Danial Barnett is a 70 y.o. female  did successfully complete today's hyperbaric oxygen treatment at [address] [place] [address]. In my clinical judgement, ongoing HBO therapy is  necessary at this time, given a threat to patient function, limb or life from the current condition.       Supervision and

## 2018-09-12 NOTE — PROGRESS NOTES
[facility]  Hyperbaric Oxygen Therapy   Progress Note    NAME: Evans John  MEDICAL RECORD NUMBER:  [id number]  AGE: 70 y.o. GENDER: female  : 1955  EPISODE DATE:  2018   Subjective   HBO  74 out of  80  HBO Diagnosis:   Tamar Calix Adas 3  Indications: Lower Extremity Diabetic Wound ___(site) (Left Foot)  Safety checks performed prior to treatment. See doc flowsheets for documentation. Objective         Recent Labs      18   0924  18   1212   GLUMET  181*  128*     Pre treatment Vital Signs       Temp: 98.3 °F (36.8 °C)     Pulse: 84     Resp: 20     BP: (!) 144/77     Post treatment Vital Signs  Temp: 97.9 °F (36.6 °C)  Pulse: 80  Resp: 20  BP: (!) 143/76  Assessment      Physical Exam:  General Appearance:  alert and oriented to person, place and time, well-developed and well-nourished, in no acute distress  ENT:  tympanic membranes intact bilaterally  Pulmonary/Chest:  clear to auscultation bilaterally- no wheezes, rales or rhonchi, normal air movement, no respiratory distress  Cardiovascular:  regular rate and rhythm     Treatment Start Time: 1021     Pressure Reached Time: 1031  RANDY Rate: 2  Number of Air Breaks:  Treatment Status: No Air break     Decompression Time: 1205   Treatment End Time: 1213  Length of Treatment: 90 Minutes  Symptoms Noted During Treatment: None    Adverse Event: no    I was present on these premises and immediately available to furnish assistance & direction throughout the procedure. Plan      Evans John is a 70 y.o. female  did successfully complete today's hyperbaric oxygen treatment at [address] [place] [address]. In my clinical judgement, ongoing HBO therapy is  necessary at this time, given a threat to patient function, limb or life from the current condition. Supervision and attendance of Hyperbaric Oxygen Therapy provided.   Continue HBO treatment as outlined in the treatment

## 2018-09-13 ENCOUNTER — HOSPITAL ENCOUNTER (OUTPATIENT)
Dept: HYPERBARIC MEDICINE | Age: 63
Setting detail: THERAPIES SERIES
Discharge: HOME | End: 2018-09-13
Payer: COMMERCIAL

## 2018-09-13 VITALS
TEMPERATURE: 97.9 F | SYSTOLIC BLOOD PRESSURE: 166 MMHG | HEART RATE: 96 BPM | DIASTOLIC BLOOD PRESSURE: 87 MMHG | RESPIRATION RATE: 20 BRPM

## 2018-09-13 DIAGNOSIS — L97.426 DIABETIC ULCER OF LEFT HEEL ASSOCIATED WITH TYPE 2 DIABETES MELLITUS, WITH BONE INVOLVEMENT WITHOUT EVIDENCE OF NECROSIS (HCC): ICD-10-CM

## 2018-09-13 DIAGNOSIS — E11.621 DIABETIC ULCER OF LEFT HEEL ASSOCIATED WITH TYPE 2 DIABETES MELLITUS, WITH BONE INVOLVEMENT WITHOUT EVIDENCE OF NECROSIS (HCC): ICD-10-CM

## 2018-09-13 LAB
METER GLUCOSE: 121 MG/DL (ref 70–110)
METER GLUCOSE: 176 MG/DL (ref 70–110)

## 2018-09-13 PROCEDURE — 82962 GLUCOSE BLOOD TEST: CPT

## 2018-09-13 PROCEDURE — 99183 HYPERBARIC OXYGEN THERAPY: CPT | Performed by: SURGERY

## 2018-09-13 PROCEDURE — G0277 HBOT, FULL BODY CHAMBER, 30M: HCPCS

## 2018-09-14 ENCOUNTER — HOSPITAL ENCOUNTER (OUTPATIENT)
Dept: HYPERBARIC MEDICINE | Age: 63
Setting detail: THERAPIES SERIES
Discharge: HOME | End: 2018-09-14
Payer: COMMERCIAL

## 2018-09-14 VITALS
HEART RATE: 84 BPM | DIASTOLIC BLOOD PRESSURE: 84 MMHG | SYSTOLIC BLOOD PRESSURE: 158 MMHG | TEMPERATURE: 98.5 F | RESPIRATION RATE: 20 BRPM

## 2018-09-14 DIAGNOSIS — E11.621 DIABETIC ULCER OF LEFT HEEL ASSOCIATED WITH TYPE 2 DIABETES MELLITUS, WITH BONE INVOLVEMENT WITHOUT EVIDENCE OF NECROSIS (HCC): ICD-10-CM

## 2018-09-14 DIAGNOSIS — L97.426 DIABETIC ULCER OF LEFT HEEL ASSOCIATED WITH TYPE 2 DIABETES MELLITUS, WITH BONE INVOLVEMENT WITHOUT EVIDENCE OF NECROSIS (HCC): ICD-10-CM

## 2018-09-14 LAB
METER GLUCOSE: 160 MG/DL (ref 70–110)
METER GLUCOSE: 188 MG/DL (ref 70–110)

## 2018-09-14 PROCEDURE — 99183 HYPERBARIC OXYGEN THERAPY: CPT | Performed by: SURGERY

## 2018-09-14 PROCEDURE — G0277 HBOT, FULL BODY CHAMBER, 30M: HCPCS

## 2018-09-14 PROCEDURE — 82962 GLUCOSE BLOOD TEST: CPT

## 2018-09-14 NOTE — PROGRESS NOTES
[facility]  Hyperbaric Oxygen Therapy   Progress Note    NAME: Alphonse Maldonado  MEDICAL RECORD NUMBER:  [id number]  AGE: 70 y.o. GENDER: female  : 1955  EPISODE DATE:  2018   Subjective   HBO Treatment Number: 64 out of Total Treatments: 80  HBO Diagnosis:   Sharene CrankerPernell Pool 3  Indications: Lower Extremity Diabetic Wound ___(site) (Left Foot)  Safety checks performed prior to treatment by HBOT staff. See doc flowsheets for documentation. Objective         Recent Labs      18   0925  18   1205   GLUMET  176*  121*     Pre treatment Vital Signs       Temp: 98.6 °F (37 °C)     Pulse: 96     Resp: 20     BP: (!) 159/86     Post treatment Vital Signs  Temp: 97.9 °F (36.6 °C)  Pulse: 96  Resp: 20  BP: (!) 166/87  Assessment      Physical Exam:  General Appearance:  alert and oriented to person, place and time, well-developed and well-nourished, in no acute distress  ENT:  tympanic membranes intact bilaterally, normal color, normal light reflex bilaterally  Pulmonary/Chest:  clear to auscultation bilaterally- no wheezes, rales or rhonchi, normal air movement, no respiratory distress  Cardiovascular:  regular rate and rhythm  Chamber #: [id number]  Treatment Start Time: 4330     Pressure Reached Time: 1024  RANDY Rate: 2  Number of Air Breaks:  Treatment Status: No Air break     Decompression Time: 1154   Treatment End Time: 1205  Length of Treatment: 90 Minutes  Symptoms Noted During Treatment: None    Adverse Event: no    I was present on these premises and immediately available to furnish assistance & direction throughout the procedure. Shannan Maldonado is a 70 y.o. female  did successfully complete today's hyperbaric oxygen treatment at [address] [place] [address]. In my clinical judgement, ongoing HBO therapy is  necessary at this time, given a threat to patient function, limb or life from the current condition.       Supervision and

## 2018-09-17 ENCOUNTER — HOSPITAL ENCOUNTER (OUTPATIENT)
Dept: HYPERBARIC MEDICINE | Age: 63
Setting detail: THERAPIES SERIES
Discharge: HOME | End: 2018-09-17
Payer: COMMERCIAL

## 2018-09-17 ENCOUNTER — HOSPITAL ENCOUNTER (OUTPATIENT)
Dept: WOUND CARE | Age: 63
Discharge: HOME | End: 2018-09-17
Payer: COMMERCIAL

## 2018-09-17 VITALS
RESPIRATION RATE: 18 BRPM | BODY MASS INDEX: 27.32 KG/M2 | HEIGHT: 66 IN | WEIGHT: 170 LBS | HEART RATE: 100 BPM | DIASTOLIC BLOOD PRESSURE: 70 MMHG | SYSTOLIC BLOOD PRESSURE: 144 MMHG | TEMPERATURE: 98.6 F

## 2018-09-17 VITALS
DIASTOLIC BLOOD PRESSURE: 91 MMHG | HEART RATE: 96 BPM | RESPIRATION RATE: 20 BRPM | SYSTOLIC BLOOD PRESSURE: 162 MMHG | TEMPERATURE: 98.4 F

## 2018-09-17 DIAGNOSIS — E11.621 DIABETIC ULCER OF LEFT HEEL ASSOCIATED WITH TYPE 2 DIABETES MELLITUS, WITH BONE INVOLVEMENT WITHOUT EVIDENCE OF NECROSIS (HCC): ICD-10-CM

## 2018-09-17 DIAGNOSIS — L97.426 DIABETIC ULCER OF LEFT HEEL ASSOCIATED WITH TYPE 2 DIABETES MELLITUS, WITH BONE INVOLVEMENT WITHOUT EVIDENCE OF NECROSIS (HCC): ICD-10-CM

## 2018-09-17 LAB
FUNGUS (MYCOLOGY) CULTURE: NORMAL
FUNGUS (MYCOLOGY) CULTURE: NORMAL
FUNGUS STAIN: NORMAL
FUNGUS STAIN: NORMAL
METER GLUCOSE: 148 MG/DL (ref 70–110)
METER GLUCOSE: 210 MG/DL (ref 70–110)

## 2018-09-17 PROCEDURE — 11042 DBRDMT SUBQ TIS 1ST 20SQCM/<: CPT

## 2018-09-17 PROCEDURE — 82962 GLUCOSE BLOOD TEST: CPT

## 2018-09-17 PROCEDURE — G0277 HBOT, FULL BODY CHAMBER, 30M: HCPCS

## 2018-09-17 PROCEDURE — 99183 HYPERBARIC OXYGEN THERAPY: CPT | Performed by: SURGERY

## 2018-09-17 NOTE — PROGRESS NOTES
Wound Healing Center Followup Visit Note    Referring Physician : DO Brooks Ty Gilford Billock  MEDICAL RECORD NUMBER:  [id number]  AGE: 70 y.o. GENDER: female  : 1955  EPISODE DATE:  2018    Subjective:     Chief Complaint   Patient presents with    Wound Check     left heel      HISTORY of PRESENT ILLNESS HPI   Venu Newman is a 70 y.o. female who presents today for wound/ulcer evaluation.    History of Wound Context:  Follow up with debridement     Wound/Ulcer Pain Timing/Severity: none  Quality of pain: N/A  Severity:  0 / 10   Modifying Factors: None  Associated Signs/Symptoms: drainage    Ulcer Identification:  Ulcer Type: non-healing surgical and refractory osteomyelitis  Contributing Factors: diabetes    Diabetic/Pressure/Non Pressure Ulcers only:  Ulcer: Non-Pressure ulcer, fat layer exposed    Wound: Wound dehiscence        PAST MEDICAL HISTORY      Diagnosis Date    Abnormal EKG     Diabetes mellitus ([address]     Hx of blood clots     PE, FROM TAKING BC PILLS    Hyperlipemia     Thyroid disease      Past Surgical History:   Procedure Laterality Date    COLONOSCOPY      DILATION AND CURETTAGE OF UTERUS      ECHO COMPL W DOP COLOR FLOW  2013         ECHOCARDIOGRAM TRANSESOPHAGEAL  2013         FOOT SURGERY  13    i&d left foot and ankle with bone biopsy    HYSTERECTOMY      OPEN TX TRIMALLEOLAR ANKLE FX W FIX PST LIP Left 2018    PARTIAL CALCANECTOMY LEFT FOOT, EXCISIONAL DEBRIDEMENT MUSCLE LEFT FOOT performed by Luzmaria Jimenes DPM at [facility] OTHER SURGICAL HISTORY Left 04/10/2018    debridement and bone biopsy heel    [place] DEEP DISSEC FOOT INFEC,1 BURSA Left 4/10/2018    LEFT HEEL DEBRIDEMENT, BONE BIOPSY performed by Dell Olivera DPM at [facility] OR    WISDOM TOOTH EXTRACTION       Family History   Problem Relation Age of Onset    Alzheimer's Disease Mother     Other Father         pacemaker    Other Maternal Grandfather associated with type 2 diabetes mellitus, with bone involvement without evidence of necrosis [facility])        Procedure Note  Indications:  Based on my examination of this patient's wound(s)/ulcer(s) today, debridement is required to promote healing and evaluate the wound base. Performed by: Gabrielle Mosley DPM    Consent obtained:  Yes    Time out taken:  Yes    Pain Control: Anesthetic  Anesthetic: None     Debridement:Excisional Debridement    Using tissue nippers the wound(s)/ulcer(s) was/were sharply debrided down through and including the removal of subcutaneous tissue. Devitalized Tissue Debrided:  fibrin, biofilm, exudate and callus to stimulate bleeding to promote healing, post debridement good bleeding base and wound edges noted    Pre Debridement Measurements:  Are located in the Wound/Ulcer Documentation Flow Sheet    Wound/Ulcer #: 1    Post Debridement Measurements:  Wound/Ulcer Descriptions are Pre Debridement except measurements:    Negative Pressure Wound Therapy Foot Left; Lower (Active)   Number of days: 1764       Negative Pressure Wound Therapy Heel Left (Active)   Number of days: 158       Wound 08/20/13 Heel Left (Active)   Number of days: 5954       Wound 11/16/13 Other (Comment) Ankle Left; Outer Red, swollen, shiny, hot area of outer L ankle (Active)   Number of days: 1766       Wound 04/09/18 Heel Left small round  (Active)   Number of days: 161       Wound 05/07/18 Other (Comment) Heel Plantar;Left #1 (acquired 4-6-18) Grade 3 (Active)   Wound Image   8/20/2018  2:13 PM   Wound Type Wound 5/7/2018  2:09 PM   Wound Diabetic Lopez 3 5/7/2018  2:09 PM   Dressing Status Clean;Dry; Intact 9/10/2018  2:45 PM   Dressing Changed Changed/New 9/10/2018  2:45 PM   Dressing/Treatment Vacuum dressing 9/10/2018  2:45 PM   Wound Cleansed Rinsed/Irrigated with saline 9/10/2018  2:45 PM   Wound Length (cm) 8.4 cm 9/17/2018  1:47 PM   Wound Width (cm) 2 cm 9/17/2018  1:47 PM   Wound Depth (cm)  3.6 ULCER-Cleanse with vashe when available, PACK with black foam apply wound vac suction at 125mmhg     Treatment Orders:Eat a diet high in protein and vitamin C. Take a multiple vitamin daily unless contraindicated.     Dr. Justin Trotter- IV daptomycin- appt 10/4/18     Continue Extended HBO     Keep all pressure off of heel     Keep heel clean and dry    Surgery 9/19.18     [address] [place] [address],[address] followup visit :  1 WEEK ____________________  (Please note your next appointment above and if you are unable to keep, kindly give a 24 hour notice. Thank you.)     Physician signature:__________________________        If you experience any of the following, please call the [facility] during business hours:     * Increase in Pain  * Temperature over 101  * Increase in drainage from your wound  * Drainage with a foul odor  * Bleeding  * Increase in swelling  * Need for compression bandage changes due to slippage, breakthrough drainage.     If you need medical attention outside of the business hours of the [facility] please contact your PCP or go to the nearest emergency room.         Electronically signed by Victor Manuel Powell DPM on 9/17/2018 at 2:10 PM

## 2018-09-18 ENCOUNTER — HOSPITAL ENCOUNTER (OUTPATIENT)
Dept: HYPERBARIC MEDICINE | Age: 63
Setting detail: THERAPIES SERIES
Discharge: HOME | End: 2018-09-18
Payer: COMMERCIAL

## 2018-09-18 VITALS
HEART RATE: 80 BPM | SYSTOLIC BLOOD PRESSURE: 152 MMHG | DIASTOLIC BLOOD PRESSURE: 84 MMHG | RESPIRATION RATE: 20 BRPM | TEMPERATURE: 98.1 F

## 2018-09-18 DIAGNOSIS — E11.621 DIABETIC ULCER OF LEFT HEEL ASSOCIATED WITH TYPE 2 DIABETES MELLITUS, WITH BONE INVOLVEMENT WITHOUT EVIDENCE OF NECROSIS (HCC): ICD-10-CM

## 2018-09-18 DIAGNOSIS — L97.426 DIABETIC ULCER OF LEFT HEEL ASSOCIATED WITH TYPE 2 DIABETES MELLITUS, WITH BONE INVOLVEMENT WITHOUT EVIDENCE OF NECROSIS (HCC): ICD-10-CM

## 2018-09-18 LAB
METER GLUCOSE: 171 MG/DL (ref 70–110)
METER GLUCOSE: 216 MG/DL (ref 70–110)

## 2018-09-18 PROCEDURE — 99183 HYPERBARIC OXYGEN THERAPY: CPT | Performed by: SURGERY

## 2018-09-18 PROCEDURE — 82962 GLUCOSE BLOOD TEST: CPT

## 2018-09-18 PROCEDURE — G0277 HBOT, FULL BODY CHAMBER, 30M: HCPCS

## 2018-09-18 NOTE — PROGRESS NOTES
[address] [facility]  Hyperbaric Oxygen Therapy   Progress Note    NAME: Renee Enriquez  MEDICAL RECORD NUMBER:  [id number]  AGE: 70 y.o. GENDER: female  : 1955  EPISODE DATE:  2018   Subjective   HBO Treatment Number: 85 out of Total Treatments: 80  HBO Diagnosis:   Deya Cronin Block 3  Indications: Lower Extremity Diabetic Wound ___(site) (Left Foot)  Safety checks performed prior to treatment by HBOT staff. See doc flowsheets for documentation. Objective         Recent Labs      18   1158  18   0931   GLUMET  148*  216*     Pre treatment Vital Signs       Temp: 98.6 °F (37 °C)     Pulse: 88     Resp: 20     BP: (!) 144/85     Post treatment Vital Signs  Temp: 98.1 °F (36.7 °C)  Pulse: 80  Resp: 20  BP: (!) 152/84  Assessment      Physical Exam:  General Appearance:  alert and oriented to person, place and time, well-developed and well-nourished, in no acute distress  ENT:  tympanic membranes intact bilaterally, normal color, normal light reflex bilaterally  Pulmonary/Chest:  clear to auscultation bilaterally- no wheezes, rales or rhonchi, normal air movement, no respiratory distress  Cardiovascular:  regular rate and rhythm  Chamber #: [id number]  Treatment Start Time: 1006     Pressure Reached Time: 3833     Number of Air Breaks:        Decompression Time: 1147              Adverse Event: no    I was present on these premises and immediately available to furnish assistance & direction throughout the procedure. Plan      Renee Enriquez is a 70 y.o. female  did successfully complete today's hyperbaric oxygen treatment at [address] [place] [address]. In my clinical judgement, ongoing HBO therapy is  necessary at this time, given a threat to patient function, limb or life from the current condition. Supervision and attendance of Hyperbaric Oxygen Therapy provided. Continue HBO treatment as outlined in the treatment plan.     Hyperbaric Oxygen:

## 2018-09-19 ENCOUNTER — HOSPITAL ENCOUNTER (OUTPATIENT)
Dept: HYPERBARIC MEDICINE | Age: 63
Setting detail: THERAPIES SERIES
Discharge: HOME | End: 2018-09-19
Payer: COMMERCIAL

## 2018-09-19 VITALS
HEART RATE: 96 BPM | RESPIRATION RATE: 20 BRPM | DIASTOLIC BLOOD PRESSURE: 78 MMHG | SYSTOLIC BLOOD PRESSURE: 141 MMHG | TEMPERATURE: 98.4 F

## 2018-09-19 DIAGNOSIS — E11.621 DIABETIC ULCER OF LEFT HEEL ASSOCIATED WITH TYPE 2 DIABETES MELLITUS, WITH BONE INVOLVEMENT WITHOUT EVIDENCE OF NECROSIS (HCC): ICD-10-CM

## 2018-09-19 DIAGNOSIS — L97.426 DIABETIC ULCER OF LEFT HEEL ASSOCIATED WITH TYPE 2 DIABETES MELLITUS, WITH BONE INVOLVEMENT WITHOUT EVIDENCE OF NECROSIS (HCC): ICD-10-CM

## 2018-09-19 LAB
METER GLUCOSE: 151 MG/DL (ref 70–110)
METER GLUCOSE: 174 MG/DL (ref 70–110)

## 2018-09-19 PROCEDURE — G0277 HBOT, FULL BODY CHAMBER, 30M: HCPCS

## 2018-09-19 PROCEDURE — 82962 GLUCOSE BLOOD TEST: CPT

## 2018-09-19 PROCEDURE — 99183 HYPERBARIC OXYGEN THERAPY: CPT | Performed by: SURGERY

## 2018-09-19 RX ORDER — PRAVASTATIN SODIUM 40 MG
40 TABLET ORAL DAILY
COMMUNITY
Start: 2018-08-13 | End: 2018-10-01 | Stop reason: ALTCHOICE

## 2018-09-19 NOTE — PROGRESS NOTES
Oxygen: Sharyn Heller ROGERIO Hernandez tolerated Treatment Number: 76 well today without complications. Discharge Instructions were explained and given to Ms. Hernandez     Electronically signed by Rossi Ramirez MD on 9/19/2018 at 5:28 PM

## 2018-09-19 NOTE — PROGRESS NOTES
[facility] PRE-ADMISSION TESTING GENERAL INSTRUCTIONS- [facility]-phone number:[phone]    GENERAL INSTRUCTIONS  [x] Antibacterial Soap shower Night before and/or AM of Surgery  [] Rosalio wipe instruction sheet and wipes given. [x] Nothing by mouth after midnight, including gum, candy, mints, or water. [x] You may brush your teeth, gargle, but do NOT swallow water. []Hibiclens shower  the night before and the morning of surgery. Do not use             Hibiclens on your face or head. []No smoking, chewing tobacco, illegal drugs, or alcohol within 24 hours of your surgery. [x] Jewelry, valuables or body piercing's should not be brought to the hospital. All body and/or tongue piercing's must be removed prior to arriving to hospital.  ALL hair pins must be removed. [x] Do not wear makeup, lotions, powders, deodorant. Nail polish as directed by the nurse. [x] Arrange transportation to and from the hospital.  Arrange for someone to be with you for the remainder of the day and for 24 hours after your procedure due to having had anesthesia. [x] Bring insurance card and photo ID.  [] Transfusion Bracelet: Please bring with you to hospital, day of surgery  [] Bring urine specimen day of surgery. Any small container is acceptable. [] Use inhalers the morning of surgery and bring with you to hospital.   []Bring copy of living will or healthcare power of  papers to be placed in your electronic record. [] CPAP/BI-PAP: Please bring your machine if you are to spend the night in the hospital.      PARKING INSTRUCTIONS:   · [x] Arrival Time 0530  · [x] Parking lot 1 is located on [facility] (the corner of [facility]). To enter, press the button and the gate will lift. A free token will be provided to exit the lot. One car per patient is allowed to park in this lot. All other cars are to park on [address] [place] [address] either in the parking garage or the handicap lot.     []

## 2018-09-20 ENCOUNTER — HOSPITAL ENCOUNTER (OUTPATIENT)
Age: 63
Discharge: HOME HEALTH | End: 2018-09-20
Attending: PODIATRIST | Admitting: PODIATRIST
Payer: COMMERCIAL

## 2018-09-20 ENCOUNTER — ANESTHESIA (OUTPATIENT)
Dept: OPERATING ROOM | Age: 63
End: 2018-09-20
Payer: COMMERCIAL

## 2018-09-20 ENCOUNTER — HOSPITAL ENCOUNTER (OUTPATIENT)
Dept: HYPERBARIC MEDICINE | Age: 63
Setting detail: THERAPIES SERIES
Discharge: HOME | End: 2018-09-20
Payer: COMMERCIAL

## 2018-09-20 ENCOUNTER — ANESTHESIA EVENT (OUTPATIENT)
Dept: OPERATING ROOM | Age: 63
End: 2018-09-20
Payer: COMMERCIAL

## 2018-09-20 VITALS
DIASTOLIC BLOOD PRESSURE: 75 MMHG | SYSTOLIC BLOOD PRESSURE: 140 MMHG | TEMPERATURE: 98.4 F | RESPIRATION RATE: 20 BRPM | HEART RATE: 76 BPM

## 2018-09-20 VITALS
HEART RATE: 87 BPM | SYSTOLIC BLOOD PRESSURE: 135 MMHG | BODY MASS INDEX: 27.32 KG/M2 | TEMPERATURE: 98.2 F | HEIGHT: 66 IN | DIASTOLIC BLOOD PRESSURE: 94 MMHG | WEIGHT: 170 LBS | OXYGEN SATURATION: 94 % | RESPIRATION RATE: 14 BRPM

## 2018-09-20 VITALS
DIASTOLIC BLOOD PRESSURE: 67 MMHG | SYSTOLIC BLOOD PRESSURE: 112 MMHG | OXYGEN SATURATION: 100 % | RESPIRATION RATE: 17 BRPM

## 2018-09-20 DIAGNOSIS — E11.621 DIABETIC ULCER OF LEFT HEEL ASSOCIATED WITH TYPE 2 DIABETES MELLITUS, WITH BONE INVOLVEMENT WITHOUT EVIDENCE OF NECROSIS (HCC): ICD-10-CM

## 2018-09-20 DIAGNOSIS — L97.426 DIABETIC ULCER OF LEFT HEEL ASSOCIATED WITH TYPE 2 DIABETES MELLITUS, WITH BONE INVOLVEMENT WITHOUT EVIDENCE OF NECROSIS (HCC): ICD-10-CM

## 2018-09-20 DIAGNOSIS — Z01.818 PREOP TESTING: Primary | ICD-10-CM

## 2018-09-20 LAB
METER GLUCOSE: 119 MG/DL (ref 70–110)
METER GLUCOSE: 214 MG/DL (ref 70–110)
METER GLUCOSE: 268 MG/DL (ref 70–110)

## 2018-09-20 PROCEDURE — 7100000001 HC PACU RECOVERY - ADDTL 15 MIN: Performed by: PODIATRIST

## 2018-09-20 PROCEDURE — 3700000000 HC ANESTHESIA ATTENDED CARE: Performed by: PODIATRIST

## 2018-09-20 PROCEDURE — 7100000000 HC PACU RECOVERY - FIRST 15 MIN: Performed by: PODIATRIST

## 2018-09-20 PROCEDURE — 3600000012 HC SURGERY LEVEL 2 ADDTL 15MIN: Performed by: PODIATRIST

## 2018-09-20 PROCEDURE — 87186 SC STD MICRODIL/AGAR DIL: CPT

## 2018-09-20 PROCEDURE — 88307 TISSUE EXAM BY PATHOLOGIST: CPT

## 2018-09-20 PROCEDURE — 2709999900 HC NON-CHARGEABLE SUPPLY: Performed by: PODIATRIST

## 2018-09-20 PROCEDURE — 88311 DECALCIFY TISSUE: CPT

## 2018-09-20 PROCEDURE — 3600000002 HC SURGERY LEVEL 2 BASE: Performed by: PODIATRIST

## 2018-09-20 PROCEDURE — 99183 HYPERBARIC OXYGEN THERAPY: CPT | Performed by: SURGERY

## 2018-09-20 PROCEDURE — 2580000003 HC RX 258

## 2018-09-20 PROCEDURE — 3700000001 HC ADD 15 MINUTES (ANESTHESIA): Performed by: PODIATRIST

## 2018-09-20 PROCEDURE — G0277 HBOT, FULL BODY CHAMBER, 30M: HCPCS

## 2018-09-20 PROCEDURE — 82962 GLUCOSE BLOOD TEST: CPT

## 2018-09-20 PROCEDURE — 87075 CULTR BACTERIA EXCEPT BLOOD: CPT

## 2018-09-20 PROCEDURE — 87070 CULTURE OTHR SPECIMN AEROBIC: CPT

## 2018-09-20 PROCEDURE — 6360000002 HC RX W HCPCS

## 2018-09-20 PROCEDURE — 87205 SMEAR GRAM STAIN: CPT

## 2018-09-20 RX ORDER — SODIUM CHLORIDE 0.9 % (FLUSH) 0.9 %
10 SYRINGE (ML) INJECTION EVERY 12 HOURS SCHEDULED
Status: CANCELLED | OUTPATIENT
Start: 2018-09-20 | End: 2019-09-20

## 2018-09-20 RX ORDER — SODIUM CHLORIDE 9 MG/ML
INJECTION, SOLUTION INTRAVENOUS CONTINUOUS PRN
Status: DISCONTINUED | OUTPATIENT
Start: 2018-09-20 | End: 2018-09-20 | Stop reason: SDUPTHER

## 2018-09-20 RX ORDER — CEFAZOLIN SODIUM 1 G/3ML
INJECTION, POWDER, FOR SOLUTION INTRAMUSCULAR; INTRAVENOUS PRN
Status: DISCONTINUED | OUTPATIENT
Start: 2018-09-20 | End: 2018-09-20 | Stop reason: SDUPTHER

## 2018-09-20 RX ORDER — FENTANYL CITRATE 50 UG/ML
INJECTION, SOLUTION INTRAMUSCULAR; INTRAVENOUS PRN
Status: DISCONTINUED | OUTPATIENT
Start: 2018-09-20 | End: 2018-09-20 | Stop reason: SDUPTHER

## 2018-09-20 RX ORDER — PROPOFOL 10 MG/ML
INJECTION, EMULSION INTRAVENOUS CONTINUOUS PRN
Status: DISCONTINUED | OUTPATIENT
Start: 2018-09-20 | End: 2018-09-20 | Stop reason: SDUPTHER

## 2018-09-20 RX ORDER — SODIUM CHLORIDE 0.9 % (FLUSH) 0.9 %
10 SYRINGE (ML) INJECTION PRN
Status: CANCELLED | OUTPATIENT
Start: 2018-09-20 | End: 2019-09-20

## 2018-09-20 RX ORDER — ONDANSETRON 2 MG/ML
4 INJECTION INTRAMUSCULAR; INTRAVENOUS EVERY 6 HOURS PRN
Status: CANCELLED | OUTPATIENT
Start: 2018-09-20 | End: 2019-09-20

## 2018-09-20 RX ORDER — ACETAMINOPHEN 325 MG/1
650 TABLET ORAL EVERY 4 HOURS PRN
Status: CANCELLED | OUTPATIENT
Start: 2018-09-20 | End: 2019-09-20

## 2018-09-20 RX ORDER — MIDAZOLAM HYDROCHLORIDE 1 MG/ML
INJECTION INTRAMUSCULAR; INTRAVENOUS PRN
Status: DISCONTINUED | OUTPATIENT
Start: 2018-09-20 | End: 2018-09-20 | Stop reason: SDUPTHER

## 2018-09-20 RX ADMIN — SODIUM CHLORIDE: 9 INJECTION, SOLUTION INTRAVENOUS at 07:28

## 2018-09-20 RX ADMIN — MIDAZOLAM HYDROCHLORIDE 1 MG: 1 INJECTION, SOLUTION INTRAMUSCULAR; INTRAVENOUS at 07:28

## 2018-09-20 RX ADMIN — CEFAZOLIN 2000 MG: 1 INJECTION, POWDER, FOR SOLUTION INTRAVENOUS at 07:47

## 2018-09-20 RX ADMIN — PROPOFOL 50 MCG/KG/MIN: 10 INJECTION, EMULSION INTRAVENOUS at 07:32

## 2018-09-20 RX ADMIN — FENTANYL CITRATE 25 MCG: 50 INJECTION, SOLUTION INTRAMUSCULAR; INTRAVENOUS at 07:45

## 2018-09-20 RX ADMIN — MIDAZOLAM HYDROCHLORIDE 1 MG: 1 INJECTION, SOLUTION INTRAMUSCULAR; INTRAVENOUS at 07:33

## 2018-09-20 ASSESSMENT — PULMONARY FUNCTION TESTS
PIF_VALUE: 0
PIF_VALUE: 1
PIF_VALUE: 0
PIF_VALUE: 27
PIF_VALUE: 0

## 2018-09-20 ASSESSMENT — PAIN SCALES - GENERAL
PAINLEVEL_OUTOF10: 0

## 2018-09-20 NOTE — ANESTHESIA PRE PROCEDURE
Department of Anesthesiology  Preprocedure Note       Name:  Eloise Levin   Age:  70 y.o.  :  1955                                          MRN:  [id number]         Date:  2018      Surgeon: Larry Xiao):  Victor Manuel Powell DPM    Procedure: Procedure(s):  EXCISIONAL DEBRIDEMENT SUBQUTANEOUS MUSCLE , BONE LEFT HEEL    Medications prior to admission:   Prior to Admission medications    Medication Sig Start Date End Date Taking? Authorizing Provider   Lactobacillus Rhamnosus GG, (CULTURELLE PO) Take by mouth   Yes Historical Provider, MD   DAPTOMYCIN IV Infuse 475 mg intravenously daily  18  Yes Historical Provider, MD   cefTRIAXone sodium 2 g SOLR Infuse intravenously daily   Yes Historical Provider, MD   gabapentin (NEURONTIN) 300 MG capsule Take 300 mg by mouth 2 times daily. . 5/3/18  Yes Historical Provider, MD   glipiZIDE (GLUCOTROL XL) 10 MG extended release tablet Take 1 tablet by mouth daily 18  Yes Historical Provider, MD   VELTASSA 8.4 g PACK Take 1 packet by mouth daily 18  Yes Historical Provider, MD   vitamin C (ASCORBIC ACID) 500 MG tablet Take 500 mg by mouth daily    Yes Historical Provider, MD   Lactobacillus Rhamnosus, GG, (CULTURELLE IMMUNITY SUPPORT) CAPS Take 1 capsule by mouth daily   Yes Historical Provider, MD   sodium bicarbonate 325 MG tablet Take 650 mg by mouth 2 times daily    Yes Historical Provider, MD   Cholecalciferol (VITAMIN D3) 2000 UNITS CAPS Take 4,000 Units by mouth daily    Yes Historical Provider, MD   acetaminophen 650 MG TABS Take 650 mg by mouth every 4 hours as needed (Fever >100.5 F (38 C)). 13  Yes Stacie Mims MD   metformin (GLUCOPHAGE) 500 MG tablet Take 1,000 mg by mouth 2 times daily (with meals)    Yes Historical Provider, MD   levothyroxine (SYNTHROID) 25 MCG tablet Take 50 mcg by mouth daily    Yes Historical Provider, MD   pravastatin (PRAVACHOL) 40 MG tablet Take 40 mg by mouth daily Has not started until after antibiotics. 8/13/18   Historical Provider, MD       Current medications:    No current facility-administered medications for this encounter. Allergies:     Allergies   Allergen Reactions    Codeine Nausea And Vomiting       Problem List:    Patient Active Problem List   Diagnosis Code    Hypothyroidism E03.9    Diabetic foot ulcer ([address] E11.621, L97.509    Osteomyelitis of ankle and foot ([address] M86.9    Diabetes mellitus, type 2 ([address] E11.9    Staphylococcus aureus bacteremia R78.81    Hypokalemia E87.6    Abnormal EKG R94.31    Thyroid disease E07.9    Diabetes mellitus ([address] E11.9    Hyperlipemia E78.5    Cellulitis L03.90    Diabetic ulcer of left heel associated with type 2 diabetes mellitus, with bone involvement without evidence of necrosis ([address] E11.621, L97.426    Non-pressure chronic ulcer of left heel and midfoot with fat layer exposed ([address] L97.422    Diabetic polyneuropathy (HCC) E11.42       Past Medical History:        Diagnosis Date    Abnormal EKG     Diabetes mellitus ([address]     Hx of blood clots 1990's    PE, FROM TAKING BC PILLS    Hyperlipemia     Thyroid disease        Past Surgical History:        Procedure Laterality Date    COLONOSCOPY      DILATION AND CURETTAGE OF UTERUS      ECHO COMPL W DOP COLOR FLOW  11/18/2013         ECHOCARDIOGRAM TRANSESOPHAGEAL  11/20/2013         FOOT SURGERY  11/17/13    i&d left foot and ankle with bone biopsy    HYSTERECTOMY      OPEN TX TRIMALLEOLAR ANKLE FX W FIX PST LIP Left 8/16/2018    PARTIAL CALCANECTOMY LEFT FOOT, EXCISIONAL DEBRIDEMENT MUSCLE LEFT FOOT performed by Abby Barnett DPM at [facility] OTHER SURGICAL HISTORY      GA DEEP [address] FOOT INFEC,1 BURSA Left 4/10/2018    LEFT HEEL DEBRIDEMENT, BONE BIOPSY performed by Yoselyn Kyle DPM at [facility] OR    WISDOM TOOTH EXTRACTION         Social History:    Social History   Substance Use Topics    Smoking status: Never Smoker    Smokeless tobacco: Never Used    Alcohol use No Counseling given: Not Answered      Vital Signs (Current):   Vitals:    09/19/18 1435 09/20/18 0430   BP:  (!) 140/79   Pulse:  113   Resp:  18   Temp:  37.2 °C (99 °F)   TempSrc:  Temporal   SpO2:  97%   Weight: 170 lb (77.1 kg)    Height: 5' 6\" (1.676 m)                                               BP Readings from Last 3 Encounters:   09/20/18 (!) 140/79   09/19/18 (!) 141/78   09/18/18 (!) 152/84       NPO Status: Time of last liquid consumption: 0000                        Time of last solid consumption: 0000                        Date of last liquid consumption: 09/19/18                        Date of last solid food consumption: 09/19/18    BMI:   Wt Readings from Last 3 Encounters:   09/19/18 170 lb (77.1 kg)   09/17/18 170 lb (77.1 kg)   09/10/18 170 lb (77.1 kg)     Body mass index is 27.44 kg/m². CBC:   Lab Results   Component Value Date    WBC 10.8 05/28/2018    RBC 3.58 05/28/2018    HGB 11.6 05/28/2018    HCT 35.8 05/28/2018    .0 05/28/2018    RDW 12.6 05/28/2018     05/28/2018       CMP:   Lab Results   Component Value Date     05/11/2018    K 4.6 05/11/2018    CL 97 05/11/2018    CO2 27 05/11/2018    BUN 15 05/28/2018    CREATININE 1.0 05/28/2018    GFRAA >60 05/28/2018    LABGLOM 56 05/28/2018    GLUCOSE 209 05/11/2018    PROT 7.1 05/28/2018    CALCIUM 10.5 05/11/2018    BILITOT 0.2 05/28/2018    ALKPHOS 70 05/28/2018    AST 16 05/28/2018    ALT 13 05/28/2018       POC Tests: No results for input(s): POCGLU, POCNA, POCK, POCCL, POCBUN, POCHEMO, POCHCT in the last 72 hours.     Coags:   Lab Results   Component Value Date    PROTIME 13.9 11/17/2013    INR 1.4 11/17/2013       HCG (If Applicable): No results found for: PREGTESTUR, PREGSERUM, HCG, HCGQUANT     ABGs: No results found for: PHART, PO2ART, GTN0PUD, JGF0YEI, BEART, E5MXTPIH     Type & Screen (If Applicable):  No results found for: LABABO, [address]   8/10/18 stress test      Left

## 2018-09-20 NOTE — ANESTHESIA POSTPROCEDURE EVALUATION
Department of Anesthesiology  Postprocedure Note    Patient: Eloise Levin  MRN: [id number]  YOB: 1955  Date of evaluation: 9/20/2018  Time:  10:58 AM     Procedure Summary     Date:  09/20/18 Room / Location:  [facility]    Anesthesia Start:  0728 Anesthesia Stop:  Domingo Velasquez    Procedure:  EXCISIONAL DEBRIDEMENT SUBQUTANEOUS MUSCLE , BONE LEFT HEEL (Left Foot) Diagnosis:  (DIABETIC ULCER LEFT FOOT )    Surgeon:  Victor Manuel Powell DPM Responsible Provider:  Riya Rincon MD    Anesthesia Type:  MAC ASA Status:  3          Anesthesia Type: MAC    Gerri Phase I: Gerri Score: 10    Gerri Phase II:      Last vitals: Reviewed and per EMR flowsheets.        Anesthesia Post Evaluation    Patient location during evaluation: PACU  Patient participation: complete - patient participated  Level of consciousness: awake and alert  Airway patency: patent  Nausea & Vomiting: no nausea and no vomiting  Complications: no  Cardiovascular status: hemodynamically stable and blood pressure returned to baseline  Respiratory status: acceptable  Hydration status: euvolemic

## 2018-09-21 ENCOUNTER — HOSPITAL ENCOUNTER (OUTPATIENT)
Dept: HYPERBARIC MEDICINE | Age: 63
Setting detail: THERAPIES SERIES
Discharge: HOME | End: 2018-09-21
Payer: COMMERCIAL

## 2018-09-21 ENCOUNTER — HOSPITAL ENCOUNTER (OUTPATIENT)
Age: 63
Discharge: HOME | End: 2018-09-23
Payer: COMMERCIAL

## 2018-09-21 VITALS
SYSTOLIC BLOOD PRESSURE: 162 MMHG | HEART RATE: 96 BPM | DIASTOLIC BLOOD PRESSURE: 66 MMHG | TEMPERATURE: 98.3 F | RESPIRATION RATE: 20 BRPM

## 2018-09-21 DIAGNOSIS — L97.426 DIABETIC ULCER OF LEFT HEEL ASSOCIATED WITH TYPE 2 DIABETES MELLITUS, WITH BONE INVOLVEMENT WITHOUT EVIDENCE OF NECROSIS (HCC): ICD-10-CM

## 2018-09-21 DIAGNOSIS — E11.621 DIABETIC ULCER OF LEFT HEEL ASSOCIATED WITH TYPE 2 DIABETES MELLITUS, WITH BONE INVOLVEMENT WITHOUT EVIDENCE OF NECROSIS (HCC): ICD-10-CM

## 2018-09-21 LAB
BASOPHILS ABSOLUTE: 0.04 E9/L (ref 0–0.2)
BASOPHILS RELATIVE PERCENT: 0.5 % (ref 0–2)
EOSINOPHILS ABSOLUTE: 0.47 E9/L (ref 0.05–0.5)
EOSINOPHILS RELATIVE PERCENT: 5.5 % (ref 0–6)
GRAM STAIN ORDERABLE: NORMAL
HCT VFR BLD CALC: 30.1 % (ref 34–48)
HEMOGLOBIN: 9.3 G/DL (ref 11.5–15.5)
IMMATURE GRANULOCYTES #: 0.05 E9/L
IMMATURE GRANULOCYTES %: 0.6 % (ref 0–5)
LYMPHOCYTES ABSOLUTE: 2.07 E9/L (ref 1.5–4)
LYMPHOCYTES RELATIVE PERCENT: 24.3 % (ref 20–42)
MCH RBC QN AUTO: 31.4 PG (ref 26–35)
MCHC RBC AUTO-ENTMCNC: 30.9 % (ref 32–34.5)
MCV RBC AUTO: 101.7 FL (ref 80–99.9)
METER GLUCOSE: 219 MG/DL (ref 70–110)
METER GLUCOSE: 262 MG/DL (ref 70–110)
MONOCYTES ABSOLUTE: 0.85 E9/L (ref 0.1–0.95)
MONOCYTES RELATIVE PERCENT: 10 % (ref 2–12)
NEUTROPHILS ABSOLUTE: 5.03 E9/L (ref 1.8–7.3)
NEUTROPHILS RELATIVE PERCENT: 59.1 % (ref 43–80)
PDW BLD-RTO: 13.8 FL (ref 11.5–15)
PLATELET # BLD: 261 E9/L (ref 130–450)
PMV BLD AUTO: 9.8 FL (ref 7–12)
RBC # BLD: 2.96 E12/L (ref 3.5–5.5)
WBC # BLD: 8.5 E9/L (ref 4.5–11.5)

## 2018-09-21 PROCEDURE — 82565 ASSAY OF CREATININE: CPT

## 2018-09-21 PROCEDURE — 84520 ASSAY OF UREA NITROGEN: CPT

## 2018-09-21 PROCEDURE — 85025 COMPLETE CBC W/AUTO DIFF WBC: CPT

## 2018-09-21 PROCEDURE — 99183 HYPERBARIC OXYGEN THERAPY: CPT | Performed by: SURGERY

## 2018-09-21 PROCEDURE — 80076 HEPATIC FUNCTION PANEL: CPT

## 2018-09-21 PROCEDURE — 82962 GLUCOSE BLOOD TEST: CPT

## 2018-09-21 PROCEDURE — G0277 HBOT, FULL BODY CHAMBER, 30M: HCPCS

## 2018-09-21 NOTE — PROGRESS NOTES
[facility]  Hyperbaric Oxygen Therapy   Progress Note      NAME: Ekta Simeon  MEDICAL RECORD NUMBER:  [id number]  AGE: 70 y.o. GENDER: female  : 1955  EPISODE DATE:  2018     Subjective     HBO Treatment Number: 25 out of Total Treatments: 80    HBO Diagnosis:               Indications: Lower Extremity Diabetic Wound ___(site)    Safety checks performed prior to treatment. See doc flowsheets for documentation. Objective           Recent Labs      18   0921  18   1153   GLUMET  262*  219*       Pre treatment Vital Signs       Temp: 98.2 °F (36.8 °C)     Pulse: 88     Resp: 16     BP: 117/82       Post treatment Vital Signs  Temp: 98.4 °F (36.9 °C)  Pulse: 76  Resp: 20  BP: (!) 140/75      Assessment        Physical Exam:  General Appearance:  alert and oriented to person, place and time, well-developed and well-nourished, in no acute distress    ENT:  tympanic membranes intact bilaterally    Post Assessment per Physician/Provider  Right Tympanic Membrane Normal    Left Tympanic Membrane Normal    Pulmonary/Chest:  clear to auscultation bilaterally- no wheezes, rales or rhonchi, normal air movement, no respiratory distress    Cardiovascular:  regular rate and rhythm    Chamber #: [id number]    Treatment Start Time: 1153     Pressure Reached Time: 1203    RANDY Rate: 2  Number of Air Breaks:  Treatment Status: No Air break          Decompression Time: 1333   Treatment End Time: 1345    Length of Treatment: 90 Minutes    Symptoms Noted During Treatment: None    Adverse Event: no      I was present on these premises and immediately available to furnish assistance & direction throughout the procedure. Plan          Ekta Simeon is a 70 y.o. female  did successfully complete today's hyperbaric oxygen treatment at [address] [place] [address].     In my clinical judgement, ongoing HBO therapy is  necessary at this time, given a threat to patient

## 2018-09-22 LAB
ALBUMIN SERPL-MCNC: 3.5 G/DL (ref 3.5–5.2)
ALP BLD-CCNC: 66 U/L (ref 35–104)
ALT SERPL-CCNC: 10 U/L (ref 0–32)
ANAEROBIC CULTURE: NORMAL
AST SERPL-CCNC: 17 U/L (ref 0–31)
BILIRUB SERPL-MCNC: 0.2 MG/DL (ref 0–1.2)
BILIRUBIN DIRECT: <0.2 MG/DL (ref 0–0.3)
BILIRUBIN, INDIRECT: NORMAL MG/DL (ref 0–1)
BUN BLDV-MCNC: 18 MG/DL (ref 8–23)
CREAT SERPL-MCNC: 0.8 MG/DL (ref 0.5–1)
GFR AFRICAN AMERICAN: >60
GFR NON-AFRICAN AMERICAN: >60 ML/MIN/1.73
TOTAL PROTEIN: 7.3 G/DL (ref 6.4–8.3)

## 2018-09-23 LAB
CULTURE SURGICAL: ABNORMAL
GRAM STAIN RESULT: ABNORMAL
ORGANISM: ABNORMAL
ORGANISM: ABNORMAL

## 2018-09-24 ENCOUNTER — HOSPITAL ENCOUNTER (OUTPATIENT)
Dept: HYPERBARIC MEDICINE | Age: 63
Setting detail: THERAPIES SERIES
Discharge: HOME | End: 2018-09-24
Payer: COMMERCIAL

## 2018-09-24 ENCOUNTER — HOSPITAL ENCOUNTER (OUTPATIENT)
Dept: WOUND CARE | Age: 63
Discharge: HOME | End: 2018-09-24
Payer: COMMERCIAL

## 2018-09-24 VITALS
HEART RATE: 88 BPM | RESPIRATION RATE: 20 BRPM | DIASTOLIC BLOOD PRESSURE: 81 MMHG | SYSTOLIC BLOOD PRESSURE: 163 MMHG | TEMPERATURE: 98 F

## 2018-09-24 VITALS
HEIGHT: 66 IN | SYSTOLIC BLOOD PRESSURE: 138 MMHG | BODY MASS INDEX: 27.32 KG/M2 | TEMPERATURE: 98.1 F | WEIGHT: 170 LBS | DIASTOLIC BLOOD PRESSURE: 78 MMHG | HEART RATE: 78 BPM | RESPIRATION RATE: 18 BRPM

## 2018-09-24 DIAGNOSIS — E11.621 DIABETIC ULCER OF LEFT HEEL ASSOCIATED WITH TYPE 2 DIABETES MELLITUS, WITH BONE INVOLVEMENT WITHOUT EVIDENCE OF NECROSIS (HCC): ICD-10-CM

## 2018-09-24 DIAGNOSIS — L97.426 DIABETIC ULCER OF LEFT HEEL ASSOCIATED WITH TYPE 2 DIABETES MELLITUS, WITH BONE INVOLVEMENT WITHOUT EVIDENCE OF NECROSIS (HCC): ICD-10-CM

## 2018-09-24 LAB
METER GLUCOSE: 147 MG/DL (ref 70–110)
METER GLUCOSE: 186 MG/DL (ref 70–110)

## 2018-09-24 PROCEDURE — 82962 GLUCOSE BLOOD TEST: CPT

## 2018-09-24 PROCEDURE — 99183 HYPERBARIC OXYGEN THERAPY: CPT | Performed by: SURGERY

## 2018-09-24 PROCEDURE — G0277 HBOT, FULL BODY CHAMBER, 30M: HCPCS

## 2018-09-24 PROCEDURE — 99213 OFFICE O/P EST LOW 20 MIN: CPT

## 2018-09-24 NOTE — PROGRESS NOTES
9/24/2018  1:00 PM   Change in Wound Size % (l*w) 42.48 9/24/2018  1:00 PM   Distance Tunneling (cm) 2.8 cm 9/17/2018  1:15 PM   Tunneling Position ___ O'Clock 1 9/17/2018  1:15 PM   Undermining Starts ___ O'Clock 6 8/13/2018  1:18 PM   Undermining Ends___ O'Clock 8 8/13/2018  1:18 PM   Undermining Maxium Distance (cm) 2.5 8/13/2018  1:18 PM   Wound Assessment Pale;[place] 9/24/2018  1:00 PM   Drainage Amount Copious 9/24/2018  1:00 PM   Drainage Description Serosanguinous; Yellow 9/24/2018  1:00 PM   Odor None 9/24/2018  1:00 PM   Exposed structure Bone 8/20/2018  1:03 PM   Kaya-wound Assessment Maceration 9/24/2018  1:00 PM   Debridement per physician None 9/24/2018  2:08 PM   Time out Yes 9/24/2018  2:08 PM   Procedural Pain 1 7/9/2018  1:47 PM   Post procedural Pain 0 7/9/2018  1:47 PM   Number of days: 140       Incision 11/17/13 Foot Left (Active)   Number of days: 1772       Incision 04/10/18 Foot Left (Active)   Number of days: 167       Incision 08/16/18 Foot Left (Active)   Number of days: 39       Incision 09/20/18 Foot Left (Active)   Wound Assessment CYDNEY 9/20/2018  8:45 AM   Drainage Amount None 9/20/2018  8:45 AM   Dressing/Treatment Ace Wrap 9/20/2018  8:45 AM   Dressing Status Clean;Dry; Intact 9/20/2018  8:45 AM   Number of days: 4     Percent of Wound/Ulcer Debrided: 0%    Total Surface Area Debrided:  0 sq cm     Estimated Blood Loss:  None  Hemostasis Achieved:  not needed    Procedural Pain:  0  / 10   Post Procedural Pain:  0 / 10     Response to treatment:  Well tolerated by patient. Plan:   Treatment Note please see attached Discharge Instructions  More sutures were placed at the wound site today. Written patient dismissal instructions given to patient and signed by patient or POA.          Discharge Instructions       Visit Discharge/Physician Orders     Discharge condition: Stable     Assessment of pain at discharge:no     Anesthetic used: 2% lidocaine gel     Discharge to: Home     Left via:Private automobile     Accompanied by:  spouse     ECF/HHA: [facility]-     Dressing Orders: LEFT HEEL ULCER-Cleanse with vashe when available, apply alginate ag and dry dressing every other day     Treatment Orders:Eat a diet high in protein and vitamin C. Take a multiple vitamin daily unless contraindicated.     Dr. Eloisa Cerda- IV daptomycin- appt 10/4/18     Continue Extended HBO     Keep all pressure off of heel     Keep heel clean and dry       [facility] followup visit :  1 WEEK ____________________  (Please note your next appointment above and if you are unable to keep, kindly give a 24 hour notice. Thank you.)     Physician signature:__________________________        If you experience any of the following, please call the [facility] during business hours:     * Increase in Pain  * Temperature over 101  * Increase in drainage from your wound  * Drainage with a foul odor  * Bleeding  * Increase in swelling  * Need for compression bandage changes due to slippage, breakthrough drainage.     If you need medical attention outside of the business hours of the [facility] please contact your PCP or go to the nearest emergency room.         Electronically signed by Arline De Guzman DPM on 9/24/2018 at 2:55 PM

## 2018-09-27 ENCOUNTER — HOSPITAL ENCOUNTER (OUTPATIENT)
Dept: HYPERBARIC MEDICINE | Age: 63
Setting detail: THERAPIES SERIES
Discharge: HOME | End: 2018-09-27
Payer: COMMERCIAL

## 2018-09-27 VITALS
HEART RATE: 80 BPM | DIASTOLIC BLOOD PRESSURE: 83 MMHG | SYSTOLIC BLOOD PRESSURE: 160 MMHG | RESPIRATION RATE: 20 BRPM | TEMPERATURE: 98.1 F

## 2018-09-27 DIAGNOSIS — E11.621 DIABETIC ULCER OF LEFT HEEL ASSOCIATED WITH TYPE 2 DIABETES MELLITUS, WITH BONE INVOLVEMENT WITHOUT EVIDENCE OF NECROSIS (HCC): ICD-10-CM

## 2018-09-27 DIAGNOSIS — L97.426 DIABETIC ULCER OF LEFT HEEL ASSOCIATED WITH TYPE 2 DIABETES MELLITUS, WITH BONE INVOLVEMENT WITHOUT EVIDENCE OF NECROSIS (HCC): ICD-10-CM

## 2018-09-27 LAB
METER GLUCOSE: 172 MG/DL (ref 70–110)
METER GLUCOSE: 215 MG/DL (ref 70–110)

## 2018-09-27 PROCEDURE — 82962 GLUCOSE BLOOD TEST: CPT

## 2018-09-27 PROCEDURE — G0277 HBOT, FULL BODY CHAMBER, 30M: HCPCS

## 2018-09-27 PROCEDURE — 99183 HYPERBARIC OXYGEN THERAPY: CPT | Performed by: SURGERY

## 2018-09-27 NOTE — PROGRESS NOTES
[facility]  Hyperbaric Oxygen Therapy   Progress Note      NAME: Ollie Falcon  MEDICAL RECORD NUMBER:  [id number]  AGE: 70 y.o. GENDER: female  : 1955  EPISODE DATE:  2018     Subjective     HBO Treatment Number: 38 out of Total Treatments: 85    HBO Diagnosis:               Indications: Lower Extremity Diabetic Wound ___(site) (Left Foot)    Safety checks performed prior to treatment. See doc flowsheets for documentation. Objective           Recent Labs      18   0922  18   1158   GLUMET  215*  172*       Pre treatment Vital Signs       Temp: 97.9 °F (36.6 °C)     Pulse: 99     Resp: 20     BP: (!) 151/85       Post treatment Vital Signs  Temp: 98 °F (36.7 °C)  Pulse: 88  Resp: 20  BP: (!) 163/81      Assessment        Physical Exam:  General Appearance:  alert and oriented to person, place and time, well-developed and well-nourished, in no acute distress    ENT:  tympanic membranes intact bilaterally    Post Assessment per Physician/Provider  Right Tympanic Membrane Normal    Left Tympanic Membrane Normal    Pulmonary/Chest:  clear to auscultation bilaterally- no wheezes, rales or rhonchi, normal air movement, no respiratory distress    Cardiovascular:  regular rate and rhythm    Chamber #: [id number]    Treatment Start Time: 1010     Pressure Reached Time: 1021    RANDY Rate: 2  Number of Air Breaks:  Treatment Status: No Air break          Decompression Time: 1151   Treatment End Time: 1200    Length of Treatment: 90 Minutes    Symptoms Noted During Treatment: None    Adverse Event: no      I was present on these premises and immediately available to furnish assistance & direction throughout the procedure. Plan          Ollie Falcon is a 70 y.o. female  did successfully complete today's hyperbaric oxygen treatment at [address] [place] [address].     In my clinical judgement, ongoing HBO therapy is  necessary at this time, given a threat to patient function, limb or life from the current condition. Supervision and attendance of Hyperbaric Oxygen Therapy provided. Continue HBO treatment as outlined in the treatment plan. Hyperbaric Oxygen: Sharla Hernandez tolerated Treatment Number: 66 well today without complications. Discharge Instructions were explained and given to Ms. Hernandez     Electronically signed by Niraj Campbell MD on 9/24/2018 at 2:29 PM

## 2018-09-27 NOTE — PROGRESS NOTES
Maurita Dance is a 70 y.o. female has been receiving hyperbaric oxygen treatment for management of: Indication  Indications: Lower Extremity Diabetic Wound ___(site) (Left Foot)  Kely Quintero: Kely Quintero 3. Ms. Jorgito Arango has completed Treatment Number: 80 out of a treatment protocol of Total Treatments: 100. Problem List:  Patient Active Problem List   Diagnosis Code    Hypothyroidism E03.9    Diabetic foot ulcer ([address] E11.621, L97.509    Osteomyelitis of ankle and foot ([address] M86.9    Diabetes mellitus, type 2 ([address] E11.9    Staphylococcus aureus bacteremia R78.81    Hypokalemia E87.6    Abnormal EKG R94.31    Thyroid disease E07.9    Diabetes mellitus ([address] E11.9    Hyperlipemia E78.5    Cellulitis L03.90    Diabetic ulcer of left heel associated with type 2 diabetes mellitus, with bone involvement without evidence of necrosis (HCC) E11.621, L97.426    Non-pressure chronic ulcer of left heel and midfoot with fat layer exposed ([address] L97.422    Diabetic polyneuropathy (HCC) E11.42       Patients ID was verified, Hyperbaric oxygen therapy plan of care,  Patient history, outpatient fall risk assessment, nutritional needs and daily medications were reviewed, addressed and updated as needed. Pt is tolerating hyperbaric oxygen therapy well, with out complications.      Candi Carter  9/27/2018  10:28 AM
attendance of Hyperbaric Oxygen Therapy provided. Continue HBO treatment as outlined in the treatment plan. Hyperbaric Oxygen: Karis Hernandez tolerated Treatment Number: 76 well today without complications. Discharge Instructions were explained and given to Ms. Hernandez by HBOT staff    Electronically signed by Pee Maya MD on 9/27/2018 at 1:35 PM

## 2018-09-28 ENCOUNTER — HOSPITAL ENCOUNTER (OUTPATIENT)
Dept: HYPERBARIC MEDICINE | Age: 63
Setting detail: THERAPIES SERIES
Discharge: HOME | End: 2018-09-28
Payer: COMMERCIAL

## 2018-09-28 VITALS
RESPIRATION RATE: 20 BRPM | HEART RATE: 68 BPM | SYSTOLIC BLOOD PRESSURE: 159 MMHG | DIASTOLIC BLOOD PRESSURE: 82 MMHG | TEMPERATURE: 97.8 F

## 2018-09-28 DIAGNOSIS — E11.621 DIABETIC ULCER OF LEFT HEEL ASSOCIATED WITH TYPE 2 DIABETES MELLITUS, WITH BONE INVOLVEMENT WITHOUT EVIDENCE OF NECROSIS (HCC): ICD-10-CM

## 2018-09-28 DIAGNOSIS — L97.426 DIABETIC ULCER OF LEFT HEEL ASSOCIATED WITH TYPE 2 DIABETES MELLITUS, WITH BONE INVOLVEMENT WITHOUT EVIDENCE OF NECROSIS (HCC): ICD-10-CM

## 2018-09-28 LAB
METER GLUCOSE: 168 MG/DL (ref 70–110)
METER GLUCOSE: 205 MG/DL (ref 70–110)

## 2018-09-28 PROCEDURE — G0277 HBOT, FULL BODY CHAMBER, 30M: HCPCS

## 2018-09-28 PROCEDURE — 82962 GLUCOSE BLOOD TEST: CPT

## 2018-09-28 PROCEDURE — 99183 HYPERBARIC OXYGEN THERAPY: CPT | Performed by: SURGERY

## 2018-10-01 ENCOUNTER — HOSPITAL ENCOUNTER (OUTPATIENT)
Dept: HYPERBARIC MEDICINE | Age: 63
Setting detail: THERAPIES SERIES
Discharge: HOME | End: 2018-10-01
Payer: COMMERCIAL

## 2018-10-01 ENCOUNTER — HOSPITAL ENCOUNTER (OUTPATIENT)
Dept: WOUND CARE | Age: 63
Discharge: HOME | End: 2018-10-01
Payer: COMMERCIAL

## 2018-10-01 VITALS
TEMPERATURE: 98.6 F | DIASTOLIC BLOOD PRESSURE: 82 MMHG | RESPIRATION RATE: 20 BRPM | SYSTOLIC BLOOD PRESSURE: 139 MMHG | HEART RATE: 80 BPM

## 2018-10-01 VITALS
HEART RATE: 100 BPM | HEIGHT: 66 IN | TEMPERATURE: 97.9 F | DIASTOLIC BLOOD PRESSURE: 80 MMHG | SYSTOLIC BLOOD PRESSURE: 120 MMHG | BODY MASS INDEX: 26.52 KG/M2 | RESPIRATION RATE: 18 BRPM | WEIGHT: 165 LBS

## 2018-10-01 DIAGNOSIS — L97.426 DIABETIC ULCER OF LEFT HEEL ASSOCIATED WITH TYPE 2 DIABETES MELLITUS, WITH BONE INVOLVEMENT WITHOUT EVIDENCE OF NECROSIS (HCC): ICD-10-CM

## 2018-10-01 DIAGNOSIS — E11.621 DIABETIC ULCER OF LEFT HEEL ASSOCIATED WITH TYPE 2 DIABETES MELLITUS, WITH BONE INVOLVEMENT WITHOUT EVIDENCE OF NECROSIS (HCC): ICD-10-CM

## 2018-10-01 LAB
METER GLUCOSE: 170 MG/DL (ref 70–110)
METER GLUCOSE: 197 MG/DL (ref 70–110)

## 2018-10-01 PROCEDURE — 99213 OFFICE O/P EST LOW 20 MIN: CPT

## 2018-10-01 PROCEDURE — 82962 GLUCOSE BLOOD TEST: CPT

## 2018-10-01 PROCEDURE — 99183 HYPERBARIC OXYGEN THERAPY: CPT | Performed by: SURGERY

## 2018-10-01 PROCEDURE — G0277 HBOT, FULL BODY CHAMBER, 30M: HCPCS

## 2018-10-02 ENCOUNTER — HOSPITAL ENCOUNTER (OUTPATIENT)
Dept: HYPERBARIC MEDICINE | Age: 63
Setting detail: THERAPIES SERIES
Discharge: HOME | End: 2018-10-02
Payer: COMMERCIAL

## 2018-10-02 VITALS
TEMPERATURE: 97.8 F | RESPIRATION RATE: 20 BRPM | DIASTOLIC BLOOD PRESSURE: 78 MMHG | SYSTOLIC BLOOD PRESSURE: 155 MMHG | HEART RATE: 84 BPM

## 2018-10-02 DIAGNOSIS — L97.426 DIABETIC ULCER OF LEFT HEEL ASSOCIATED WITH TYPE 2 DIABETES MELLITUS, WITH BONE INVOLVEMENT WITHOUT EVIDENCE OF NECROSIS (HCC): ICD-10-CM

## 2018-10-02 DIAGNOSIS — E11.621 DIABETIC ULCER OF LEFT HEEL ASSOCIATED WITH TYPE 2 DIABETES MELLITUS, WITH BONE INVOLVEMENT WITHOUT EVIDENCE OF NECROSIS (HCC): ICD-10-CM

## 2018-10-02 LAB
METER GLUCOSE: 224 MG/DL (ref 70–110)
METER GLUCOSE: 303 MG/DL (ref 70–110)

## 2018-10-02 PROCEDURE — 99183 HYPERBARIC OXYGEN THERAPY: CPT | Performed by: SURGERY

## 2018-10-02 PROCEDURE — G0277 HBOT, FULL BODY CHAMBER, 30M: HCPCS

## 2018-10-02 PROCEDURE — 82962 GLUCOSE BLOOD TEST: CPT

## 2018-10-03 ENCOUNTER — HOSPITAL ENCOUNTER (OUTPATIENT)
Dept: HYPERBARIC MEDICINE | Age: 63
Setting detail: THERAPIES SERIES
Discharge: HOME | End: 2018-10-03
Payer: COMMERCIAL

## 2018-10-03 VITALS
TEMPERATURE: 98.4 F | DIASTOLIC BLOOD PRESSURE: 76 MMHG | SYSTOLIC BLOOD PRESSURE: 135 MMHG | RESPIRATION RATE: 20 BRPM | HEART RATE: 84 BPM

## 2018-10-03 DIAGNOSIS — E11.621 DIABETIC ULCER OF LEFT HEEL ASSOCIATED WITH TYPE 2 DIABETES MELLITUS, WITH BONE INVOLVEMENT WITHOUT EVIDENCE OF NECROSIS (HCC): ICD-10-CM

## 2018-10-03 DIAGNOSIS — L97.426 DIABETIC ULCER OF LEFT HEEL ASSOCIATED WITH TYPE 2 DIABETES MELLITUS, WITH BONE INVOLVEMENT WITHOUT EVIDENCE OF NECROSIS (HCC): ICD-10-CM

## 2018-10-03 LAB
METER GLUCOSE: 130 MG/DL (ref 70–110)
METER GLUCOSE: 167 MG/DL (ref 70–110)

## 2018-10-03 PROCEDURE — 82962 GLUCOSE BLOOD TEST: CPT

## 2018-10-03 PROCEDURE — 99183 HYPERBARIC OXYGEN THERAPY: CPT | Performed by: SURGERY

## 2018-10-03 PROCEDURE — G0277 HBOT, FULL BODY CHAMBER, 30M: HCPCS

## 2018-10-04 ENCOUNTER — HOSPITAL ENCOUNTER (OUTPATIENT)
Dept: HYPERBARIC MEDICINE | Age: 63
Setting detail: THERAPIES SERIES
Discharge: HOME | End: 2018-10-04
Payer: COMMERCIAL

## 2018-10-04 VITALS
TEMPERATURE: 98.1 F | DIASTOLIC BLOOD PRESSURE: 99 MMHG | HEART RATE: 84 BPM | SYSTOLIC BLOOD PRESSURE: 176 MMHG | RESPIRATION RATE: 20 BRPM

## 2018-10-04 DIAGNOSIS — E11.621 DIABETIC ULCER OF LEFT HEEL ASSOCIATED WITH TYPE 2 DIABETES MELLITUS, WITH BONE INVOLVEMENT WITHOUT EVIDENCE OF NECROSIS (HCC): ICD-10-CM

## 2018-10-04 DIAGNOSIS — L97.426 DIABETIC ULCER OF LEFT HEEL ASSOCIATED WITH TYPE 2 DIABETES MELLITUS, WITH BONE INVOLVEMENT WITHOUT EVIDENCE OF NECROSIS (HCC): ICD-10-CM

## 2018-10-04 LAB
METER GLUCOSE: 148 MG/DL (ref 70–110)
METER GLUCOSE: 185 MG/DL (ref 70–110)

## 2018-10-04 PROCEDURE — 99183 HYPERBARIC OXYGEN THERAPY: CPT | Performed by: SURGERY

## 2018-10-04 PROCEDURE — 82962 GLUCOSE BLOOD TEST: CPT

## 2018-10-04 PROCEDURE — G0277 HBOT, FULL BODY CHAMBER, 30M: HCPCS

## 2018-10-05 ENCOUNTER — HOSPITAL ENCOUNTER (OUTPATIENT)
Dept: HYPERBARIC MEDICINE | Age: 63
Setting detail: THERAPIES SERIES
Discharge: HOME | End: 2018-10-05
Payer: COMMERCIAL

## 2018-10-05 VITALS
SYSTOLIC BLOOD PRESSURE: 140 MMHG | RESPIRATION RATE: 20 BRPM | HEART RATE: 96 BPM | TEMPERATURE: 98.2 F | DIASTOLIC BLOOD PRESSURE: 70 MMHG

## 2018-10-05 DIAGNOSIS — E11.621 DIABETIC ULCER OF LEFT HEEL ASSOCIATED WITH TYPE 2 DIABETES MELLITUS, WITH BONE INVOLVEMENT WITHOUT EVIDENCE OF NECROSIS (HCC): ICD-10-CM

## 2018-10-05 DIAGNOSIS — L97.426 DIABETIC ULCER OF LEFT HEEL ASSOCIATED WITH TYPE 2 DIABETES MELLITUS, WITH BONE INVOLVEMENT WITHOUT EVIDENCE OF NECROSIS (HCC): ICD-10-CM

## 2018-10-05 LAB
METER GLUCOSE: 147 MG/DL (ref 70–110)
METER GLUCOSE: 210 MG/DL (ref 70–110)

## 2018-10-05 PROCEDURE — 99183 HYPERBARIC OXYGEN THERAPY: CPT | Performed by: SURGERY

## 2018-10-05 PROCEDURE — G0277 HBOT, FULL BODY CHAMBER, 30M: HCPCS

## 2018-10-05 PROCEDURE — 82962 GLUCOSE BLOOD TEST: CPT

## 2018-10-08 ENCOUNTER — HOSPITAL ENCOUNTER (OUTPATIENT)
Dept: WOUND CARE | Age: 63
Discharge: HOME | End: 2018-10-08
Payer: COMMERCIAL

## 2018-10-08 ENCOUNTER — HOSPITAL ENCOUNTER (OUTPATIENT)
Age: 63
Discharge: HOME | End: 2018-10-10
Payer: COMMERCIAL

## 2018-10-08 ENCOUNTER — HOSPITAL ENCOUNTER (OUTPATIENT)
Dept: HYPERBARIC MEDICINE | Age: 63
Setting detail: THERAPIES SERIES
Discharge: HOME | End: 2018-10-08
Payer: COMMERCIAL

## 2018-10-08 ENCOUNTER — HOSPITAL ENCOUNTER (OUTPATIENT)
Dept: GENERAL RADIOLOGY | Age: 63
Discharge: HOME | End: 2018-10-10
Payer: COMMERCIAL

## 2018-10-08 VITALS
BODY MASS INDEX: 26.52 KG/M2 | HEART RATE: 88 BPM | RESPIRATION RATE: 20 BRPM | DIASTOLIC BLOOD PRESSURE: 80 MMHG | HEIGHT: 66 IN | WEIGHT: 165 LBS | TEMPERATURE: 98.1 F | SYSTOLIC BLOOD PRESSURE: 130 MMHG

## 2018-10-08 VITALS
SYSTOLIC BLOOD PRESSURE: 183 MMHG | DIASTOLIC BLOOD PRESSURE: 95 MMHG | TEMPERATURE: 98.8 F | HEART RATE: 74 BPM | RESPIRATION RATE: 20 BRPM

## 2018-10-08 DIAGNOSIS — E11.621 DIABETIC ULCER OF LEFT HEEL ASSOCIATED WITH TYPE 2 DIABETES MELLITUS, WITH BONE INVOLVEMENT WITHOUT EVIDENCE OF NECROSIS (HCC): ICD-10-CM

## 2018-10-08 DIAGNOSIS — L97.426 DIABETIC ULCER OF LEFT HEEL ASSOCIATED WITH TYPE 2 DIABETES MELLITUS, WITH BONE INVOLVEMENT WITHOUT EVIDENCE OF NECROSIS (HCC): ICD-10-CM

## 2018-10-08 DIAGNOSIS — M86.00 ACUTE HEMATOGENOUS OSTEOMYELITIS, UNSPECIFIED SITE (HCC): ICD-10-CM

## 2018-10-08 LAB
METER GLUCOSE: 152 MG/DL (ref 70–110)
METER GLUCOSE: 184 MG/DL (ref 70–110)

## 2018-10-08 PROCEDURE — 82962 GLUCOSE BLOOD TEST: CPT

## 2018-10-08 PROCEDURE — G0277 HBOT, FULL BODY CHAMBER, 30M: HCPCS

## 2018-10-08 PROCEDURE — 73610 X-RAY EXAM OF ANKLE: CPT

## 2018-10-08 PROCEDURE — 99183 HYPERBARIC OXYGEN THERAPY: CPT | Performed by: SURGERY

## 2018-10-08 PROCEDURE — 11042 DBRDMT SUBQ TIS 1ST 20SQCM/<: CPT

## 2018-10-08 NOTE — PROGRESS NOTES
Oxygen: Bryan Hernandez tolerated Treatment Number: 27 well today without complications. Discharge Instructions were explained and given to Ms. Hernandez     Electronically signed by Daisy Rankin MD on 10/8/2018 at 2:24 PM

## 2018-10-08 NOTE — PROGRESS NOTES
[facility] Followup Visit Note    Referring Physician : DO Sara Reyes Shira Hernandez  MEDICAL RECORD NUMBER:  [id number]  AGE: 70 y.o. GENDER: female  : 1955  EPISODE DATE:  10/8/2018    Subjective:     Chief Complaint   Patient presents with    Wound Check     patient here for treatment of left foot ulcer      HISTORY of PRESENT ILLNESS JANE Petit is a 70 y.o. female who presents today for wound/ulcer evaluation.    History of Wound Context:  Follow up and debridement     Wound/Ulcer Pain Timing/Severity: none  Quality of pain: N/A  Severity:  0 / 10   Modifying Factors: None  Associated Signs/Symptoms: drainage    Ulcer Identification:  Ulcer Type: non-healing surgical and refractory osteomyelitis  Contributing Factors: diabetes    Diabetic/Pressure/Non Pressure Ulcers only:  Ulcer: Diabetic ulcer, fat layer exposed    Wound: Nonhealing surgical due to infection        PAST MEDICAL HISTORY      Diagnosis Date    Abnormal EKG     Diabetes mellitus ([address]     Hx of blood clots     PE, FROM TAKING BC PILLS    Hyperlipemia     Thyroid disease      Past Surgical History:   Procedure Laterality Date    COLONOSCOPY      DILATION AND CURETTAGE OF UTERUS      ECHO COMPL W DOP COLOR FLOW  2013         ECHOCARDIOGRAM TRANSESOPHAGEAL  2013         FOOT SURGERY  13    i&d left foot and ankle with bone biopsy    HYSTERECTOMY      OPEN TX TRIMALLEOLAR ANKLE FX W FIX PST LIP Left 2018    PARTIAL CALCANECTOMY LEFT FOOT, EXCISIONAL DEBRIDEMENT MUSCLE LEFT FOOT performed by Linda Bush DPM at [facility]. 2., SKIN, SUB-Q TISSUE,MUSCLE,BONE,=<20 SQ CM Left 2018    EXCISIONAL DEBRIDEMENT SUBQUTANEOUS MUSCLE , BONE LEFT HEEL performed by Linda Bush DPM at [facility] Left 4/10/2018    LEFT HEEL DEBRIDEMENT, BONE BIOPSY performed by Roma Nuñez DPM at [facility] 1:44 PM   Dressing Changed Changed/New 10/1/2018  1:44 PM   Dressing/Treatment Alginate;Dry dressing 10/1/2018  1:44 PM   Wound Cleansed Rinsed/Irrigated with saline 10/1/2018  1:44 PM   Wound Length (cm) 9 cm 10/8/2018  1:22 PM   Wound Width (cm) 1.9 cm 10/8/2018  1:22 PM   Wound Depth (cm)  2.2 10/8/2018  1:22 PM   Calculated Wound Size (cm^2) (l*w) 17.1 cm^2 10/8/2018  1:22 PM   Change in Wound Size % (l*w) -123.53 10/8/2018  1:22 PM   Distance Tunneling (cm) 2.8 cm 9/17/2018  1:15 PM   Tunneling Position ___ O'Clock 1 9/17/2018  1:15 PM   Undermining Starts ___ O'Clock 6 8/13/2018  1:18 PM   Undermining Ends___ O'Clock 8 8/13/2018  1:18 PM   Undermining Maxium Distance (cm) 2.5 8/13/2018  1:18 PM   Wound Assessment Pale;Pink;Gray;Black 10/8/2018 12:52 PM   Drainage Amount Large 10/8/2018 12:52 PM   Drainage Description Serosanguinous; Tan 10/8/2018 12:52 PM   Odor None 10/8/2018 12:52 PM   Exposed structure Bone 8/20/2018  1:03 PM   Kaya-wound Assessment White;Pink;Maceration; Other (Comment) 10/8/2018 12:52 PM   Debridement per physician None 9/24/2018  2:08 PM   Time out Yes 10/8/2018  1:22 PM   Procedural Pain 1 7/9/2018  1:47 PM   Post procedural Pain 0 7/9/2018  1:47 PM   Number of days: 154       Incision 11/17/13 Foot Left (Active)   Number of days: 1786       Incision 04/10/18 Foot Left (Active)   Number of days: 181       Incision 08/16/18 Foot Left (Active)   Number of days: 53       Incision 09/20/18 Foot Left (Active)   Wound Assessment CYDNEY 9/20/2018  8:45 AM   Drainage Amount None 9/20/2018  8:45 AM   Dressing/Treatment Ace Wrap 9/20/2018  8:45 AM   Dressing Status Clean;Dry; Intact 9/20/2018  8:45 AM   Number of days: 18     Percent of Wound/Ulcer Debrided: 100%    Total Surface Area Debrided:  40 sq cm     Estimated Blood Loss:  Minimal  Hemostasis Achieved:  by pressure    Procedural Pain:  0  / 10   Post Procedural Pain:  0 / 10     Response to treatment:  Well tolerated by patient.      Plan:

## 2018-10-09 ENCOUNTER — HOSPITAL ENCOUNTER (OUTPATIENT)
Dept: HYPERBARIC MEDICINE | Age: 63
Setting detail: THERAPIES SERIES
Discharge: HOME | End: 2018-10-09
Payer: COMMERCIAL

## 2018-10-09 ENCOUNTER — HOSPITAL ENCOUNTER (OUTPATIENT)
Age: 63
Discharge: HOME | End: 2018-10-11
Payer: COMMERCIAL

## 2018-10-09 VITALS
TEMPERATURE: 98.2 F | HEART RATE: 103 BPM | RESPIRATION RATE: 20 BRPM | DIASTOLIC BLOOD PRESSURE: 90 MMHG | SYSTOLIC BLOOD PRESSURE: 153 MMHG

## 2018-10-09 DIAGNOSIS — L97.426 DIABETIC ULCER OF LEFT HEEL ASSOCIATED WITH TYPE 2 DIABETES MELLITUS, WITH BONE INVOLVEMENT WITHOUT EVIDENCE OF NECROSIS (HCC): ICD-10-CM

## 2018-10-09 DIAGNOSIS — E11.621 DIABETIC ULCER OF LEFT HEEL ASSOCIATED WITH TYPE 2 DIABETES MELLITUS, WITH BONE INVOLVEMENT WITHOUT EVIDENCE OF NECROSIS (HCC): ICD-10-CM

## 2018-10-09 LAB
C-REACTIVE PROTEIN: 2.3 MG/DL (ref 0–0.4)
METER GLUCOSE: 142 MG/DL (ref 70–110)
METER GLUCOSE: 174 MG/DL (ref 70–110)

## 2018-10-09 PROCEDURE — 82962 GLUCOSE BLOOD TEST: CPT

## 2018-10-09 PROCEDURE — 86140 C-REACTIVE PROTEIN: CPT

## 2018-10-09 PROCEDURE — G0277 HBOT, FULL BODY CHAMBER, 30M: HCPCS

## 2018-10-09 PROCEDURE — 85651 RBC SED RATE NONAUTOMATED: CPT

## 2018-10-09 PROCEDURE — 85025 COMPLETE CBC W/AUTO DIFF WBC: CPT

## 2018-10-09 PROCEDURE — 82550 ASSAY OF CK (CPK): CPT

## 2018-10-09 PROCEDURE — 80076 HEPATIC FUNCTION PANEL: CPT

## 2018-10-09 PROCEDURE — 99183 HYPERBARIC OXYGEN THERAPY: CPT | Performed by: SURGERY

## 2018-10-09 PROCEDURE — 82565 ASSAY OF CREATININE: CPT

## 2018-10-09 PROCEDURE — 84520 ASSAY OF UREA NITROGEN: CPT

## 2018-10-10 ENCOUNTER — HOSPITAL ENCOUNTER (OUTPATIENT)
Dept: HYPERBARIC MEDICINE | Age: 63
Setting detail: THERAPIES SERIES
Discharge: HOME | End: 2018-10-10
Payer: COMMERCIAL

## 2018-10-10 VITALS
RESPIRATION RATE: 20 BRPM | DIASTOLIC BLOOD PRESSURE: 94 MMHG | TEMPERATURE: 97.9 F | SYSTOLIC BLOOD PRESSURE: 120 MMHG | HEART RATE: 72 BPM

## 2018-10-10 DIAGNOSIS — L97.426 DIABETIC ULCER OF LEFT HEEL ASSOCIATED WITH TYPE 2 DIABETES MELLITUS, WITH BONE INVOLVEMENT WITHOUT EVIDENCE OF NECROSIS (HCC): ICD-10-CM

## 2018-10-10 DIAGNOSIS — E11.621 DIABETIC ULCER OF LEFT HEEL ASSOCIATED WITH TYPE 2 DIABETES MELLITUS, WITH BONE INVOLVEMENT WITHOUT EVIDENCE OF NECROSIS (HCC): ICD-10-CM

## 2018-10-10 LAB
ALBUMIN SERPL-MCNC: 3.7 G/DL (ref 3.5–5.2)
ALP BLD-CCNC: 80 U/L (ref 35–104)
ALT SERPL-CCNC: 11 U/L (ref 0–32)
AST SERPL-CCNC: 14 U/L (ref 0–31)
BASOPHILS ABSOLUTE: 0.06 E9/L (ref 0–0.2)
BASOPHILS RELATIVE PERCENT: 0.5 % (ref 0–2)
BILIRUB SERPL-MCNC: 0.3 MG/DL (ref 0–1.2)
BILIRUBIN DIRECT: <0.2 MG/DL (ref 0–0.3)
BILIRUBIN, INDIRECT: NORMAL MG/DL (ref 0–1)
BUN BLDV-MCNC: 19 MG/DL (ref 8–23)
CREAT SERPL-MCNC: 1 MG/DL (ref 0.5–1)
EOSINOPHILS ABSOLUTE: 0.43 E9/L (ref 0.05–0.5)
EOSINOPHILS RELATIVE PERCENT: 3.5 % (ref 0–6)
GFR AFRICAN AMERICAN: >60
GFR NON-AFRICAN AMERICAN: 56 ML/MIN/1.73
HCT VFR BLD CALC: 33 % (ref 34–48)
HEMOGLOBIN: 10.4 G/DL (ref 11.5–15.5)
IMMATURE GRANULOCYTES #: 0.05 E9/L
IMMATURE GRANULOCYTES %: 0.4 % (ref 0–5)
LYMPHOCYTES ABSOLUTE: 2.62 E9/L (ref 1.5–4)
LYMPHOCYTES RELATIVE PERCENT: 21.6 % (ref 20–42)
MCH RBC QN AUTO: 31.7 PG (ref 26–35)
MCHC RBC AUTO-ENTMCNC: 31.5 % (ref 32–34.5)
MCV RBC AUTO: 100.6 FL (ref 80–99.9)
METER GLUCOSE: 149 MG/DL (ref 70–110)
METER GLUCOSE: 172 MG/DL (ref 70–110)
MONOCYTES ABSOLUTE: 0.87 E9/L (ref 0.1–0.95)
MONOCYTES RELATIVE PERCENT: 7.2 % (ref 2–12)
NEUTROPHILS ABSOLUTE: 8.12 E9/L (ref 1.8–7.3)
NEUTROPHILS RELATIVE PERCENT: 66.8 % (ref 43–80)
PDW BLD-RTO: 13.4 FL (ref 11.5–15)
PLATELET # BLD: 252 E9/L (ref 130–450)
PMV BLD AUTO: 10.4 FL (ref 7–12)
RBC # BLD: 3.28 E12/L (ref 3.5–5.5)
SEDIMENTATION RATE, ERYTHROCYTE: 77 MM/HR (ref 0–20)
TOTAL CK: 45 U/L (ref 20–180)
TOTAL PROTEIN: 7.3 G/DL (ref 6.4–8.3)
WBC # BLD: 12.2 E9/L (ref 4.5–11.5)

## 2018-10-10 PROCEDURE — 99183 HYPERBARIC OXYGEN THERAPY: CPT | Performed by: SURGERY

## 2018-10-10 PROCEDURE — 82962 GLUCOSE BLOOD TEST: CPT

## 2018-10-11 ENCOUNTER — HOSPITAL ENCOUNTER (OUTPATIENT)
Dept: MRI IMAGING | Age: 63
Discharge: HOME | End: 2018-10-13
Payer: COMMERCIAL

## 2018-10-11 ENCOUNTER — HOSPITAL ENCOUNTER (OUTPATIENT)
Dept: HYPERBARIC MEDICINE | Age: 63
Setting detail: THERAPIES SERIES
Discharge: HOME | End: 2018-10-11
Payer: COMMERCIAL

## 2018-10-11 VITALS
SYSTOLIC BLOOD PRESSURE: 170 MMHG | DIASTOLIC BLOOD PRESSURE: 93 MMHG | RESPIRATION RATE: 20 BRPM | HEART RATE: 88 BPM | TEMPERATURE: 98.3 F

## 2018-10-11 DIAGNOSIS — L97.426 DIABETIC ULCER OF LEFT HEEL ASSOCIATED WITH TYPE 2 DIABETES MELLITUS, WITH BONE INVOLVEMENT WITHOUT EVIDENCE OF NECROSIS (HCC): ICD-10-CM

## 2018-10-11 DIAGNOSIS — E11.621 DIABETIC ULCER OF LEFT HEEL ASSOCIATED WITH TYPE 2 DIABETES MELLITUS, WITH BONE INVOLVEMENT WITHOUT EVIDENCE OF NECROSIS (HCC): ICD-10-CM

## 2018-10-11 LAB
METER GLUCOSE: 159 MG/DL (ref 70–110)
METER GLUCOSE: 174 MG/DL (ref 70–110)

## 2018-10-11 PROCEDURE — 82962 GLUCOSE BLOOD TEST: CPT

## 2018-10-11 PROCEDURE — 99183 HYPERBARIC OXYGEN THERAPY: CPT | Performed by: SURGERY

## 2018-10-11 PROCEDURE — G0277 HBOT, FULL BODY CHAMBER, 30M: HCPCS

## 2018-10-11 PROCEDURE — 73721 MRI JNT OF LWR EXTRE W/O DYE: CPT

## 2018-10-12 ENCOUNTER — HOSPITAL ENCOUNTER (OUTPATIENT)
Dept: HYPERBARIC MEDICINE | Age: 63
Setting detail: THERAPIES SERIES
Discharge: HOME | End: 2018-10-12
Payer: COMMERCIAL

## 2018-10-12 VITALS
HEART RATE: 80 BPM | TEMPERATURE: 98.5 F | DIASTOLIC BLOOD PRESSURE: 84 MMHG | RESPIRATION RATE: 20 BRPM | SYSTOLIC BLOOD PRESSURE: 171 MMHG

## 2018-10-12 DIAGNOSIS — E11.621 DIABETIC ULCER OF LEFT HEEL ASSOCIATED WITH TYPE 2 DIABETES MELLITUS, WITH BONE INVOLVEMENT WITHOUT EVIDENCE OF NECROSIS (HCC): ICD-10-CM

## 2018-10-12 DIAGNOSIS — L97.426 DIABETIC ULCER OF LEFT HEEL ASSOCIATED WITH TYPE 2 DIABETES MELLITUS, WITH BONE INVOLVEMENT WITHOUT EVIDENCE OF NECROSIS (HCC): ICD-10-CM

## 2018-10-12 LAB
METER GLUCOSE: 148 MG/DL (ref 70–110)
METER GLUCOSE: 199 MG/DL (ref 70–110)

## 2018-10-12 PROCEDURE — 99183 HYPERBARIC OXYGEN THERAPY: CPT | Performed by: SURGERY

## 2018-10-12 PROCEDURE — G0277 HBOT, FULL BODY CHAMBER, 30M: HCPCS

## 2018-10-12 PROCEDURE — 82962 GLUCOSE BLOOD TEST: CPT

## 2018-10-12 NOTE — PROGRESS NOTES
HBO treatment as outlined in the treatment plan. Hyperbaric Oxygen: Alycia Romeronarcisa Hernandez tolerated Treatment Number: 68 well today without complications. Discharge Instructions were explained and given to Ms. Hernandez     Electronically signed by Zeinab Forbes MD on 10/12/2018 at 6:13 PM

## 2018-10-15 ENCOUNTER — HOSPITAL ENCOUNTER (OUTPATIENT)
Dept: HYPERBARIC MEDICINE | Age: 63
Setting detail: THERAPIES SERIES
Discharge: HOME | End: 2018-10-15
Payer: COMMERCIAL

## 2018-10-15 VITALS
TEMPERATURE: 98.6 F | RESPIRATION RATE: 20 BRPM | DIASTOLIC BLOOD PRESSURE: 86 MMHG | SYSTOLIC BLOOD PRESSURE: 160 MMHG | HEART RATE: 88 BPM

## 2018-10-15 DIAGNOSIS — L97.426 DIABETIC ULCER OF LEFT HEEL ASSOCIATED WITH TYPE 2 DIABETES MELLITUS, WITH BONE INVOLVEMENT WITHOUT EVIDENCE OF NECROSIS (HCC): ICD-10-CM

## 2018-10-15 DIAGNOSIS — E11.621 DIABETIC ULCER OF LEFT HEEL ASSOCIATED WITH TYPE 2 DIABETES MELLITUS, WITH BONE INVOLVEMENT WITHOUT EVIDENCE OF NECROSIS (HCC): ICD-10-CM

## 2018-10-15 LAB
METER GLUCOSE: 126 MG/DL (ref 70–110)
METER GLUCOSE: 140 MG/DL (ref 70–110)

## 2018-10-15 PROCEDURE — 82962 GLUCOSE BLOOD TEST: CPT

## 2018-10-15 PROCEDURE — 99183 HYPERBARIC OXYGEN THERAPY: CPT | Performed by: SURGERY

## 2018-10-15 PROCEDURE — G0277 HBOT, FULL BODY CHAMBER, 30M: HCPCS

## 2018-10-16 ENCOUNTER — HOSPITAL ENCOUNTER (OUTPATIENT)
Dept: HYPERBARIC MEDICINE | Age: 63
Setting detail: THERAPIES SERIES
Discharge: HOME | End: 2018-10-16
Payer: COMMERCIAL

## 2018-10-16 VITALS
SYSTOLIC BLOOD PRESSURE: 168 MMHG | TEMPERATURE: 98.3 F | HEART RATE: 84 BPM | RESPIRATION RATE: 20 BRPM | DIASTOLIC BLOOD PRESSURE: 80 MMHG

## 2018-10-16 DIAGNOSIS — E11.621 DIABETIC ULCER OF LEFT HEEL ASSOCIATED WITH TYPE 2 DIABETES MELLITUS, WITH BONE INVOLVEMENT WITHOUT EVIDENCE OF NECROSIS (HCC): ICD-10-CM

## 2018-10-16 DIAGNOSIS — L97.426 DIABETIC ULCER OF LEFT HEEL ASSOCIATED WITH TYPE 2 DIABETES MELLITUS, WITH BONE INVOLVEMENT WITHOUT EVIDENCE OF NECROSIS (HCC): ICD-10-CM

## 2018-10-16 LAB
METER GLUCOSE: 110 MG/DL (ref 70–110)
METER GLUCOSE: 158 MG/DL (ref 70–110)

## 2018-10-16 PROCEDURE — 82962 GLUCOSE BLOOD TEST: CPT

## 2018-10-16 PROCEDURE — 99183 HYPERBARIC OXYGEN THERAPY: CPT | Performed by: SURGERY

## 2018-10-16 PROCEDURE — G0277 HBOT, FULL BODY CHAMBER, 30M: HCPCS

## 2018-10-17 ENCOUNTER — HOSPITAL ENCOUNTER (OUTPATIENT)
Dept: HYPERBARIC MEDICINE | Age: 63
Setting detail: THERAPIES SERIES
Discharge: HOME | End: 2018-10-17
Payer: COMMERCIAL

## 2018-10-17 VITALS
DIASTOLIC BLOOD PRESSURE: 87 MMHG | TEMPERATURE: 98.1 F | RESPIRATION RATE: 20 BRPM | HEART RATE: 84 BPM | SYSTOLIC BLOOD PRESSURE: 163 MMHG

## 2018-10-17 DIAGNOSIS — L97.426 DIABETIC ULCER OF LEFT HEEL ASSOCIATED WITH TYPE 2 DIABETES MELLITUS, WITH BONE INVOLVEMENT WITHOUT EVIDENCE OF NECROSIS (HCC): ICD-10-CM

## 2018-10-17 DIAGNOSIS — E11.621 DIABETIC ULCER OF LEFT HEEL ASSOCIATED WITH TYPE 2 DIABETES MELLITUS, WITH BONE INVOLVEMENT WITHOUT EVIDENCE OF NECROSIS (HCC): ICD-10-CM

## 2018-10-17 LAB
METER GLUCOSE: 104 MG/DL (ref 70–110)
METER GLUCOSE: 108 MG/DL (ref 70–110)

## 2018-10-17 PROCEDURE — 82962 GLUCOSE BLOOD TEST: CPT

## 2018-10-17 PROCEDURE — 99183 HYPERBARIC OXYGEN THERAPY: CPT | Performed by: SURGERY

## 2018-10-17 PROCEDURE — G0277 HBOT, FULL BODY CHAMBER, 30M: HCPCS

## 2018-10-17 NOTE — PROGRESS NOTES
treatment as outlined in the treatment plan. Hyperbaric Oxygen: Jaylen Hernandez tolerated Treatment Number: 92 well today without complications. Discharge Instructions were explained and given to Ms. Hernandez     Electronically signed by Dora Hunter MD on 10/10/2018 at 9:36 PM

## 2018-10-18 ENCOUNTER — HOSPITAL ENCOUNTER (OUTPATIENT)
Dept: HYPERBARIC MEDICINE | Age: 63
Setting detail: THERAPIES SERIES
Discharge: HOME | End: 2018-10-18
Payer: COMMERCIAL

## 2018-10-18 ENCOUNTER — ANESTHESIA EVENT (OUTPATIENT)
Dept: OPERATING ROOM | Age: 63
End: 2018-10-18
Payer: COMMERCIAL

## 2018-10-18 VITALS
DIASTOLIC BLOOD PRESSURE: 78 MMHG | TEMPERATURE: 98.5 F | HEART RATE: 84 BPM | SYSTOLIC BLOOD PRESSURE: 147 MMHG | RESPIRATION RATE: 20 BRPM

## 2018-10-18 DIAGNOSIS — E11.621 DIABETIC ULCER OF LEFT HEEL ASSOCIATED WITH TYPE 2 DIABETES MELLITUS, WITH BONE INVOLVEMENT WITHOUT EVIDENCE OF NECROSIS (HCC): ICD-10-CM

## 2018-10-18 DIAGNOSIS — L97.426 DIABETIC ULCER OF LEFT HEEL ASSOCIATED WITH TYPE 2 DIABETES MELLITUS, WITH BONE INVOLVEMENT WITHOUT EVIDENCE OF NECROSIS (HCC): ICD-10-CM

## 2018-10-18 LAB
METER GLUCOSE: 107 MG/DL (ref 70–110)
METER GLUCOSE: 129 MG/DL (ref 70–110)

## 2018-10-18 PROCEDURE — G0277 HBOT, FULL BODY CHAMBER, 30M: HCPCS

## 2018-10-18 PROCEDURE — 99183 HYPERBARIC OXYGEN THERAPY: CPT | Performed by: SURGERY

## 2018-10-18 PROCEDURE — 82962 GLUCOSE BLOOD TEST: CPT

## 2018-10-19 ENCOUNTER — ANESTHESIA (OUTPATIENT)
Dept: OPERATING ROOM | Age: 63
End: 2018-10-19
Payer: COMMERCIAL

## 2018-10-19 ENCOUNTER — HOSPITAL ENCOUNTER (OUTPATIENT)
Age: 63
Setting detail: OUTPATIENT SURGERY
Discharge: HOME | End: 2018-10-19
Attending: PODIATRIST | Admitting: PODIATRIST
Payer: COMMERCIAL

## 2018-10-19 ENCOUNTER — APPOINTMENT (OUTPATIENT)
Dept: HYPERBARIC MEDICINE | Age: 63
End: 2018-10-19
Payer: COMMERCIAL

## 2018-10-19 ENCOUNTER — PREP FOR PROCEDURE (OUTPATIENT)
Dept: SURGERY | Age: 63
End: 2018-10-19

## 2018-10-19 VITALS
HEART RATE: 84 BPM | TEMPERATURE: 97.2 F | OXYGEN SATURATION: 98 % | WEIGHT: 165 LBS | RESPIRATION RATE: 16 BRPM | SYSTOLIC BLOOD PRESSURE: 147 MMHG | DIASTOLIC BLOOD PRESSURE: 79 MMHG | HEIGHT: 66 IN | BODY MASS INDEX: 26.52 KG/M2

## 2018-10-19 VITALS
DIASTOLIC BLOOD PRESSURE: 71 MMHG | RESPIRATION RATE: 19 BRPM | OXYGEN SATURATION: 100 % | SYSTOLIC BLOOD PRESSURE: 117 MMHG

## 2018-10-19 DIAGNOSIS — L97.422 NON-PRESSURE CHRONIC ULCER OF LEFT HEEL AND MIDFOOT WITH FAT LAYER EXPOSED (HCC): Primary | ICD-10-CM

## 2018-10-19 DIAGNOSIS — L97.402 DIABETIC ULCER OF HEEL ASSOCIATED WITH DIABETES MELLITUS DUE TO UNDERLYING CONDITION, WITH FAT LAYER EXPOSED, UNSPECIFIED LATERALITY (HCC): Chronic | ICD-10-CM

## 2018-10-19 DIAGNOSIS — E08.621 DIABETIC ULCER OF HEEL ASSOCIATED WITH DIABETES MELLITUS DUE TO UNDERLYING CONDITION, WITH FAT LAYER EXPOSED, UNSPECIFIED LATERALITY (HCC): Chronic | ICD-10-CM

## 2018-10-19 LAB
METER GLUCOSE: 126 MG/DL (ref 70–110)
METER GLUCOSE: 147 MG/DL (ref 70–110)

## 2018-10-19 PROCEDURE — 7100000010 HC PHASE II RECOVERY - FIRST 15 MIN: Performed by: PODIATRIST

## 2018-10-19 PROCEDURE — 3600000015 HC SURGERY LEVEL 5 ADDTL 15MIN: Performed by: PODIATRIST

## 2018-10-19 PROCEDURE — 3600000005 HC SURGERY LEVEL 5 BASE: Performed by: PODIATRIST

## 2018-10-19 PROCEDURE — 82962 GLUCOSE BLOOD TEST: CPT

## 2018-10-19 PROCEDURE — 2580000003 HC RX 258: Performed by: NURSE ANESTHETIST, CERTIFIED REGISTERED

## 2018-10-19 PROCEDURE — 6360000002 HC RX W HCPCS: Performed by: NURSE ANESTHETIST, CERTIFIED REGISTERED

## 2018-10-19 PROCEDURE — 3700000001 HC ADD 15 MINUTES (ANESTHESIA): Performed by: PODIATRIST

## 2018-10-19 PROCEDURE — 6360000002 HC RX W HCPCS: Performed by: PODIATRIST

## 2018-10-19 PROCEDURE — 88305 TISSUE EXAM BY PATHOLOGIST: CPT

## 2018-10-19 PROCEDURE — 3700000000 HC ANESTHESIA ATTENDED CARE: Performed by: PODIATRIST

## 2018-10-19 PROCEDURE — 7100000011 HC PHASE II RECOVERY - ADDTL 15 MIN: Performed by: PODIATRIST

## 2018-10-19 PROCEDURE — 2709999900 HC NON-CHARGEABLE SUPPLY: Performed by: PODIATRIST

## 2018-10-19 PROCEDURE — 88311 DECALCIFY TISSUE: CPT

## 2018-10-19 RX ORDER — SODIUM CHLORIDE 9 MG/ML
INJECTION, SOLUTION INTRAVENOUS CONTINUOUS PRN
Status: DISCONTINUED | OUTPATIENT
Start: 2018-10-19 | End: 2018-10-19 | Stop reason: SDUPTHER

## 2018-10-19 RX ORDER — SODIUM CHLORIDE 0.9 % (FLUSH) 0.9 %
10 SYRINGE (ML) INJECTION PRN
Status: CANCELLED | OUTPATIENT
Start: 2018-10-19

## 2018-10-19 RX ORDER — ONDANSETRON 2 MG/ML
4 INJECTION INTRAMUSCULAR; INTRAVENOUS EVERY 6 HOURS PRN
Status: DISCONTINUED | OUTPATIENT
Start: 2018-10-19 | End: 2018-10-19 | Stop reason: HOSPADM

## 2018-10-19 RX ORDER — SODIUM CHLORIDE 0.9 % (FLUSH) 0.9 %
10 SYRINGE (ML) INJECTION EVERY 12 HOURS SCHEDULED
Status: CANCELLED | OUTPATIENT
Start: 2018-10-19

## 2018-10-19 RX ORDER — ACETAMINOPHEN 325 MG/1
650 TABLET ORAL EVERY 4 HOURS PRN
Status: DISCONTINUED | OUTPATIENT
Start: 2018-10-19 | End: 2018-10-19 | Stop reason: HOSPADM

## 2018-10-19 RX ORDER — PROPOFOL 10 MG/ML
INJECTION, EMULSION INTRAVENOUS CONTINUOUS PRN
Status: DISCONTINUED | OUTPATIENT
Start: 2018-10-19 | End: 2018-10-19 | Stop reason: SDUPTHER

## 2018-10-19 RX ORDER — SODIUM CHLORIDE 0.9 % (FLUSH) 0.9 %
10 SYRINGE (ML) INJECTION PRN
Status: DISCONTINUED | OUTPATIENT
Start: 2018-10-19 | End: 2018-10-19 | Stop reason: HOSPADM

## 2018-10-19 RX ORDER — SODIUM CHLORIDE 0.9 % (FLUSH) 0.9 %
10 SYRINGE (ML) INJECTION EVERY 12 HOURS SCHEDULED
Status: DISCONTINUED | OUTPATIENT
Start: 2018-10-19 | End: 2018-10-19 | Stop reason: HOSPADM

## 2018-10-19 RX ORDER — ACETAMINOPHEN 325 MG/1
650 TABLET ORAL EVERY 4 HOURS PRN
Status: CANCELLED | OUTPATIENT
Start: 2018-10-19

## 2018-10-19 RX ORDER — MIDAZOLAM HYDROCHLORIDE 1 MG/ML
INJECTION INTRAMUSCULAR; INTRAVENOUS PRN
Status: DISCONTINUED | OUTPATIENT
Start: 2018-10-19 | End: 2018-10-19 | Stop reason: SDUPTHER

## 2018-10-19 RX ORDER — ONDANSETRON 2 MG/ML
4 INJECTION INTRAMUSCULAR; INTRAVENOUS EVERY 6 HOURS PRN
Status: CANCELLED | OUTPATIENT
Start: 2018-10-19

## 2018-10-19 RX ADMIN — SODIUM CHLORIDE: 9 INJECTION, SOLUTION INTRAVENOUS at 11:55

## 2018-10-19 RX ADMIN — Medication 2 G: at 12:02

## 2018-10-19 RX ADMIN — MIDAZOLAM HYDROCHLORIDE 1 MG: 1 INJECTION, SOLUTION INTRAMUSCULAR; INTRAVENOUS at 11:55

## 2018-10-19 RX ADMIN — PROPOFOL 50 MCG/KG/MIN: 10 INJECTION, EMULSION INTRAVENOUS at 12:02

## 2018-10-19 ASSESSMENT — PULMONARY FUNCTION TESTS
PIF_VALUE: 0

## 2018-10-19 ASSESSMENT — PAIN SCALES - GENERAL
PAINLEVEL_OUTOF10: 0

## 2018-10-19 ASSESSMENT — PAIN - FUNCTIONAL ASSESSMENT: PAIN_FUNCTIONAL_ASSESSMENT: 0-10

## 2018-10-19 NOTE — ANESTHESIA PRE PROCEDURE
this encounter. Current Outpatient Prescriptions   Medication Sig Dispense Refill    doxycycline hyclate (VIBRA-TABS) 100 MG tablet Take 1 tablet by mouth 2 times daily 60 tablet 0    DAPTOMYCIN IV Infuse 475 mg intravenously daily       cefTRIAXone sodium 2 g SOLR Infuse intravenously daily      gabapentin (NEURONTIN) 300 MG capsule Take 300 mg by mouth 2 times daily. Perla Adams glipiZIDE (GLUCOTROL XL) 10 MG extended release tablet Take 1 tablet by mouth daily      VELTASSA 8.4 g PACK Take 1 packet by mouth daily (before lunch)   1    vitamin C (ASCORBIC ACID) 500 MG tablet Take 500 mg by mouth daily       Lactobacillus Rhamnosus, GG, (CULTUREE IMMUNITY SUPPORT) CAPS Take 1 capsule by mouth daily      sodium bicarbonate 325 MG tablet Take 650 mg by mouth 2 times daily       Cholecalciferol (VITAMIN D3) 2000 UNITS CAPS Take 4,000 Units by mouth daily       acetaminophen 650 MG TABS Take 650 mg by mouth every 4 hours as needed (Fever >100.5 F (38 C)). 120 tablet     metformin (GLUCOPHAGE) 500 MG tablet Take 1,000 mg by mouth 2 times daily (with meals)       levothyroxine (SYNTHROID) 25 MCG tablet Take 50 mcg by mouth daily          Allergies:     Allergies   Allergen Reactions    Codeine Nausea And Vomiting       Problem List:    Patient Active Problem List   Diagnosis Code    Hypothyroidism E03.9    Diabetic foot ulcer ([address] E11.621, L97.509    Osteomyelitis of ankle and foot ([address] M86.9    Diabetes mellitus, type 2 ([address] E11.9    Staphylococcus aureus bacteremia R78.81    Hypokalemia E87.6    Abnormal EKG R94.31    Thyroid disease E07.9    Diabetes mellitus ([address] E11.9    Hyperlipemia E78.5    Cellulitis L03.90    Diabetic ulcer of left heel associated with type 2 diabetes mellitus, with bone involvement without evidence of necrosis ([address] E11.621, L97.426    Non-pressure chronic ulcer of left heel and midfoot with fat layer exposed ([address] L97.422    Diabetic polyneuropathy (HCC) E11.42 intraoperative/postoperative concerns discussed with care team.    Nicole Gannon MD  10/19/2018  9:27 AM

## 2018-10-22 ENCOUNTER — HOSPITAL ENCOUNTER (OUTPATIENT)
Dept: WOUND CARE | Age: 63
Discharge: HOME | End: 2018-10-22
Payer: COMMERCIAL

## 2018-10-22 ENCOUNTER — HOSPITAL ENCOUNTER (OUTPATIENT)
Dept: HYPERBARIC MEDICINE | Age: 63
Setting detail: THERAPIES SERIES
Discharge: HOME | End: 2018-10-22
Payer: COMMERCIAL

## 2018-10-22 VITALS
DIASTOLIC BLOOD PRESSURE: 76 MMHG | RESPIRATION RATE: 20 BRPM | SYSTOLIC BLOOD PRESSURE: 130 MMHG | HEART RATE: 76 BPM | TEMPERATURE: 97.7 F

## 2018-10-22 VITALS
RESPIRATION RATE: 20 BRPM | HEART RATE: 92 BPM | BODY MASS INDEX: 26.52 KG/M2 | HEIGHT: 66 IN | WEIGHT: 165 LBS | TEMPERATURE: 98.1 F | SYSTOLIC BLOOD PRESSURE: 140 MMHG | DIASTOLIC BLOOD PRESSURE: 70 MMHG

## 2018-10-22 DIAGNOSIS — L97.426 DIABETIC ULCER OF LEFT HEEL ASSOCIATED WITH TYPE 2 DIABETES MELLITUS, WITH BONE INVOLVEMENT WITHOUT EVIDENCE OF NECROSIS (HCC): ICD-10-CM

## 2018-10-22 DIAGNOSIS — E11.621 DIABETIC ULCER OF LEFT HEEL ASSOCIATED WITH TYPE 2 DIABETES MELLITUS, WITH BONE INVOLVEMENT WITHOUT EVIDENCE OF NECROSIS (HCC): ICD-10-CM

## 2018-10-22 LAB
METER GLUCOSE: 151 MG/DL (ref 70–110)
METER GLUCOSE: 178 MG/DL (ref 70–110)

## 2018-10-22 PROCEDURE — 99183 HYPERBARIC OXYGEN THERAPY: CPT | Performed by: SURGERY

## 2018-10-22 PROCEDURE — G0277 HBOT, FULL BODY CHAMBER, 30M: HCPCS

## 2018-10-22 PROCEDURE — 82962 GLUCOSE BLOOD TEST: CPT

## 2018-10-22 PROCEDURE — 99213 OFFICE O/P EST LOW 20 MIN: CPT

## 2018-10-22 NOTE — PROGRESS NOTES
[facility] at [facility] 78 OFFICE/OUTPT VISIT,PROCEDURE ONLY Left 10/19/2018    PARTIAL LEFT CALCANECTOMY performed by Jai Lovell DPM at [facility] OR    WISDOM TOOTH EXTRACTION       Family History   Problem Relation Age of Onset   [facility] Alzheimer's Disease Mother     Other Father         pacemaker    Other Maternal Grandfather         aneurysm     Social History   Substance Use Topics    Smoking status: Never Smoker    Smokeless tobacco: Never Used    Alcohol use No     Allergies   Allergen Reactions    Codeine Nausea And Vomiting     Current Outpatient Prescriptions on File Prior to Encounter   Medication Sig Dispense Refill    doxycycline hyclate (VIBRA-TABS) 100 MG tablet Take 1 tablet by mouth 2 times daily 60 tablet 0    DAPTOMYCIN IV Infuse 475 mg intravenously daily       cefTRIAXone sodium 2 g SOLR Infuse intravenously daily      gabapentin (NEURONTIN) 300 MG capsule Take 300 mg by mouth 2 times daily. [employer] glipiZIDE (GLUCOTROL XL) 10 MG extended release tablet Take 1 tablet by mouth daily      VELTASSA 8.4 g PACK Take 1 packet by mouth daily (before lunch)   1    vitamin C (ASCORBIC ACID) 500 MG tablet Take 500 mg by mouth daily       Lactobacillus Rhamnosus, GG, (CULTURELLE IMMUNITY SUPPORT) CAPS Take 1 capsule by mouth daily      sodium bicarbonate 325 MG tablet Take 650 mg by mouth 2 times daily       Cholecalciferol (VITAMIN D3) 2000 UNITS CAPS Take 4,000 Units by mouth daily       acetaminophen 650 MG TABS Take 650 mg by mouth every 4 hours as needed (Fever >100.5 F (38 C)). 120 tablet     metformin (GLUCOPHAGE) 500 MG tablet Take 1,000 mg by mouth 2 times daily (with meals)       levothyroxine (SYNTHROID) 25 MCG tablet Take 50 mcg by mouth daily        No current facility-administered medications on file prior to encounter.         REVIEW OF SYSTEMS See HPI    Objective:    BP (!) 140/70   Pulse 92   Temp 98.1 °F (36.7 °C) (Oral)   Resp 20   Ht 5' 6\" (1.676 m)   Wt 165 lb (74.8 kg) BMI 26.63 kg/m²   Wt Readings from Last 3 Encounters:   10/22/18 165 lb (74.8 kg)   10/19/18 165 lb (74.8 kg)   10/08/18 165 lb (74.8 kg)     PHYSICAL EXAM  CONSTITUTIONAL:   Awake, alert, cooperative   EYES:  lids and lashes normal   ENT: external ears and nose without lesions   NECK:  supple, symmetrical, trachea midline   SKIN:  Open wound Present    Assessment:     Problem List Items Addressed This Visit     Diabetic ulcer of left heel associated with type 2 diabetes mellitus, with bone involvement without evidence of necrosis ([facility] [address]        Procedure Note  Indications:  Based on my examination of this patient's wound(s)/ulcer(s) today, debridement is required to promote healing and evaluate the wound base. Performed by: Santa Acosta DPM    Consent obtained:  Yes    Time out taken:  Yes    Pain Control: Anesthetic  Anesthetic: None       Negative Pressure Wound Therapy Foot Left; Lower (Active)   Number of days: 1799       Negative Pressure Wound Therapy Heel Left (Active)   Number of days: 194       Wound 08/20/13 Heel Left (Active)   Number of days: 1888       Wound 11/16/13 Other (Comment) Ankle Left; Outer Red, swollen, shiny, hot area of outer L ankle (Active)   Number of days: 1801       Wound 04/09/18 Heel Left small round  (Active)   Number of days: 196       Wound 05/07/18 Other (Comment) Heel Plantar;Left #1 (acquired 4-6-18) Grade 3 (Active)   Wound Image   10/22/2018  1:21 PM   Wound Type Wound 5/7/2018  2:09 PM   Wound Other 10/22/2018  1:21 PM   Dressing Status Clean;Dry; Intact 10/8/2018  1:41 PM   Dressing Changed Changed/New 10/8/2018  1:41 PM   Dressing/Treatment ABD; Alginate;Barrier Film 10/8/2018  1:41 PM   Wound Cleansed Rinsed/Irrigated with saline 10/8/2018  1:41 PM   Wound Length (cm) 13.5 cm 10/22/2018  1:21 PM   Wound Width (cm) 0.2 cm 10/22/2018  1:21 PM   Wound Depth (cm)  3.7 10/22/2018  1:21 PM   Calculated Wound Size (cm^2) (l*w) 2.7 cm^2 10/22/2018  1:21 PM   Change in Wound Size

## 2018-10-22 NOTE — PROGRESS NOTES
[facility]  Hyperbaric Oxygen Therapy   Progress Note    NAME: Courtney Medeiros  MEDICAL RECORD NUMBER:  [id number]  AGE: 70 y.o. GENDER: female  : 1955  EPISODE DATE:  10/22/2018   Subjective   HBO Treatment Number: 99 out of Total Treatments: 100  HBO Diagnosis:   Ivana Avitia 3  Indications: Lower Extremity Diabetic Wound ___(site) (Left Foot)  Safety checks performed prior to treatment. See doc flowsheets for documentation. Objective         Recent Labs      10/22/18   0925  10/22/18   1157   GLUMET  178*  151*     Pre treatment Vital Signs       Temp: 98.2 °F (36.8 °C)     Pulse: 96     Resp: 20     BP: (!) 152/80     Post treatment Vital Signs  Temp: 97.7 °F (36.5 °C)  Pulse: 76  Resp: 20  BP: 130/76  Assessment      Physical Exam:  General Appearance:  alert and oriented to person, place and time, well-developed and well-nourished, in no acute distress  ENT:  tympanic membranes intact bilaterally  Pulmonary/Chest:  clear to auscultation bilaterally- no wheezes, rales or rhonchi, normal air movement, no respiratory distress  Cardiovascular:  regular rate and rhythm  Chamber #: [id number]  Treatment Start Time: 1003     Pressure Reached Time: 1013  RANDY Rate: 2  Number of Air Breaks:  Treatment Status: No Air break     Decompression Time: 1144   Treatment End Time: 1155  Length of Treatment: 90 Minutes  Symptoms Noted During Treatment: None    Adverse Event: no    I was present on these premises and immediately available to furnish assistance & direction throughout the procedure. Plan      Courtney Medeiros is a 70 y.o. female  did successfully complete today's hyperbaric oxygen treatment at [address] [place] [address]. In my clinical judgement, ongoing HBO therapy is  necessary at this time, given a threat to patient function, limb or life from the current condition. Supervision and attendance of Hyperbaric Oxygen Therapy provided.   Continue HBO treatment as outlined in the treatment plan. Hyperbaric Oxygen: Antwan Drop ROGERIO Hernandez tolerated Treatment Number: 70 well today without complications. Discharge Instructions were explained and given to MsYury  Mary     Electronically signed by Stas Lafleur MD on 10/22/2018 at 1:54 PM

## 2018-10-23 ENCOUNTER — HOSPITAL ENCOUNTER (OUTPATIENT)
Dept: HYPERBARIC MEDICINE | Age: 63
Setting detail: THERAPIES SERIES
Discharge: HOME | End: 2018-10-23
Payer: COMMERCIAL

## 2018-10-23 VITALS
SYSTOLIC BLOOD PRESSURE: 139 MMHG | RESPIRATION RATE: 20 BRPM | HEART RATE: 72 BPM | DIASTOLIC BLOOD PRESSURE: 70 MMHG | TEMPERATURE: 98.5 F

## 2018-10-23 DIAGNOSIS — L97.426 DIABETIC ULCER OF LEFT HEEL ASSOCIATED WITH TYPE 2 DIABETES MELLITUS, WITH BONE INVOLVEMENT WITHOUT EVIDENCE OF NECROSIS (HCC): ICD-10-CM

## 2018-10-23 DIAGNOSIS — E11.621 DIABETIC ULCER OF LEFT HEEL ASSOCIATED WITH TYPE 2 DIABETES MELLITUS, WITH BONE INVOLVEMENT WITHOUT EVIDENCE OF NECROSIS (HCC): ICD-10-CM

## 2018-10-23 LAB
METER GLUCOSE: 158 MG/DL (ref 70–110)
METER GLUCOSE: 169 MG/DL (ref 70–110)

## 2018-10-23 PROCEDURE — G0277 HBOT, FULL BODY CHAMBER, 30M: HCPCS

## 2018-10-23 PROCEDURE — 82962 GLUCOSE BLOOD TEST: CPT

## 2018-10-23 PROCEDURE — 99183 HYPERBARIC OXYGEN THERAPY: CPT | Performed by: SURGERY

## 2018-10-24 ENCOUNTER — HOSPITAL ENCOUNTER (OUTPATIENT)
Dept: HYPERBARIC MEDICINE | Age: 63
Setting detail: THERAPIES SERIES
Discharge: HOME | End: 2018-10-24
Payer: COMMERCIAL

## 2018-10-24 VITALS
TEMPERATURE: 97.4 F | HEART RATE: 80 BPM | SYSTOLIC BLOOD PRESSURE: 150 MMHG | RESPIRATION RATE: 20 BRPM | DIASTOLIC BLOOD PRESSURE: 87 MMHG

## 2018-10-24 DIAGNOSIS — L97.424 DIABETIC ULCER OF LEFT HEEL ASSOCIATED WITH TYPE 2 DIABETES MELLITUS, WITH NECROSIS OF BONE (HCC): ICD-10-CM

## 2018-10-24 DIAGNOSIS — E11.621 DIABETIC ULCER OF LEFT HEEL ASSOCIATED WITH TYPE 2 DIABETES MELLITUS, WITH NECROSIS OF BONE (HCC): ICD-10-CM

## 2018-10-24 LAB
METER GLUCOSE: 128 MG/DL (ref 70–110)
METER GLUCOSE: 160 MG/DL (ref 70–110)

## 2018-10-24 PROCEDURE — G0277 HBOT, FULL BODY CHAMBER, 30M: HCPCS

## 2018-10-24 PROCEDURE — 99183 HYPERBARIC OXYGEN THERAPY: CPT | Performed by: SURGERY

## 2018-10-24 PROCEDURE — 82962 GLUCOSE BLOOD TEST: CPT

## 2018-10-25 PROBLEM — L97.424 DIABETIC ULCER OF LEFT HEEL ASSOCIATED WITH TYPE 2 DIABETES MELLITUS, WITH NECROSIS OF BONE (HCC): Status: ACTIVE | Noted: 2018-05-09

## 2018-10-29 ENCOUNTER — HOSPITAL ENCOUNTER (OUTPATIENT)
Dept: WOUND CARE | Age: 63
Discharge: HOME | End: 2018-10-29
Payer: COMMERCIAL

## 2018-10-29 VITALS
DIASTOLIC BLOOD PRESSURE: 62 MMHG | HEART RATE: 92 BPM | SYSTOLIC BLOOD PRESSURE: 126 MMHG | TEMPERATURE: 98.1 F | HEIGHT: 66 IN | WEIGHT: 165 LBS | RESPIRATION RATE: 16 BRPM | BODY MASS INDEX: 26.52 KG/M2

## 2018-10-29 DIAGNOSIS — E11.621 DIABETIC ULCER OF LEFT HEEL ASSOCIATED WITH TYPE 2 DIABETES MELLITUS, WITH NECROSIS OF BONE (HCC): ICD-10-CM

## 2018-10-29 DIAGNOSIS — L97.424 DIABETIC ULCER OF LEFT HEEL ASSOCIATED WITH TYPE 2 DIABETES MELLITUS, WITH NECROSIS OF BONE (HCC): ICD-10-CM

## 2018-10-29 PROCEDURE — G0463 HOSPITAL OUTPT CLINIC VISIT: HCPCS

## 2018-10-29 PROCEDURE — 99212 OFFICE O/P EST SF 10 MIN: CPT

## 2018-10-29 NOTE — PROGRESS NOTES
PM   Wound Width (cm) 0.5 cm 10/29/2018  1:07 PM   Wound Depth (cm)  2.3 10/29/2018  1:07 PM   Calculated Wound Size (cm^2) (l*w) 6.25 cm^2 10/29/2018  1:07 PM   Change in Wound Size % (l*w) 18.3 10/29/2018  1:07 PM   Distance Tunneling (cm) 2.8 cm 9/17/2018  1:15 PM   Tunneling Position ___ O'Clock 1 9/17/2018  1:15 PM   Undermining Starts ___ O'Clock 6 8/13/2018  1:18 PM   Undermining Ends___ O'Clock 8 8/13/2018  1:18 PM   Undermining Maxium Distance (cm) 2.5 8/13/2018  1:18 PM   Wound Assessment CYDNEY 10/29/2018  1:07 PM   Drainage Amount Copious 10/29/2018  1:07 PM   Drainage Description Green;Serosanguinous; Serous 10/29/2018  1:07 PM   Odor None 10/29/2018  1:07 PM   Exposed structure Bone 8/20/2018  1:03 PM   Kaya-wound Assessment Maceration; White;Pink 10/29/2018  1:07 PM   Debridement per physician None 10/29/2018  1:39 PM   Time out Yes 10/29/2018  1:39 PM   Procedural Pain 1 7/9/2018  1:47 PM   Post procedural Pain 0 7/9/2018  1:47 PM   Number of days: 175       Incision 11/17/13 Foot Left (Active)   Number of days: 1807       Incision 04/10/18 Foot Left (Active)   Number of days: 202       Incision 08/16/18 Foot Left (Active)   Number of days: 74       Incision 09/20/18 Foot Left (Active)   Number of days: 39       Incision 10/19/18 Heel Left (Active)   Drainage Amount None 10/19/2018  2:16 PM   Dressing/Treatment Ace Wrap 10/19/2018  2:16 PM   Dressing Status Clean;Dry; Intact 10/19/2018  2:16 PM   Number of days: 10     Percent of Wound/Ulcer Debrided: 0%    Total Surface Area Debrided:  0 sq cm     Estimated Blood Loss:  None  Hemostasis Achieved:  not needed    Procedural Pain:  0  / 10   Post Procedural Pain:  0 / 10     Response to treatment:  Well tolerated by patient. Plan:   Treatment Note please see attached Discharge Instructions  Pathology reports sent to hospitals in hope of getting further treatments due to the chronic refractory osteomyelitis.   Written patient dismissal instructions given to patient and signed by patient or POA. Discharge Instructions       Visit Discharge/Physician Orders     Discharge condition: Stable     Assessment of pain at discharge:no     Anesthetic used: 2% lidocaine gel     Discharge to: Home     Left via:Private automobile     Accompanied by:  spouse     ECF/HHA: [facility]-     Dressing Orders: LEFT HEEL ULCER-Cleanse with vashe when available, pack with alginate  and dry dressing every other day and as needed   Treatment Orders:Eat a diet high in protein and vitamin C. Take a multiple vitamin daily unless contraindicated.     Dr. Christoph Morataya- 10/25/18     Keep all pressure off of heel     Keep heel clean and dry     [facility] followup visit : 1 WEEK_________________  (Please note your next appointment above and if you are unable to keep, kindly give a 24 hour notice. Thank you.)     Physician signature:__________________________        If you experience any of the following, please call the [facility] during business hours:     * Increase in Pain  * Temperature over 101  * Increase in drainage from your wound  * Drainage with a foul odor  * Bleeding  * Increase in swelling  * Need for compression bandage changes due to slippage, breakthrough drainage.     If you need medical attention outside of the business hours of the [facility] please contact your PCP or go to the nearest emergency room.         Electronically signed by Justino Foster DPM on 10/29/2018 at 2:27 PM

## 2018-11-05 ENCOUNTER — HOSPITAL ENCOUNTER (OUTPATIENT)
Dept: HYPERBARIC MEDICINE | Age: 63
Setting detail: THERAPIES SERIES
Discharge: HOME | End: 2018-11-05
Payer: COMMERCIAL

## 2018-11-05 ENCOUNTER — HOSPITAL ENCOUNTER (OUTPATIENT)
Dept: WOUND CARE | Age: 63
Discharge: HOME | End: 2018-11-05
Payer: COMMERCIAL

## 2018-11-05 VITALS
RESPIRATION RATE: 20 BRPM | DIASTOLIC BLOOD PRESSURE: 84 MMHG | SYSTOLIC BLOOD PRESSURE: 143 MMHG | HEART RATE: 80 BPM | TEMPERATURE: 97.8 F

## 2018-11-05 VITALS
HEIGHT: 66 IN | DIASTOLIC BLOOD PRESSURE: 80 MMHG | RESPIRATION RATE: 20 BRPM | WEIGHT: 165 LBS | HEART RATE: 100 BPM | BODY MASS INDEX: 26.52 KG/M2 | SYSTOLIC BLOOD PRESSURE: 152 MMHG | TEMPERATURE: 98.3 F

## 2018-11-05 DIAGNOSIS — L97.424 DIABETIC ULCER OF LEFT HEEL ASSOCIATED WITH TYPE 2 DIABETES MELLITUS, WITH NECROSIS OF BONE (HCC): ICD-10-CM

## 2018-11-05 DIAGNOSIS — E11.621 DIABETIC ULCER OF LEFT HEEL ASSOCIATED WITH TYPE 2 DIABETES MELLITUS, WITH NECROSIS OF BONE (HCC): ICD-10-CM

## 2018-11-05 LAB
METER GLUCOSE: 115 MG/DL (ref 74–99)
METER GLUCOSE: 118 MG/DL (ref 74–99)

## 2018-11-05 PROCEDURE — 82962 GLUCOSE BLOOD TEST: CPT

## 2018-11-05 PROCEDURE — G0277 HBOT, FULL BODY CHAMBER, 30M: HCPCS

## 2018-11-05 PROCEDURE — 11042 DBRDMT SUBQ TIS 1ST 20SQCM/<: CPT

## 2018-11-05 PROCEDURE — 99183 HYPERBARIC OXYGEN THERAPY: CPT | Performed by: SURGERY

## 2018-11-05 RX ORDER — DOXYCYCLINE HYCLATE 100 MG
100 TABLET ORAL 2 TIMES DAILY
COMMUNITY
End: 2019-02-04 | Stop reason: ALTCHOICE

## 2018-11-05 NOTE — PROGRESS NOTES
FOOT INFEC,1 BURSA Left 4/10/2018    LEFT HEEL DEBRIDEMENT, BONE BIOPSY performed by Nehemiah Becker DPM at [place] [address] OFFICE/OUTPT VISIT,PROCEDURE ONLY Left 10/19/2018    PARTIAL LEFT CALCANECTOMY performed by Dell Taylor DPM at [facility] OR    WISDOM TOOTH EXTRACTION       Family History   Problem Relation Age of Onset   [facility] Alzheimer's Disease Mother     Other Father         pacemaker    Other Maternal Grandfather         aneurysm     Social History   Substance Use Topics    Smoking status: Never Smoker    Smokeless tobacco: Never Used    Alcohol use No     Allergies   Allergen Reactions    Codeine Nausea And Vomiting     Current Outpatient Prescriptions on File Prior to Encounter   Medication Sig Dispense Refill    DAPTOMYCIN IV Infuse 475 mg intravenously daily       cefTRIAXone sodium 2 g SOLR Infuse intravenously daily      gabapentin (NEURONTIN) 300 MG capsule Take 300 mg by mouth 2 times daily. [employer] glipiZIDE (GLUCOTROL XL) 10 MG extended release tablet Take 1 tablet by mouth daily      VELTASSA 8.4 g PACK Take 1 packet by mouth daily (before lunch)   1    vitamin C (ASCORBIC ACID) 500 MG tablet Take 500 mg by mouth daily       Lactobacillus Rhamnosus, GG, (CULTURELLE IMMUNITY SUPPORT) CAPS Take 1 capsule by mouth daily      sodium bicarbonate 325 MG tablet Take 650 mg by mouth 2 times daily       Cholecalciferol (VITAMIN D3) 2000 UNITS CAPS Take 4,000 Units by mouth daily       acetaminophen 650 MG TABS Take 650 mg by mouth every 4 hours as needed (Fever >100.5 F (38 C)). 120 tablet     metformin (GLUCOPHAGE) 500 MG tablet Take 1,000 mg by mouth 2 times daily (with meals)       levothyroxine (SYNTHROID) 25 MCG tablet Take 50 mcg by mouth daily        No current facility-administered medications on file prior to encounter.         REVIEW OF SYSTEMS See HPI    Objective:    BP (!) 152/80   Pulse 100   Temp 98.3 °F (36.8 °C) (Oral)   Resp 20   Ht 5' 6\" (1.676 m)   Wt 165 lb (74.8 kg)

## 2018-11-06 ENCOUNTER — HOSPITAL ENCOUNTER (OUTPATIENT)
Dept: HYPERBARIC MEDICINE | Age: 63
Setting detail: THERAPIES SERIES
Discharge: HOME | End: 2018-11-06
Payer: COMMERCIAL

## 2018-11-06 VITALS
RESPIRATION RATE: 20 BRPM | DIASTOLIC BLOOD PRESSURE: 78 MMHG | HEART RATE: 84 BPM | SYSTOLIC BLOOD PRESSURE: 156 MMHG | TEMPERATURE: 98.9 F

## 2018-11-06 DIAGNOSIS — L97.424 DIABETIC ULCER OF LEFT HEEL ASSOCIATED WITH TYPE 2 DIABETES MELLITUS, WITH NECROSIS OF BONE (HCC): ICD-10-CM

## 2018-11-06 DIAGNOSIS — E11.621 DIABETIC ULCER OF LEFT HEEL ASSOCIATED WITH TYPE 2 DIABETES MELLITUS, WITH NECROSIS OF BONE (HCC): ICD-10-CM

## 2018-11-06 LAB
METER GLUCOSE: 128 MG/DL (ref 74–99)
METER GLUCOSE: 191 MG/DL (ref 74–99)

## 2018-11-06 PROCEDURE — G0277 HBOT, FULL BODY CHAMBER, 30M: HCPCS

## 2018-11-06 PROCEDURE — 82962 GLUCOSE BLOOD TEST: CPT

## 2018-11-06 PROCEDURE — 99183 HYPERBARIC OXYGEN THERAPY: CPT | Performed by: SURGERY

## 2018-11-07 ENCOUNTER — HOSPITAL ENCOUNTER (OUTPATIENT)
Dept: HYPERBARIC MEDICINE | Age: 63
Setting detail: THERAPIES SERIES
Discharge: HOME | End: 2018-11-07
Payer: COMMERCIAL

## 2018-11-07 VITALS
HEART RATE: 76 BPM | TEMPERATURE: 98.7 F | DIASTOLIC BLOOD PRESSURE: 67 MMHG | SYSTOLIC BLOOD PRESSURE: 163 MMHG | RESPIRATION RATE: 20 BRPM

## 2018-11-07 DIAGNOSIS — E11.621 DIABETIC ULCER OF LEFT HEEL ASSOCIATED WITH TYPE 2 DIABETES MELLITUS, WITH NECROSIS OF BONE (HCC): ICD-10-CM

## 2018-11-07 DIAGNOSIS — L97.424 DIABETIC ULCER OF LEFT HEEL ASSOCIATED WITH TYPE 2 DIABETES MELLITUS, WITH NECROSIS OF BONE (HCC): ICD-10-CM

## 2018-11-07 LAB
METER GLUCOSE: 133 MG/DL (ref 74–99)
METER GLUCOSE: 215 MG/DL (ref 74–99)

## 2018-11-07 PROCEDURE — 82962 GLUCOSE BLOOD TEST: CPT

## 2018-11-07 PROCEDURE — G0277 HBOT, FULL BODY CHAMBER, 30M: HCPCS

## 2018-11-07 PROCEDURE — 99183 HYPERBARIC OXYGEN THERAPY: CPT | Performed by: NURSE PRACTITIONER

## 2018-11-07 NOTE — PROGRESS NOTES
[facility]  Hyperbaric Oxygen Therapy   Progress Note    NAME: George Sams  MEDICAL RECORD NUMBER:  [id number]  AGE: 70 y.o. GENDER: female  : 1955  EPISODE DATE:  2018   Subjective   HBO Treatment Number: 104 out of Total Treatments: 120  HBO Diagnosis:   Amador Tab: Amador Barth 3  Indications:  (non healing diabetic wound of the left foot)  Safety checks performed prior to treatment. See doc flowsheets for documentation. Objective         Recent Labs      18   0920  18   1215   GLUMET  215*  133*     Pre treatment Vital Signs       Temp: 98.7 °F (37.1 °C)     Pulse: 76     Resp: 20     BP: (!) 163/67     Post treatment Vital Signs  Temp: 98.7 °F (37.1 °C)  Pulse: 76  Resp: 20  BP: (!) 163/67  Assessment      Physical Exam:  General Appearance:  alert and oriented to person, place and time, well-developed and well-nourished, in no acute distress  ENT:  tympanic membranes intact bilaterally  Pulmonary/Chest:  clear to auscultation bilaterally- no wheezes, rales or rhonchi, normal air movement, no respiratory distress  Cardiovascular:  normal  Chamber #: [id number]  Treatment Start Time: 1020     Pressure Reached Time: 1032  RANDY Rate: 2  Number of Air Breaks:  Treatment Status: No Air break     Decompression Time: 1203   Treatment End Time: 0407  Length of Treatment: 90 Minutes  Symptoms Noted During Treatment: None    Adverse Event: no    I was present on these premises and immediately available to furnish assistance & direction throughout the procedure. Plan      George Sams is a 70 y.o. female  did successfully complete today's hyperbaric oxygen treatment at [address] [place] [address]. In my clinical judgement, ongoing HBO therapy is  necessary at this time, given a threat to patient function, limb or life from the current condition. Supervision and attendance of Hyperbaric Oxygen Therapy provided.   Continue HBO treatment as

## 2018-11-07 NOTE — PROGRESS NOTES
[facility]  Hyperbaric Oxygen Therapy   Progress Note    NAME: Princess Ortiz  MEDICAL RECORD NUMBER:  [id number]  AGE: 70 y.o. GENDER: female  : 1955  EPISODE DATE:  2018   Subjective   HBO Treatment Number: 102 out of Total Treatments: 120  HBO Diagnosis:   Isabela Ochoa Roof 3  Indications: Lower Extremity Diabetic Wound ___(site) (Left Foot)  Safety checks performed prior to treatment. See doc flowsheets for documentation. Objective         Recent Labs      18   0931  18   1202   GLUMET  191*  128*     Pre treatment Vital Signs       Temp: 97.6 °F (36.4 °C)     Pulse: 76     Resp: 20     BP: (!) 150/96     Post treatment Vital Signs  Temp: 97.8 °F (36.6 °C)  Pulse: 80  Resp: 20  BP: (!) 143/84  Assessment      Physical Exam:  General Appearance:  alert and oriented to person, place and time, well-developed and well-nourished, in no acute distress  ENT:  tympanic membranes intact bilaterally  Pulmonary/Chest:  clear to auscultation bilaterally- no wheezes, rales or rhonchi, normal air movement, no respiratory distress  Cardiovascular:  regular rate and rhythm  Chamber #: [id number]  Treatment Start Time: 1013     Pressure Reached Time: 1024  RANDY Rate: 2  Number of Air Breaks:  Treatment Status: No Air break     Decompression Time: 1155   Treatment End Time: 6804  Length of Treatment: 90 Minutes  Symptoms Noted During Treatment: None    Adverse Event: no    I was present on these premises and immediately available to furnish assistance & direction throughout the procedure. Plan      Princess Ortiz is a 70 y.o. female  did successfully complete today's hyperbaric oxygen treatment at [address] [place] [address]. In my clinical judgement, ongoing HBO therapy is  necessary at this time, given a threat to patient function, limb or life from the current condition. Supervision and attendance of Hyperbaric Oxygen Therapy provided.   Continue HBO treatment as outlined in the treatment plan. Hyperbaric Oxygen: Matthew Hernandez tolerated Treatment Number: 957 well today without complications. Discharge Instructions were explained and given to Ms. Hernandez     Electronically signed by Eulalia Eastman MD on 11/5/2018 at 7:16 PM

## 2018-11-08 ENCOUNTER — HOSPITAL ENCOUNTER (OUTPATIENT)
Dept: HYPERBARIC MEDICINE | Age: 63
Setting detail: THERAPIES SERIES
Discharge: HOME | End: 2018-11-08
Payer: COMMERCIAL

## 2018-11-08 VITALS
HEART RATE: 96 BPM | TEMPERATURE: 98.4 F | SYSTOLIC BLOOD PRESSURE: 159 MMHG | RESPIRATION RATE: 20 BRPM | DIASTOLIC BLOOD PRESSURE: 91 MMHG

## 2018-11-08 DIAGNOSIS — E11.621 DIABETIC ULCER OF LEFT HEEL ASSOCIATED WITH TYPE 2 DIABETES MELLITUS, WITH NECROSIS OF BONE (HCC): ICD-10-CM

## 2018-11-08 DIAGNOSIS — L97.424 DIABETIC ULCER OF LEFT HEEL ASSOCIATED WITH TYPE 2 DIABETES MELLITUS, WITH NECROSIS OF BONE (HCC): ICD-10-CM

## 2018-11-08 LAB
METER GLUCOSE: 100 MG/DL (ref 74–99)
METER GLUCOSE: 106 MG/DL (ref 74–99)

## 2018-11-08 PROCEDURE — G0277 HBOT, FULL BODY CHAMBER, 30M: HCPCS

## 2018-11-08 PROCEDURE — 82962 GLUCOSE BLOOD TEST: CPT

## 2018-11-08 PROCEDURE — 99183 HYPERBARIC OXYGEN THERAPY: CPT | Performed by: SURGERY

## 2018-11-08 NOTE — PROGRESS NOTES
[facility]  Hyperbaric Oxygen Therapy   Progress Note      NAME: Char Orantes  MEDICAL RECORD NUMBER:  [id number]  AGE: 70 y.o. GENDER: female  : 1955  EPISODE DATE:  2018     Subjective     HBO Treatment Number: 105 out of Total Treatments: 120    HBO Diagnosis:               Indications: Lower Extremity Diabetic Wound ___(site) (Left Enedelia)    Safety checks performed prior to treatment. See doc flowsheets for documentation. Objective           Recent Labs      18   0921  18   1206   GLUMET  106*  100*       Pre treatment Vital Signs       Temp: 98.4 °F (36.9 °C)     Pulse: 96     Resp: 20     BP: (!) 159/91       Post treatment Vital Signs  Temp: 98.4 °F (36.9 °C)  Pulse: 96  Resp: 20  BP: (!) 159/91      Assessment        Physical Exam:  General Appearance:  alert and oriented to person, place and time, well-developed and well-nourished, in no acute distress    ENT:  tympanic membranes intact bilaterally    Post Assessment per Physician/Provider  Right Tympanic Membrane Normal    Left Tympanic Membrane Normal    Pulmonary/Chest:  clear to auscultation bilaterally- no wheezes, rales or rhonchi, normal air movement, no respiratory distress    Cardiovascular:  regular rate and rhythm    Chamber #: [id number]    Treatment Start Time: 1012     Pressure Reached Time: 1024    RANDY Rate: 2  Number of Air Breaks:             Decompression Time: 1155        Length of Treatment: 90 Minutes         Adverse Event: no      I was present on these premises and immediately available to furnish assistance & direction throughout the procedure. Plan          Char Orantes is a 70 y.o. female  did successfully complete today's hyperbaric oxygen treatment at [address] [place] [address]. In my clinical judgement, ongoing HBO therapy is  necessary at this time, given a threat to patient function, limb or life from the current condition.       Supervision and attendance of Hyperbaric Oxygen Therapy provided. Continue HBO treatment as outlined in the treatment plan. Hyperbaric Oxygen: Emily Hernandez tolerated Treatment Number: 007 well today without complications. Discharge Instructions were explained and given to Ms. Hernandez     Electronically signed by Rock Jaclyn MD on 11/8/2018 at 12:51 PM

## 2018-11-09 ENCOUNTER — HOSPITAL ENCOUNTER (OUTPATIENT)
Dept: HYPERBARIC MEDICINE | Age: 63
Setting detail: THERAPIES SERIES
Discharge: HOME | End: 2018-11-09
Payer: COMMERCIAL

## 2018-11-09 VITALS
SYSTOLIC BLOOD PRESSURE: 163 MMHG | TEMPERATURE: 97.8 F | DIASTOLIC BLOOD PRESSURE: 90 MMHG | HEART RATE: 76 BPM | RESPIRATION RATE: 20 BRPM

## 2018-11-09 DIAGNOSIS — L97.424 DIABETIC ULCER OF LEFT HEEL ASSOCIATED WITH TYPE 2 DIABETES MELLITUS, WITH NECROSIS OF BONE (HCC): ICD-10-CM

## 2018-11-09 DIAGNOSIS — E11.621 DIABETIC ULCER OF LEFT HEEL ASSOCIATED WITH TYPE 2 DIABETES MELLITUS, WITH NECROSIS OF BONE (HCC): ICD-10-CM

## 2018-11-09 LAB
METER GLUCOSE: 182 MG/DL (ref 74–99)
METER GLUCOSE: 234 MG/DL (ref 74–99)

## 2018-11-09 PROCEDURE — G0277 HBOT, FULL BODY CHAMBER, 30M: HCPCS

## 2018-11-09 PROCEDURE — 82962 GLUCOSE BLOOD TEST: CPT

## 2018-11-09 PROCEDURE — 99183 HYPERBARIC OXYGEN THERAPY: CPT | Performed by: NURSE PRACTITIONER

## 2018-11-12 ENCOUNTER — HOSPITAL ENCOUNTER (OUTPATIENT)
Dept: HYPERBARIC MEDICINE | Age: 63
Setting detail: THERAPIES SERIES
Discharge: HOME | End: 2018-11-12
Payer: COMMERCIAL

## 2018-11-12 ENCOUNTER — HOSPITAL ENCOUNTER (OUTPATIENT)
Dept: WOUND CARE | Age: 63
Discharge: HOME | End: 2018-11-12
Payer: COMMERCIAL

## 2018-11-12 VITALS
DIASTOLIC BLOOD PRESSURE: 60 MMHG | TEMPERATURE: 98.2 F | BODY MASS INDEX: 25.07 KG/M2 | RESPIRATION RATE: 16 BRPM | WEIGHT: 156 LBS | SYSTOLIC BLOOD PRESSURE: 150 MMHG | HEART RATE: 134 BPM | HEIGHT: 66 IN

## 2018-11-12 VITALS
SYSTOLIC BLOOD PRESSURE: 166 MMHG | TEMPERATURE: 98.5 F | RESPIRATION RATE: 20 BRPM | HEART RATE: 80 BPM | DIASTOLIC BLOOD PRESSURE: 83 MMHG

## 2018-11-12 DIAGNOSIS — L97.424 DIABETIC ULCER OF LEFT HEEL ASSOCIATED WITH TYPE 2 DIABETES MELLITUS, WITH NECROSIS OF BONE (HCC): ICD-10-CM

## 2018-11-12 DIAGNOSIS — E11.621 DIABETIC ULCER OF LEFT HEEL ASSOCIATED WITH TYPE 2 DIABETES MELLITUS, WITH NECROSIS OF BONE (HCC): ICD-10-CM

## 2018-11-12 LAB
METER GLUCOSE: 101 MG/DL (ref 74–99)
METER GLUCOSE: 110 MG/DL (ref 74–99)
METER GLUCOSE: 83 MG/DL (ref 74–99)
METER GLUCOSE: 87 MG/DL (ref 74–99)

## 2018-11-12 PROCEDURE — 82962 GLUCOSE BLOOD TEST: CPT

## 2018-11-12 PROCEDURE — 99213 OFFICE O/P EST LOW 20 MIN: CPT

## 2018-11-12 PROCEDURE — 97607 NEG PRS WND THR NDME<=50SQCM: CPT

## 2018-11-12 PROCEDURE — 99183 HYPERBARIC OXYGEN THERAPY: CPT | Performed by: SURGERY

## 2018-11-12 PROCEDURE — G0277 HBOT, FULL BODY CHAMBER, 30M: HCPCS

## 2018-11-12 NOTE — PROGRESS NOTES
[facility] Followup Visit Note    Referring Physician : DO Brad Evans Mary  MEDICAL RECORD NUMBER:  [id number]  AGE: 70 y.o. GENDER: female  : 1955  EPISODE DATE:  2018    Subjective:     Chief Complaint   Patient presents with    Wound Check     patient here for treatment of left heel ulcer      HISTORY of PRESENT ILLNESS JANE Rascon is a 70 y.o. female who presents today for wound/ulcer evaluation.    History of Wound Context:  Follow up and debridement     Wound/Ulcer Pain Timing/Severity: none  Quality of pain: N/A  Severity:  0 / 10   Modifying Factors: None  Associated Signs/Symptoms: none    Ulcer Identification:  Ulcer Type: diabetic and non-healing surgical  Contributing Factors: diabetes    Diabetic/Pressure/Non Pressure Ulcers only:  Ulcer: Diabetic ulcer, fat layer exposed    Wound: External surgical dehiscence        PAST MEDICAL HISTORY      Diagnosis Date    Diabetes mellitus ([facility] [address]     Diabetic ulcer of left heel associated with type 2 diabetes mellitus, with necrosis of bone ([facility] [address] 2018    Hx of blood clots     PE, FROM TAKING BC PILLS    Hyperlipemia     no med    Thyroid disease      Past Surgical History:   Procedure Laterality Date    COLONOSCOPY      DILATION AND CURETTAGE OF UTERUS      ECHO COMPL W DOP COLOR FLOW  2013         ECHOCARDIOGRAM TRANSESOPHAGEAL  2013         FOOT SURGERY  13    i&d left foot and ankle with bone biopsy    HYSTERECTOMY      ovaries removed Dr Louise Del Cid PST LIP Left 2018    PARTIAL CALCANECTOMY LEFT FOOT, EXCISIONAL DEBRIDEMENT MUSCLE LEFT FOOT performed by Santa Acosta DPM at [facility] SKIN, SUB-Q TISSUE,MUSCLE,BONE,=<20 SQ CM Left 2018    EXCISIONAL DEBRIDEMENT SUBQUTANEOUS MUSCLE , BONE LEFT HEEL performed by Santa Acosta DPM at [address] [facility] Left 4/10/2018    LEFT HEEL DEBRIDEMENT, BONE BIOPSY performed by Nasra Contreras DPM at [facility] OFFICE/OUTPT VISIT,PROCEDURE ONLY Left 10/19/2018    PARTIAL LEFT CALCANECTOMY performed by Eula Lovell DPM at [facility] OR    WISDOM TOOTH EXTRACTION       Family History   Problem Relation Age of Onset   [employer] Alzheimer's Disease Mother     Other Father         pacemaker    Other Maternal Grandfather         aneurysm     Social History   Substance Use Topics    Smoking status: Never Smoker    Smokeless tobacco: Never Used    Alcohol use No     Allergies   Allergen Reactions    Codeine Nausea And Vomiting     Current Outpatient Prescriptions on File Prior to Encounter   Medication Sig Dispense Refill    doxycycline hyclate (VIBRA-TABS) 100 MG tablet Take 100 mg by mouth 2 times daily      DAPTOMYCIN IV Infuse 475 mg intravenously daily       cefTRIAXone sodium 2 g SOLR Infuse intravenously daily      gabapentin (NEURONTIN) 300 MG capsule Take 300 mg by mouth 2 times daily. Jonathan Lopez glipiZIDE (GLUCOTROL XL) 10 MG extended release tablet Take 1 tablet by mouth daily      VELTASSA 8.4 g PACK Take 1 packet by mouth daily (before lunch)   1    vitamin C (ASCORBIC ACID) 500 MG tablet Take 500 mg by mouth daily       Lactobacillus Rhamnosus, GG, (CULTURELLE IMMUNITY SUPPORT) CAPS Take 1 capsule by mouth daily      sodium bicarbonate 325 MG tablet Take 650 mg by mouth 2 times daily       Cholecalciferol (VITAMIN D3) 2000 UNITS CAPS Take 4,000 Units by mouth daily       acetaminophen 650 MG TABS Take 650 mg by mouth every 4 hours as needed (Fever >100.5 F (38 C)). 120 tablet     metformin (GLUCOPHAGE) 500 MG tablet Take 1,000 mg by mouth 2 times daily (with meals)       levothyroxine (SYNTHROID) 25 MCG tablet Take 50 mcg by mouth daily        No current facility-administered medications on file prior to encounter.         REVIEW OF SYSTEMS See HPI    Objective:    BP (!) 150/60   Pulse 134 Temp 98.2 °F (36.8 °C) (Oral)   Resp 16   Ht 5' 6\" (1.676 m)   Wt 156 lb (70.8 kg)   BMI 25.18 kg/m²   Wt Readings from Last 3 Encounters:   11/12/18 156 lb (70.8 kg)   11/05/18 165 lb (74.8 kg)   10/29/18 165 lb (74.8 kg)     PHYSICAL EXAM  CONSTITUTIONAL:   Awake, alert, cooperative   EYES:  lids and lashes normal   ENT: external ears and nose without lesions   NECK:  supple, symmetrical, trachea midline   SKIN:  Open wound Present    Assessment:     Problem List Items Addressed This Visit     Diabetic ulcer of left heel associated with type 2 diabetes mellitus, with necrosis of bone ([address]        Procedure Note  Indications:  Based on my examination of this patient's wound(s)/ulcer(s) today, debridement is required to promote healing and evaluate the wound base. Performed by: Ruth Flores DPM    Consent obtained:  Yes    Time out taken:  Yes    Pain Control: Anesthetic  Anesthetic: None     Debridement:Excisional Debridement    Using tissue nippers the wound(s)/ulcer(s) was/were sharply debrided down through and including the removal of subcutaneous tissue. Devitalized Tissue Debrided:  fibrin, biofilm and slough to stimulate bleeding to promote healing, post debridement good bleeding base and wound edges noted    Pre Debridement Measurements:  Are located in the Wound/Ulcer Documentation Flow Sheet    Wound/Ulcer #: 1    Post Debridement Measurements:  Wound/Ulcer Descriptions are Pre Debridement except measurements:    Negative Pressure Wound Therapy Foot Left; Lower (Active)   Number of days: 1820       Negative Pressure Wound Therapy Heel Left (Active)   Number of days: 215       Wound 08/20/13 Heel Left (Active)   Number of days: 1909       Wound 11/16/13 Other (Comment) Ankle Left; Outer Red, swollen, shiny, hot area of outer L ankle (Active)   Number of days: 1822       Wound 04/09/18 Heel Left small round  (Active)   Number of days: 217       Wound 05/07/18 Other (Comment) Heel Plantar;Left #1 (acquired 4-6-18) Grade 3 (Active)   Wound Image   11/12/2018  1:13 PM   Wound Type Wound 5/7/2018  2:09 PM   Wound Other 10/22/2018  1:21 PM   Dressing Status Clean;Dry; Intact 11/5/2018  2:01 PM   Dressing Changed Changed/New 11/5/2018  2:01 PM   Dressing/Treatment Alginate;Dry dressing 11/5/2018  2:01 PM   Wound Cleansed Rinsed/Irrigated with saline 11/5/2018  2:01 PM   Wound Length (cm) 10.2 cm 11/12/2018  1:13 PM   Wound Width (cm) 2.5 cm 11/12/2018  1:13 PM   Wound Depth (cm)  2.7 11/12/2018  1:13 PM   Calculated Wound Size (cm^2) (l*w) 25.5 cm^2 11/12/2018  1:13 PM   Change in Wound Size % (l*w) -233.33 11/12/2018  1:13 PM   Distance Tunneling (cm) 2.8 cm 9/17/2018  1:15 PM   Tunneling Position ___ O'Clock 1 9/17/2018  1:15 PM   Undermining Starts ___ O'Clock 6 8/13/2018  1:18 PM   Undermining Ends___ O'Clock 8 8/13/2018  1:18 PM   Undermining Maxium Distance (cm) 2.5 8/13/2018  1:18 PM   Wound Assessment Pink;Yellow 11/12/2018  1:13 PM   Drainage Amount Copious 11/12/2018  1:13 PM   Drainage Description Serous; Yellow;Serosanguinous 11/12/2018  1:13 PM   Odor None 11/12/2018  1:13 PM   Exposed structure Bone 8/20/2018  1:03 PM   Kaya-wound Assessment White;Pink;Maceration 11/12/2018  1:13 PM   Debridement per physician None 11/12/2018  1:41 PM   Time out Yes 11/12/2018  1:41 PM   Procedural Pain 1 7/9/2018  1:47 PM   Post procedural Pain 0 7/9/2018  1:47 PM   Number of days: 189       Incision 11/17/13 Foot Left (Active)   Number of days: 1821       Incision 04/10/18 Foot Left (Active)   Number of days: 216       Incision 08/16/18 Foot Left (Active)   Number of days: 88       Incision 09/20/18 Foot Left (Active)   Number of days: 53       Incision 10/19/18 Heel Left (Active)   Drainage Amount None 10/19/2018  2:16 PM   Dressing/Treatment Ace Wrap 10/19/2018  2:16 PM   Dressing Status Clean;Dry; Intact 10/19/2018  2:16 PM   Number of days: 24     Percent of Wound/Ulcer Debrided: 100%    Total Surface Area

## 2018-11-13 ENCOUNTER — HOSPITAL ENCOUNTER (OUTPATIENT)
Dept: HYPERBARIC MEDICINE | Age: 63
Setting detail: THERAPIES SERIES
Discharge: HOME | End: 2018-11-13
Payer: COMMERCIAL

## 2018-11-13 VITALS
TEMPERATURE: 98.4 F | DIASTOLIC BLOOD PRESSURE: 83 MMHG | RESPIRATION RATE: 20 BRPM | SYSTOLIC BLOOD PRESSURE: 139 MMHG | HEART RATE: 84 BPM

## 2018-11-13 DIAGNOSIS — L97.424 DIABETIC ULCER OF LEFT HEEL ASSOCIATED WITH TYPE 2 DIABETES MELLITUS, WITH NECROSIS OF BONE (HCC): ICD-10-CM

## 2018-11-13 DIAGNOSIS — E11.621 DIABETIC ULCER OF LEFT HEEL ASSOCIATED WITH TYPE 2 DIABETES MELLITUS, WITH NECROSIS OF BONE (HCC): ICD-10-CM

## 2018-11-13 LAB
METER GLUCOSE: 123 MG/DL (ref 74–99)
METER GLUCOSE: 147 MG/DL (ref 74–99)

## 2018-11-13 PROCEDURE — G0277 HBOT, FULL BODY CHAMBER, 30M: HCPCS

## 2018-11-13 PROCEDURE — 82962 GLUCOSE BLOOD TEST: CPT

## 2018-11-13 PROCEDURE — 99183 HYPERBARIC OXYGEN THERAPY: CPT | Performed by: SURGERY

## 2018-11-14 ENCOUNTER — HOSPITAL ENCOUNTER (OUTPATIENT)
Dept: HYPERBARIC MEDICINE | Age: 63
Setting detail: THERAPIES SERIES
Discharge: HOME | End: 2018-11-14
Payer: COMMERCIAL

## 2018-11-14 VITALS
RESPIRATION RATE: 20 BRPM | DIASTOLIC BLOOD PRESSURE: 73 MMHG | TEMPERATURE: 98.2 F | SYSTOLIC BLOOD PRESSURE: 144 MMHG | HEART RATE: 80 BPM

## 2018-11-14 DIAGNOSIS — L97.424 DIABETIC ULCER OF LEFT HEEL ASSOCIATED WITH TYPE 2 DIABETES MELLITUS, WITH NECROSIS OF BONE (HCC): ICD-10-CM

## 2018-11-14 DIAGNOSIS — E11.621 DIABETIC ULCER OF LEFT HEEL ASSOCIATED WITH TYPE 2 DIABETES MELLITUS, WITH NECROSIS OF BONE (HCC): ICD-10-CM

## 2018-11-14 LAB
METER GLUCOSE: 147 MG/DL (ref 74–99)
METER GLUCOSE: 171 MG/DL (ref 74–99)

## 2018-11-14 PROCEDURE — 82962 GLUCOSE BLOOD TEST: CPT

## 2018-11-14 PROCEDURE — 99183 HYPERBARIC OXYGEN THERAPY: CPT | Performed by: SURGERY

## 2018-11-15 ENCOUNTER — APPOINTMENT (OUTPATIENT)
Dept: HYPERBARIC MEDICINE | Age: 63
End: 2018-11-15
Payer: COMMERCIAL

## 2018-11-16 ENCOUNTER — HOSPITAL ENCOUNTER (OUTPATIENT)
Dept: HYPERBARIC MEDICINE | Age: 63
Setting detail: THERAPIES SERIES
Discharge: HOME | End: 2018-11-16
Payer: COMMERCIAL

## 2018-11-16 VITALS
TEMPERATURE: 98.3 F | DIASTOLIC BLOOD PRESSURE: 91 MMHG | RESPIRATION RATE: 20 BRPM | HEART RATE: 72 BPM | SYSTOLIC BLOOD PRESSURE: 163 MMHG

## 2018-11-16 DIAGNOSIS — E11.621 DIABETIC ULCER OF LEFT HEEL ASSOCIATED WITH TYPE 2 DIABETES MELLITUS, WITH NECROSIS OF BONE (HCC): ICD-10-CM

## 2018-11-16 DIAGNOSIS — L97.424 DIABETIC ULCER OF LEFT HEEL ASSOCIATED WITH TYPE 2 DIABETES MELLITUS, WITH NECROSIS OF BONE (HCC): ICD-10-CM

## 2018-11-16 LAB
METER GLUCOSE: 131 MG/DL (ref 74–99)
METER GLUCOSE: 180 MG/DL (ref 74–99)

## 2018-11-16 PROCEDURE — G0277 HBOT, FULL BODY CHAMBER, 30M: HCPCS

## 2018-11-16 PROCEDURE — 82962 GLUCOSE BLOOD TEST: CPT

## 2018-11-16 PROCEDURE — 99183 HYPERBARIC OXYGEN THERAPY: CPT | Performed by: SURGERY

## 2018-11-16 NOTE — PROGRESS NOTES
[facility]  Hyperbaric Oxygen Therapy   Progress Note    NAME: Lucy Rodriguez  MEDICAL RECORD NUMBER:  [id number]  AGE: 70 y.o. GENDER: female  : 1955  EPISODE DATE:  2018   Subjective   HBO Treatment Number: 109 out of Total Treatments: 120  HBO Diagnosis:   Itzel Barber Genin 3  Indications: Lower Extremity Diabetic Wound ___(site) (Lft Foot)  Safety checks performed prior to treatment. See doc flowsheets for documentation. Objective         Recent Labs      18   0921  18   1204   GLUMET  171*  147*     Pre treatment Vital Signs       Temp: 98.3 °F (36.8 °C)     Pulse: 84     Resp: 20     BP: (!) 155/87     Post treatment Vital Signs  Temp: 98.2 °F (36.8 °C)  Pulse: 80  Resp: 20  BP: (!) 144/73  Assessment      Physical Exam:  General Appearance:  alert and oriented to person, place and time, well-developed and well-nourished, in no acute distress  ENT:  tympanic membranes intact bilaterally  Pulmonary/Chest:  clear to auscultation bilaterally- no wheezes, rales or rhonchi, normal air movement, no respiratory distress  Cardiovascular:  regular rate and rhythm  Chamber #: [id number]  Treatment Start Time: 1010     Pressure Reached Time: 1021  RANDY Rate: 2  Number of Air Breaks:  Treatment Status: No Air break     Decompression Time: 1152   Treatment End Time: 1202  Length of Treatment: 90 Minutes  Symptoms Noted During Treatment: None    Adverse Event: no    I was present on these premises and immediately available to furnish assistance & direction throughout the procedure. Plan      Lucy Rodriguez is a 70 y.o. female  did successfully complete today's hyperbaric oxygen treatment at [address] [place] [address]. In my clinical judgement, ongoing HBO therapy is  necessary at this time, given a threat to patient function, limb or life from the current condition. Supervision and attendance of Hyperbaric Oxygen Therapy provided.   Continue HBO treatment as outlined in the treatment plan. Hyperbaric Oxygen: Teofilo Hernandez tolerated Treatment Number: 448 well today without complications. Discharge Instructions were explained and given to Ms. Hernandez     Electronically signed by Hernan Ying MD

## 2018-11-19 ENCOUNTER — HOSPITAL ENCOUNTER (OUTPATIENT)
Dept: HYPERBARIC MEDICINE | Age: 63
Setting detail: THERAPIES SERIES
Discharge: HOME | End: 2018-11-19
Payer: COMMERCIAL

## 2018-11-19 ENCOUNTER — HOSPITAL ENCOUNTER (OUTPATIENT)
Dept: WOUND CARE | Age: 63
Discharge: HOME | End: 2018-11-19
Payer: COMMERCIAL

## 2018-11-19 VITALS
WEIGHT: 156 LBS | RESPIRATION RATE: 20 BRPM | DIASTOLIC BLOOD PRESSURE: 80 MMHG | BODY MASS INDEX: 25.07 KG/M2 | HEIGHT: 66 IN | TEMPERATURE: 98.4 F | HEART RATE: 100 BPM | SYSTOLIC BLOOD PRESSURE: 148 MMHG

## 2018-11-19 VITALS
SYSTOLIC BLOOD PRESSURE: 163 MMHG | DIASTOLIC BLOOD PRESSURE: 86 MMHG | HEART RATE: 76 BPM | TEMPERATURE: 97.8 F | RESPIRATION RATE: 20 BRPM

## 2018-11-19 DIAGNOSIS — L97.424 DIABETIC ULCER OF LEFT HEEL ASSOCIATED WITH TYPE 2 DIABETES MELLITUS, WITH NECROSIS OF BONE (HCC): ICD-10-CM

## 2018-11-19 DIAGNOSIS — E11.621 DIABETIC ULCER OF LEFT HEEL ASSOCIATED WITH TYPE 2 DIABETES MELLITUS, WITH NECROSIS OF BONE (HCC): ICD-10-CM

## 2018-11-19 LAB
METER GLUCOSE: 120 MG/DL (ref 74–99)
METER GLUCOSE: 160 MG/DL (ref 74–99)

## 2018-11-19 PROCEDURE — 99213 OFFICE O/P EST LOW 20 MIN: CPT

## 2018-11-19 PROCEDURE — 82962 GLUCOSE BLOOD TEST: CPT

## 2018-11-19 PROCEDURE — G0277 HBOT, FULL BODY CHAMBER, 30M: HCPCS

## 2018-11-19 PROCEDURE — 99183 HYPERBARIC OXYGEN THERAPY: CPT | Performed by: SURGERY

## 2018-11-19 NOTE — PROGRESS NOTES
Temp 98.4 °F (36.9 °C) (Oral)   Resp 20   Ht 5' 6\" (1.676 m)   Wt 156 lb (70.8 kg)   BMI 25.18 kg/m²   Wt Readings from Last 3 Encounters:   11/19/18 156 lb (70.8 kg)   11/12/18 156 lb (70.8 kg)   11/05/18 165 lb (74.8 kg)     PHYSICAL EXAM  CONSTITUTIONAL:   Awake, alert, cooperative   EYES:  lids and lashes normal   ENT: external ears and nose without lesions   NECK:  supple, symmetrical, trachea midline   SKIN:  Open wound Present    Assessment:     Problem List Items Addressed This Visit     Diabetic ulcer of left heel associated with type 2 diabetes mellitus, with necrosis of bone ([facility] [address]        Procedure Note  Indications:  Based on my examination of this patient's wound(s)/ulcer(s) today, debridement is required to promote healing and evaluate the wound base. Performed by: Dell Console, RENATO    Consent obtained:  Yes    Time out taken:  Yes    Pain Control: Anesthetic  Anesthetic: None     Debridement:Excisional Debridement    Using tissue nippers the wound(s)/ulcer(s) was/were sharply debrided down through and including the removal of subcutaneous tissue. Devitalized Tissue Debrided:  slough and hypertrophic granulation tissue to stimulate bleeding to promote healing, post debridement good bleeding base and wound edges noted    Pre Debridement Measurements:  Are located in the Wound/Ulcer Documentation Flow Sheet    Wound/Ulcer #: 1    Post Debridement Measurements:  Wound/Ulcer Descriptions are Pre Debridement except measurements:    Negative Pressure Wound Therapy Foot Left; Lower (Active)   Number of days: 1827       Negative Pressure Wound Therapy Heel Left (Active)   Number of days: 222       Wound 08/20/13 Heel Left (Active)   Number of days: 1916       Wound 11/16/13 Other (Comment) Ankle Left; Outer Red, swollen, shiny, hot area of outer L ankle (Active)   Number of days: 1829       Wound 04/09/18 Heel Left small round  (Active)   Number of days: 224       Wound 05/07/18 Other (Comment) Procedural Pain:  0 / 10     Response to treatment:  Well tolerated by patient. Plan:   Treatment Note please see attached Discharge Instructions    Written patient dismissal instructions given to patient and signed by patient or POA. Discharge Instructions       Visit Discharge/Physician Orders     Discharge condition: Stable     Assessment of pain at discharge:no     Anesthetic used: 2% lidocaine gel     Discharge to: Home     Left via:Private automobile     Accompanied by:  spouse     ECF/HHA: [facility]-     Dressing Orders: LEFT HEEL ULCER-Cleanse with vashe when available, continue wound vac at 125 mmhg. Change mondays at wound care, wednesdays and fridays at home.         Treatment Orders:Eat a diet high in protein and vitamin C. Take a multiple vitamin daily unless contraindicated.     Dr. Lory Sotelo- 11/27/18     Keep all pressure off of heel     Keep heel clean and dry     [facility] followup visit : 1 WEEK_________________  (Please note your next appointment above and if you are unable to keep, kindly give a 24 hour notice. Thank you.)     Physician signature:__________________________        If you experience any of the following, please call the [facility] during business hours:     * Increase in Pain  * Temperature over 101  * Increase in drainage from your wound  * Drainage with a foul odor  * Bleeding  * Increase in swelling  * Need for compression bandage changes due to slippage, breakthrough drainage.     If you need medical attention outside of the business hours of the [facility] please contact your PCP or go to the nearest emergency room.         Electronically signed by Hernan Mauro DPM on 11/19/2018 at 1:53 PM

## 2018-11-20 ENCOUNTER — HOSPITAL ENCOUNTER (OUTPATIENT)
Age: 63
Discharge: HOME | End: 2018-11-22
Payer: COMMERCIAL

## 2018-11-20 ENCOUNTER — HOSPITAL ENCOUNTER (OUTPATIENT)
Dept: HYPERBARIC MEDICINE | Age: 63
Setting detail: THERAPIES SERIES
Discharge: HOME | End: 2018-11-20
Payer: COMMERCIAL

## 2018-11-20 VITALS
DIASTOLIC BLOOD PRESSURE: 87 MMHG | HEART RATE: 80 BPM | RESPIRATION RATE: 20 BRPM | TEMPERATURE: 97.9 F | SYSTOLIC BLOOD PRESSURE: 159 MMHG

## 2018-11-20 DIAGNOSIS — L97.424 DIABETIC ULCER OF LEFT HEEL ASSOCIATED WITH TYPE 2 DIABETES MELLITUS, WITH NECROSIS OF BONE (HCC): ICD-10-CM

## 2018-11-20 DIAGNOSIS — E11.621 DIABETIC ULCER OF LEFT HEEL ASSOCIATED WITH TYPE 2 DIABETES MELLITUS, WITH NECROSIS OF BONE (HCC): ICD-10-CM

## 2018-11-20 LAB
METER GLUCOSE: 160 MG/DL (ref 74–99)
METER GLUCOSE: 246 MG/DL (ref 74–99)

## 2018-11-20 PROCEDURE — 82962 GLUCOSE BLOOD TEST: CPT

## 2018-11-20 PROCEDURE — G0277 HBOT, FULL BODY CHAMBER, 30M: HCPCS

## 2018-11-20 PROCEDURE — 99183 HYPERBARIC OXYGEN THERAPY: CPT | Performed by: SURGERY

## 2018-11-20 NOTE — PROGRESS NOTES
[facility]  Hyperbaric Oxygen Therapy   Progress Note    NAME: Karilyn Osgood  MEDICAL RECORD NUMBER:  [id number]  AGE: 70 y.o. GENDER: female  : 1955  EPISODE DATE:  2018   Subjective   HBO Treatment Number: 111 out of Total Treatments: 120  HBO Diagnosis:   Dilip Wan 3  Indications: Lower Extremity Diabetic Wound ___(site) (Left Foot)  Safety checks performed prior to treatment. See doc flowsheets for documentation. Objective         Recent Labs      18   1153  18   0915   GLUMET  120*  246*     Pre treatment Vital Signs       Temp: 97.8 °F (36.6 °C)     Pulse: 76     Resp: 20     BP: (!) 163/86     Post treatment Vital Signs  Temp: 97.8 °F (36.6 °C)  Pulse: 76  Resp: 20  BP: (!) 163/86  Assessment      Physical Exam:  General Appearance:  alert and oriented to person, place and time, well-developed and well-nourished, in no acute distress  ENT:  tympanic membranes intact bilaterally  Pulmonary/Chest:  clear to auscultation bilaterally- no wheezes, rales or rhonchi, normal air movement, no respiratory distress  Cardiovascular:  regular rate and rhythm  Chamber #: [id number]  Treatment Start Time: 1001     Pressure Reached Time: 1010  RANDY Rate: 2  Number of Air Breaks:  Treatment Status: No Air break     Decompression Time: 1142      Length of Treatment: 90 Minutes       Adverse Event: no    I was present on these premises and immediately available to furnish assistance & direction throughout the procedure. Plan      Karilyn Osgood is a 70 y.o. female  did successfully complete today's hyperbaric oxygen treatment at [address] [place] [address]. In my clinical judgement, ongoing HBO therapy is  necessary at this time, given a threat to patient function, limb or life from the current condition. Supervision and attendance of Hyperbaric Oxygen Therapy provided.   Continue HBO treatment as outlined in the treatment plan.    Hyperbaric Oxygen: Selma Barrientos ROGERIO Hernandez tolerated Treatment Number: 643 well today without complications. Discharge Instructions were explained and given to Ms. Hernandez     Electronically signed by Marilee Rodriguez MD on 11/19/2018 at 10:39 AM

## 2018-11-21 ENCOUNTER — HOSPITAL ENCOUNTER (OUTPATIENT)
Dept: HYPERBARIC MEDICINE | Age: 63
Setting detail: THERAPIES SERIES
Discharge: HOME | End: 2018-11-21
Payer: COMMERCIAL

## 2018-11-21 VITALS
RESPIRATION RATE: 20 BRPM | DIASTOLIC BLOOD PRESSURE: 87 MMHG | HEART RATE: 100 BPM | SYSTOLIC BLOOD PRESSURE: 159 MMHG | TEMPERATURE: 33.3 F

## 2018-11-21 DIAGNOSIS — L97.424 DIABETIC ULCER OF LEFT HEEL ASSOCIATED WITH TYPE 2 DIABETES MELLITUS, WITH NECROSIS OF BONE (HCC): ICD-10-CM

## 2018-11-21 DIAGNOSIS — E11.621 DIABETIC ULCER OF LEFT HEEL ASSOCIATED WITH TYPE 2 DIABETES MELLITUS, WITH NECROSIS OF BONE (HCC): ICD-10-CM

## 2018-11-21 LAB
ALBUMIN SERPL-MCNC: 3.5 G/DL (ref 3.5–5.2)
ALP BLD-CCNC: 59 U/L (ref 35–104)
ALT SERPL-CCNC: 9 U/L (ref 0–32)
AST SERPL-CCNC: 11 U/L (ref 0–31)
BASOPHILS ABSOLUTE: 0.07 E9/L (ref 0–0.2)
BASOPHILS RELATIVE PERCENT: 0.7 % (ref 0–2)
BILIRUB SERPL-MCNC: 0.2 MG/DL (ref 0–1.2)
BILIRUBIN DIRECT: <0.2 MG/DL (ref 0–0.3)
BILIRUBIN, INDIRECT: NORMAL MG/DL (ref 0–1)
BUN BLDV-MCNC: 18 MG/DL (ref 8–23)
C-REACTIVE PROTEIN: 7 MG/DL (ref 0–0.4)
CREAT SERPL-MCNC: 0.9 MG/DL (ref 0.5–1)
EOSINOPHILS ABSOLUTE: 0.6 E9/L (ref 0.05–0.5)
EOSINOPHILS RELATIVE PERCENT: 5.8 % (ref 0–6)
GFR AFRICAN AMERICAN: >60
GFR NON-AFRICAN AMERICAN: >60 ML/MIN/1.73
HCT VFR BLD CALC: 31.1 % (ref 34–48)
HEMOGLOBIN: 9.9 G/DL (ref 11.5–15.5)
IMMATURE GRANULOCYTES #: 0.06 E9/L
IMMATURE GRANULOCYTES %: 0.6 % (ref 0–5)
LYMPHOCYTES ABSOLUTE: 2.63 E9/L (ref 1.5–4)
LYMPHOCYTES RELATIVE PERCENT: 25.2 % (ref 20–42)
MCH RBC QN AUTO: 30.9 PG (ref 26–35)
MCHC RBC AUTO-ENTMCNC: 31.8 % (ref 32–34.5)
MCV RBC AUTO: 97.2 FL (ref 80–99.9)
METER GLUCOSE: 151 MG/DL (ref 74–99)
METER GLUCOSE: 206 MG/DL (ref 74–99)
MONOCYTES ABSOLUTE: 1.01 E9/L (ref 0.1–0.95)
MONOCYTES RELATIVE PERCENT: 9.7 % (ref 2–12)
NEUTROPHILS ABSOLUTE: 6.06 E9/L (ref 1.8–7.3)
NEUTROPHILS RELATIVE PERCENT: 58 % (ref 43–80)
PDW BLD-RTO: 12.4 FL (ref 11.5–15)
PLATELET # BLD: 302 E9/L (ref 130–450)
PMV BLD AUTO: 9.6 FL (ref 7–12)
RBC # BLD: 3.2 E12/L (ref 3.5–5.5)
SEDIMENTATION RATE, ERYTHROCYTE: 83 MM/HR (ref 0–20)
TOTAL CK: 51 U/L (ref 20–180)
TOTAL PROTEIN: 6.7 G/DL (ref 6.4–8.3)
WBC # BLD: 10.4 E9/L (ref 4.5–11.5)

## 2018-11-21 PROCEDURE — 82565 ASSAY OF CREATININE: CPT

## 2018-11-21 PROCEDURE — 80076 HEPATIC FUNCTION PANEL: CPT

## 2018-11-21 PROCEDURE — 84520 ASSAY OF UREA NITROGEN: CPT

## 2018-11-21 PROCEDURE — 82550 ASSAY OF CK (CPK): CPT

## 2018-11-21 PROCEDURE — 86140 C-REACTIVE PROTEIN: CPT

## 2018-11-21 PROCEDURE — 85025 COMPLETE CBC W/AUTO DIFF WBC: CPT

## 2018-11-21 PROCEDURE — 82962 GLUCOSE BLOOD TEST: CPT

## 2018-11-21 PROCEDURE — 85651 RBC SED RATE NONAUTOMATED: CPT

## 2018-11-21 PROCEDURE — 99183 HYPERBARIC OXYGEN THERAPY: CPT | Performed by: SURGERY

## 2018-11-22 NOTE — PROGRESS NOTES
attendance of Hyperbaric Oxygen Therapy provided. Continue HBO treatment as outlined in the treatment plan. Hyperbaric Oxygen: Alycia Hernandez tolerated Treatment Number: 021 well today without complications. Discharge Instructions were explained and given to Ms. Hernandez by HBOT staff    Electronically signed by Meme Ashley MD on 11/20/2018 at 10:29 AM

## 2018-11-23 ENCOUNTER — APPOINTMENT (OUTPATIENT)
Dept: HYPERBARIC MEDICINE | Age: 63
End: 2018-11-23
Payer: COMMERCIAL

## 2018-11-26 ENCOUNTER — HOSPITAL ENCOUNTER (OUTPATIENT)
Dept: WOUND CARE | Age: 63
Discharge: HOME | End: 2018-11-26
Payer: COMMERCIAL

## 2018-11-26 ENCOUNTER — HOSPITAL ENCOUNTER (OUTPATIENT)
Dept: HYPERBARIC MEDICINE | Age: 63
Setting detail: THERAPIES SERIES
Discharge: HOME | End: 2018-11-26
Payer: COMMERCIAL

## 2018-11-26 VITALS
RESPIRATION RATE: 20 BRPM | DIASTOLIC BLOOD PRESSURE: 80 MMHG | WEIGHT: 156 LBS | BODY MASS INDEX: 25.07 KG/M2 | HEIGHT: 66 IN | TEMPERATURE: 98.3 F | SYSTOLIC BLOOD PRESSURE: 120 MMHG | HEART RATE: 100 BPM

## 2018-11-26 VITALS
HEART RATE: 88 BPM | RESPIRATION RATE: 20 BRPM | TEMPERATURE: 98.6 F | SYSTOLIC BLOOD PRESSURE: 151 MMHG | DIASTOLIC BLOOD PRESSURE: 85 MMHG

## 2018-11-26 DIAGNOSIS — L97.424 DIABETIC ULCER OF LEFT HEEL ASSOCIATED WITH TYPE 2 DIABETES MELLITUS, WITH NECROSIS OF BONE (HCC): ICD-10-CM

## 2018-11-26 DIAGNOSIS — E11.621 DIABETIC ULCER OF LEFT HEEL ASSOCIATED WITH TYPE 2 DIABETES MELLITUS, WITH NECROSIS OF BONE (HCC): ICD-10-CM

## 2018-11-26 LAB
METER GLUCOSE: 167 MG/DL (ref 74–99)
METER GLUCOSE: 231 MG/DL (ref 74–99)

## 2018-11-26 PROCEDURE — 99183 HYPERBARIC OXYGEN THERAPY: CPT | Performed by: SURGERY

## 2018-11-26 PROCEDURE — 82962 GLUCOSE BLOOD TEST: CPT

## 2018-11-26 PROCEDURE — 99213 OFFICE O/P EST LOW 20 MIN: CPT

## 2018-11-26 PROCEDURE — G0277 HBOT, FULL BODY CHAMBER, 30M: HCPCS

## 2018-11-26 NOTE — PROGRESS NOTES
(Comment) Heel Plantar;Left #1 (acquired 4-6-18) Grade 3 (Active)   Wound Image   11/12/2018  1:13 PM   Wound Type Wound 5/7/2018  2:09 PM   Wound Other 10/22/2018  1:21 PM   Dressing Status Clean;Dry; Intact 11/19/2018  2:32 PM   Dressing Changed Changed/New 11/19/2018  2:32 PM   Dressing/Treatment Vacuum dressing 11/19/2018  2:32 PM   Wound Cleansed Rinsed/Irrigated with saline 11/19/2018  2:32 PM   Wound Length (cm) 10.9 cm 11/26/2018  1:16 PM   Wound Width (cm) 3.5 cm 11/26/2018  1:16 PM   Wound Depth (cm)  4.0 11/26/2018  1:16 PM   Calculated Wound Size (cm^2) (l*w) 38.15 cm^2 11/26/2018  1:16 PM   Change in Wound Size % (l*w) -398.69 11/26/2018  1:16 PM   Distance Tunneling (cm) 2.8 cm 9/17/2018  1:15 PM   Tunneling Position ___ O'Clock 1 9/17/2018  1:15 PM   Undermining Starts ___ O'Clock 8 11/26/2018  1:16 PM   Undermining Ends___ O'Clock 10 11/26/2018  1:16 PM   Undermining Maxium Distance (cm) 3.0 11/26/2018  1:16 PM   Wound Assessment Pink;Yellow 11/26/2018  1:16 PM   Drainage Amount Copious 11/26/2018  1:16 PM   Drainage Description Serosanguinous; Serous 11/26/2018  1:16 PM   Odor None 11/26/2018  1:16 PM   Exposed structure Bone 8/20/2018  1:03 PM   Kaya-wound Assessment White;Pink;Maceration 11/26/2018  1:16 PM   Debridement per physician None 11/26/2018  1:53 PM   Time out Yes 11/19/2018  1:37 PM   Procedural Pain 1 7/9/2018  1:47 PM   Post procedural Pain 0 7/9/2018  1:47 PM   Number of days: 203       Incision 11/17/13 Foot Left (Active)   Number of days: 1835       Incision 04/10/18 Foot Left (Active)   Number of days: 230       Incision 08/16/18 Foot Left (Active)   Number of days: 102       Incision 09/20/18 Foot Left (Active)   Number of days: 67       Incision 10/19/18 Heel Left (Active)   Number of days: 38     Percent of Wound/Ulcer Debrided: 100%    Total Surface Area Debrided:  20 sq cm     Estimated Blood Loss:  Minimal  Hemostasis Achieved:  by silver nitrate stick    Procedural Pain:  0

## 2018-11-27 ENCOUNTER — HOSPITAL ENCOUNTER (OUTPATIENT)
Dept: HYPERBARIC MEDICINE | Age: 63
Setting detail: THERAPIES SERIES
Discharge: HOME | End: 2018-11-27
Payer: COMMERCIAL

## 2018-11-27 VITALS
TEMPERATURE: 98.5 F | DIASTOLIC BLOOD PRESSURE: 70 MMHG | RESPIRATION RATE: 20 BRPM | HEART RATE: 76 BPM | SYSTOLIC BLOOD PRESSURE: 148 MMHG

## 2018-11-27 DIAGNOSIS — L97.424 DIABETIC ULCER OF LEFT HEEL ASSOCIATED WITH TYPE 2 DIABETES MELLITUS, WITH NECROSIS OF BONE (HCC): ICD-10-CM

## 2018-11-27 DIAGNOSIS — E11.621 DIABETIC ULCER OF LEFT HEEL ASSOCIATED WITH TYPE 2 DIABETES MELLITUS, WITH NECROSIS OF BONE (HCC): ICD-10-CM

## 2018-11-27 LAB
METER GLUCOSE: 226 MG/DL (ref 74–99)
METER GLUCOSE: 296 MG/DL (ref 74–99)

## 2018-11-27 PROCEDURE — G0277 HBOT, FULL BODY CHAMBER, 30M: HCPCS

## 2018-11-27 PROCEDURE — 82962 GLUCOSE BLOOD TEST: CPT

## 2018-11-27 PROCEDURE — 99183 HYPERBARIC OXYGEN THERAPY: CPT | Performed by: SURGERY

## 2018-11-28 ENCOUNTER — HOSPITAL ENCOUNTER (OUTPATIENT)
Dept: HYPERBARIC MEDICINE | Age: 63
Setting detail: THERAPIES SERIES
Discharge: HOME | End: 2018-11-28
Payer: COMMERCIAL

## 2018-11-28 VITALS
DIASTOLIC BLOOD PRESSURE: 73 MMHG | HEART RATE: 72 BPM | SYSTOLIC BLOOD PRESSURE: 151 MMHG | RESPIRATION RATE: 20 BRPM | TEMPERATURE: 98.1 F

## 2018-11-28 DIAGNOSIS — E11.621 DIABETIC ULCER OF LEFT HEEL ASSOCIATED WITH TYPE 2 DIABETES MELLITUS, WITH NECROSIS OF BONE (HCC): ICD-10-CM

## 2018-11-28 DIAGNOSIS — L97.424 DIABETIC ULCER OF LEFT HEEL ASSOCIATED WITH TYPE 2 DIABETES MELLITUS, WITH NECROSIS OF BONE (HCC): ICD-10-CM

## 2018-11-28 PROBLEM — L03.90 CELLULITIS: Status: RESOLVED | Noted: 2018-04-09 | Resolved: 2018-11-28

## 2018-11-28 LAB — METER GLUCOSE: 182 MG/DL (ref 74–99)

## 2018-11-28 PROCEDURE — 82962 GLUCOSE BLOOD TEST: CPT

## 2018-11-28 PROCEDURE — G0277 HBOT, FULL BODY CHAMBER, 30M: HCPCS

## 2018-11-28 PROCEDURE — 99183 HYPERBARIC OXYGEN THERAPY: CPT | Performed by: SURGERY

## 2018-11-29 ENCOUNTER — HOSPITAL ENCOUNTER (OUTPATIENT)
Dept: HYPERBARIC MEDICINE | Age: 63
Setting detail: THERAPIES SERIES
Discharge: HOME | End: 2018-11-29
Payer: COMMERCIAL

## 2018-11-29 VITALS
RESPIRATION RATE: 20 BRPM | SYSTOLIC BLOOD PRESSURE: 150 MMHG | TEMPERATURE: 98.5 F | HEART RATE: 96 BPM | DIASTOLIC BLOOD PRESSURE: 89 MMHG

## 2018-11-29 DIAGNOSIS — E11.621 DIABETIC ULCER OF LEFT HEEL ASSOCIATED WITH TYPE 2 DIABETES MELLITUS, WITH NECROSIS OF BONE (HCC): ICD-10-CM

## 2018-11-29 DIAGNOSIS — L97.424 DIABETIC ULCER OF LEFT HEEL ASSOCIATED WITH TYPE 2 DIABETES MELLITUS, WITH NECROSIS OF BONE (HCC): ICD-10-CM

## 2018-11-29 LAB
METER GLUCOSE: 109 MG/DL (ref 74–99)
METER GLUCOSE: 181 MG/DL (ref 74–99)

## 2018-11-29 PROCEDURE — G0277 HBOT, FULL BODY CHAMBER, 30M: HCPCS

## 2018-11-29 PROCEDURE — 82962 GLUCOSE BLOOD TEST: CPT

## 2018-11-29 PROCEDURE — 99183 HYPERBARIC OXYGEN THERAPY: CPT | Performed by: SURGERY

## 2018-11-29 NOTE — PROGRESS NOTES
[facility]  Hyperbaric Oxygen Therapy   Progress Note    NAME: Brian Nguyen  MEDICAL RECORD NUMBER:  [id number]  AGE: 70 y.o. GENDER: female  : 1955  EPISODE DATE:  2018   Subjective   HBO Treatment Number: 115 out of Total Treatments: 120  HBO Diagnosis:   Lexi Marie 3  Indications: Lower Extremity Diabetic Wound ___(site) (Left Foot)  Safety checks performed prior to treatment. See doc flowsheets for documentation. Objective         Recent Labs      18   1158  18   0919   GLUMET  226*  182*     Pre treatment Vital Signs       Temp: 97.6 °F (36.4 °C)     Pulse: 80     Resp: 20     BP: 122/78     Post treatment Vital Signs  Temp: 98.5 °F (36.9 °C)  Pulse: 76  Resp: 20  BP: (!) 148/70  Assessment      Physical Exam:  General Appearance:  alert and oriented to person, place and time, well-developed and well-nourished, in no acute distress  ENT:  tympanic membranes intact bilaterally  Pulmonary/Chest:  clear to auscultation bilaterally- no wheezes, rales or rhonchi, normal air movement, no respiratory distress  Cardiovascular:  regular rate and rhythm  Chamber #: [id number]  Treatment Start Time: 1006     Pressure Reached Time: 1016  RANDY Rate: 2  Number of Air Breaks:  Treatment Status: No Air break     Decompression Time: 1146   Treatment End Time: 1155  Length of Treatment: 90 Minutes  Symptoms Noted During Treatment: None    Adverse Event: no    I was present on these premises and immediately available to furnish assistance & direction throughout the procedure. Plan      Brian Nguyen is a 70 y.o. female  did successfully complete today's hyperbaric oxygen treatment at [address] [place] [address]. In my clinical judgement, ongoing HBO therapy is  necessary at this time, given a threat to patient function, limb or life from the current condition. Supervision and attendance of Hyperbaric Oxygen Therapy provided.   Continue HBO treatment as outlined in the treatment plan. Hyperbaric Oxygen: Monica BEATTY Mary tolerated Treatment Number: 397 well today without complications. Discharge Instructions were explained and given to MsYury  Mary     Electronically signed by Paras Diaz MD on 11/27/2018 at 8:35 PM

## 2018-11-29 NOTE — PROGRESS NOTES
[facility]  Hyperbaric Oxygen Therapy   Progress Note      NAME: Jamar Borja  MEDICAL RECORD NUMBER:  [id number]  AGE: 70 y.o. GENDER: female  : 1955  EPISODE DATE:  2018     Subjective     HBO Treatment Number: 117 out of Total Treatments: 120    HBO Diagnosis:               Indications: Lower Extremity Diabetic Wound ___(site) (Left Foot)    Safety checks performed prior to treatment. See doc flowsheets for documentation. Objective           Recent Labs      18   0916  18   1146   GLUMET  181*  109*       Pre treatment Vital Signs       Temp: 98.6 °F (37 °C)     Pulse: 104     Resp: 20     BP: (!) 162/85       Post treatment Vital Signs  Temp: 98.5 °F (36.9 °C)  Pulse: 96  Resp: 20  BP: (!) 150/89      Assessment        Physical Exam:  General Appearance:  alert and oriented to person, place and time, well-developed and well-nourished, in no acute distress    ENT:  tympanic membranes intact bilaterally    Post Assessment per Physician/Provider  Right Tympanic Membrane Normal    Left Tympanic Membrane Normal    Pulmonary/Chest:  clear to auscultation bilaterally- no wheezes, rales or rhonchi, normal air movement, no respiratory distress    Cardiovascular:  regular rate and rhythm    Chamber #: [id number]    Treatment Start Time: 1003     Pressure Reached Time: 1011    RANDY Rate: 2  Number of Air Breaks:  Treatment Status: No Air break          Decompression Time: 1132   Treatment End Time: 1143    Length of Treatment: 90 Minutes    Symptoms Noted During Treatment: None    Adverse Event: no      I was present on these premises and immediately available to furnish assistance & direction throughout the procedure. Plan          Jamar Borja is a 70 y.o. female  did successfully complete today's hyperbaric oxygen treatment at [address] [place] [address].     In my clinical judgement, ongoing HBO therapy is  necessary at this time, given a

## 2018-11-30 ENCOUNTER — HOSPITAL ENCOUNTER (OUTPATIENT)
Dept: HYPERBARIC MEDICINE | Age: 63
Setting detail: THERAPIES SERIES
Discharge: HOME | End: 2018-11-30
Payer: COMMERCIAL

## 2018-11-30 ENCOUNTER — HOSPITAL ENCOUNTER (OUTPATIENT)
Age: 63
Discharge: HOME | End: 2018-12-02
Payer: COMMERCIAL

## 2018-11-30 VITALS
TEMPERATURE: 98.5 F | DIASTOLIC BLOOD PRESSURE: 80 MMHG | RESPIRATION RATE: 20 BRPM | SYSTOLIC BLOOD PRESSURE: 153 MMHG | HEART RATE: 84 BPM

## 2018-11-30 DIAGNOSIS — E11.621 DIABETIC ULCER OF LEFT HEEL ASSOCIATED WITH TYPE 2 DIABETES MELLITUS, WITH NECROSIS OF BONE (HCC): ICD-10-CM

## 2018-11-30 DIAGNOSIS — L97.424 DIABETIC ULCER OF LEFT HEEL ASSOCIATED WITH TYPE 2 DIABETES MELLITUS, WITH NECROSIS OF BONE (HCC): ICD-10-CM

## 2018-11-30 LAB
ALBUMIN SERPL-MCNC: 3.7 G/DL (ref 3.5–5.2)
ALP BLD-CCNC: 61 U/L (ref 35–104)
ALT SERPL-CCNC: 12 U/L (ref 0–32)
AST SERPL-CCNC: 16 U/L (ref 0–31)
BASOPHILS ABSOLUTE: 0.05 E9/L (ref 0–0.2)
BASOPHILS RELATIVE PERCENT: 0.6 % (ref 0–2)
BILIRUB SERPL-MCNC: <0.2 MG/DL (ref 0–1.2)
BILIRUBIN DIRECT: <0.2 MG/DL (ref 0–0.3)
BILIRUBIN, INDIRECT: NORMAL MG/DL (ref 0–1)
BUN BLDV-MCNC: 21 MG/DL (ref 8–23)
CREAT SERPL-MCNC: 1 MG/DL (ref 0.5–1)
EOSINOPHILS ABSOLUTE: 0.46 E9/L (ref 0.05–0.5)
EOSINOPHILS RELATIVE PERCENT: 5.1 % (ref 0–6)
GFR AFRICAN AMERICAN: >60
GFR NON-AFRICAN AMERICAN: 56 ML/MIN/1.73
HCT VFR BLD CALC: 32.2 % (ref 34–48)
HEMOGLOBIN: 10.2 G/DL (ref 11.5–15.5)
IMMATURE GRANULOCYTES #: 0.04 E9/L
IMMATURE GRANULOCYTES %: 0.4 % (ref 0–5)
LYMPHOCYTES ABSOLUTE: 2.44 E9/L (ref 1.5–4)
LYMPHOCYTES RELATIVE PERCENT: 26.9 % (ref 20–42)
MCH RBC QN AUTO: 30.7 PG (ref 26–35)
MCHC RBC AUTO-ENTMCNC: 31.7 % (ref 32–34.5)
MCV RBC AUTO: 97 FL (ref 80–99.9)
METER GLUCOSE: 126 MG/DL (ref 74–99)
METER GLUCOSE: 166 MG/DL (ref 74–99)
MONOCYTES ABSOLUTE: 0.76 E9/L (ref 0.1–0.95)
MONOCYTES RELATIVE PERCENT: 8.4 % (ref 2–12)
NEUTROPHILS ABSOLUTE: 5.31 E9/L (ref 1.8–7.3)
NEUTROPHILS RELATIVE PERCENT: 58.6 % (ref 43–80)
PDW BLD-RTO: 12.5 FL (ref 11.5–15)
PLATELET # BLD: 299 E9/L (ref 130–450)
PMV BLD AUTO: 9.7 FL (ref 7–12)
RBC # BLD: 3.32 E12/L (ref 3.5–5.5)
TOTAL PROTEIN: 6.8 G/DL (ref 6.4–8.3)
WBC # BLD: 9.1 E9/L (ref 4.5–11.5)

## 2018-11-30 PROCEDURE — 84520 ASSAY OF UREA NITROGEN: CPT

## 2018-11-30 PROCEDURE — 85025 COMPLETE CBC W/AUTO DIFF WBC: CPT

## 2018-11-30 PROCEDURE — 82962 GLUCOSE BLOOD TEST: CPT

## 2018-11-30 PROCEDURE — 82565 ASSAY OF CREATININE: CPT

## 2018-11-30 PROCEDURE — 80076 HEPATIC FUNCTION PANEL: CPT

## 2018-12-03 ENCOUNTER — HOSPITAL ENCOUNTER (OUTPATIENT)
Dept: WOUND CARE | Age: 63
Discharge: HOME | End: 2018-12-03
Payer: COMMERCIAL

## 2018-12-03 ENCOUNTER — HOSPITAL ENCOUNTER (OUTPATIENT)
Dept: HYPERBARIC MEDICINE | Age: 63
Setting detail: THERAPIES SERIES
Discharge: HOME | End: 2018-12-03
Payer: COMMERCIAL

## 2018-12-03 VITALS
HEIGHT: 66 IN | DIASTOLIC BLOOD PRESSURE: 70 MMHG | SYSTOLIC BLOOD PRESSURE: 140 MMHG | RESPIRATION RATE: 20 BRPM | HEART RATE: 72 BPM | WEIGHT: 156 LBS | BODY MASS INDEX: 25.07 KG/M2 | TEMPERATURE: 98.4 F

## 2018-12-03 VITALS
HEART RATE: 72 BPM | RESPIRATION RATE: 20 BRPM | TEMPERATURE: 98.2 F | DIASTOLIC BLOOD PRESSURE: 90 MMHG | SYSTOLIC BLOOD PRESSURE: 165 MMHG

## 2018-12-03 DIAGNOSIS — L97.424 DIABETIC ULCER OF LEFT HEEL ASSOCIATED WITH TYPE 2 DIABETES MELLITUS, WITH NECROSIS OF BONE (HCC): ICD-10-CM

## 2018-12-03 DIAGNOSIS — E11.621 DIABETIC ULCER OF LEFT HEEL ASSOCIATED WITH TYPE 2 DIABETES MELLITUS, WITH NECROSIS OF BONE (HCC): ICD-10-CM

## 2018-12-03 LAB
METER GLUCOSE: 139 MG/DL (ref 74–99)
METER GLUCOSE: 207 MG/DL (ref 74–99)

## 2018-12-03 PROCEDURE — 82962 GLUCOSE BLOOD TEST: CPT

## 2018-12-03 PROCEDURE — 99183 HYPERBARIC OXYGEN THERAPY: CPT | Performed by: SURGERY

## 2018-12-03 PROCEDURE — 97607 NEG PRS WND THR NDME<=50SQCM: CPT

## 2018-12-03 PROCEDURE — G0277 HBOT, FULL BODY CHAMBER, 30M: HCPCS

## 2018-12-03 PROCEDURE — 11042 DBRDMT SUBQ TIS 1ST 20SQCM/<: CPT

## 2018-12-03 PROCEDURE — 99183 HYPERBARIC OXYGEN THERAPY: CPT

## 2018-12-03 NOTE — PROGRESS NOTES
[facility]  Hyperbaric Oxygen Therapy   Progress Note    NAME: Brian Nguyen  MEDICAL RECORD NUMBER:  [id number]  AGE: 70 y.o. GENDER: female  : 1955  EPISODE DATE:  2018   Subjective   HBO Treatment Number: 118 out of Total Treatments: 120  HBO Diagnosis:   Lexi Marie 3  Indications: Lower Extremity Diabetic Wound ___(site) (Left Foot)  Safety checks performed prior to treatment. See doc flowsheets for documentation. Objective         Recent Labs      18   0918  18   1202   GLUMET  166*  126*     Pre treatment Vital Signs       Temp: 98.4 °F (36.9 °C)     Pulse: 92     Resp: 20     BP: (!) 155/88     Post treatment Vital Signs  Temp: 98.5 °F (36.9 °C)  Pulse: 84  Resp: 20  BP: (!) 153/80  Assessment      Physical Exam:  General Appearance:  alert and oriented to person, place and time, well-developed and well-nourished, in no acute distress  ENT:  tympanic membranes intact bilaterally  Pulmonary/Chest:  clear to auscultation bilaterally- no wheezes, rales or rhonchi, normal air movement, no respiratory distress  Cardiovascular:  regular rate and rhythm  Chamber #: [id number]  Treatment Start Time: 1006     Pressure Reached Time: 1017  RANDY Rate: 2  Number of Air Breaks:  Treatment Status: No Air break     Decompression Time: 1147   Treatment End Time: 1159  Length of Treatment: 90 Minutes  Symptoms Noted During Treatment: None    Adverse Event: no    I was present on these premises and immediately available to furnish assistance & direction throughout the procedure. Plan      Brian Nguyen is a 70 y.o. female  did successfully complete today's hyperbaric oxygen treatment at [address] [place] [address]. In my clinical judgement, ongoing HBO therapy is  necessary at this time, given a threat to patient function, limb or life from the current condition. Supervision and attendance of Hyperbaric Oxygen Therapy provided.   Continue HBO treatment as outlined in the treatment plan. Hyperbaric Oxygen: Laura Hernandez tolerated Treatment Number: 468 well today without complications. Discharge Instructions were explained and given to Ms. Hernandez     Electronically signed by Primo Overton MD on 11/30/2018 at 9:58 PM

## 2018-12-04 ENCOUNTER — HOSPITAL ENCOUNTER (OUTPATIENT)
Age: 63
Discharge: HOME | End: 2018-12-06
Payer: COMMERCIAL

## 2018-12-04 ENCOUNTER — HOSPITAL ENCOUNTER (OUTPATIENT)
Dept: HYPERBARIC MEDICINE | Age: 63
Setting detail: THERAPIES SERIES
Discharge: HOME | End: 2018-12-04
Payer: COMMERCIAL

## 2018-12-04 VITALS
DIASTOLIC BLOOD PRESSURE: 99 MMHG | RESPIRATION RATE: 20 BRPM | SYSTOLIC BLOOD PRESSURE: 177 MMHG | HEART RATE: 88 BPM | TEMPERATURE: 98.2 F

## 2018-12-04 DIAGNOSIS — E11.621 DIABETIC ULCER OF LEFT HEEL ASSOCIATED WITH TYPE 2 DIABETES MELLITUS, WITH NECROSIS OF BONE (HCC): ICD-10-CM

## 2018-12-04 DIAGNOSIS — L97.424 DIABETIC ULCER OF LEFT HEEL ASSOCIATED WITH TYPE 2 DIABETES MELLITUS, WITH NECROSIS OF BONE (HCC): ICD-10-CM

## 2018-12-04 LAB
ALBUMIN SERPL-MCNC: 3.7 G/DL (ref 3.5–5.2)
ALP BLD-CCNC: 64 U/L (ref 35–104)
ALT SERPL-CCNC: 8 U/L (ref 0–32)
AST SERPL-CCNC: 9 U/L (ref 0–31)
BASOPHILS ABSOLUTE: 0.05 E9/L (ref 0–0.2)
BASOPHILS RELATIVE PERCENT: 0.5 % (ref 0–2)
BILIRUB SERPL-MCNC: 0.2 MG/DL (ref 0–1.2)
BILIRUBIN DIRECT: <0.2 MG/DL (ref 0–0.3)
BILIRUBIN, INDIRECT: NORMAL MG/DL (ref 0–1)
BUN BLDV-MCNC: 17 MG/DL (ref 8–23)
C-REACTIVE PROTEIN: 6.3 MG/DL (ref 0–0.4)
CREAT SERPL-MCNC: 0.9 MG/DL (ref 0.5–1)
EOSINOPHILS ABSOLUTE: 0.36 E9/L (ref 0.05–0.5)
EOSINOPHILS RELATIVE PERCENT: 3.8 % (ref 0–6)
GFR AFRICAN AMERICAN: >60
GFR NON-AFRICAN AMERICAN: >60 ML/MIN/1.73
HCT VFR BLD CALC: 31.5 % (ref 34–48)
HEMOGLOBIN: 10 G/DL (ref 11.5–15.5)
IMMATURE GRANULOCYTES #: 0.05 E9/L
IMMATURE GRANULOCYTES %: 0.5 % (ref 0–5)
LYMPHOCYTES ABSOLUTE: 2.65 E9/L (ref 1.5–4)
LYMPHOCYTES RELATIVE PERCENT: 27.7 % (ref 20–42)
MCH RBC QN AUTO: 30.4 PG (ref 26–35)
MCHC RBC AUTO-ENTMCNC: 31.7 % (ref 32–34.5)
MCV RBC AUTO: 95.7 FL (ref 80–99.9)
METER GLUCOSE: 122 MG/DL (ref 74–99)
METER GLUCOSE: 182 MG/DL (ref 74–99)
MONOCYTES ABSOLUTE: 0.78 E9/L (ref 0.1–0.95)
MONOCYTES RELATIVE PERCENT: 8.2 % (ref 2–12)
NEUTROPHILS ABSOLUTE: 5.67 E9/L (ref 1.8–7.3)
NEUTROPHILS RELATIVE PERCENT: 59.3 % (ref 43–80)
PDW BLD-RTO: 12.5 FL (ref 11.5–15)
PLATELET # BLD: 281 E9/L (ref 130–450)
PMV BLD AUTO: 9.8 FL (ref 7–12)
RBC # BLD: 3.29 E12/L (ref 3.5–5.5)
SEDIMENTATION RATE, ERYTHROCYTE: 89 MM/HR (ref 0–20)
TOTAL CK: 67 U/L (ref 20–180)
TOTAL PROTEIN: 6.7 G/DL (ref 6.4–8.3)
WBC # BLD: 9.6 E9/L (ref 4.5–11.5)

## 2018-12-04 PROCEDURE — 85025 COMPLETE CBC W/AUTO DIFF WBC: CPT

## 2018-12-04 PROCEDURE — 85651 RBC SED RATE NONAUTOMATED: CPT

## 2018-12-04 PROCEDURE — 82962 GLUCOSE BLOOD TEST: CPT

## 2018-12-04 PROCEDURE — 82565 ASSAY OF CREATININE: CPT

## 2018-12-04 PROCEDURE — 99183 HYPERBARIC OXYGEN THERAPY: CPT | Performed by: SURGERY

## 2018-12-04 PROCEDURE — 86140 C-REACTIVE PROTEIN: CPT

## 2018-12-04 PROCEDURE — 84520 ASSAY OF UREA NITROGEN: CPT

## 2018-12-04 PROCEDURE — 82550 ASSAY OF CK (CPK): CPT

## 2018-12-04 PROCEDURE — G0277 HBOT, FULL BODY CHAMBER, 30M: HCPCS

## 2018-12-04 PROCEDURE — 80076 HEPATIC FUNCTION PANEL: CPT

## 2018-12-04 PROCEDURE — 99183 HYPERBARIC OXYGEN THERAPY: CPT

## 2018-12-05 ENCOUNTER — APPOINTMENT (OUTPATIENT)
Dept: HYPERBARIC MEDICINE | Age: 63
End: 2018-12-05
Payer: COMMERCIAL

## 2018-12-10 ENCOUNTER — HOSPITAL ENCOUNTER (OUTPATIENT)
Dept: WOUND CARE | Age: 63
Discharge: HOME | End: 2018-12-10
Payer: COMMERCIAL

## 2018-12-10 VITALS
HEIGHT: 66 IN | WEIGHT: 156 LBS | HEART RATE: 104 BPM | SYSTOLIC BLOOD PRESSURE: 140 MMHG | TEMPERATURE: 98.1 F | RESPIRATION RATE: 20 BRPM | BODY MASS INDEX: 25.07 KG/M2 | DIASTOLIC BLOOD PRESSURE: 70 MMHG

## 2018-12-10 DIAGNOSIS — E11.621 DIABETIC ULCER OF LEFT HEEL ASSOCIATED WITH TYPE 2 DIABETES MELLITUS, WITH NECROSIS OF BONE (HCC): ICD-10-CM

## 2018-12-10 DIAGNOSIS — L97.424 DIABETIC ULCER OF LEFT HEEL ASSOCIATED WITH TYPE 2 DIABETES MELLITUS, WITH NECROSIS OF BONE (HCC): ICD-10-CM

## 2018-12-10 PROCEDURE — 11042 DBRDMT SUBQ TIS 1ST 20SQCM/<: CPT

## 2018-12-10 PROCEDURE — 97607 NEG PRS WND THR NDME<=50SQCM: CPT

## 2018-12-10 NOTE — PROGRESS NOTES
(Oral)   Resp 20   Ht 5' 6\" (1.676 m)   Wt 156 lb (70.8 kg)   BMI 25.18 kg/m²   Wt Readings from Last 3 Encounters:   12/10/18 156 lb (70.8 kg)   12/03/18 156 lb (70.8 kg)   11/26/18 156 lb (70.8 kg)     PHYSICAL EXAM  CONSTITUTIONAL:   Awake, alert, cooperative   EYES:  lids and lashes normal   ENT: external ears and nose without lesions   NECK:  supple, symmetrical, trachea midline   SKIN:  Open wound Present    Assessment:     Problem List Items Addressed This Visit     Diabetic ulcer of left heel associated with type 2 diabetes mellitus, with necrosis of bone ([address]        Procedure Note  Indications:  Based on my examination of this patient's wound(s)/ulcer(s) today, debridement is required to promote healing and evaluate the wound base. Performed by: Hernan Mauro DPM    Consent obtained:  Yes    Time out taken:  Yes    Pain Control: Anesthetic  Anesthetic: None     Debridement:Excisional Debridement    Using curette the wound(s)/ulcer(s) was/were sharply debrided down through and including the removal of subcutaneous tissue. Devitalized Tissue Debrided:  fibrin, biofilm and slough to stimulate bleeding to promote healing, post debridement good bleeding base and wound edges noted    Pre Debridement Measurements:  Are located in the Wound/Ulcer Documentation Flow Sheet    Wound/Ulcer #: 1    Post Debridement Measurements:  Wound/Ulcer Descriptions are Pre Debridement except measurements:    Negative Pressure Wound Therapy Foot Left; Lower (Active)   Number of days: 1848       Negative Pressure Wound Therapy Heel Left (Active)   Number of days: 243       Wound 08/20/13 Heel Left (Active)   Number of days: 1937       Wound 11/16/13 Other (Comment) Ankle Left; Outer Red, swollen, shiny, hot area of outer L ankle (Active)   Number of days: 1850       Wound 04/09/18 Heel Left small round  (Active)   Number of days: 245       Incision 11/17/13 Foot Left (Active)   Number of days: 1849       Incision 04/10/18 Foot Left (Active)   Number of days: 244       Incision 08/16/18 Foot Left (Active)   Number of days: 116       Incision 09/20/18 Foot Left (Active)   Number of days: 81       Incision 10/19/18 Heel Left (Active)   Number of days: 52       Wound 11/30/18 Heel Left;Plantar #1 (acquired 4-6-18) Grade 3 (Active)   Dressing Status Clean;Dry; Intact 12/3/2018  2:35 PM   Dressing Changed Changed/New 12/3/2018  2:35 PM   Dressing/Treatment Vacuum dressing 12/3/2018  2:35 PM   Wound Cleansed Rinsed/Irrigated with saline 12/3/2018  2:35 PM   Wound Length (cm) 9.5 cm 12/10/2018  1:25 PM   Wound Width (cm) 4 cm 12/10/2018  1:25 PM   Wound Depth (cm) 6.2 cm 12/10/2018  1:25 PM   Wound Surface Area (cm^2) 38 cm^2 12/10/2018  1:25 PM   Change in Wound Size % (l*w) 14.2 12/10/2018  1:25 PM   Wound Volume (cm^3) 235.6 cm^3 12/10/2018  1:25 PM   Wound Healing % -83 12/10/2018  1:25 PM   Post-Procedure Length (cm) 9.7 cm 12/10/2018  1:41 PM   Post-Procedure Width (cm) 4.2 cm 12/10/2018  1:41 PM   Post-Procedure Depth (cm) 6.2 cm 12/10/2018  1:41 PM   Post-Procedure Surface Area (cm^2) 40.74 cm^2 12/10/2018  1:41 PM   Post-Procedure Volume (cm^3) 252.59 cm^3 12/10/2018  1:41 PM   Undermining Starts ___ O'Clock 8 12/10/2018  1:25 PM   Undermining Ends___ O'Clock 10 12/10/2018  1:25 PM   Undermining Maxium Distance (cm) 2.3 12/10/2018  1:25 PM   Wound Assessment Pink;Red; White 12/10/2018  1:25 PM   Drainage Amount Large 12/10/2018  1:25 PM   Drainage Description Serosanguinous 12/10/2018  1:25 PM   Odor None 12/10/2018  1:25 PM   Kaya-wound Assessment Maceration;Denuded;Fragile 12/10/2018  1:25 PM   Number of days: 10     Percent of Wound/Ulcer Debrided: 100%    Total Surface Area Debrided:  20 sq cm     Estimated Blood Loss:  Minimal  Hemostasis Achieved:  by silver nitrate stick    Procedural Pain:  0  / 10   Post Procedural Pain:  0 / 10     Response to treatment:  Well tolerated by patient.      Plan:   Treatment Note please

## 2018-12-17 ENCOUNTER — HOSPITAL ENCOUNTER (OUTPATIENT)
Dept: WOUND CARE | Age: 63
Discharge: HOME | End: 2018-12-17
Payer: COMMERCIAL

## 2018-12-17 VITALS
RESPIRATION RATE: 20 BRPM | TEMPERATURE: 98.4 F | HEART RATE: 96 BPM | HEIGHT: 66 IN | BODY MASS INDEX: 26.52 KG/M2 | SYSTOLIC BLOOD PRESSURE: 140 MMHG | WEIGHT: 165 LBS | DIASTOLIC BLOOD PRESSURE: 80 MMHG

## 2018-12-17 DIAGNOSIS — E11.621 DIABETIC ULCER OF LEFT HEEL ASSOCIATED WITH TYPE 2 DIABETES MELLITUS, WITH NECROSIS OF BONE (HCC): ICD-10-CM

## 2018-12-17 DIAGNOSIS — L97.424 DIABETIC ULCER OF LEFT HEEL ASSOCIATED WITH TYPE 2 DIABETES MELLITUS, WITH NECROSIS OF BONE (HCC): ICD-10-CM

## 2018-12-17 DIAGNOSIS — M86.9 OSTEOMYELITIS OF ANKLE AND FOOT (HCC): ICD-10-CM

## 2018-12-17 PROCEDURE — 11045 DBRDMT SUBQ TISS EACH ADDL: CPT

## 2018-12-17 PROCEDURE — 97607 NEG PRS WND THR NDME<=50SQCM: CPT

## 2018-12-17 PROCEDURE — 11042 DBRDMT SUBQ TIS 1ST 20SQCM/<: CPT

## 2019-01-07 ENCOUNTER — HOSPITAL ENCOUNTER (OUTPATIENT)
Dept: WOUND CARE | Age: 64
Discharge: HOME | End: 2019-01-07
Payer: COMMERCIAL

## 2019-01-07 VITALS
TEMPERATURE: 98.1 F | WEIGHT: 165 LBS | DIASTOLIC BLOOD PRESSURE: 70 MMHG | SYSTOLIC BLOOD PRESSURE: 130 MMHG | HEART RATE: 104 BPM | RESPIRATION RATE: 18 BRPM | BODY MASS INDEX: 26.52 KG/M2 | HEIGHT: 66 IN

## 2019-01-07 DIAGNOSIS — L97.424 DIABETIC ULCER OF LEFT HEEL ASSOCIATED WITH TYPE 2 DIABETES MELLITUS, WITH NECROSIS OF BONE (HCC): ICD-10-CM

## 2019-01-07 DIAGNOSIS — E11.621 DIABETIC ULCER OF LEFT HEEL ASSOCIATED WITH TYPE 2 DIABETES MELLITUS, WITH NECROSIS OF BONE (HCC): ICD-10-CM

## 2019-01-07 PROCEDURE — 99213 OFFICE O/P EST LOW 20 MIN: CPT

## 2019-01-07 PROCEDURE — 97607 NEG PRS WND THR NDME<=50SQCM: CPT

## 2019-01-14 ENCOUNTER — HOSPITAL ENCOUNTER (OUTPATIENT)
Dept: WOUND CARE | Age: 64
Discharge: HOME | End: 2019-01-14
Payer: COMMERCIAL

## 2019-01-14 VITALS
HEART RATE: 78 BPM | TEMPERATURE: 98.2 F | WEIGHT: 165 LBS | RESPIRATION RATE: 18 BRPM | HEIGHT: 66 IN | BODY MASS INDEX: 26.52 KG/M2

## 2019-01-14 DIAGNOSIS — E11.621 DIABETIC ULCER OF LEFT HEEL ASSOCIATED WITH TYPE 2 DIABETES MELLITUS, WITH NECROSIS OF BONE (HCC): ICD-10-CM

## 2019-01-14 DIAGNOSIS — L97.424 DIABETIC ULCER OF LEFT HEEL ASSOCIATED WITH TYPE 2 DIABETES MELLITUS, WITH NECROSIS OF BONE (HCC): ICD-10-CM

## 2019-01-14 PROCEDURE — 11042 DBRDMT SUBQ TIS 1ST 20SQCM/<: CPT

## 2019-01-21 ENCOUNTER — HOSPITAL ENCOUNTER (OUTPATIENT)
Dept: WOUND CARE | Age: 64
Discharge: HOME | End: 2019-01-21
Payer: COMMERCIAL

## 2019-01-21 VITALS
HEART RATE: 72 BPM | SYSTOLIC BLOOD PRESSURE: 120 MMHG | BODY MASS INDEX: 26.52 KG/M2 | RESPIRATION RATE: 18 BRPM | DIASTOLIC BLOOD PRESSURE: 70 MMHG | TEMPERATURE: 97.8 F | HEIGHT: 66 IN | WEIGHT: 165 LBS

## 2019-01-21 DIAGNOSIS — E11.621 DIABETIC ULCER OF LEFT HEEL ASSOCIATED WITH TYPE 2 DIABETES MELLITUS, WITH NECROSIS OF BONE (HCC): ICD-10-CM

## 2019-01-21 DIAGNOSIS — L97.424 DIABETIC ULCER OF LEFT HEEL ASSOCIATED WITH TYPE 2 DIABETES MELLITUS, WITH NECROSIS OF BONE (HCC): ICD-10-CM

## 2019-01-21 PROCEDURE — 97607 NEG PRS WND THR NDME<=50SQCM: CPT

## 2019-01-21 PROCEDURE — 11045 DBRDMT SUBQ TISS EACH ADDL: CPT

## 2019-01-21 PROCEDURE — 11042 DBRDMT SUBQ TIS 1ST 20SQCM/<: CPT

## 2019-01-28 ENCOUNTER — HOSPITAL ENCOUNTER (OUTPATIENT)
Dept: WOUND CARE | Age: 64
Discharge: HOME | End: 2019-01-28
Payer: COMMERCIAL

## 2019-01-28 VITALS — DIASTOLIC BLOOD PRESSURE: 66 MMHG | TEMPERATURE: 97.6 F | SYSTOLIC BLOOD PRESSURE: 118 MMHG | HEART RATE: 100 BPM

## 2019-01-28 PROCEDURE — 97607 NEG PRS WND THR NDME<=50SQCM: CPT

## 2019-01-28 PROCEDURE — 11042 DBRDMT SUBQ TIS 1ST 20SQCM/<: CPT

## 2019-02-04 ENCOUNTER — HOSPITAL ENCOUNTER (OUTPATIENT)
Dept: WOUND CARE | Age: 64
Discharge: HOME | End: 2019-02-04
Payer: COMMERCIAL

## 2019-02-04 VITALS
HEART RATE: 119 BPM | SYSTOLIC BLOOD PRESSURE: 120 MMHG | RESPIRATION RATE: 20 BRPM | WEIGHT: 165 LBS | TEMPERATURE: 98.4 F | HEIGHT: 66 IN | BODY MASS INDEX: 26.52 KG/M2 | DIASTOLIC BLOOD PRESSURE: 70 MMHG

## 2019-02-04 DIAGNOSIS — E11.621 DIABETIC ULCER OF LEFT HEEL ASSOCIATED WITH TYPE 2 DIABETES MELLITUS, WITH NECROSIS OF BONE (HCC): ICD-10-CM

## 2019-02-04 DIAGNOSIS — L97.424 DIABETIC ULCER OF LEFT HEEL ASSOCIATED WITH TYPE 2 DIABETES MELLITUS, WITH NECROSIS OF BONE (HCC): ICD-10-CM

## 2019-02-04 PROCEDURE — 97607 NEG PRS WND THR NDME<=50SQCM: CPT

## 2019-02-04 PROCEDURE — 11042 DBRDMT SUBQ TIS 1ST 20SQCM/<: CPT

## 2019-02-04 RX ORDER — PRAVASTATIN SODIUM 40 MG
20 TABLET ORAL EVERY OTHER DAY
COMMUNITY

## 2019-02-04 ASSESSMENT — PAIN SCALES - GENERAL: PAINLEVEL_OUTOF10: 0

## 2019-02-11 ENCOUNTER — HOSPITAL ENCOUNTER (OUTPATIENT)
Dept: WOUND CARE | Age: 64
Discharge: HOME | End: 2019-02-11
Payer: COMMERCIAL

## 2019-02-11 VITALS
HEART RATE: 88 BPM | HEIGHT: 66 IN | BODY MASS INDEX: 26.52 KG/M2 | SYSTOLIC BLOOD PRESSURE: 134 MMHG | TEMPERATURE: 98.2 F | WEIGHT: 165 LBS | DIASTOLIC BLOOD PRESSURE: 80 MMHG | RESPIRATION RATE: 20 BRPM

## 2019-02-11 DIAGNOSIS — E11.621 DIABETIC ULCER OF LEFT HEEL ASSOCIATED WITH TYPE 2 DIABETES MELLITUS, WITH NECROSIS OF BONE (HCC): ICD-10-CM

## 2019-02-11 DIAGNOSIS — L97.424 DIABETIC ULCER OF LEFT HEEL ASSOCIATED WITH TYPE 2 DIABETES MELLITUS, WITH NECROSIS OF BONE (HCC): ICD-10-CM

## 2019-02-11 PROCEDURE — 97607 NEG PRS WND THR NDME<=50SQCM: CPT

## 2019-02-11 PROCEDURE — 11042 DBRDMT SUBQ TIS 1ST 20SQCM/<: CPT

## 2019-02-18 ENCOUNTER — HOSPITAL ENCOUNTER (OUTPATIENT)
Dept: WOUND CARE | Age: 64
Discharge: HOME | End: 2019-02-18
Payer: COMMERCIAL

## 2019-02-18 VITALS
TEMPERATURE: 98.7 F | SYSTOLIC BLOOD PRESSURE: 132 MMHG | RESPIRATION RATE: 18 BRPM | DIASTOLIC BLOOD PRESSURE: 78 MMHG | WEIGHT: 165 LBS | HEART RATE: 92 BPM | BODY MASS INDEX: 26.63 KG/M2

## 2019-02-18 PROCEDURE — 11042 DBRDMT SUBQ TIS 1ST 20SQCM/<: CPT

## 2019-02-18 PROCEDURE — 97607 NEG PRS WND THR NDME<=50SQCM: CPT

## 2019-02-18 PROCEDURE — 11045 DBRDMT SUBQ TISS EACH ADDL: CPT

## 2019-02-22 ENCOUNTER — OFFICE VISIT (OUTPATIENT)
Dept: CARDIOLOGY CLINIC | Age: 64
End: 2019-02-22
Payer: COMMERCIAL

## 2019-02-22 VITALS
HEIGHT: 66 IN | SYSTOLIC BLOOD PRESSURE: 118 MMHG | RESPIRATION RATE: 16 BRPM | HEART RATE: 109 BPM | BODY MASS INDEX: 26.63 KG/M2 | DIASTOLIC BLOOD PRESSURE: 70 MMHG

## 2019-02-22 DIAGNOSIS — I51.9 HEART PROBLEM: Primary | ICD-10-CM

## 2019-02-22 PROCEDURE — 93000 ELECTROCARDIOGRAM COMPLETE: CPT | Performed by: INTERNAL MEDICINE

## 2019-02-22 PROCEDURE — 99214 OFFICE O/P EST MOD 30 MIN: CPT | Performed by: INTERNAL MEDICINE

## 2019-02-25 ENCOUNTER — HOSPITAL ENCOUNTER (OUTPATIENT)
Dept: WOUND CARE | Age: 64
Discharge: HOME | End: 2019-02-25
Payer: COMMERCIAL

## 2019-02-25 VITALS
HEIGHT: 66 IN | HEART RATE: 84 BPM | TEMPERATURE: 98.1 F | RESPIRATION RATE: 16 BRPM | WEIGHT: 165 LBS | SYSTOLIC BLOOD PRESSURE: 124 MMHG | DIASTOLIC BLOOD PRESSURE: 68 MMHG | BODY MASS INDEX: 26.52 KG/M2

## 2019-02-25 DIAGNOSIS — E11.621 DIABETIC ULCER OF LEFT HEEL ASSOCIATED WITH TYPE 2 DIABETES MELLITUS, WITH NECROSIS OF BONE (HCC): ICD-10-CM

## 2019-02-25 DIAGNOSIS — L97.424 DIABETIC ULCER OF LEFT HEEL ASSOCIATED WITH TYPE 2 DIABETES MELLITUS, WITH NECROSIS OF BONE (HCC): ICD-10-CM

## 2019-02-25 PROCEDURE — 97607 NEG PRS WND THR NDME<=50SQCM: CPT

## 2019-02-25 PROCEDURE — 11042 DBRDMT SUBQ TIS 1ST 20SQCM/<: CPT

## 2019-03-04 ENCOUNTER — HOSPITAL ENCOUNTER (OUTPATIENT)
Dept: WOUND CARE | Age: 64
Discharge: HOME | End: 2019-03-04
Payer: COMMERCIAL

## 2019-03-04 VITALS
SYSTOLIC BLOOD PRESSURE: 116 MMHG | TEMPERATURE: 98.2 F | DIASTOLIC BLOOD PRESSURE: 70 MMHG | BODY MASS INDEX: 26.52 KG/M2 | HEART RATE: 64 BPM | WEIGHT: 165 LBS | HEIGHT: 66 IN | RESPIRATION RATE: 16 BRPM

## 2019-03-04 DIAGNOSIS — L97.424 DIABETIC ULCER OF LEFT HEEL ASSOCIATED WITH TYPE 2 DIABETES MELLITUS, WITH NECROSIS OF BONE (HCC): ICD-10-CM

## 2019-03-04 DIAGNOSIS — E11.621 DIABETIC ULCER OF LEFT HEEL ASSOCIATED WITH TYPE 2 DIABETES MELLITUS, WITH NECROSIS OF BONE (HCC): ICD-10-CM

## 2019-03-04 PROCEDURE — 11042 DBRDMT SUBQ TIS 1ST 20SQCM/<: CPT

## 2019-03-04 PROCEDURE — 97607 NEG PRS WND THR NDME<=50SQCM: CPT

## 2019-03-04 RX ORDER — CEFDINIR 300 MG/1
300 CAPSULE ORAL 2 TIMES DAILY
COMMUNITY
End: 2019-08-20 | Stop reason: SDUPTHER

## 2019-03-04 RX ORDER — DOXYCYCLINE HYCLATE 100 MG
100 TABLET ORAL 2 TIMES DAILY
COMMUNITY
End: 2019-08-20 | Stop reason: SDUPTHER

## 2019-03-11 ENCOUNTER — HOSPITAL ENCOUNTER (OUTPATIENT)
Dept: WOUND CARE | Age: 64
Discharge: HOME | End: 2019-03-11
Payer: COMMERCIAL

## 2019-03-11 VITALS
BODY MASS INDEX: 26.52 KG/M2 | HEART RATE: 80 BPM | WEIGHT: 165 LBS | TEMPERATURE: 98 F | DIASTOLIC BLOOD PRESSURE: 70 MMHG | SYSTOLIC BLOOD PRESSURE: 118 MMHG | RESPIRATION RATE: 16 BRPM | HEIGHT: 66 IN

## 2019-03-11 DIAGNOSIS — E11.621 DIABETIC ULCER OF LEFT HEEL ASSOCIATED WITH TYPE 2 DIABETES MELLITUS, WITH NECROSIS OF BONE (HCC): ICD-10-CM

## 2019-03-11 DIAGNOSIS — L97.424 DIABETIC ULCER OF LEFT HEEL ASSOCIATED WITH TYPE 2 DIABETES MELLITUS, WITH NECROSIS OF BONE (HCC): ICD-10-CM

## 2019-03-11 PROCEDURE — 11042 DBRDMT SUBQ TIS 1ST 20SQCM/<: CPT

## 2019-03-18 ENCOUNTER — HOSPITAL ENCOUNTER (OUTPATIENT)
Dept: WOUND CARE | Age: 64
Discharge: HOME | End: 2019-03-18
Payer: COMMERCIAL

## 2019-03-18 VITALS
TEMPERATURE: 98.3 F | WEIGHT: 165 LBS | RESPIRATION RATE: 16 BRPM | DIASTOLIC BLOOD PRESSURE: 72 MMHG | BODY MASS INDEX: 26.52 KG/M2 | SYSTOLIC BLOOD PRESSURE: 120 MMHG | HEIGHT: 66 IN | HEART RATE: 88 BPM

## 2019-03-18 DIAGNOSIS — L97.424 DIABETIC ULCER OF LEFT HEEL ASSOCIATED WITH TYPE 2 DIABETES MELLITUS, WITH NECROSIS OF BONE (HCC): ICD-10-CM

## 2019-03-18 DIAGNOSIS — E11.621 DIABETIC ULCER OF LEFT HEEL ASSOCIATED WITH TYPE 2 DIABETES MELLITUS, WITH NECROSIS OF BONE (HCC): ICD-10-CM

## 2019-03-18 PROCEDURE — 97607 NEG PRS WND THR NDME<=50SQCM: CPT

## 2019-03-18 PROCEDURE — 11042 DBRDMT SUBQ TIS 1ST 20SQCM/<: CPT

## 2019-03-25 ENCOUNTER — HOSPITAL ENCOUNTER (OUTPATIENT)
Dept: WOUND CARE | Age: 64
Discharge: HOME | End: 2019-03-25
Payer: COMMERCIAL

## 2019-03-25 VITALS
TEMPERATURE: 97.9 F | DIASTOLIC BLOOD PRESSURE: 66 MMHG | SYSTOLIC BLOOD PRESSURE: 126 MMHG | RESPIRATION RATE: 18 BRPM | HEART RATE: 92 BPM

## 2019-03-25 PROCEDURE — 97597 DBRDMT OPN WND 1ST 20 CM/<: CPT

## 2019-04-01 ENCOUNTER — HOSPITAL ENCOUNTER (OUTPATIENT)
Dept: WOUND CARE | Age: 64
Discharge: HOME | End: 2019-04-01
Payer: COMMERCIAL

## 2019-04-01 VITALS
RESPIRATION RATE: 20 BRPM | DIASTOLIC BLOOD PRESSURE: 76 MMHG | HEART RATE: 88 BPM | SYSTOLIC BLOOD PRESSURE: 130 MMHG | TEMPERATURE: 98.6 F

## 2019-04-01 PROCEDURE — 11042 DBRDMT SUBQ TIS 1ST 20SQCM/<: CPT

## 2019-04-01 NOTE — PROGRESS NOTES
Wound Healing Center Followup Visit Note    Referring Physician : DO Kraig Byrd [facility]  MEDICAL RECORD NUMBER:  [id number]  AGE: 75 y.o. GENDER: female  : 1955  EPISODE DATE:  2019    Subjective:     Chief Complaint   Patient presents with    Wound Check     Patient here for treatment of left heel ulcer      HISTORY of PRESENT ILLNESS HPI   Pop Miner is a 75 y.o. female who presents today in regards to follow up evaluation and treatment of wound/ulcer. That patient's past medical, family and social hx were reviewed and changes were made if present.     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, with necrosis of bone ([facility] 2018    Hx of blood clots     PE, FROM TAKING BC PILLS    Hyperlipemia     no med    Thyroid disease      Past Surgical History:   Procedure Laterality Date    COLONOSCOPY      DILATION AND CURETTAGE OF UTERUS      ECHO COMPL W DOP COLOR FLOW  2013         ECHOCARDIOGRAM TRANSESOPHAGEAL  2013         FOOT SURGERY  13    i&d left foot and ankle with bone biopsy    HYSTERECTOMY  1998    ovaries removed Dr Malika Roche PST LIP Left 2018    PARTIAL CALCANECTOMY LEFT FOOT, EXCISIONAL DEBRIDEMENT MUSCLE LEFT FOOT performed by Jewel Crowley DPM at [address] [facility] [place] DEBRIDEMENT, SKIN, SUB-Q TISSUE,MUSCLE,BONE,=<20 SQ CM Left 2018    EXCISIONAL DEBRIDEMENT SUBQUTANEOUS MUSCLE , BONE LEFT HEEL performed by Duane Providence, DPM at [address] [facility] FOOT INFEC,1 BURSA Left 4/10/2018    LEFT HEEL DEBRIDEMENT, BONE BIOPSY performed by Katlyn Guerin DPM at [address] [facility] OFFICE/OUTPT VISIT,PROCEDURE ONLY Left 10/19/2018    PARTIAL LEFT CALCANECTOMY performed by Duane Providence, DPM at [facility]    WISDOM TOOTH EXTRACTION       Family History   Problem Relation Age of Onset   Blossom Romano Alzheimer's Disease Mother     Other Father         pacemaker    Other Maternal Grandfather         aneurysm     Social History     Tobacco Use    Smoking status: Never Smoker    Smokeless tobacco: Never Used   Substance Use Topics    Alcohol use: No    Drug use: No     Allergies   Allergen Reactions    Codeine Nausea And Vomiting     Current Outpatient Medications on File Prior to Encounter   Medication Sig Dispense Refill    Lactobacillus-Inulin ([address] PO) Take 1 capsule by mouth every morning      doxycycline hyclate (VIBRA-TABS) 100 MG tablet Take 100 mg by mouth 2 times daily      cefdinir (OMNICEF) 300 MG capsule Take 300 mg by mouth 2 times daily      pravastatin (PRAVACHOL) 40 MG tablet Take 20 mg by mouth every other day      gabapentin (NEURONTIN) 300 MG capsule Take 300 mg by mouth 2 times daily. Dois [employer] glipiZIDE (GLUCOTROL XL) 10 MG extended release tablet Take 1 tablet by mouth daily      VELTASSA 8.4 g PACK Take 1 packet by mouth daily (before lunch)   1    vitamin C (ASCORBIC ACID) 500 MG tablet Take 500 mg by mouth daily       sodium bicarbonate 325 MG tablet Take 650 mg by mouth 2 times daily       Cholecalciferol (VITAMIN D3) 2000 UNITS CAPS Take 4,000 Units by mouth daily       metformin (GLUCOPHAGE) 500 MG tablet Take 1,000 mg by mouth 2 times daily (with meals)       levothyroxine (SYNTHROID) 25 MCG tablet Take 50 mcg by mouth daily       acetaminophen 650 MG TABS Take 650 mg by mouth every 4 hours as needed (Fever >100.5 F (38 C)).  120 tablet      No Foot Left (Active)   Number of days: 1961       Incision 04/10/18 Foot Left (Active)   Number of days: 356       Incision 08/16/18 Foot Left (Active)   Number of days: 228       Incision 09/20/18 Foot Left (Active)   Number of days: 193       Incision 10/19/18 Heel Left (Active)   Number of days: 164       Wound 11/30/18 Heel Left;Plantar #1 (acquired 4-6-18) Grade 3 (Active)   Wound Image   3/11/2019  1:32 PM   Dressing Status Clean;Dry; Intact 4/1/2019  2:26 PM   Dressing Changed Changed/New 4/1/2019  2:26 PM   Dressing/Treatment Vacuum dressing 4/1/2019  2:26 PM   Wound Cleansed Rinsed/Irrigated with saline 4/1/2019  2:26 PM   Wound Length (cm) 5.5 cm 4/1/2019  1:01 PM   Wound Width (cm) 3.3 cm 4/1/2019  1:01 PM   Wound Depth (cm) 3.5 cm 4/1/2019  1:01 PM   Wound Surface Area (cm^2) 18.15 cm^2 4/1/2019  1:01 PM   Change in Wound Size % (l*w) 59.02 4/1/2019  1:01 PM   Wound Volume (cm^3) 63.52 cm^3 4/1/2019  1:01 PM   Wound Healing % 51 4/1/2019  1:01 PM   Post-Procedure Length (cm) 5.5 cm 4/1/2019  1:48 PM   Post-Procedure Width (cm) 3.3 cm 4/1/2019  1:48 PM   Post-Procedure Depth (cm) 3.6 cm 4/1/2019  1:48 PM   Post-Procedure Surface Area (cm^2) 18.15 cm^2 4/1/2019  1:48 PM   Post-Procedure Volume (cm^3) 65.34 cm^3 4/1/2019  1:48 PM   Distance Tunneling (cm) 0.6 cm 1/14/2019  1:38 PM   Tunneling Position ___ O'Clock 12 1/14/2019  1:38 PM   Undermining Starts ___ O'Clock 6 4/1/2019  1:01 PM   Undermining Ends___ O'Clock 8 4/1/2019  1:01 PM   Undermining Maxium Distance (cm) 1.9 @7 4/1/2019  1:01 PM   Wound Assessment Yellow;Red;Pink 4/1/2019  1:01 PM   Drainage Amount Large 4/1/2019  1:01 PM   Drainage Description Serosanguinous 4/1/2019  1:01 PM   Odor None 3/25/2019  1:49 PM   Kaya-wound Assessment Maceration;Pink; White 4/1/2019  1:01 PM   Number of days: 122     Percent of Wound/Ulcer Debrided: 30%    Total Surface Area Debrided:  6 sq cm     Estimated Blood Loss:  Minimal  Hemostasis Achieved:  by silver nitrate stick    Procedural Pain:  0  / 10   Post Procedural Pain:  0 / 10     Response to treatment:  Well tolerated by patient. Plan:   Treatment Note please see attached Discharge Instructions    Written patient dismissal instructions given to patient and signed by patient or POA. Discharge Instructions       Visit Discharge/Physician Orders     Discharge condition: Stable     Assessment of pain at discharge:no     Anesthetic used: NONE     Discharge to: Home     Left via:Private automobile     Accompanied by:  spouse     ECF/HHA: [facility]- PHYSICAL THERAPY TO EVAL AND TREAT 50% weight bearing left foot     Dressing Orders: LEFT HEEL ULCER-Cleanse with vashe, continue wound vac at 125 mmhg continuouse. Place duoderm CGF around wound as to border wound. Continue to bridge.  Change mondays at wound care, wednesdays and fridays at home.      Treatment Orders:Eat a diet high in protein and vitamin C. Take a multiple vitamin daily unless contraindicated.     Dr. Justino Landa 3/26/19     Keep heel clean and dry     [facility] followup visit : _____________1 WEEK DR. ORDRIGUES_______________________  (Please note your next appointment above and if you are unable to keep, kindly give a 24 hour notice.  Thank you.)     Physician signature:__________________________        If you experience any of the following, please call the [facility] during business hours:     * Increase in Pain  * Temperature over 101  * Increase in drainage from your wound  * Drainage with a foul odor  * Bleeding  * Increase in swelling  * Need for compression bandage changes due to slippage, breakthrough drainage.     If you need medical attention outside of the business hours of the [facility] please contact your PCP or go to the nearest emergency room        Electronically signed by Lucy Potts DPM on 4/1/2019 at 2:31 PM

## 2019-04-08 ENCOUNTER — HOSPITAL ENCOUNTER (OUTPATIENT)
Dept: WOUND CARE | Age: 64
Discharge: HOME | End: 2019-04-08
Payer: COMMERCIAL

## 2019-04-08 VITALS
HEART RATE: 64 BPM | SYSTOLIC BLOOD PRESSURE: 130 MMHG | RESPIRATION RATE: 16 BRPM | TEMPERATURE: 99.6 F | BODY MASS INDEX: 26.52 KG/M2 | HEIGHT: 66 IN | DIASTOLIC BLOOD PRESSURE: 70 MMHG | WEIGHT: 165 LBS

## 2019-04-08 DIAGNOSIS — E11.621 DIABETIC ULCER OF LEFT HEEL ASSOCIATED WITH TYPE 2 DIABETES MELLITUS, WITH NECROSIS OF BONE (HCC): ICD-10-CM

## 2019-04-08 DIAGNOSIS — L97.424 DIABETIC ULCER OF LEFT HEEL ASSOCIATED WITH TYPE 2 DIABETES MELLITUS, WITH NECROSIS OF BONE (HCC): ICD-10-CM

## 2019-04-08 PROCEDURE — 11042 DBRDMT SUBQ TIS 1ST 20SQCM/<: CPT

## 2019-04-08 PROCEDURE — 97607 NEG PRS WND THR NDME<=50SQCM: CPT

## 2019-04-08 NOTE — PROGRESS NOTES
Wound Healing Center Followup Visit Note    Referring Physician : DO Derek Garzon [place] [facility]  MEDICAL RECORD NUMBER:  [id number]  AGE: 75 y.o. GENDER: female  : 1955  EPISODE DATE:  2019    Subjective:     Chief Complaint   Patient presents with    Wound Check     patient here for treatment of left heel ulcer      HISTORY of PRESENT ILLNESS JANE Phelps is a 75 y.o. female who presents today in regards to follow up evaluation and treatment of wound/ulcer. That patient's past medical, family and social hx were reviewed and changes were made if present.     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, with necrosis of bone ([facility] 2018    Hx of blood clots     PE, FROM TAKING BC PILLS    Hyperlipemia     no med    Thyroid disease      Past Surgical History:   Procedure Laterality Date    COLONOSCOPY      DILATION AND CURETTAGE OF UTERUS      ECHO COMPL W DOP COLOR FLOW  2013         ECHOCARDIOGRAM TRANSESOPHAGEAL  2013         FOOT SURGERY  13    i&d left foot and ankle with bone biopsy    HYSTERECTOMY  1998    ovaries removed Dr Hansa Alegre PST LIP Left 2018    PARTIAL CALCANECTOMY LEFT FOOT, EXCISIONAL DEBRIDEMENT MUSCLE LEFT FOOT performed by Patrice Chaney DPM at [address] [facility] DEBRIDEMENT, SKIN, SUB-Q TISSUE,MUSCLE,BONE,=<20 SQ CM Left 2018    EXCISIONAL DEBRIDEMENT SUBQUTANEOUS MUSCLE , current facility-administered medications on file prior to encounter. REVIEW OF SYSTEMS See HPI    Objective:    /70   Pulse 64   Temp 99.6 °F (37.6 °C) (Oral)   Resp 16   Ht 5' 6\" (1.676 m)   Wt 165 lb (74.8 kg)   BMI 26.63 kg/m²   Wt Readings from Last 3 Encounters:   04/08/19 165 lb (74.8 kg)   03/18/19 165 lb (74.8 kg)   03/11/19 165 lb (74.8 kg)     PHYSICAL EXAM  CONSTITUTIONAL:   Awake, alert, cooperative   EYES:  lids and lashes normal   ENT: external ears and nose without lesions   NECK:  supple, symmetrical, trachea midline   SKIN:  Open wound Present, 20% devitalized nonviable tissue, no purulence or odor  VSI  LOPS  Charcot changes    Assessment:     DM with ulcer. Neuropathy. Procedure Note  Indications:  Based on my examination of this patient's wound(s)/ulcer(s) today, debridement is required to promote healing and evaluate the wound base. Performed by: Ollie Bennett DPM    Consent obtained:  Yes    Time out taken:  Yes    Pain Control: Anesthetic  Anesthetic: None     Debridement:Excisional Debridement    Using curette the wound(s)/ulcer(s) was/were sharply debrided down through and including the removal of subcutaneous tissue. Devitalized Tissue Debrided:  fibrin, biofilm and slough to stimulate bleeding to promote healing, post debridement good bleeding base and wound edges noted    Pre Debridement Measurements:  Are located in the Wound/Ulcer Documentation Flow Sheet    Wound/Ulcer #: 1    Post Debridement Measurements:  Wound/Ulcer Descriptions are Pre Debridement except measurements:    Negative Pressure Wound Therapy Foot Left; Lower (Active)   Number of days: 1967       Negative Pressure Wound Therapy Heel Left (Active)   Number of days: 361       Wound 08/20/13 Heel Left (Active)   Number of days: 2056       Wound 11/16/13 Other (Comment) Ankle Left; Outer Red, swollen, shiny, hot area of outer L ankle (Active)   Number of days: 1969       Wound 04/09/18 Heel Left small round  (Active)   Number of days: 364       Incision 11/17/13 Foot Left (Active)   Number of days: 1968       Incision 04/10/18 Foot Left (Active)   Number of days: 363       Incision 08/16/18 Foot Left (Active)   Number of days: 235       Incision 09/20/18 Foot Left (Active)   Number of days: 200       Incision 10/19/18 Heel Left (Active)   Number of days: 171       Wound 11/30/18 Heel Left;Plantar #1 (acquired 4-6-18) Grade 3 (Active)   Wound Image   4/8/2019 12:49 PM   Dressing Status Clean;Dry; Intact 4/1/2019  2:26 PM   Dressing Changed Changed/New 4/1/2019  2:26 PM   Dressing/Treatment Vacuum dressing 4/1/2019  2:26 PM   Wound Cleansed Rinsed/Irrigated with saline 4/1/2019  2:26 PM   Wound Length (cm) 5.8 cm 4/8/2019 12:49 PM   Wound Width (cm) 3.4 cm 4/8/2019 12:49 PM   Wound Depth (cm) 4.2 cm 4/8/2019 12:49 PM   Wound Surface Area (cm^2) 19.72 cm^2 4/8/2019 12:49 PM   Change in Wound Size % (l*w) 55.48 4/8/2019 12:49 PM   Wound Volume (cm^3) 82.82 cm^3 4/8/2019 12:49 PM   Wound Healing % 36 4/8/2019 12:49 PM   Post-Procedure Length (cm) 5.8 cm 4/8/2019  1:24 PM   Post-Procedure Width (cm) 3.4 cm 4/8/2019  1:24 PM   Post-Procedure Depth (cm) 4.2 cm 4/8/2019  1:24 PM   Post-Procedure Surface Area (cm^2) 19.72 cm^2 4/8/2019  1:24 PM   Post-Procedure Volume (cm^3) 82.82 cm^3 4/8/2019  1:24 PM   Distance Tunneling (cm) 0.6 cm 1/14/2019  1:38 PM   Tunneling Position ___ O'Clock 12 1/14/2019  1:38 PM   Undermining Starts ___ O'Clock 6 4/8/2019 12:49 PM   Undermining Ends___ O'Clock 8 4/8/2019 12:49 PM   Undermining Maxium Distance (cm) 0.7 @ 7 4/8/2019 12:49 PM   Wound Assessment Yellow;Red;Pink 4/8/2019 12:49 PM   Drainage Amount Large 4/8/2019 12:49 PM   Drainage Description Serosanguinous 4/8/2019 12:49 PM   Odor None 4/8/2019 12:49 PM   Kaya-wound Assessment Maceration;Pink; White 4/8/2019 12:49 PM   Number of days: 129     Percent of Wound/Ulcer Debrided: 20%    Total Surface Area Debrided:  4 sq cm Estimated Blood Loss:  Minimal  Hemostasis Achieved:  by silver nitrate stick    Procedural Pain:  0  / 10   Post Procedural Pain:  0 / 10     Response to treatment:  Well tolerated by patient. Plan:   Treatment Note please see attached Discharge Instructions    Written patient dismissal instructions given to patient and signed by patient or POA. Discharge Instructions       Visit Discharge/Physician Orders     Discharge condition: Stable     Assessment of pain at discharge:no     Anesthetic used: NONE     Discharge to: Home     Left via:Private automobile     Accompanied by:  spouse     ECF/HHA: [facility]- PHYSICAL THERAPY TO EVAL AND TREAT 50% weight bearing left foot     Dressing Orders: LEFT HEEL ULCER-Cleanse with vashe, continue wound vac at 125 mmhg continuouse. Place duoderm CGF around wound as to border wound. Continue to bridge.  Change mondays at wound care, wednesdays and fridays at home.      Treatment Orders:Eat a diet high in protein and vitamin C. Take a multiple vitamin daily unless contraindicated.          Keep heel clean and dry     [facility] followup visit : _____________1 WEEK DR. RODRIGUES_______________________  (Please note your next appointment above and if you are unable to keep, kindly give a 24 hour notice.  Thank you.)     Physician signature:__________________________        If you experience any of the following, please call the [facility] during business hours:     * Increase in Pain  * Temperature over 101  * Increase in drainage from your wound  * Drainage with a foul odor  * Bleeding  * Increase in swelling  * Need for compression bandage changes due to slippage, breakthrough drainage.     If you need medical attention outside of the business hours of the [facility] [address] please contact your PCP or go to the nearest emergency room                 Electronically signed by Lucas Sharma DPM on 4/8/2019 at 1:31 PM

## 2019-04-15 ENCOUNTER — HOSPITAL ENCOUNTER (OUTPATIENT)
Dept: WOUND CARE | Age: 64
Discharge: HOME | End: 2019-04-15
Payer: COMMERCIAL

## 2019-04-15 VITALS
SYSTOLIC BLOOD PRESSURE: 140 MMHG | DIASTOLIC BLOOD PRESSURE: 80 MMHG | BODY MASS INDEX: 26.52 KG/M2 | TEMPERATURE: 98.4 F | HEART RATE: 88 BPM | HEIGHT: 66 IN | RESPIRATION RATE: 18 BRPM | WEIGHT: 165 LBS

## 2019-04-15 DIAGNOSIS — E11.621 DIABETIC ULCER OF LEFT HEEL ASSOCIATED WITH TYPE 2 DIABETES MELLITUS, WITH NECROSIS OF BONE (HCC): ICD-10-CM

## 2019-04-15 DIAGNOSIS — L97.424 DIABETIC ULCER OF LEFT HEEL ASSOCIATED WITH TYPE 2 DIABETES MELLITUS, WITH NECROSIS OF BONE (HCC): ICD-10-CM

## 2019-04-15 PROCEDURE — 97597 DBRDMT OPN WND 1ST 20 CM/<: CPT

## 2019-04-15 PROCEDURE — 97607 NEG PRS WND THR NDME<=50SQCM: CPT

## 2019-04-15 RX ORDER — CYANOCOBALAMIN 1000 UG/ML
1000 INJECTION INTRAMUSCULAR; SUBCUTANEOUS
COMMUNITY
End: 2020-06-15 | Stop reason: ALTCHOICE

## 2019-04-15 RX ORDER — FERROUS SULFATE 325(65) MG
325 TABLET ORAL 2 TIMES DAILY
COMMUNITY
End: 2019-07-29 | Stop reason: ALTCHOICE

## 2019-04-15 ASSESSMENT — PAIN SCALES - GENERAL: PAINLEVEL_OUTOF10: 0

## 2019-04-15 NOTE — PROGRESS NOTES
W[facility] Healing Center Followup Visit Note    Referring Physician : Willis Cockayne, DO Lanetta Saxon Mary  MEDICAL RECORD NUMBER:  [id number]  AGE: 75 y.o. GENDER: female  : 1955  EPISODE DATE:  4/15/2019    Subjective:     Chief Complaint   Patient presents with    Wound Check     lt heel      HISTORY of PRESENT ILLNESS HPI   Bulloch Switchback is a 75 y.o. female who presents today in regards to follow up evaluation and treatment of wound/ulcer. That patient's past medical, family and social hx were reviewed and changes were made if present.     History of Wound Context:  Follow up and debridement     Wound/Ulcer Pain Timing/Severity: none  Quality of pain: N/A  Severity:  0 / 10   Modifying Factors: None  Associated Signs/Symptoms: none    Ulcer Identification:  Ulcer Type:  Diabetic  Contributing Factors: diabetes    Diabetic/Pressure/Non Pressure Ulcers only:  Ulcer: Diabetic ulcer, fat layer exposed    Wound: Internal surgical dehiscence        PAST MEDICAL HISTORY      Diagnosis Date    Diabetes mellitus ([address]     Diabetic ulcer of left heel associated with type 2 diabetes mellitus, with necrosis of bone ([address] 2018    Hx of blood clots     PE, FROM TAKING BC PILLS    Hyperlipemia     no med    Thyroid disease      Past Surgical History:   Procedure Laterality Date    COLONOSCOPY      DILATION AND CURETTAGE OF UTERUS      ECHO COMPL W DOP COLOR FLOW  2013         ECHOCARDIOGRAM TRANSESOPHAGEAL  2013         FOOT SURGERY  13    i&d left foot and ankle with bone biopsy    HYSTERECTOMY  1998    ovaries removed Dr Irene Kaufman PST LIP Left 2018    PARTIAL CALCANECTOMY LEFT FOOT, EXCISIONAL DEBRIDEMENT MUSCLE LEFT FOOT performed by Melissa Wilson DPM at [address] [facility] DE DEBRIDEMENT, SKIN, SUB-Q TISSUE,MUSCLE,BONE,=<20 SQ CM Left 2018    EXCISIONAL DEBRIDEMENT SUBQUTANEOUS MUSCLE , BONE LEFT HEEL performed by Carlos Manuel Null DPM at [facility] FOOT INFEC,1 BURSA Left 4/10/2018    LEFT HEEL DEBRIDEMENT, BONE BIOPSY performed by Damir Michael DPM at [facility] OFFICE/OUTPT VISIT,PROCEDURE ONLY Left 10/19/2018    PARTIAL LEFT CALCANECTOMY performed by Carlos Manuel Null DPM at [facility] OR    WISDOM TOOTH EXTRACTION       Family History   Problem Relation Age of Onset   [facility] Alzheimer's Disease Mother     Other Father         pacemaker    Other Maternal Grandfather         aneurysm     Social History     Tobacco Use    Smoking status: Never Smoker    Smokeless tobacco: Never Used   Substance Use Topics    Alcohol use: No    Drug use: No     Allergies   Allergen Reactions    Codeine Nausea And Vomiting     Current Outpatient Medications on File Prior to Encounter   Medication Sig Dispense Refill    ferrous sulfate 325 (65 Fe) MG tablet Take 325 mg by mouth 2 times daily      doxycycline hyclate (VIBRA-TABS) 100 MG tablet Take 100 mg by mouth 2 times daily      cefdinir (OMNICEF) 300 MG capsule Take 300 mg by mouth 2 times daily      pravastatin (PRAVACHOL) 40 MG tablet Take 20 mg by mouth every other day      gabapentin (NEURONTIN) 300 MG capsule Take 300 mg by mouth 2 times daily. [place]avilla glipiZIDE (GLUCOTROL XL) 10 MG extended release tablet Take 1 tablet by mouth daily      VELTASSA 8.4 g PACK Take 1 packet by mouth daily (before lunch)   1    vitamin C (ASCORBIC ACID) 500 MG tablet Take 500 mg by mouth daily       sodium bicarbonate 325 MG tablet Take 650 mg by mouth 2 times daily       Cholecalciferol (VITAMIN D3) 2000 UNITS CAPS Take 4,000 Units by mouth daily       metformin (GLUCOPHAGE) 500 MG tablet Take 1,000 mg by mouth 2 times daily (with meals)       levothyroxine (SYNTHROID) 25 MCG tablet Take 50 mcg by mouth daily       cyanocobalamin 1000 MCG/ML injection Inject 1,000 mcg into the muscle every 7 days      Lactobacillus-Inulin ([facility]) Take 1 capsule by mouth every morning      acetaminophen 650 MG TABS Take 650 mg by mouth every 4 hours as needed (Fever >100.5 F (38 C)). 120 tablet      No current facility-administered medications on file prior to encounter. REVIEW OF SYSTEMS See HPI    Objective:    BP (!) 140/80   Pulse 88   Temp 98.4 °F (36.9 °C) (Oral)   Resp 18   Ht 5' 6\" (1.676 m)   Wt 165 lb (74.8 kg)   BMI 26.63 kg/m²   Wt Readings from Last 3 Encounters:   04/15/19 165 lb (74.8 kg)   04/08/19 165 lb (74.8 kg)   03/18/19 165 lb (74.8 kg)     PHYSICAL EXAM  CONSTITUTIONAL:   Awake, alert, cooperative   EYES:  lids and lashes normal     ENT: external ears and nose without lesions     NECK:  supple, symmetrical, trachea midline     SKIN:  Open wound Present    Assessment:     Problem List Items Addressed This Visit     Diabetic ulcer of left heel associated with type 2 diabetes mellitus, with necrosis of bone ([facility]        Procedure Note  Indications:  Based on my examination of this patient's wound(s)/ulcer(s) today, debridement is required to promote healing and evaluate the wound base. Performed by: Yadira Doyle DPM    Consent obtained:  Yes    Time out taken:  Yes    Pain Control: Anesthetic  Anesthetic: 2% Lidocaine Gel Topical     Debridement:Excisional Debridement    Using curette the wound(s)/ulcer(s) was/were sharply debrided down through and including the removal of subcutaneous tissue. Devitalized Tissue Debrided:  fibrin, slough and callus to stimulate bleeding to promote healing, post debridement good bleeding base and wound edges noted    Pre Debridement Measurements:  Are located in the Wound/Ulcer Documentation Flow Sheet    Wound/Ulcer #: 1    Post Debridement Measurements:  Wound/Ulcer Descriptions are Pre Debridement except measurements:    Negative Pressure Wound Therapy Foot Left; Lower (Active)   Number of days: 1974       Negative Pressure Wound Therapy Heel Left (Active)   Number of days: 368 Wound 08/20/13 Heel Left (Active)   Number of days: 2063       Wound 11/16/13 Other (Comment) Ankle Left; Outer Red, swollen, shiny, hot area of outer L ankle (Active)   Number of days: 1976       Wound 04/09/18 Heel Left small round  (Active)   Number of days: 371       Incision 11/17/13 Foot Left (Active)   Number of days: 1975       Incision 04/10/18 Foot Left (Active)   Number of days: 370       Incision 08/16/18 Foot Left (Active)   Number of days: 242       Incision 09/20/18 Foot Left (Active)   Number of days: 207       Incision 10/19/18 Heel Left (Active)   Number of days: 178       Wound 11/30/18 Heel Left;Plantar #1 (acquired 4-6-18) Grade 3 (Active)   Wound Image   4/8/2019 12:49 PM   Dressing Status Clean;Dry; Intact 4/8/2019  2:05 PM   Dressing Changed Changed/New 4/8/2019  2:05 PM   Dressing/Treatment Vacuum dressing 4/8/2019  2:05 PM   Wound Cleansed Rinsed/Irrigated with saline 4/8/2019  2:05 PM   Wound Length (cm) 5.2 cm 4/15/2019  1:39 PM   Wound Width (cm) 3 cm 4/15/2019  1:39 PM   Wound Depth (cm) 1.8 cm 4/15/2019  1:39 PM   Wound Surface Area (cm^2) 15.6 cm^2 4/15/2019  1:39 PM   Change in Wound Size % (l*w) 64.78 4/15/2019  1:39 PM   Wound Volume (cm^3) 28.08 cm^3 4/15/2019  1:39 PM   Wound Healing % 78 4/15/2019  1:39 PM   Post-Procedure Length (cm) 5.2 cm 4/15/2019  2:00 PM   Post-Procedure Width (cm) 3 cm 4/15/2019  2:00 PM   Post-Procedure Depth (cm) 1.9 cm 4/15/2019  2:00 PM   Post-Procedure Surface Area (cm^2) 15.6 cm^2 4/15/2019  2:00 PM   Post-Procedure Volume (cm^3) 29.64 cm^3 4/15/2019  2:00 PM   Distance Tunneling (cm) 1.8 cm 4/15/2019  1:39 PM   Tunneling Position ___ O'Clock 12 4/15/2019  1:39 PM   Undermining Starts ___ O'Clock 3 4/15/2019  1:39 PM   Undermining Ends___ O'Clock 4 4/15/2019  1:39 PM   Undermining Maxium Distance (cm) 2.7 4/15/2019  1:39 PM   Wound Assessment Yellow;Red;Pink 4/15/2019  1:39 PM   Drainage Amount Moderate 4/15/2019  1:39 PM   Drainage Description Serosanguinous 4/15/2019  1:39 PM   Odor None 4/15/2019  1:39 PM   Kaya-wound Assessment Maceration;Pink; White 4/15/2019  1:39 PM   Number of days: 136     Percent of Wound/Ulcer Debrided: 100%    Total Surface Area Debrided:  20 sq cm     Estimated Blood Loss:  Minimal  Hemostasis Achieved:  by pressure    Procedural Pain:  0  / 10   Post Procedural Pain:  0 / 10     Response to treatment:  Well tolerated by patient. Plan:   Treatment Note please see attached Discharge Instructions    Written patient dismissal instructions given to patient and signed by patient or POA. Discharge Instructions       Visit Discharge/Physician Orders     Discharge condition: Stable     Assessment of pain at discharge:no     Anesthetic used: NONE     Discharge to: Home     Left via:Private automobile     Accompanied by:  spouse     ECF/HHA: [facility]- PHYSICAL THERAPY TO EVAL AND TREAT 50% weight bearing left foot     Dressing Orders: LEFT HEEL ULCER-Cleanse with vashe, continue wound vac at 125 mmhg continuouse. Place duoderm CGF around wound as to border wound. Continue to bridge.  Change mondays at wound care, wednesdays and fridays at home.      Treatment Orders:Eat a diet high in protein and vitamin C. Take a multiple vitamin daily unless contraindicated.     Keep heel clean and dry     [facility] followup visit : _____________1 WEEK DR. RODRIGUES_______________________  (Please note your next appointment above and if you are unable to keep, kindly give a 24 hour notice.  Thank you.)     Physician signature:__________________________        If you experience any of the following, please call the [address] [place] [address] Road during business hours:     * Increase in Pain  * Temperature over 101  * Increase in drainage from your wound  * Drainage with a foul odor  * Bleeding  * Increase in swelling  * Need for compression bandage changes due to slippage, breakthrough drainage.     If you need medical attention outside of the business hours of the [address] [facility] [address] please contact your PCP or go to the nearest emergency room                                 Electronically signed by Nikhil Sow DPM on 4/15/2019 at 2:16 PM

## 2019-04-22 ENCOUNTER — HOSPITAL ENCOUNTER (OUTPATIENT)
Dept: WOUND CARE | Age: 64
Discharge: HOME | End: 2019-04-22
Payer: COMMERCIAL

## 2019-04-22 VITALS
HEART RATE: 88 BPM | RESPIRATION RATE: 18 BRPM | DIASTOLIC BLOOD PRESSURE: 70 MMHG | TEMPERATURE: 98.1 F | SYSTOLIC BLOOD PRESSURE: 128 MMHG

## 2019-04-22 PROCEDURE — 11042 DBRDMT SUBQ TIS 1ST 20SQCM/<: CPT

## 2019-04-22 PROCEDURE — 97607 NEG PRS WND THR NDME<=50SQCM: CPT

## 2019-04-22 NOTE — PROGRESS NOTES
Wound Healing Center Followup Visit Note    Referring Physician : DO Idalia Estrada [facility]  MEDICAL RECORD NUMBER:  [id number]  AGE: 75 y.o. GENDER: female  : 1955  EPISODE DATE:  2019    Subjective:     Chief Complaint   Patient presents with    Wound Check     left foot      HISTORY of PRESENT ILLNESS HPI   Anuel Valera is a 75 y.o. female who presents today in regards to follow up evaluation and treatment of wound/ulcer. That patient's past medical, family and social hx were reviewed and changes were made if present.     History of Wound Context:  Follow up and debridement     Wound/Ulcer Pain Timing/Severity: none  Quality of pain: N/A  Severity:  0 / 10   Modifying Factors: None  Associated Signs/Symptoms: none    Ulcer Identification:  Ulcer Type: diabetic  Contributing Factors: diabetes    Diabetic/Pressure/Non Pressure Ulcers only:  Ulcer: Diabetic ulcer, fat layer exposed    Wound: N/A        PAST MEDICAL HISTORY      Diagnosis Date    Diabetes mellitus ([address]     Diabetic ulcer of left heel associated with type 2 diabetes mellitus, with necrosis of bone ([address] 2018    Hx of blood clots     PE, FROM TAKING BC PILLS    Hyperlipemia     no med    Thyroid disease      Past Surgical History:   Procedure Laterality Date    COLONOSCOPY      DILATION AND CURETTAGE OF UTERUS      ECHO COMPL W DOP COLOR FLOW  2013         ECHOCARDIOGRAM TRANSESOPHAGEAL  2013         FOOT SURGERY  13    i&d left foot and ankle with bone biopsy    HYSTERECTOMY      ovaries removed Dr Ayah Fung PST LIP Left 2018    PARTIAL CALCANECTOMY LEFT FOOT, EXCISIONAL DEBRIDEMENT MUSCLE LEFT FOOT performed by Lela Brandon DPM at [place] [address] SKIN, SUB-Q TISSUE,MUSCLE,BONE,=<20 SQ CM Left 2018    EXCISIONAL DEBRIDEMENT SUBQUTANEOUS MUSCLE , BONE LEFT HEEL performed by Verenice Rodriguez RENATO Massey at [address] [facility] FOOT INFEC,1 BURSA Left 4/10/2018    LEFT HEEL DEBRIDEMENT, BONE BIOPSY performed by Genoveva Quan DPM at [address] [place] [address] OFFICE/OUTPT VISIT,PROCEDURE ONLY Left 10/19/2018    PARTIAL LEFT CALCANECTOMY performed by Marilyn Brown DPM at [facility] OR    WISDOM TOOTH EXTRACTION       Family History   Problem Relation Age of Onset   [facility] Alzheimer's Disease Mother     Other Father         pacemaker    Other Maternal Grandfather         aneurysm     Social History     Tobacco Use    Smoking status: Never Smoker    Smokeless tobacco: Never Used   Substance Use Topics    Alcohol use: No    Drug use: No     Allergies   Allergen Reactions    Codeine Nausea And Vomiting     Current Outpatient Medications on File Prior to Encounter   Medication Sig Dispense Refill    ferrous sulfate 325 (65 Fe) MG tablet Take 325 mg by mouth 2 times daily      Lactobacillus-Inulin ([facility]) Take 1 capsule by mouth every morning      doxycycline hyclate (VIBRA-TABS) 100 MG tablet Take 100 mg by mouth 2 times daily      cefdinir (OMNICEF) 300 MG capsule Take 300 mg by mouth 2 times daily      pravastatin (PRAVACHOL) 40 MG tablet Take 20 mg by mouth every other day      gabapentin (NEURONTIN) 300 MG capsule Take 300 mg by mouth 2 times daily. Greg Smith glipiZIDE (GLUCOTROL XL) 10 MG extended release tablet Take 1 tablet by mouth daily      VELTASSA 8.4 g PACK Take 1 packet by mouth daily (before lunch)   1    vitamin C (ASCORBIC ACID) 500 MG tablet Take 500 mg by mouth daily       sodium bicarbonate 325 MG tablet Take 650 mg by mouth 2 times daily       Cholecalciferol (VITAMIN D3) 2000 UNITS CAPS Take 4,000 Units by mouth daily       metformin (GLUCOPHAGE) 500 MG tablet Take 1,000 mg by mouth 2 times daily (with meals)       levothyroxine (SYNTHROID) 25 MCG tablet Take 50 mcg by mouth daily       cyanocobalamin 1000 MCG/ML injection Inject 1,000 mcg into the muscle every 7 days      acetaminophen 650 MG TABS Take 650 mg by mouth every 4 hours as needed (Fever >100.5 F (38 C)). 120 tablet      No current facility-administered medications on file prior to encounter. REVIEW OF SYSTEMS See HPI    Objective:    /70   Pulse 88   Temp 98.1 °F (36.7 °C) (Oral)   Resp 18   Wt Readings from Last 3 Encounters:   04/15/19 165 lb (74.8 kg)   04/08/19 165 lb (74.8 kg)   03/18/19 165 lb (74.8 kg)     PHYSICAL EXAM  CONSTITUTIONAL:   Awake, alert, cooperative   EYES:  lids and lashes normal   ENT: external ears and nose without lesions   NECK:  supple, symmetrical, trachea midline   SKIN:  Open wound Present    Assessment:     Problem List Items Addressed This Visit     None        Procedure Note  Indications:  Based on my examination of this patient's wound(s)/ulcer(s) today, debridement is required to promote healing and evaluate the wound base. Performed by: Patrice Chaney DPM    Consent obtained:  Yes    Time out taken:  Yes    Pain Control:       Debridement:Excisional Debridement    Using tissue nippers the wound(s)/ulcer(s) was/were sharply debrided down through and including the removal of subcutaneous tissue. Devitalized Tissue Debrided:  fibrin, biofilm, slough and callus to stimulate bleeding to promote healing, post debridement good bleeding base and wound edges noted    Pre Debridement Measurements:  Are located in the Wound/Ulcer Documentation Flow Sheet    Wound/Ulcer #: 1    Post Debridement Measurements:  Wound/Ulcer Descriptions are Pre Debridement except measurements:    Negative Pressure Wound Therapy Foot Left; Lower (Active)   Number of days: 1981       Negative Pressure Wound Therapy Heel Left (Active)   Number of days: 376       Wound 08/20/13 Heel Left (Active)   Number of days: 2070       Wound 11/16/13 Other (Comment) Ankle Left; Outer Red, swollen, shiny, hot area of outer L ankle (Active)   Number of days: 1983       Wound 04/09/18 Heel Left small round  (Active)   Number of days: 378       Incision 11/17/13 Foot Left (Active)   Number of days: 1982       Incision 04/10/18 Foot Left (Active)   Number of days: 377       Incision 08/16/18 Foot Left (Active)   Number of days: 249       Incision 09/20/18 Foot Left (Active)   Number of days: 214       Incision 10/19/18 Heel Left (Active)   Number of days: 185       Wound 11/30/18 Heel Left;Plantar #1 (acquired 4-6-18) Grade 3 (Active)   Wound Image   4/8/2019 12:49 PM   Dressing Status Clean;Dry; Intact 4/15/2019  2:48 PM   Dressing Changed Changed/New 4/15/2019  2:48 PM   Dressing/Treatment Vacuum dressing 4/15/2019  2:48 PM   Wound Cleansed Rinsed/Irrigated with saline 4/15/2019  2:48 PM   Wound Length (cm) 5.5 cm 4/22/2019  1:23 PM   Wound Width (cm) 3.4 cm 4/22/2019  1:23 PM   Wound Depth (cm) 2.2 cm 4/22/2019  1:23 PM   Wound Surface Area (cm^2) 18.7 cm^2 4/22/2019  1:23 PM   Change in Wound Size % (l*w) 57.78 4/22/2019  1:23 PM   Wound Volume (cm^3) 41.14 cm^3 4/22/2019  1:23 PM   Wound Healing % 68 4/22/2019  1:23 PM   Post-Procedure Length (cm) 5.5 cm 4/22/2019  2:25 PM   Post-Procedure Width (cm) 3.5 cm 4/22/2019  2:25 PM   Post-Procedure Depth (cm) 2.2 cm 4/22/2019  2:25 PM   Post-Procedure Surface Area (cm^2) 19.25 cm^2 4/22/2019  2:25 PM   Post-Procedure Volume (cm^3) 42.35 cm^3 4/22/2019  2:25 PM   Distance Tunneling (cm) 2.5 cm 4/22/2019  1:23 PM   Tunneling Position ___ O'Clock 12 4/22/2019  1:23 PM   Undermining Starts ___ O'Clock 3 4/15/2019  1:39 PM   Undermining Ends___ O'Clock 4 4/15/2019  1:39 PM   Undermining Maxium Distance (cm) 2.7 4/15/2019  1:39 PM   Wound Assessment Pink;Yellow 4/22/2019  1:23 PM   Drainage Amount Large 4/22/2019  1:23 PM   Drainage Description Serosanguinous 4/22/2019  1:23 PM   Odor None 4/22/2019  1:23 PM   Kaya-wound Assessment Pink; Maceration 4/22/2019  1:23 PM   Number of days: 143     Percent of Wound/Ulcer Debrided: 100%    Total Surface Area Debrided: 20 sq cm     Estimated Blood Loss:  Minimal  Hemostasis Achieved:  by pressure    Procedural Pain:  0  / 10   Post Procedural Pain:  0 / 10     Response to treatment:  Well tolerated by patient. Plan:   Treatment Note please see attached Discharge Instructions    Written patient dismissal instructions given to patient and signed by patient or POA. Discharge Instructions         Visit Discharge/Physician Orders     Discharge condition: Stable     Assessment of pain at discharge:no     Anesthetic used: NONE     Discharge to: Home     Left via:Private automobile     Accompanied by:  spouse     ECF/HHA: [facility]- PHYSICAL THERAPY TO EVAL AND TREAT 50% weight bearing left foot     Dressing Orders: LEFT HEEL ULCER-Cleanse with vashe, continue wound vac at 125 mmhg continuouse. Place duoderm CGF around wound as to border wound. Continue to bridge.  Change mondays at wound care, wednesdays and fridays at home.      Treatment Orders:Eat a diet high in protein and vitamin C. Take a multiple vitamin daily unless contraindicated.     Keep heel clean and dry     [facility] followup visit :  Next week surgery_______________________2 WEEKS DR. RODRIGUES_______________________  (Please note your next appointment above and if you are unable to keep, kindly give a 24 hour notice.  Thank you.)     Physician signature:__________________________        If you experience any of the following, please call the [address] during business hours:     * Increase in Pain  * Temperature over 101  * Increase in drainage from your wound  * Drainage with a foul odor  * Bleeding  * Increase in swelling  * Need for compression bandage changes due to slippage, breakthrough drainage.     If you need medical attention outside of the business hours of the [address] please contact your PCP or go to the nearest emergency room                                                        Electronically signed by Eduardo Ortega DPM on

## 2019-04-29 ENCOUNTER — HOSPITAL ENCOUNTER (OUTPATIENT)
Dept: WOUND CARE | Age: 64
Discharge: HOME | End: 2019-04-29
Payer: COMMERCIAL

## 2019-05-05 ENCOUNTER — PREP FOR PROCEDURE (OUTPATIENT)
Dept: SURGERY | Age: 64
End: 2019-05-05

## 2019-05-05 RX ORDER — SODIUM CHLORIDE 0.9 % (FLUSH) 0.9 %
10 SYRINGE (ML) INJECTION PRN
Status: CANCELLED | OUTPATIENT
Start: 2019-05-05

## 2019-05-05 RX ORDER — SODIUM CHLORIDE 0.9 % (FLUSH) 0.9 %
10 SYRINGE (ML) INJECTION EVERY 12 HOURS SCHEDULED
Status: CANCELLED | OUTPATIENT
Start: 2019-05-05

## 2019-05-06 ENCOUNTER — ANESTHESIA EVENT (OUTPATIENT)
Dept: OPERATING ROOM | Age: 64
End: 2019-05-06
Payer: COMMERCIAL

## 2019-05-06 ENCOUNTER — HOSPITAL ENCOUNTER (OUTPATIENT)
Age: 64
Setting detail: OUTPATIENT SURGERY
Discharge: HOME | End: 2019-05-06
Attending: PODIATRIST | Admitting: PODIATRIST
Payer: COMMERCIAL

## 2019-05-06 ENCOUNTER — ANESTHESIA (OUTPATIENT)
Dept: OPERATING ROOM | Age: 64
End: 2019-05-06
Payer: COMMERCIAL

## 2019-05-06 VITALS — OXYGEN SATURATION: 96 % | DIASTOLIC BLOOD PRESSURE: 67 MMHG | SYSTOLIC BLOOD PRESSURE: 114 MMHG

## 2019-05-06 VITALS
TEMPERATURE: 98 F | HEART RATE: 92 BPM | BODY MASS INDEX: 26.52 KG/M2 | SYSTOLIC BLOOD PRESSURE: 121 MMHG | RESPIRATION RATE: 18 BRPM | WEIGHT: 165 LBS | DIASTOLIC BLOOD PRESSURE: 62 MMHG | OXYGEN SATURATION: 96 % | HEIGHT: 66 IN

## 2019-05-06 DIAGNOSIS — E08.621 DIABETIC ULCER OF HEEL ASSOCIATED WITH DIABETES MELLITUS DUE TO UNDERLYING CONDITION, WITH FAT LAYER EXPOSED, UNSPECIFIED LATERALITY (HCC): Primary | Chronic | ICD-10-CM

## 2019-05-06 DIAGNOSIS — L97.402 DIABETIC ULCER OF HEEL ASSOCIATED WITH DIABETES MELLITUS DUE TO UNDERLYING CONDITION, WITH FAT LAYER EXPOSED, UNSPECIFIED LATERALITY (HCC): Primary | Chronic | ICD-10-CM

## 2019-05-06 LAB — METER GLUCOSE: 181 MG/DL (ref 74–99)

## 2019-05-06 PROCEDURE — 2709999900 HC NON-CHARGEABLE SUPPLY: Performed by: PODIATRIST

## 2019-05-06 PROCEDURE — 3600000012 HC SURGERY LEVEL 2 ADDTL 15MIN: Performed by: PODIATRIST

## 2019-05-06 PROCEDURE — 82962 GLUCOSE BLOOD TEST: CPT

## 2019-05-06 PROCEDURE — 3700000001 HC ADD 15 MINUTES (ANESTHESIA): Performed by: PODIATRIST

## 2019-05-06 PROCEDURE — 6360000002 HC RX W HCPCS: Performed by: ANESTHESIOLOGIST ASSISTANT

## 2019-05-06 PROCEDURE — 7100000011 HC PHASE II RECOVERY - ADDTL 15 MIN: Performed by: PODIATRIST

## 2019-05-06 PROCEDURE — 87205 SMEAR GRAM STAIN: CPT

## 2019-05-06 PROCEDURE — 7100000010 HC PHASE II RECOVERY - FIRST 15 MIN: Performed by: PODIATRIST

## 2019-05-06 PROCEDURE — 87102 FUNGUS ISOLATION CULTURE: CPT

## 2019-05-06 PROCEDURE — 87206 SMEAR FLUORESCENT/ACID STAI: CPT

## 2019-05-06 PROCEDURE — 3600000002 HC SURGERY LEVEL 2 BASE: Performed by: PODIATRIST

## 2019-05-06 PROCEDURE — 3700000000 HC ANESTHESIA ATTENDED CARE: Performed by: PODIATRIST

## 2019-05-06 PROCEDURE — 87116 MYCOBACTERIA CULTURE: CPT

## 2019-05-06 PROCEDURE — 2580000003 HC RX 258: Performed by: ANESTHESIOLOGIST ASSISTANT

## 2019-05-06 PROCEDURE — 6360000002 HC RX W HCPCS: Performed by: PODIATRIST

## 2019-05-06 PROCEDURE — 87070 CULTURE OTHR SPECIMN AEROBIC: CPT

## 2019-05-06 PROCEDURE — 87075 CULTR BACTERIA EXCEPT BLOOD: CPT

## 2019-05-06 PROCEDURE — 87015 SPECIMEN INFECT AGNT CONCNTJ: CPT

## 2019-05-06 RX ORDER — SODIUM CHLORIDE 0.9 % (FLUSH) 0.9 %
10 SYRINGE (ML) INJECTION PRN
Status: DISCONTINUED | OUTPATIENT
Start: 2019-05-06 | End: 2019-05-06 | Stop reason: HOSPADM

## 2019-05-06 RX ORDER — SODIUM CHLORIDE 0.9 % (FLUSH) 0.9 %
10 SYRINGE (ML) INJECTION PRN
Status: CANCELLED | OUTPATIENT
Start: 2019-05-06

## 2019-05-06 RX ORDER — SODIUM CHLORIDE 0.9 % (FLUSH) 0.9 %
10 SYRINGE (ML) INJECTION EVERY 12 HOURS SCHEDULED
Status: DISCONTINUED | OUTPATIENT
Start: 2019-05-06 | End: 2019-05-06 | Stop reason: HOSPADM

## 2019-05-06 RX ORDER — SODIUM CHLORIDE 0.9 % (FLUSH) 0.9 %
10 SYRINGE (ML) INJECTION EVERY 12 HOURS SCHEDULED
Status: CANCELLED | OUTPATIENT
Start: 2019-05-06

## 2019-05-06 RX ORDER — MIDAZOLAM HYDROCHLORIDE 1 MG/ML
INJECTION INTRAMUSCULAR; INTRAVENOUS PRN
Status: DISCONTINUED | OUTPATIENT
Start: 2019-05-06 | End: 2019-05-06 | Stop reason: SDUPTHER

## 2019-05-06 RX ORDER — PHENYLEPHRINE HYDROCHLORIDE 10 MG/ML
INJECTION INTRAVENOUS PRN
Status: DISCONTINUED | OUTPATIENT
Start: 2019-05-06 | End: 2019-05-06 | Stop reason: SDUPTHER

## 2019-05-06 RX ORDER — PROPOFOL 10 MG/ML
INJECTION, EMULSION INTRAVENOUS CONTINUOUS PRN
Status: DISCONTINUED | OUTPATIENT
Start: 2019-05-06 | End: 2019-05-06 | Stop reason: SDUPTHER

## 2019-05-06 RX ORDER — ONDANSETRON 2 MG/ML
4 INJECTION INTRAMUSCULAR; INTRAVENOUS EVERY 6 HOURS PRN
Status: CANCELLED | OUTPATIENT
Start: 2019-05-06

## 2019-05-06 RX ORDER — SODIUM CHLORIDE 9 MG/ML
INJECTION, SOLUTION INTRAVENOUS CONTINUOUS PRN
Status: DISCONTINUED | OUTPATIENT
Start: 2019-05-06 | End: 2019-05-06 | Stop reason: SDUPTHER

## 2019-05-06 RX ADMIN — MIDAZOLAM HYDROCHLORIDE 1 MG: 1 INJECTION, SOLUTION INTRAMUSCULAR; INTRAVENOUS at 12:43

## 2019-05-06 RX ADMIN — MIDAZOLAM HYDROCHLORIDE 1 MG: 1 INJECTION, SOLUTION INTRAMUSCULAR; INTRAVENOUS at 12:47

## 2019-05-06 RX ADMIN — PROPOFOL 50 MCG/KG/MIN: 10 INJECTION, EMULSION INTRAVENOUS at 12:48

## 2019-05-06 RX ADMIN — PHENYLEPHRINE HYDROCHLORIDE 100 MCG: 10 INJECTION INTRAVENOUS at 13:27

## 2019-05-06 RX ADMIN — PHENYLEPHRINE HYDROCHLORIDE 100 MCG: 10 INJECTION INTRAVENOUS at 13:34

## 2019-05-06 RX ADMIN — SODIUM CHLORIDE: 9 INJECTION, SOLUTION INTRAVENOUS at 12:43

## 2019-05-06 RX ADMIN — Medication 2 G: at 12:50

## 2019-05-06 ASSESSMENT — PULMONARY FUNCTION TESTS
PIF_VALUE: 0

## 2019-05-06 ASSESSMENT — PAIN SCALES - GENERAL
PAINLEVEL_OUTOF10: 0
PAINLEVEL_OUTOF10: 0

## 2019-05-06 ASSESSMENT — PAIN - FUNCTIONAL ASSESSMENT: PAIN_FUNCTIONAL_ASSESSMENT: 0-10

## 2019-05-06 NOTE — BRIEF OP NOTE
Brief Postoperative Note  ______________________________________________________________    Patient: Kang Srivastava  YOB: 1955  MRN: [id number]  Date of Procedure: 5/6/2019    Pre-Op Diagnosis: Diabetic ulcer of left heel    Post-Op Diagnosis: Same       Procedure(s):  EXCISIONAL DEBRIDEMENT TO  & THRU MUSCLE LEFT HEEL    Anesthesia: Monitor Anesthesia Care    Surgeon(s):  Sandra Galvez DPM    Assistant: Daniele Valiente DPM PGY-1    Estimated Blood Loss (mL): less than 50     Complications: None    Specimens:   ID Type Source Tests Collected by Time Destination   1 : BONE AND SOFT TISSUE LEFT CALCANEOUS Tissue Tissue ANAEROBIC CULTURE, FUNGUS CULTURE, GRAM STAIN, SURGICAL CULTURE, ACID FAST CULTURE WITH SMEAR Sandra Galvez DPM 5/6/2019 1314        Implants:  * No implants in log *      Drains:   Negative Pressure Wound Therapy Heel Left (Active)       [REMOVED] Negative Pressure Wound Therapy Foot Left; Lower (Removed)   Cycle On; Intermittent 5/6/2019  1:17 PM   Target Pressure (mmHg) 125 5/6/2019  1:17 PM   Canister changed?  Yes 5/6/2019  1:17 PM   Dressing Status Changed 5/6/2019  1:17 PM   Dressing Changed Changed/New 5/6/2019  1:17 PM       Findings: See dictation    Daniele Valiente  Date: 5/6/2019  Time: 1:59 PM

## 2019-05-06 NOTE — ANESTHESIA POSTPROCEDURE EVALUATION
Department of Anesthesiology  Postprocedure Note    Patient: Shen Colmenares  MRN: [id number]  YOB: 1955  Date of evaluation: 5/6/2019  Time:  6:05 PM     Procedure Summary     Date:  05/06/19 Room / Location:  [facility]    Anesthesia Start:  1243 Anesthesia Stop:  1356    Procedure:  EXCISIONAL DEBRIDEMENT TO  & THRU MUSCLE LEFT HEEL (Left ) Diagnosis:  (DIABETIC ULCER)    Surgeon:  Marquise Blanchard DPM Responsible Provider:  Dimas Berg MD    Anesthesia Type:  MAC ASA Status:  3          Anesthesia Type: MAC    Gerri Phase I: Gerri Score: 10    Gerri Phase II: Gerri Score: 10    Last vitals: Reviewed and per EMR flowsheets.        Anesthesia Post Evaluation    Patient location during evaluation: PACU  Patient participation: complete - patient participated  Level of consciousness: awake and alert  Airway patency: patent  Nausea & Vomiting: no nausea and no vomiting  Complications: no  Cardiovascular status: hemodynamically stable and blood pressure returned to baseline  Respiratory status: acceptable  Hydration status: euvolemic

## 2019-05-06 NOTE — ANESTHESIA PRE PROCEDURE
Charan Landeros MD   metformin (GLUCOPHAGE) 500 MG tablet Take 1,000 mg by mouth 2 times daily (with meals)    Yes Historical Provider, MD   levothyroxine (SYNTHROID) 25 MCG tablet Take 50 mcg by mouth daily    Yes Historical Provider, MD       Current medications:    Current Facility-Administered Medications   Medication Dose Route Frequency Provider Last Rate Last Dose    ceFAZolin (ANCEF) 2 g in dextrose 5 % 50 mL IVPB  2 g Intravenous On Call to [address] [place] [address]        sodium chloride flush 0.9 % injection 10 mL  10 mL Intravenous 2 times per day Dina East DPM        sodium chloride flush 0.9 % injection 10 mL  10 mL Intravenous PRN Dina East DPM           Allergies:     Allergies   Allergen Reactions    Codeine Nausea And Vomiting       Problem List:    Patient Active Problem List   Diagnosis Code    Hypothyroidism E03.9    Diabetic foot ulcer ([address] E11.621, L97.509    Osteomyelitis of ankle and foot ([address] M86.9    Diabetes mellitus, type 2 ([address] E11.9    Staphylococcus aureus bacteremia R78.81    Hypokalemia E87.6    Thyroid disease E07.9    Diabetes mellitus ([address] E11.9    Hyperlipemia E78.5    Diabetic ulcer of left heel associated with type 2 diabetes mellitus, with necrosis of bone ([address] E11.621, L97.424    Non-pressure chronic ulcer of left heel and midfoot with fat layer exposed ([address] L97.422    Diabetic polyneuropathy ([address] E11.42       Past Medical History:        Diagnosis Date    Diabetes mellitus ([address]     Diabetic ulcer of left heel associated with type 2 diabetes mellitus, with necrosis of bone ([address] 5/9/2018    Hx of blood clots 1990's    PE, FROM TAKING BC PILLS    Hyperlipemia     no med    Thyroid disease        Past Surgical History:        Procedure Laterality Date    COLONOSCOPY      DILATION AND CURETTAGE OF UTERUS      ECHO COMPL W DOP COLOR FLOW  11/18/2013         ECHOCARDIOGRAM TRANSESOPHAGEAL  11/20/2013         FOOT SURGERY  11/17/13    i&d left foot and ankle with bone biopsy    HYSTERECTOMY  1998    ovaries removed Dr María Melendez PST LIP Left 8/16/2018    PARTIAL CALCANECTOMY LEFT FOOT, EXCISIONAL DEBRIDEMENT MUSCLE LEFT FOOT performed by Neal Canales DPM at [place] [address]., SKIN, SUB-Q TISSUE,MUSCLE,BONE,=<20 SQ CM Left 9/20/2018    EXCISIONAL DEBRIDEMENT SUBQUTANEOUS MUSCLE , BONE LEFT HEEL performed by Neal Canales DPM at [facility] Left 4/10/2018    LEFT HEEL DEBRIDEMENT, BONE BIOPSY performed by Susy Paez DPM at [address] OFFICE/OUTPT VISIT,PROCEDURE ONLY Left 10/19/2018    PARTIAL LEFT CALCANECTOMY performed by Neal Canales DPM at [facility] 22 WISDOM TOOTH EXTRACTION         Social History:    Social History     Tobacco Use    Smoking status: Never Smoker    Smokeless tobacco: Never Used   Substance Use Topics    Alcohol use: No                                Counseling given: Not Answered      Vital Signs (Current):   Vitals:    05/06/19 1031 05/06/19 1045   BP:  (!) 145/86   Pulse:  119   Resp:  20   Temp:  97.5 °F (36.4 °C)   TempSrc:  Temporal   SpO2:  98%   Weight: 165 lb (74.8 kg)    Height: 5' 6\" (1.676 m)                                               BP Readings from Last 3 Encounters:   05/06/19 (!) 145/86   04/22/19 128/70   04/15/19 (!) 140/80       NPO Status: Time of last liquid consumption: 2200> 8hrs                        Time of last solid consumption: 1600                        Date of last liquid consumption: 05/05/19                        Date of last solid food consumption: 05/05/19    BMI:   Wt Readings from Last 3 Encounters:   05/06/19 165 lb (74.8 kg)   04/15/19 165 lb (74.8 kg)   04/08/19 165 lb (74.8 kg)     Body mass index is 26.63 kg/m².     CBC:   Lab Results   Component Value Date    WBC 9.6 12/04/2018    RBC 3.29 12/04/2018    HGB 10.0 12/04/2018    HCT 31.5 12/04/2018    MCV 95.7 12/04/2018    RDW 12.5 12/04/2018     12/04/2018       CMP:   Lab Results   Component Value Date     05/11/2018    K 4.6 05/11/2018    CL 97 05/11/2018    CO2 27 05/11/2018    BUN 17 12/04/2018    CREATININE 0.9 12/04/2018    GFRAA >60 12/04/2018    LABGLOM >60 12/04/2018    GLUCOSE 209 05/11/2018    PROT 6.7 12/04/2018    CALCIUM 10.5 05/11/2018    BILITOT 0.2 12/04/2018    ALKPHOS 64 12/04/2018    AST 9 12/04/2018    ALT 8 12/04/2018       POC Tests: No results for input(s): POCGLU, POCNA, POCK, POCCL, POCBUN, POCHEMO, POCHCT in the last 72 hours. Coags:   Lab Results   Component Value Date    PROTIME 13.9 11/17/2013    INR 1.4 11/17/2013       HCG (If Applicable): No results found for: PREGTESTUR, PREGSERUM, HCG, HCGQUANT     ABGs: No results found for: PHART, PO2ART, GBP9BQK, DQC4IYF, BEART, H0HGTZYW     Type & Screen (If Applicable):  No results found for: LABABO, LABRH   8/10/18 stress test      Left Ventricle   Ejection fraction is visually estimated at 60%. No regional wall motion   abnormalities seen. Normal left ventricular wall thickness. Left ventricle   size is normal. There is doppler evidence of stage I diastolic   dysfunction.      Right Ventricle   Normal right ventricle structure and function.      Left Atrium   Left atrial volume index of 25 ml per meters squared BSA.      Right Atrium   Normal right atrium.      Mitral Valve   Structurally normal mitral valve. No evidence of mitral valve stenosis.   Physiologic and/or trace mitral regurgitation is present.      Tricuspid Valve   The tricuspid valve appears structurally normal.   Physiologic and/or trace tricuspid regurgitation.      Aortic Valve   The aortic valve is trileaflet. No hemodynamically significant aortic   stenosis is present.  No evidence of aortic valve regurgitation.      Pulmonic Valve   Pulmonic valve is structurally normal.      Pericardial Effusion   No evidence for hemodynamically significant pericardial effusion.      Aorta   Normal aortic root and ascending aorta.   Miscellaneous   The inferior vena cava diameter is normal with normal respiratory   variation.      Conclusions      Summary   Ejection fraction is visually estimated at 60%.   No regional wall motion abnormalities seen.   There is doppler evidence of stage I diastolic dysfunction.   Normal right ventricle structure and function.   Physiologic and/or trace mitral regurgitation is present. Anesthesia Evaluation  Patient summary reviewed and Nursing notes reviewed no history of anesthetic complications:   Airway: Mallampati: I  TM distance: >3 FB   Neck ROM: full  Mouth opening: > = 3 FB Dental:      Comment: No loose teeth    Pulmonary:Negative Pulmonary ROS breath sounds clear to auscultation                             Cardiovascular:          ECG reviewed  Rhythm: regular  Rate: normal  Echocardiogram reviewed               ROS comment: Right BBB     Neuro/Psych:   (+) neuromuscular disease:,              ROS comment: Diabetic neuropathy in feet GI/Hepatic/Renal:   (+) renal disease:,           Endo/Other:    (+) DiabetesType II DM, , hypothyroidism::., .                 Abdominal:           Vascular:   + DVT, .        ROS comment: Hx of DVT. Anesthesia Plan      MAC     ASA 3       Induction: intravenous. Anesthetic plan and risks discussed with patient. Use of blood products discussed with patient whom consented to blood products. Plan discussed with CRNA and attending. Ernie Calero DO   5/6/2019      DOS STAFF ADDENDUM:    Patient seen and chart reviewed. Physical exam and history updated as indicated. NPO status confirmed. Anesthesia options and plan discussed including risks benefits with patient/legal guardian and family as available. Concerns and questions addressed. Consent verbalized to proceed.   Anesthesia plan, options and intraoperative/postoperative concerns discussed with care team.    Kristyn Glynn MD  5/6/2019  12:27 PM

## 2019-05-06 NOTE — OP NOTE
Procedure Note     Surgeon: COREY CrystalM    Assistants: Darcie Rogers DPM PGY1    Pre-operative Diagnosis: Diabetic ulcer of left heel    Post-operative Diagnosis: Same    Anesthesia: Monitored Local Anesthesia with Sedation    Hemostasis: hemostats and vessel loops    Findings: no abscess noted. Non-viable tissue noted in the heel wound. Estimated Blood Loss:  less than 50 ml           Materials: None    Injectibles: None           Specimens:     Specimens:   ID Type Source Tests Collected by Time Destination   1 : BONE AND SOFT TISSUE LEFT CALCANEOUS Tissue Tissue ANAEROBIC CULTURE, FUNGUS CULTURE, GRAM STAIN, SURGICAL CULTURE, ACID FAST CULTURE WITH SMEAR                     Complications:  None; patient tolerated the procedure well. Condition: Stable    Procedure Details   Following satisfactory pre-op evaluation the patient was brought into the OR and placed on the OR table in the supine position. MAC sedation was administered by anesthesia. Following sedation a time out was then called identifying patient identity, procedure, operative location, birth date, allergies, antibiotics and other pertinent information in regards to the surgery to which everyone agreed. The foot was then prepped and draped in the usual sterile manner and lowered onto the surgical field. Attention was then directed to the to the left heel, using a #10blade we performed a elliptical incision to encompass  the heel wound. Incision was taken through subcutaneous tissue. Hemostats were place on the bleeders. At this time further debridement of the wound through muscle using #10 blade. Metzenbaum scissors and rongeius were use to debridement further non-viable tissue. Soft tissue and bone was sent off for pathological and microbiological examination. Vessel loops were used to tie off the bleeders. Afterwards, irrigated the area using pulse lavage system. A 3L of normal sterile saline was used.       The subcutaneous tissue was then reapproximated using 2-0 vicryl suture. The skin was then coapted using 3-0 nylon by simple stitch. A dressing was applied consisting of aquacel Ag+, 4x4 gauze,ABD, kerlix, and ace. The patient tolerated anesthesia and the procedure well and was transported back to the PACU with VSS and VSI to the right foot. Orders for the following were written:      1. resume all pre-op medications, orders, and diet  2. Keep dressing CDI, podiatry to change daily  3.  NWB to left foot  4. elevate left foot with pillow under leg

## 2019-05-07 LAB — GRAM STAIN ORDERABLE: NORMAL

## 2019-05-08 LAB
CULTURE SURGICAL: NORMAL
GRAM STAIN RESULT: NORMAL

## 2019-05-09 LAB
ANAEROBIC CULTURE: ABNORMAL
ANAEROBIC CULTURE: ABNORMAL
ORGANISM: ABNORMAL

## 2019-05-13 ENCOUNTER — HOSPITAL ENCOUNTER (OUTPATIENT)
Dept: WOUND CARE | Age: 64
Discharge: HOME | End: 2019-05-13
Payer: COMMERCIAL

## 2019-05-13 VITALS
RESPIRATION RATE: 18 BRPM | HEIGHT: 66 IN | BODY MASS INDEX: 26.52 KG/M2 | HEART RATE: 96 BPM | WEIGHT: 165 LBS | DIASTOLIC BLOOD PRESSURE: 60 MMHG | SYSTOLIC BLOOD PRESSURE: 108 MMHG | TEMPERATURE: 98 F

## 2019-05-13 PROCEDURE — 99213 OFFICE O/P EST LOW 20 MIN: CPT

## 2019-05-13 NOTE — PROGRESS NOTES
[facility] Followup Visit Note    Referring Physician : DO Cee Rasmussen Pebbles Hernandez  MEDICAL RECORD NUMBER:  [id number]  AGE: 75 y.o. GENDER: female  : 1955  EPISODE DATE:  2019    Subjective:     Chief Complaint   Patient presents with    Wound Check     patient here for treatment of left foot ulcer      HISTORY of PRESENT ILLNESS JANE Florence is a 75 y.o. female who presents today in regards to follow up evaluation and treatment of wound/ulcer. That patient's past medical, family and social hx were reviewed and changes were made if present.     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, with necrosis of bone ([facility] 2018    Hx of blood clots     PE, FROM TAKING BC PILLS    Hyperlipemia     no med    Thyroid disease      Past Surgical History:   Procedure Laterality Date    COLONOSCOPY      DILATION AND CURETTAGE OF UTERUS      ECHO COMPL W DOP COLOR FLOW  2013         ECHOCARDIOGRAM TRANSESOPHAGEAL  2013         FOOT DEBRIDEMENT Left 2019    EXCISIONAL DEBRIDEMENT TO  & THRU MUSCLE LEFT HEEL performed by Dariusz Beckman DPM at [place] [address]  13    i&d left foot and ankle with bone biopsy    HYSTERECTOMY  1998    ovaries removed Dr Oneil Ramirez FX W FIX PST LIP Left 2018    PARTIAL CALCANECTOMY LEFT FOOT, EXCISIONAL DEBRIDEMENT MUSCLE LEFT FOOT performed by Dariusz Beckman DPM at [facility] 22 OTHER SURGICAL HISTORY      [place] DEBRIDEMENT, SKIN, SUB-Q TISSUE,MUSCLE,BONE,=<20 SQ CM Left 9/20/2018    EXCISIONAL DEBRIDEMENT SUBQUTANEOUS MUSCLE , BONE LEFT HEEL performed by Halina Teague DPM at [address] [place] [address] INFEC,1 BURSA Left 4/10/2018    LEFT HEEL DEBRIDEMENT, BONE BIOPSY performed by Jie Santillan DPM at [facility] OR    [place] OFFICE/OUTPT VISIT,PROCEDURE ONLY Left 10/19/2018    PARTIAL LEFT CALCANECTOMY performed [facility] Halina Teague DPM at [facility] OR    WISDOM TOOTH EXTRACTION       Family History   Problem Relation Age of Onset   [place] Alzheimer's Disease Mother     Other Father         pacemaker    Other Maternal Grandfather         aneurysm     Social History     Tobacco Use    Smoking status: Never Smoker    Smokeless tobacco: Never Used   Substance Use Topics    Alcohol use: No    Drug use: No     Allergies   Allergen Reactions    Codeine Nausea And Vomiting     Current Outpatient Medications on File Prior to Encounter   Medication Sig Dispense Refill    ferrous sulfate 325 (65 Fe) MG tablet Take 325 mg by mouth 2 times daily STOP PREOP MED      cyanocobalamin 1000 MCG/ML injection Inject 1,000 mcg into the muscle every 7 days      Lactobacillus-Inulin ([facility] DIGESTIVE HEALTH PO) Take 1 capsule by mouth every morning STOP PREOP MED      doxycycline hyclate (VIBRA-TABS) 100 MG tablet Take 100 mg by mouth 2 times daily      cefdinir (OMNICEF) 300 MG capsule Take 300 mg by mouth 2 times daily      pravastatin (PRAVACHOL) 40 MG tablet Take 20 mg by mouth every other day      gabapentin (NEURONTIN) 300 MG capsule Take 300 mg by mouth 2 times daily. .      glipiZIDE (GLUCOTROL XL) 10 MG extended release tablet Take 1 tablet by mouth daily      VELTASSA 8.4 g PACK Take 1 packet by mouth daily (before lunch)   1    vitamin C (ASCORBIC ACID) 500 MG tablet Take 500 mg by mouth daily STOP PREOP MED      sodium bicarbonate 325 MG tablet Take 650 mg by mouth 2 times daily       [facility]-     Dressing Orders: LEFT HEEL ULCER-cleanse with normal saline apply aquacel and dry dressing.  Change mondays at wound care, wednesdays and fridays and as needed      Treatment Orders:Eat a diet high in protein and vitamin C. Take a multiple vitamin daily unless contraindicated.     Keep heel clean and dry     [facility] followup visit :  ____________________1 WEEKS DR. RODRIGUES_______________________  (Please note your next appointment above and if you are unable to keep, kindly give a 24 hour notice.  Thank you.)     Physician signature:__________________________        If you experience any of the following, please call the [facility] during business hours:     * Increase in Pain  * Temperature over 101  * Increase in drainage from your wound  * Drainage with a foul odor  * Bleeding  * Increase in swelling  * Need for compression bandage changes due to slippage, breakthrough drainage.     If you need medical attention outside of the business hours of the [facility] please contact your PCP or go to the nearest emergency room                                                                     Electronically signed by Marquise Blanchard DPM on 5/13/2019 at 2:22 PM

## 2019-05-20 ENCOUNTER — HOSPITAL ENCOUNTER (OUTPATIENT)
Dept: WOUND CARE | Age: 64
Discharge: HOME | End: 2019-05-20
Payer: COMMERCIAL

## 2019-05-20 VITALS
SYSTOLIC BLOOD PRESSURE: 136 MMHG | HEART RATE: 80 BPM | TEMPERATURE: 98.2 F | HEIGHT: 66 IN | BODY MASS INDEX: 26.52 KG/M2 | RESPIRATION RATE: 20 BRPM | DIASTOLIC BLOOD PRESSURE: 72 MMHG | WEIGHT: 165 LBS

## 2019-05-20 PROCEDURE — 11042 DBRDMT SUBQ TIS 1ST 20SQCM/<: CPT

## 2019-05-20 PROCEDURE — 99213 OFFICE O/P EST LOW 20 MIN: CPT

## 2019-05-20 NOTE — PROGRESS NOTES
 OTHER SURGICAL HISTORY      [place] DEBRIDEMENT, SKIN, SUB-Q TISSUE,MUSCLE,BONE,=<20 SQ CM Left 9/20/2018    EXCISIONAL DEBRIDEMENT SUBQUTANEOUS MUSCLE , BONE LEFT HEEL performed by Santos Nuñez DPM at [address] [place] [address] INFEC,1 BURSA Left 4/10/2018    LEFT HEEL DEBRIDEMENT, BONE BIOPSY performed by Jeremy Argueta DPM at [facility] OR    [place] OFFICE/OUTPT VISIT,PROCEDURE ONLY Left 10/19/2018    PARTIAL LEFT CALCANECTOMY performed by Santos Nuñez DPM at [facility] OR    WISDOM TOOTH EXTRACTION       Family History   Problem Relation Age of Onset   [facility] Alzheimer's Disease Mother     Other Father         pacemaker    Other Maternal Grandfather         aneurysm     Social History     Tobacco Use    Smoking status: Never Smoker    Smokeless tobacco: Never Used   Substance Use Topics    Alcohol use: No    Drug use: No     Allergies   Allergen Reactions    Codeine Nausea And Vomiting     Current Outpatient Medications on File Prior to Encounter   Medication Sig Dispense Refill    ferrous sulfate 325 (65 Fe) MG tablet Take 325 mg by mouth 2 times daily STOP PREOP MED      cyanocobalamin 1000 MCG/ML injection Inject 1,000 mcg into the muscle every 7 days      Lactobacillus-Inulin ([facility] DIGESTIVE HEALTH PO) Take 1 capsule by mouth every morning STOP PREOP MED      doxycycline hyclate (VIBRA-TABS) 100 MG tablet Take 100 mg by mouth 2 times daily      cefdinir (OMNICEF) 300 MG capsule Take 300 mg by mouth 2 times daily      pravastatin (PRAVACHOL) 40 MG tablet Take 20 mg by mouth every other day      gabapentin (NEURONTIN) 300 MG capsule Take 300 mg by mouth 2 times daily. .      glipiZIDE (GLUCOTROL XL) 10 MG extended release tablet Take 1 tablet by mouth daily      VELTASSA 8.4 g PACK Take 1 packet by mouth daily (before lunch)   1    vitamin C (ASCORBIC ACID) 500 MG tablet Take 500 mg by mouth daily STOP PREOP MED      sodium bicarbonate 325 MG tablet Take 650 mg by mouth 2 times daily       Cholecalciferol (VITAMIN D3) 2000 UNITS CAPS Take 4,000 Units by mouth daily STOP PREOP MED      acetaminophen 650 MG TABS Take 650 mg by mouth every 4 hours as needed (Fever >100.5 F (38 C)). 120 tablet     metformin (GLUCOPHAGE) 500 MG tablet Take 1,000 mg by mouth 2 times daily (with meals)       levothyroxine (SYNTHROID) 25 MCG tablet Take 50 mcg by mouth daily        No current facility-administered medications on file prior to encounter. REVIEW OF SYSTEMS See HPI    Objective:    /72   Pulse 80   Temp 98.2 °F (36.8 °C) (Oral)   Resp 20   Ht 5' 6\" (1.676 m)   Wt 165 lb (74.8 kg)   BMI 26.63 kg/m²   Wt Readings from Last 3 Encounters:   05/20/19 165 lb (74.8 kg)   05/13/19 165 lb (74.8 kg)   05/06/19 165 lb (74.8 kg)     PHYSICAL EXAM  CONSTITUTIONAL:   Awake, alert, cooperative   EYES:  lids and lashes normal   ENT: external ears and nose without lesions   NECK:  supple, symmetrical, trachea midline   SKIN:  Open wound Present    Assessment:     Problem List Items Addressed This Visit     None        Procedure Note  Indications:  Based on my examination of this patient's wound(s)/ulcer(s) today, debridement is required to promote healing and evaluate the wound base. Performed by: Sandra Galvez DPM    Consent obtained:  Yes    Time out taken:  Yes    Pain Control: Anesthetic  Anesthetic: None     Debridement:Excisional Debridement    Using curette the wound(s)/ulcer(s) was/were sharply debrided down through and including the removal of subcutaneous tissue.         Devitalized Tissue Debrided:  fibrin, biofilm and slough to stimulate bleeding to promote healing, post debridement good bleeding base and wound edges noted    Pre Debridement Measurements:  Are located in the [place]  Documentation Flow Sheet    Wound/Ulcer #: 1    Post Debridement Measurements:  Wound/Ulcer Descriptions are Pre Debridement except measurements:    Wound 11/30/18 Heel Left;Plantar #1 (acquired 4-6-18) Grade 3 (Active)   Wound Image   5/13/2019  1:11 PM   Dressing Status Clean;Dry; Intact 4/22/2019  3:05 PM   Dressing Changed Changed/New 5/13/2019  1:56 PM   Dressing/Treatment Alginate;Foam;Dry Dressing 5/13/2019  1:56 PM   Wound Cleansed Rinsed/Irrigated with saline 5/13/2019  1:56 PM   Wound Length (cm) 8.2 cm 5/20/2019  1:12 PM   Wound Width (cm) 2.6 cm 5/20/2019  1:12 PM   Wound Depth (cm) 3.1 cm 5/20/2019  1:12 PM   Wound Surface Area (cm^2) 21.32 cm^2 5/20/2019  1:12 PM   Change in Wound Size % (l*w) 51.86 5/20/2019  1:12 PM   Wound Volume (cm^3) 66.09 cm^3 5/20/2019  1:12 PM   Wound Healing % 49 5/20/2019  1:12 PM   Post-Procedure Length (cm) 8.2 cm 5/20/2019  1:51 PM   Post-Procedure Width (cm) 2.6 cm 5/20/2019  1:51 PM   Post-Procedure Depth (cm) 3.1 cm 5/20/2019  1:51 PM   Post-Procedure Surface Area (cm^2) 21.32 cm^2 5/20/2019  1:51 PM   Post-Procedure Volume (cm^3) 66.09 cm^3 5/20/2019  1:51 PM   Distance Tunneling (cm) 2.5 cm 4/22/2019  1:23 PM   Tunneling Position ___ O'Clock 12 4/22/2019  1:23 PM   Undermining Starts ___ O'Clock 8 12/17/2018  1:07 PM   Undermining Ends___ O'Clock 10 12/17/2018  1:07 PM   Undermining Maxium Distance (cm) 2.0 12/17/2018  1:07 PM   Wound Assessment Red;Yellow 5/20/2019  1:12 PM   Drainage Amount Copious 5/20/2019  1:12 PM   Drainage Description Brown;Serosanguinous 5/20/2019  1:12 PM   Odor None 5/20/2019  1:12 PM   Margins Unattached edges 5/20/2019  1:12 PM   Kaya-wound Assessment Maceration; White 5/20/2019  1:12 PM   Number of days: 171     Percent of Wound/Ulcer Debrided: 100%    Total Surface Area Debrided:  20 sq cm     Estimated Blood Loss:  Minimal  Hemostasis Achieved:  by pressure    Procedural Pain:  0  / 10   Post Procedural Pain:  0 / 10     Response to treatment:  Well tolerated by patient. Plan:   Treatment Note please see attached Discharge Instructions    Written patient dismissal instructions given to patient and signed by patient or POA.

## 2019-05-31 NOTE — PROGRESS NOTES
[facility] 34 PRE-ADMISSION TESTING GENERAL INSTRUCTIONS- [facility]-phone number:[phone]    GENERAL INSTRUCTIONS  [x] Antibacterial Soap shower Night before and/or AM of Surgery  [] Rosalio wipe instruction sheet and wipes given. [x] Nothing by mouth after midnight, including gum, candy, mints, or water.  [] You may brush your teeth, gargle, but do NOT swallow water. []Hibiclens shower  the night before and the morning of surgery. Do not use             Hibiclens on your face or head. [x]No smoking, chewing tobacco, illegal drugs, or alcohol within 24 hours of your surgery. [x] Jewelry, valuables or body piercing's should not be brought to the hospital. All body and/or tongue piercing's must be removed prior to arriving to hospital.  ALL hair pins must be removed. [x] Do not wear makeup, lotions, powders, deodorant. Nail polish as directed by the nurse. [x] Arrange transportation with a responsible adult  to and from the hospital. If you do not have a responsible adult  to transport you, you will need to make arrangements with a medical transportation company (i.e. [employer]. A Uber/taxi/bus is not appropriate unless you are accompanied by a responsible adult ). Arrange for someone to be with you for the remainder of the day and for 24 hours after your procedure due to having had anesthesia. Who will be your  for transportation? __SPOUSE_______________   Who will be staying with you for 24 hrs after your procedure? __SPOUSE________________  [x] Bring insurance card and photo ID.  [] Transfusion Bracelet: Please bring with you to hospital, day of surgery  [] Bring urine specimen day of surgery. Any small container is acceptable. [] Use inhalers the morning of surgery and bring with you to hospital.  [] Bring copy of living will or healthcare power of  papers to be placed in your electronic record.   [] CPAP/BI-PAP: Please bring your machine if may call the pre-op area if you have concerns about your blood sugar [phone]. [] Use your inhalers the morning of surgery. Bring your emergency inhaler with you day of surgery. [x] Follow physician instructions regarding any blood thinners you may be taking. WHAT TO EXPECT:  [x] The day of surgery you will be greeted and checked in by the [employer].  In addition, you will be registered in the [place] by a Patient Access Representative. Please bring your photo ID and insurance card. A nurse will greet you in accordance to the time you are needed in the pre-op area to prepare you for surgery. Please do not be discouraged if you are not greeted in the order you arrive as there are many variables that are involved in patient preparation. Your patience is greatly appreciated as you wait for your nurse. Please bring in items such as: books, magazines, newspapers, electronics, or any other items  to occupy your time in the waiting area. []  Delays may occur with surgery and staff will make a sincere effort to keep you informed of delays. If any delays occur with your procedure, we apologize ahead of time for your inconvenience as we recognize the value of your time. no

## 2019-06-03 ENCOUNTER — ANESTHESIA EVENT (OUTPATIENT)
Dept: OPERATING ROOM | Age: 64
End: 2019-06-03
Payer: COMMERCIAL

## 2019-06-03 ENCOUNTER — ANESTHESIA (OUTPATIENT)
Dept: OPERATING ROOM | Age: 64
End: 2019-06-03
Payer: COMMERCIAL

## 2019-06-03 ENCOUNTER — PREP FOR PROCEDURE (OUTPATIENT)
Dept: SURGERY | Age: 64
End: 2019-06-03

## 2019-06-03 ENCOUNTER — HOSPITAL ENCOUNTER (OUTPATIENT)
Age: 64
Setting detail: OUTPATIENT SURGERY
Discharge: HOME | End: 2019-06-03
Attending: PODIATRIST | Admitting: PODIATRIST
Payer: COMMERCIAL

## 2019-06-03 VITALS
SYSTOLIC BLOOD PRESSURE: 130 MMHG | RESPIRATION RATE: 17 BRPM | TEMPERATURE: 97.3 F | OXYGEN SATURATION: 98 % | WEIGHT: 165 LBS | DIASTOLIC BLOOD PRESSURE: 70 MMHG | BODY MASS INDEX: 26.52 KG/M2 | HEIGHT: 66 IN | HEART RATE: 96 BPM

## 2019-06-03 VITALS
OXYGEN SATURATION: 100 % | DIASTOLIC BLOOD PRESSURE: 61 MMHG | RESPIRATION RATE: 17 BRPM | SYSTOLIC BLOOD PRESSURE: 91 MMHG

## 2019-06-03 DIAGNOSIS — Z01.812 PRE-OPERATIVE LABORATORY EXAMINATION: Primary | ICD-10-CM

## 2019-06-03 LAB
CHP ED QC CHECK: NORMAL
GLUCOSE BLD-MCNC: 200 MG/DL
METER GLUCOSE: 200 MG/DL (ref 74–99)

## 2019-06-03 PROCEDURE — 3600000002 HC SURGERY LEVEL 2 BASE: Performed by: PODIATRIST

## 2019-06-03 PROCEDURE — C1713 ANCHOR/SCREW BN/BN,TIS/BN: HCPCS | Performed by: PODIATRIST

## 2019-06-03 PROCEDURE — 3600000012 HC SURGERY LEVEL 2 ADDTL 15MIN: Performed by: PODIATRIST

## 2019-06-03 PROCEDURE — 7100000011 HC PHASE II RECOVERY - ADDTL 15 MIN: Performed by: PODIATRIST

## 2019-06-03 PROCEDURE — 82962 GLUCOSE BLOOD TEST: CPT

## 2019-06-03 PROCEDURE — 6360000002 HC RX W HCPCS: Performed by: NURSE ANESTHETIST, CERTIFIED REGISTERED

## 2019-06-03 PROCEDURE — 3700000000 HC ANESTHESIA ATTENDED CARE: Performed by: PODIATRIST

## 2019-06-03 PROCEDURE — 7100000010 HC PHASE II RECOVERY - FIRST 15 MIN: Performed by: PODIATRIST

## 2019-06-03 PROCEDURE — 3700000001 HC ADD 15 MINUTES (ANESTHESIA): Performed by: PODIATRIST

## 2019-06-03 PROCEDURE — 2580000003 HC RX 258: Performed by: NURSE ANESTHETIST, CERTIFIED REGISTERED

## 2019-06-03 PROCEDURE — C1776 JOINT DEVICE (IMPLANTABLE): HCPCS | Performed by: PODIATRIST

## 2019-06-03 PROCEDURE — 2709999900 HC NON-CHARGEABLE SUPPLY: Performed by: PODIATRIST

## 2019-06-03 PROCEDURE — 6360000002 HC RX W HCPCS: Performed by: PODIATRIST

## 2019-06-03 DEVICE — TENOGLIDE TENDON PROTECTOR SHEET 4 IN X 5 IN (10CM X 12.5CM)
Type: IMPLANTABLE DEVICE | Site: FOOT | Status: FUNCTIONAL
Brand: TENOGLIDE®

## 2019-06-03 RX ORDER — MIDAZOLAM HYDROCHLORIDE 1 MG/ML
INJECTION INTRAMUSCULAR; INTRAVENOUS PRN
Status: DISCONTINUED | OUTPATIENT
Start: 2019-06-03 | End: 2019-06-03 | Stop reason: SDUPTHER

## 2019-06-03 RX ORDER — SODIUM CHLORIDE 0.9 % (FLUSH) 0.9 %
10 SYRINGE (ML) INJECTION PRN
Status: DISCONTINUED | OUTPATIENT
Start: 2019-06-03 | End: 2019-06-03 | Stop reason: HOSPADM

## 2019-06-03 RX ORDER — PROPOFOL 10 MG/ML
INJECTION, EMULSION INTRAVENOUS CONTINUOUS PRN
Status: DISCONTINUED | OUTPATIENT
Start: 2019-06-03 | End: 2019-06-03 | Stop reason: SDUPTHER

## 2019-06-03 RX ORDER — SODIUM CHLORIDE 9 MG/ML
INJECTION, SOLUTION INTRAVENOUS CONTINUOUS PRN
Status: DISCONTINUED | OUTPATIENT
Start: 2019-06-03 | End: 2019-06-03 | Stop reason: SDUPTHER

## 2019-06-03 RX ORDER — SODIUM CHLORIDE 0.9 % (FLUSH) 0.9 %
10 SYRINGE (ML) INJECTION EVERY 12 HOURS SCHEDULED
Status: CANCELLED | OUTPATIENT
Start: 2019-06-03

## 2019-06-03 RX ORDER — LIDOCAINE HYDROCHLORIDE 20 MG/ML
INJECTION, SOLUTION INTRAVENOUS PRN
Status: DISCONTINUED | OUTPATIENT
Start: 2019-06-03 | End: 2019-06-03 | Stop reason: SDUPTHER

## 2019-06-03 RX ORDER — SODIUM CHLORIDE 0.9 % (FLUSH) 0.9 %
10 SYRINGE (ML) INJECTION PRN
Status: CANCELLED | OUTPATIENT
Start: 2019-06-03

## 2019-06-03 RX ORDER — PROPOFOL 10 MG/ML
INJECTION, EMULSION INTRAVENOUS PRN
Status: DISCONTINUED | OUTPATIENT
Start: 2019-06-03 | End: 2019-06-03 | Stop reason: SDUPTHER

## 2019-06-03 RX ORDER — SODIUM CHLORIDE 0.9 % (FLUSH) 0.9 %
10 SYRINGE (ML) INJECTION EVERY 12 HOURS SCHEDULED
Status: DISCONTINUED | OUTPATIENT
Start: 2019-06-03 | End: 2019-06-03 | Stop reason: HOSPADM

## 2019-06-03 RX ADMIN — PROPOFOL 125 MCG/KG/MIN: 10 INJECTION, EMULSION INTRAVENOUS at 12:27

## 2019-06-03 RX ADMIN — PROPOFOL 50 MG: 10 INJECTION, EMULSION INTRAVENOUS at 12:27

## 2019-06-03 RX ADMIN — LIDOCAINE HYDROCHLORIDE 100 MG: 20 INJECTION, SOLUTION INTRAVENOUS at 12:27

## 2019-06-03 RX ADMIN — SODIUM CHLORIDE: 9 INJECTION, SOLUTION INTRAVENOUS at 12:24

## 2019-06-03 RX ADMIN — MIDAZOLAM HYDROCHLORIDE 2 MG: 1 INJECTION, SOLUTION INTRAMUSCULAR; INTRAVENOUS at 12:23

## 2019-06-03 RX ADMIN — Medication 2 G: at 12:27

## 2019-06-03 ASSESSMENT — PULMONARY FUNCTION TESTS
PIF_VALUE: 0
PIF_VALUE: 1
PIF_VALUE: 0
PIF_VALUE: 0

## 2019-06-03 ASSESSMENT — PAIN - FUNCTIONAL ASSESSMENT: PAIN_FUNCTIONAL_ASSESSMENT: 0-10

## 2019-06-03 ASSESSMENT — PAIN SCALES - GENERAL
PAINLEVEL_OUTOF10: 0
PAINLEVEL_OUTOF10: 0

## 2019-06-03 NOTE — ANESTHESIA PRE PROCEDURE
Department of Anesthesiology  Preprocedure Note       Name:  Washington Saini   Age:  75 y.o.  :  1955                                          MRN:  [id number]         Date:  6/3/2019      Surgeon: Iva Garcia):  Corrinne Cunning, DPM    Procedure: Procedure(s):  EXCISIONAL  DEBRIDEMENT WITH GRAFT APPLICATION LEFT HEEL (INTEGRA ) (Left ) - 34745    Medications prior to admission:   Prior to Admission medications    Medication Sig Start Date End Date Taking? Authorizing Provider   metroNIDAZOLE (FLAGYL) 500 MG tablet Take 1 tablet by mouth 3 times daily for 15 days 19  Talita Vance MD   ferrous sulfate 325 (65 Fe) MG tablet Take 325 mg by mouth 2 times daily STOP PREOP MED    Historical Provider, MD   cyanocobalamin 1000 MCG/ML injection Inject 1,000 mcg into the muscle every 7 days    Historical Provider, MD   Lactobacillus-Inulin ([address] PO) Take 1 capsule by mouth every morning STOP PREOP MED    Historical Provider, MD   doxycycline hyclate (VIBRA-TABS) 100 MG tablet Take 100 mg by mouth 2 times daily    Historical Provider, MD   cefdinir (OMNICEF) 300 MG capsule Take 300 mg by mouth 2 times daily    Historical Provider, MD   pravastatin (PRAVACHOL) 40 MG tablet Take 20 mg by mouth every other day    Historical Provider, MD   gabapentin (NEURONTIN) 300 MG capsule Take 300 mg by mouth 2 times daily. . 5/3/18   Historical Provider, MD   glipiZIDE (GLUCOTROL XL) 10 MG extended release tablet Take 1 tablet by mouth daily 18   Historical Provider, MD   VELTASSA 8.4 g PACK Take 1 packet by mouth daily (before lunch)  18   Historical Provider, MD   vitamin C (ASCORBIC ACID) 500 MG tablet Take 500 mg by mouth daily STOP PREOP MED    Historical Provider, MD   sodium bicarbonate 325 MG tablet Take 650 mg by mouth 2 times daily     Historical Provider, MD   Cholecalciferol (VITAMIN D3) 2000 UNITS CAPS Take 4,000 Units by mouth daily STOP PREOP MED    Historical Provider, MD   acetaminophen 650 MG TABS Take 650 mg by mouth every 4 hours as needed (Fever >100.5 F (38 C)). 11/19/13   German Gavin MD   metformin (GLUCOPHAGE) 500 MG tablet Take 1,000 mg by mouth 2 times daily (with meals)     Historical Provider, MD   levothyroxine (SYNTHROID) 25 MCG tablet Take 50 mcg by mouth daily     Historical Provider, MD       Current medications:    No current facility-administered medications for this visit. No current outpatient medications on file. Facility-Administered Medications Ordered in Other Visits   Medication Dose Route Frequency Provider Last Rate Last Dose    ceFAZolin (ANCEF) 2 g in dextrose 5 % 50 mL IVPB  2 g Intravenous On Call to [address] [place] [address], DP        sodium chloride flush 0.9 % injection 10 mL  10 mL Intravenous 2 times per day Yadira Lacrosse, DPM        sodium chloride flush 0.9 % injection 10 mL  10 mL Intravenous PRN Yadira Lacrosse, DPM           Allergies:     Allergies   Allergen Reactions    Codeine Nausea And Vomiting       Problem List:    Patient Active Problem List   Diagnosis Code    Hypothyroidism E03.9    Diabetic foot ulcer ([address] E11.621, L97.509    Osteomyelitis of ankle and foot ([address] M86.9    Diabetes mellitus, type 2 ([address] E11.9    Staphylococcus aureus bacteremia R78.81    Hypokalemia E87.6    Thyroid disease E07.9    Diabetes mellitus ([address] E11.9    Hyperlipemia E78.5    Diabetic ulcer of left heel associated with type 2 diabetes mellitus, with necrosis of bone ([address] E11.621, L97.424    Non-pressure chronic ulcer of left heel and midfoot with fat layer exposed ([address] L97.422    Diabetic polyneuropathy ([address] E11.42       Past Medical History:        Diagnosis Date    Diabetes mellitus ([address]     Diabetic ulcer of left heel associated with type 2 diabetes mellitus, with necrosis of bone ([address] 5/9/2018    Hx of blood clots 1990's    PE, FROM TAKING BC PILLS    Hyperlipemia     no med    Thyroid disease        Past RBC 3.29 12/04/2018    HGB 10.0 12/04/2018    HCT 31.5 12/04/2018    MCV 95.7 12/04/2018    RDW 12.5 12/04/2018     12/04/2018       CMP:   Lab Results   Component Value Date     05/11/2018    K 4.6 05/11/2018    CL 97 05/11/2018    CO2 27 05/11/2018    BUN 17 12/04/2018    CREATININE 0.9 12/04/2018    GFRAA >60 12/04/2018    LABGLOM >60 12/04/2018    GLUCOSE 200 06/03/2019    PROT 6.7 12/04/2018    CALCIUM 10.5 05/11/2018    BILITOT 0.2 12/04/2018    ALKPHOS 64 12/04/2018    AST 9 12/04/2018    ALT 8 12/04/2018       POC Tests: No results for input(s): POCGLU, POCNA, POCK, POCCL, POCBUN, POCHEMO, POCHCT in the last 72 hours. Coags:   Lab Results   Component Value Date    PROTIME 13.9 11/17/2013    INR 1.4 11/17/2013       HCG (If Applicable): No results found for: PREGTESTUR, PREGSERUM, HCG, HCGQUANT     ABGs: No results found for: PHART, PO2ART, OJU9QKX, XPC7SKP, BEART, A4NFPPZR     Type & Screen (If Applicable):  No results found for: LABABO, LABRH   8/10/18 stress test      Left Ventricle   Ejection fraction is visually estimated at 60%. No regional wall motion   abnormalities seen. Normal left ventricular wall thickness. Left ventricle   size is normal. There is doppler evidence of stage I diastolic   dysfunction.      Right Ventricle   Normal right ventricle structure and function.      Left Atrium   Left atrial volume index of 25 ml per meters squared BSA.      Right Atrium   Normal right atrium.      Mitral Valve   Structurally normal mitral valve. No evidence of mitral valve stenosis.   Physiologic and/or trace mitral regurgitation is present.      Tricuspid Valve   The tricuspid valve appears structurally normal.   Physiologic and/or trace tricuspid regurgitation.      Aortic Valve   The aortic valve is trileaflet. No hemodynamically significant aortic   stenosis is present.  No evidence of aortic valve regurgitation.      Pulmonic Valve   Pulmonic valve is structurally normal.      Pericardial Effusion   No evidence for hemodynamically significant pericardial effusion.      Aorta   Normal aortic root and ascending aorta.   Miscellaneous   The inferior vena cava diameter is normal with normal respiratory   variation.      Conclusions      Summary   Ejection fraction is visually estimated at 60%.   No regional wall motion abnormalities seen.   There is doppler evidence of stage I diastolic dysfunction.   Normal right ventricle structure and function.   Physiologic and/or trace mitral regurgitation is present. Anesthesia Evaluation  Patient summary reviewed and Nursing notes reviewed no history of anesthetic complications:   Airway: Mallampati: I  TM distance: >3 FB   Neck ROM: full  Mouth opening: > = 3 FB Dental:      Comment: No loose teeth    Pulmonary:Negative Pulmonary ROS breath sounds clear to auscultation                             Cardiovascular:          ECG reviewed  Rhythm: regular  Rate: normal  Echocardiogram reviewed               ROS comment: Right BBB     Neuro/Psych:   (+) neuromuscular disease:,              ROS comment: Diabetic neuropathy in feet GI/Hepatic/Renal:   (+) renal disease:,           Endo/Other:    (+) DiabetesType II DM, , hypothyroidism::., .                 Abdominal:   (+) obese,         Vascular:   + DVT, .        ROS comment: Hx of DVT. Anesthesia Plan      MAC     ASA 3       Induction: intravenous. Anesthetic plan and risks discussed with patient. Use of blood products discussed with patient whom consented to blood products. Plan discussed with CRNA and attending.                   Austin Akbar MD   6/3/2019

## 2019-06-03 NOTE — BRIEF OP NOTE
Brief Postoperative Note  ______________________________________________________________    Patient: Ronnell Keen  YOB: 1955  MRN: [id number]  Date of Procedure: 6/3/2019    Pre-Op Diagnosis: DIABETIC ULCER    Post-Op Diagnosis: Same       Procedure(s):  EXCISIONAL  DEBRIDEMENT WITH GRAFT APPLICATION LEFT HEEL (INTEGRA )    Anesthesia: Monitor Anesthesia Care    Surgeon(s):  Simin Bhat DPM    Assistant: Leeroy Allen    Estimated Blood Loss (mL): less than 50     Complications: None    Specimens:   * No specimens in log *    Implants:  Implant Name Type Inv. Item Serial No.  Lot No. LRB No. Used   INTEGRA BIOFIX PLUS AMNIOTIC MEMBRANE ALLOGRAFT Bone/Graft/Tissue/Human/Synth  TISSUE ID# [id number]   Left 1   PROTECTOR TENDON TENOGLIDE 4X5IN Shoulder/Arm/Wrist/Hand PROTECTOR TENDON TENOGLIDE 4X5IN  INTEGRA [email or web address] [facility] 5729 [id number] Left 1         Drains:   [REMOVED] Negative Pressure Wound Therapy Foot Left; Lower (Removed)   Cycle On; Intermittent 5/6/2019  1:17 PM   Target Pressure (mmHg) 125 5/6/2019  1:17 PM   Canister changed?  Yes 5/6/2019  1:17 PM   Dressing Status Changed 5/6/2019  1:17 PM   Dressing Changed Changed/New 5/6/2019  1:17 PM       [REMOVED] Negative Pressure Wound Therapy Heel Left (Removed)       Findings:     Leeroy Allen  Date: 6/3/2019  Time: 1:36 PM

## 2019-06-04 NOTE — ANESTHESIA POSTPROCEDURE EVALUATION
Department of Anesthesiology  Postprocedure Note    Patient: Kang Srivastava  MRN: [id number]  YOB: 1955  Date of evaluation: 6/4/2019  Time:  5:56 AM     Procedure Summary     Date:  06/03/19 Room / Location:  [facility] OR 05 / [facility]    Anesthesia Start:  1224 Anesthesia Stop:  1327    Procedure:  EXCISIONAL  DEBRIDEMENT WITH GRAFT APPLICATION LEFT HEEL (INTEGRA ), WITH REAPPLICATION OF WOUND VAC (Left Foot) Diagnosis:  (DIABETIC ULCER)    Surgeon:  Sandra Galvez DPM Responsible Provider:  Giorgio Carrera MD    Anesthesia Type:  MAC ASA Status:  3          Anesthesia Type: MAC    Gerri Phase I: Gerri Score: 10    Gerri Phase II: Gerri Score: 10    Last vitals: Reviewed and per EMR flowsheets.        Anesthesia Post Evaluation    Patient participation: complete - patient participated  Level of consciousness: awake  Airway patency: patent  Nausea & Vomiting: no nausea and no vomiting  Complications: no  Cardiovascular status: hemodynamically stable  Respiratory status: acceptable  Hydration status: stable

## 2019-06-06 NOTE — OP NOTE
Procedure Note     Surgeon: Dr. Alida Lnadis DPM    Assistants: Karly Coelho DPM PGY1    Pre-operative Diagnosis: diabetic ulcer    Post-operative Diagnosis: same    Anesthesia: Monitored Local Anesthesia with Sedation    Hemostasis: none    Findings: fibrotic wound    Estimated Blood Loss:  less than 50 ml           Materials: intergra biofix plus amniotic membrane    Injectibles: none           Specimens: none           Complications:  None; patient tolerated the procedure well. Condition: Stable    Procedure Details   Following satisfactory pre-op evaluation the patient was brought into the OR and placed on the OR table in the supine position. MAC sedation was administered by anesthesia. Following sedation a time out was then called identifying patient identity, procedure, operative location, birth date, allergies, antibiotics and other pertinent information in regards to the surgery to which everyone agreed. The foot was then prepped and draped in the usual sterile manner and lowered onto the surgical field. Attention was then directed to the plantar aspect of the left foot. A curette was used to debrided the base and the rim of the wound. Debridement was stopped when pinpoint bleeding was noted. The wound was cleansed with normal saline. A 4x5 inch intergra bilayer allograft was then place on the wound. Staples were used to secure the graft. At this point, the wound vac foam was cut to fit the wound. Wound vac was applied, target pressure set at 125mmhg intermittent. A dressing was applied consisting of xeroform, 4x4 gauze, kerlix, and conform. The patient tolerated anesthesia and the procedure well and was transported back to PACU. Orders for the following were written:    1. resume all pre-op medications, orders, and diet  2.  NWB to left foot  3. elevate right/left foot with pillow under leg      Attending Attestation:

## 2019-06-10 ENCOUNTER — HOSPITAL ENCOUNTER (OUTPATIENT)
Dept: WOUND CARE | Age: 64
Discharge: HOME | End: 2019-06-10
Payer: COMMERCIAL

## 2019-06-10 VITALS
SYSTOLIC BLOOD PRESSURE: 126 MMHG | RESPIRATION RATE: 20 BRPM | HEART RATE: 100 BPM | BODY MASS INDEX: 26.52 KG/M2 | WEIGHT: 165 LBS | TEMPERATURE: 98.3 F | HEIGHT: 66 IN | DIASTOLIC BLOOD PRESSURE: 70 MMHG

## 2019-06-10 DIAGNOSIS — M86.9 OSTEOMYELITIS OF ANKLE AND FOOT (HCC): ICD-10-CM

## 2019-06-10 LAB
FUNGUS (MYCOLOGY) CULTURE: NORMAL
FUNGUS STAIN: NORMAL

## 2019-06-10 PROCEDURE — 99213 OFFICE O/P EST LOW 20 MIN: CPT

## 2019-06-10 NOTE — PROGRESS NOTES
[facility] Followup Visit Note    Referring Physician : DO James Gonzales Archana Hernandez  MEDICAL RECORD NUMBER:  [id number]  AGE: 75 y.o. GENDER: female  : 1955  EPISODE DATE:  6/10/2019    Subjective:     Chief Complaint   Patient presents with    Wound Check     patient here for treatment of left heel ulcer      HISTORY of PRESENT ILLNESS HPI   Shirlyn Mcburney is a 75 y.o. female who presents today in regards to follow up evaluation and treatment of wound/ulcer. That patient's past medical, family and social hx were reviewed and changes were made if present.     History of Wound Context:  Post op follow up     Wound/Ulcer Pain Timing/Severity: none  Quality of pain: N/A  Severity:  0 / 10   Modifying Factors: None  Associated Signs/Symptoms: drainage    Ulcer Identification:  Ulcer Type: diabetic  Contributing Factors: diabetes    Diabetic/Pressure/Non Pressure Ulcers only:  Ulcer: Diabetic ulcer, fat layer exposed    Wound: N/A        PAST MEDICAL HISTORY      Diagnosis Date    Diabetes mellitus [facility]     Diabetic ulcer of left heel associated with type 2 diabetes mellitus, with necrosis of bone ([facility] 2018    Hx of blood clots     PE, FROM TAKING BC PILLS    Hyperlipemia     no med    Thyroid disease      Past Surgical History:   Procedure Laterality Date    COLONOSCOPY      DILATION AND CURETTAGE OF UTERUS      ECHO COMPL W DOP COLOR FLOW  2013         ECHOCARDIOGRAM TRANSESOPHAGEAL  2013         FOOT DEBRIDEMENT Left 2019    EXCISIONAL DEBRIDEMENT TO  & THRU MUSCLE LEFT HEEL performed by Eduardo Ortega DPM at [address] [place] [address] Left 6/3/2019    EXCISIONAL  DEBRIDEMENT WITH GRAFT APPLICATION LEFT HEEL (INTEGRA ), WITH REAPPLICATION OF WOUND VAC performed by Eduardo Ortega DPM at [place] [address]  13    i&d left foot and ankle with bone biopsy    HYSTERECTOMY      ovaries removed Dr Ben Kraft ANKLE FX W FIX PST LIP Left 8/16/2018    PARTIAL CALCANECTOMY LEFT FOOT, EXCISIONAL DEBRIDEMENT MUSCLE LEFT FOOT performed by Sandra Galvez DPM at [place] [address]., SKIN, SUB-Q TISSUE,MUSCLE,BONE,=<20 SQ CM Left 9/20/2018    EXCISIONAL DEBRIDEMENT SUBQUTANEOUS MUSCLE , BONE LEFT HEEL performed by Sandra Galvez DPM at [address] [place] [address] Left 4/10/2018    LEFT HEEL DEBRIDEMENT, BONE BIOPSY performed by Adiel [facility]rand DPM at [facility] OR    MA OFFICE/OUTPT VISIT,PROCEDURE ONLY Left 10/19/2018    PARTIAL LEFT CALCANECTOMY [facility] DPM at [facility] OR    WISDOM TOOTH EXTRACTION       Family History   Problem Relation Age of Onset   King Alzheimer's Disease Mother     Other Father         pacemaker    Other Maternal Grandfather         aneurysm     Social History     Tobacco Use    Smoking status: Never Smoker    Smokeless tobacco: Never Used   Substance Use Topics    Alcohol use: No    Drug use: No     Allergies   Allergen Reactions    Codeine Nausea And Vomiting     Current Outpatient Medications on File Prior to Encounter   Medication Sig Dispense Refill    metroNIDAZOLE (FLAGYL) 500 MG tablet Take 1 tablet by mouth 3 times daily for 15 days 30 tablet 0    ferrous sulfate 325 (65 Fe) MG tablet Take 325 mg by mouth 2 times daily STOP PREOP MED      cyanocobalamin 1000 MCG/ML injection Inject 1,000 mcg into the muscle every 7 days      Lactobacillus-Inulin ([facility] PO) Take 1 capsule by mouth every morning STOP PREOP MED      doxycycline hyclate (VIBRA-TABS) 100 MG tablet Take 100 mg by mouth 2 times daily      cefdinir (OMNICEF) 300 MG capsule Take 300 mg by mouth 2 times daily      pravastatin (PRAVACHOL) 40 MG tablet Take 20 mg by mouth every other day      gabapentin (NEURONTIN) 300 MG capsule Take 300 mg by mouth 2 times daily. .      glipiZIDE (GLUCOTROL XL) 10 MG extended release tablet Take 1 tablet base and wound edges noted    Pre Debridement Measurements:  Are located in the [place]  Documentation Flow Sheet    Wound/Ulcer #: 1    Post Debridement Measurements:  Wound/Ulcer Descriptions are Pre Debridement except measurements:    Wound 11/30/18 Heel Left;Plantar #1 (acquired 4-6-18) Grade 3 (Active)   Wound Image   5/13/2019  1:11 PM   Dressing Status Clean;Dry; Intact 5/20/2019  2:09 PM   Dressing Changed Changed/New 5/20/2019  2:09 PM   Dressing/Treatment ABD; Alginate;Dry Dressing 5/20/2019  2:09 PM   Wound Cleansed Rinsed/Irrigated with saline 5/20/2019  2:09 PM   Wound Length (cm) 7.7 cm 6/10/2019  1:06 PM   Wound Width (cm) 3.5 cm 6/10/2019  1:06 PM   Wound Depth (cm) 4.7 cm 6/10/2019  1:06 PM   Wound Surface Area (cm^2) 26.95 cm^2 6/10/2019  1:06 PM   Change in Wound Size % (l*w) 39.15 6/10/2019  1:06 PM   Wound Volume (cm^3) 126.66 cm^3 6/10/2019  1:06 PM   Wound Healing % 1 6/10/2019  1:06 PM   Post-Procedure Length (cm) 8.2 cm 5/20/2019  1:51 PM   Post-Procedure Width (cm) 2.6 cm 5/20/2019  1:51 PM   Post-Procedure Depth (cm) 3.1 cm 5/20/2019  1:51 PM   Post-Procedure Surface Area (cm^2) 21.32 cm^2 5/20/2019  1:51 PM   Post-Procedure Volume (cm^3) 66.09 cm^3 5/20/2019  1:51 PM   Undermining Starts ___ O'Clock 7 6/10/2019  1:06 PM   Undermining Ends___ O'Clock 11 6/10/2019  1:06 PM   Undermining Maxium Distance (cm) 2.9 6/10/2019  1:06 PM   Wound Assessment Red;Yellow 6/10/2019  1:06 PM   Drainage Amount Copious 6/10/2019  1:06 PM   Drainage Description Serosanguinous 6/10/2019  1:06 PM   Odor None 6/10/2019  1:06 PM   Margins Unattached edges 5/20/2019  1:12 PM   Kaya-wound Assessment White; Maceration 6/10/2019  1:06 PM   Number of days: 192     Percent of Wound/Ulcer Debrided: 100%    Total Surface Area Debrided:  40 sq cm     Estimated Blood Loss:  Minimal  Hemostasis Achieved:  by pressure    Procedural Pain:  0  / 10   Post Procedural Pain:  0 / 10     Response to treatment:  Well tolerated by patient. Plan:   Treatment Note please see attached Discharge Instructions  Wound vac reapplied  Written patient dismissal instructions given to patient and signed by patient or POA. Discharge Instructions       Visit Discharge/Physician Orders     Discharge condition: Stable     Assessment of pain at discharge:no     Anesthetic used: NONE     Discharge to: Home     Left via:Private automobile     Accompanied by:  spouse     ECF/HHA: [facility]     Dressing Orders: LEFT HEEL ULCER-cleanse with normal saline apply wound vac at 125mmhg Change mondays at wound care, wednesdays and fridays and as needed        Treatment Orders:Eat a diet high in protein and vitamin C. Take a multiple vitamin daily unless contraindicated.     Keep heel clean and dry     [facility] followup visit :  ___________________1 WEEKS DR. RODRIGUES_______________________  (Please note your next appointment above and if you are unable to keep, kindly give a 24 hour notice.  Thank you.)     Physician signature:__________________________        If you experience any of the following, please call the [facility] during business hours:     * Increase in Pain  * Temperature over 101  * Increase in drainage from your wound  * Drainage with a foul odor  * Bleeding  * Increase in swelling  * Need for compression bandage changes due to slippage, breakthrough drainage.     If you need medical attention outside of the business hours of the [facility] please contact your PCP or go to the nearest emergency room               Electronically signed by Santos Nuñez DPM on 6/10/2019 at 2:00 PM

## 2019-06-17 ENCOUNTER — HOSPITAL ENCOUNTER (OUTPATIENT)
Dept: WOUND CARE | Age: 64
Discharge: HOME | End: 2019-06-17
Payer: COMMERCIAL

## 2019-06-17 VITALS
SYSTOLIC BLOOD PRESSURE: 122 MMHG | HEIGHT: 66 IN | RESPIRATION RATE: 18 BRPM | HEART RATE: 78 BPM | DIASTOLIC BLOOD PRESSURE: 62 MMHG | WEIGHT: 165 LBS | BODY MASS INDEX: 26.52 KG/M2 | TEMPERATURE: 98 F

## 2019-06-17 DIAGNOSIS — E11.621 DIABETIC ULCER OF LEFT HEEL ASSOCIATED WITH TYPE 2 DIABETES MELLITUS, WITH NECROSIS OF BONE (HCC): ICD-10-CM

## 2019-06-17 DIAGNOSIS — L97.424 DIABETIC ULCER OF LEFT HEEL ASSOCIATED WITH TYPE 2 DIABETES MELLITUS, WITH NECROSIS OF BONE (HCC): ICD-10-CM

## 2019-06-17 PROCEDURE — 99213 OFFICE O/P EST LOW 20 MIN: CPT

## 2019-06-17 NOTE — PROGRESS NOTES
Left 8/16/2018    PARTIAL CALCANECTOMY LEFT FOOT, EXCISIONAL DEBRIDEMENT MUSCLE LEFT FOOT performed by Jim Santoyo DPM at [facility] 2., SKIN, SUB-Q TISSUE,MUSCLE,BONE,=<20 SQ CM Left 9/20/2018    EXCISIONAL DEBRIDEMENT SUBQUTANEOUS MUSCLE , BONE LEFT HEEL performed by Jim Santoyo DPM at [facility] [address] Left 4/10/2018    LEFT HEEL DEBRIDEMENT, BONE BIOPSY performed by Alvino Kawasaki, DPM at [facility] OR    [place] OFFICE/OUTPT VISIT,PROCEDURE ONLY Left 10/19/2018    PARTIAL LEFT CALCANECTOMY performed by Jim Santoyo DPM at [facility] OR    WISDOM TOOTH EXTRACTION       Family History   Problem Relation Age of Onset   [facility] Alzheimer's Disease Mother     Other Father         pacemaker    Other Maternal Grandfather         aneurysm     Social History     Tobacco Use    Smoking status: Never Smoker    Smokeless tobacco: Never Used   Substance Use Topics    Alcohol use: No    Drug use: No     Allergies   Allergen Reactions    Codeine Nausea And Vomiting     Current Outpatient Medications on File Prior to Encounter   Medication Sig Dispense Refill    ferrous sulfate 325 (65 Fe) MG tablet Take 325 mg by mouth 2 times daily STOP PREOP MED      cyanocobalamin 1000 MCG/ML injection Inject 1,000 mcg into the muscle every 7 days      Lactobacillus-Inulin ([facility] DIGESTIVE HEALTH PO) Take 1 capsule by mouth every morning STOP PREOP MED      doxycycline hyclate (VIBRA-TABS) 100 MG tablet Take 100 mg by mouth 2 times daily      cefdinir (OMNICEF) 300 MG capsule Take 300 mg by mouth 2 times daily      pravastatin (PRAVACHOL) 40 MG tablet Take 20 mg by mouth every other day      gabapentin (NEURONTIN) 300 MG capsule Take 300 mg by mouth 2 times daily. .      glipiZIDE (GLUCOTROL XL) 10 MG extended release tablet Take 1 tablet by mouth daily      VELTASSA 8.4 g PACK Take 1 packet by mouth daily (before lunch)   1    vitamin C (ASCORBIC ACID) 500 MG tablet Take 500 mg by mouth daily STOP PREOP MED      sodium bicarbonate 325 MG tablet Take 650 mg by mouth 2 times daily       Cholecalciferol (VITAMIN D3) 2000 UNITS CAPS Take 4,000 Units by mouth daily STOP PREOP MED      acetaminophen 650 MG TABS Take 650 mg by mouth every 4 hours as needed (Fever >100.5 F (38 C)). 120 tablet     metformin (GLUCOPHAGE) 500 MG tablet Take 1,000 mg by mouth 2 times daily (with meals)       levothyroxine (SYNTHROID) 25 MCG tablet Take 50 mcg by mouth daily        No current facility-administered medications on file prior to encounter. REVIEW OF SYSTEMS See HPI    Objective:    /62   Pulse 78   Temp 98 °F (36.7 °C) (Oral)   Resp 18   Ht 5' 6\" (1.676 m)   Wt 165 lb (74.8 kg)   BMI 26.63 kg/m²   Wt Readings from Last 3 Encounters:   06/17/19 165 lb (74.8 kg)   06/10/19 165 lb (74.8 kg)   06/03/19 165 lb (74.8 kg)     PHYSICAL EXAM  CONSTITUTIONAL:   Awake, alert, cooperative   EYES:  lids and lashes normal   ENT: external ears and nose without lesions   NECK:  supple, symmetrical, trachea midline   SKIN:  Open wound Present    Assessment:     Problem List Items Addressed This Visit     Diabetic ulcer of left heel associated with type 2 diabetes mellitus, with necrosis of bone ([facility] [address]        Procedure Note  Indications:  Based on my examination of this patient's wound(s)/ulcer(s) today, debridement is required to promote healing and evaluate the wound base. Performed by: Marilyn Brown DPM    Consent obtained:  Yes    Time out taken:  Yes    Pain Control: Anesthetic  Anesthetic: None     Debridement:Excisional Debridement    Using curette the wound(s)/ulcer(s) was/were sharply debrided down through and including the removal of subcutaneous tissue.         Devitalized Tissue Debrided:  fibrin, biofilm and slough to stimulate bleeding to promote healing, post debridement good bleeding base and wound edges noted    Pre Debridement Measurements:  Are located in the to treatment:  Well tolerated by patient. Plan:   Treatment Note please see attached Discharge Instructions    Written patient dismissal instructions given to patient and signed by patient or POA. Discharge Instructions         Visit Discharge/Physician Orders     Discharge condition: Stable     Assessment of pain at discharge:no     Anesthetic used: NONE     Discharge to: Home     Left via:Private automobile     Accompanied by:  spouse     ECF/HHA: [facility]     Dressing Orders: LEFT HEEL ULCER-cleanse with normal saline apply wound vac at 125mmhg Change mondays at wound care, wednesdays and fridays and as needed        Treatment Orders:Eat a diet high in protein and vitamin C. Take a multiple vitamin daily unless contraindicated.     Keep heel clean and dry     [facility] followup visit :  ___________________1 WEEKS DR. RODRIGUES_______________________  (Please note your next appointment above and if you are unable to keep, kindly give a 24 hour notice.  Thank you.)     Physician signature:__________________________        If you experience any of the following, please call the [facility] during business hours:     * Increase in Pain  * Temperature over 101  * Increase in drainage from your wound  * Drainage with a foul odor  * Bleeding  * Increase in swelling  * Need for compression bandage changes due to slippage, breakthrough drainage.     If you need medical attention outside of the business hours of the [address] please contact your PCP or go to the nearest emergency room                          Electronically signed by Selam Bailey DPM on 6/17/2019 at 2:18 PM

## 2019-06-24 ENCOUNTER — HOSPITAL ENCOUNTER (OUTPATIENT)
Dept: WOUND CARE | Age: 64
Discharge: HOME | End: 2019-06-24
Payer: COMMERCIAL

## 2019-06-24 VITALS
SYSTOLIC BLOOD PRESSURE: 122 MMHG | TEMPERATURE: 98.1 F | DIASTOLIC BLOOD PRESSURE: 72 MMHG | RESPIRATION RATE: 18 BRPM | HEART RATE: 76 BPM

## 2019-06-24 PROCEDURE — 11045 DBRDMT SUBQ TISS EACH ADDL: CPT

## 2019-06-24 PROCEDURE — 11042 DBRDMT SUBQ TIS 1ST 20SQCM/<: CPT

## 2019-06-24 ASSESSMENT — PAIN SCALES - GENERAL: PAINLEVEL_OUTOF10: 0

## 2019-06-24 NOTE — PROGRESS NOTES
Wound Healing Center Followup Visit Note    Referring Physician : DO Steven Gan Mary  MEDICAL RECORD NUMBER:  [id number]  AGE: 75 y.o. GENDER: female  : 1955  EPISODE DATE:  2019    Subjective:     Chief Complaint   Patient presents with    Wound Check     left foot      HISTORY of PRESENT ILLNESS HPI   Pippa Alejandro is a 75 y.o. female who presents today in regards to follow up evaluation and treatment of wound/ulcer. That patient's past medical, family and social hx were reviewed and changes were made if present.     History of Wound Context:  Follow up and debridement     Wound/Ulcer Pain Timing/Severity: none  Quality of pain: N/A  Severity:  0 / 10   Modifying Factors: None  Associated Signs/Symptoms: none    Ulcer Identification:  Ulcer Type: diabetic  Contributing Factors: diabetes    Diabetic/Pressure/Non Pressure Ulcers only:  Ulcer: Diabetic ulcer, fat layer exposed    Wound: Wound dehiscence        PAST MEDICAL HISTORY      Diagnosis Date    Diabetes mellitus ([address]     Diabetic ulcer of left heel associated with type 2 diabetes mellitus, with necrosis of bone ([address] 2018    Hx of blood clots     PE, FROM TAKING BC PILLS    Hyperlipemia     no med    Thyroid disease      Past Surgical History:   Procedure Laterality Date    COLONOSCOPY      DILATION AND CURETTAGE OF UTERUS      ECHO COMPL W DOP COLOR FLOW  2013         ECHOCARDIOGRAM TRANSESOPHAGEAL  2013         FOOT DEBRIDEMENT Left 2019    EXCISIONAL DEBRIDEMENT TO  & THRU MUSCLE LEFT HEEL performed by Carlos Nichols DPM at [address] [place] [address] Left 6/3/2019    EXCISIONAL  DEBRIDEMENT WITH GRAFT APPLICATION LEFT HEEL (INTEGRA ), WITH REAPPLICATION OF WOUND VAC performed by Carlos Nichols DPM at [facility]  13    i&d left foot and ankle with bone biopsy    HYSTERECTOMY      ovaries removed Dr Flakito Moses PST LIP Left 8/16/2018    PARTIAL CALCANECTOMY LEFT FOOT, EXCISIONAL DEBRIDEMENT MUSCLE LEFT FOOT performed by Melissa Wilson DPM at [place] [address] SKIN, SUB-Q TISSUE,MUSCLE,BONE,=<20 SQ CM Left 9/20/2018    EXCISIONAL DEBRIDEMENT SUBQUTANEOUS MUSCLE , BONE LEFT HEEL performed by Melissa Wilson DPM at [address] [place] [address] Left 4/10/2018    LEFT HEEL DEBRIDEMENT, BONE BIOPSY performed by Ronald Langford DPM at [facility] OR    [place] OFFICE/OUTPT VISIT,PROCEDURE ONLY Left 10/19/2018    PARTIAL LEFT CALCANECTOMY performed by Melissa Wilson DPM at [facility] OR    WISDOM TOOTH EXTRACTION       Family History   Problem Relation Age of Onset   [facility] Alzheimer's Disease Mother     Other Father         pacemaker    Other Maternal Grandfather         aneurysm     Social History     Tobacco Use    Smoking status: Never Smoker    Smokeless tobacco: Never Used   Substance Use Topics    Alcohol use: No    Drug use: No     Allergies   Allergen Reactions    Codeine Nausea And Vomiting     Current Outpatient Medications on File Prior to Encounter   Medication Sig Dispense Refill    ferrous sulfate 325 (65 Fe) MG tablet Take 325 mg by mouth 2 times daily STOP PREOP MED      cyanocobalamin 1000 MCG/ML injection Inject 1,000 mcg into the muscle every 7 days      Lactobacillus-Inulin ([facility] DIGESTIVE HEALTH PO) Take 1 capsule by mouth every morning STOP PREOP MED      doxycycline hyclate (VIBRA-TABS) 100 MG tablet Take 100 mg by mouth 2 times daily      cefdinir (OMNICEF) 300 MG capsule Take 300 mg by mouth 2 times daily      pravastatin (PRAVACHOL) 40 MG tablet Take 20 mg by mouth every other day      gabapentin (NEURONTIN) 300 MG capsule Take 300 mg by mouth 2 times daily. .      glipiZIDE (GLUCOTROL XL) 10 MG extended release tablet Take 1 tablet by mouth daily      VELTASSA 8.4 g PACK Take 1 packet by mouth daily (before lunch)   1    vitamin C (ASCORBIC ACID) 500 MG tablet Take 500 mg by mouth daily STOP PREOP MED      sodium bicarbonate 325 MG tablet Take 650 mg by mouth 2 times daily       Cholecalciferol (VITAMIN D3) 2000 UNITS CAPS Take 4,000 Units by mouth daily STOP PREOP MED      metformin (GLUCOPHAGE) 500 MG tablet Take 1,000 mg by mouth 2 times daily (with meals)       levothyroxine (SYNTHROID) 25 MCG tablet Take 50 mcg by mouth daily       acetaminophen 650 MG TABS Take 650 mg by mouth every 4 hours as needed (Fever >100.5 F (38 C)). 120 tablet      No current facility-administered medications on file prior to encounter. REVIEW OF SYSTEMS See HPI    Objective:    /72   Pulse 76   Temp 98.1 °F (36.7 °C) (Oral)   Resp 18   Wt Readings from Last 3 Encounters:   06/17/19 165 lb (74.8 kg)   06/10/19 165 lb (74.8 kg)   06/03/19 165 lb (74.8 kg)     PHYSICAL EXAM  CONSTITUTIONAL:   Awake, alert, cooperative   EYES:  lids and lashes normal   ENT: external ears and nose without lesions   NECK:  supple, symmetrical, trachea midline   SKIN:  Open wound Present    Assessment:     Problem List Items Addressed This Visit     None        Procedure Note  Indications:  Based on my examination of this patient's wound(s)/ulcer(s) today, debridement is required to promote healing and evaluate the wound base. Performed by: Jeffy Bah DPM    Consent obtained:  Yes    Time out taken:  Yes    Pain Control: Anesthetic  Anesthetic: None     Debridement:Excisional Debridement    Using curette and tissue nippers the wound(s)/ulcer(s) was/were sharply debrided down through and including the removal of subcutaneous tissue.         Devitalized Tissue Debrided:  fibrin, biofilm, slough and exudate to stimulate bleeding to promote healing, post debridement good bleeding base and wound edges noted    Pre Debridement Measurements:  Are located in the [facility] [place]  Documentation Flow Sheet    Wound/Ulcer #: 1    Post Debridement Measurements:  Wound/Ulcer Descriptions by patient or POA. Discharge Instructions         Visit Discharge/Physician Orders     Discharge condition: Stable     Assessment of pain at discharge:no     Anesthetic used: NONE     Discharge to: Home     Left via:Private automobile     Accompanied by:  spouse     ECF/HHA: [facility]- hold wound vac     Dressing Orders: LEFT HEEL ULCER-cleanse with normal saline apply drawtex and dry dressing daily and as needed    Treatment Orders:Eat a diet high in protein and vitamin C. Take a multiple vitamin daily unless contraindicated.     Keep heel clean and dry     [facility] followup visit :  ___________________1 WEEKS DR. RODRIGUES_______________________  (Please note your next appointment above and if you are unable to keep, kindly give a 24 hour notice.  Thank you.)     Physician signature:__________________________        If you experience any of the following, please call the [facility] during business hours:     * Increase in Pain  * Temperature over 101  * Increase in drainage from your wound  * Drainage with a foul odor  * Bleeding  * Increase in swelling  * Need for compression bandage changes due to slippage, breakthrough drainage.     If you need medical attention outside of the business hours of the [address] please contact your PCP or go to the nearest emergency room                                       Electronically signed by Neal Canales DPM on 6/24/2019 at 2:12 PM

## 2019-06-25 LAB
AFB CULTURE (MYCOBACTERIA): NORMAL
AFB SMEAR: NORMAL

## 2019-07-01 ENCOUNTER — HOSPITAL ENCOUNTER (OUTPATIENT)
Dept: WOUND CARE | Age: 64
Discharge: HOME | End: 2019-07-01
Payer: MEDICARE

## 2019-07-01 VITALS
HEART RATE: 96 BPM | TEMPERATURE: 98.8 F | RESPIRATION RATE: 24 BRPM | DIASTOLIC BLOOD PRESSURE: 80 MMHG | SYSTOLIC BLOOD PRESSURE: 120 MMHG | HEIGHT: 66 IN | BODY MASS INDEX: 26.52 KG/M2 | WEIGHT: 165 LBS

## 2019-07-01 DIAGNOSIS — L97.424 DIABETIC ULCER OF LEFT HEEL ASSOCIATED WITH TYPE 2 DIABETES MELLITUS, WITH NECROSIS OF BONE (HCC): ICD-10-CM

## 2019-07-01 DIAGNOSIS — E11.621 DIABETIC ULCER OF LEFT HEEL ASSOCIATED WITH TYPE 2 DIABETES MELLITUS, WITH NECROSIS OF BONE (HCC): ICD-10-CM

## 2019-07-01 PROCEDURE — 11045 DBRDMT SUBQ TISS EACH ADDL: CPT

## 2019-07-01 PROCEDURE — 11042 DBRDMT SUBQ TIS 1ST 20SQCM/<: CPT

## 2019-07-01 PROCEDURE — 99213 OFFICE O/P EST LOW 20 MIN: CPT

## 2019-07-01 RX ORDER — LIDOCAINE HYDROCHLORIDE 20 MG/ML
JELLY TOPICAL ONCE
Status: DISCONTINUED | OUTPATIENT
Start: 2019-07-01 | End: 2019-07-02 | Stop reason: HOSPADM

## 2019-07-08 ENCOUNTER — HOSPITAL ENCOUNTER (OUTPATIENT)
Dept: WOUND CARE | Age: 64
Discharge: HOME | End: 2019-07-08
Payer: MEDICARE

## 2019-07-08 VITALS
TEMPERATURE: 98.2 F | HEIGHT: 66 IN | HEART RATE: 100 BPM | RESPIRATION RATE: 20 BRPM | SYSTOLIC BLOOD PRESSURE: 102 MMHG | WEIGHT: 165 LBS | BODY MASS INDEX: 26.52 KG/M2 | DIASTOLIC BLOOD PRESSURE: 70 MMHG

## 2019-07-08 PROCEDURE — 11045 DBRDMT SUBQ TISS EACH ADDL: CPT

## 2019-07-08 PROCEDURE — 11042 DBRDMT SUBQ TIS 1ST 20SQCM/<: CPT

## 2019-07-08 PROCEDURE — 97607 NEG PRS WND THR NDME<=50SQCM: CPT

## 2019-07-08 NOTE — PROGRESS NOTES
8/16/2018    PARTIAL CALCANECTOMY LEFT FOOT, EXCISIONAL DEBRIDEMENT MUSCLE LEFT FOOT performed by Bettina Allen DPM at [facility]., SKIN, SUB-Q TISSUE,MUSCLE,BONE,=<20 SQ CM Left 9/20/2018    EXCISIONAL DEBRIDEMENT SUBQUTANEOUS MUSCLE , BONE LEFT HEEL performed by Bettina Allen DPM at [facility] [place] [facility] Left 4/10/2018    LEFT HEEL DEBRIDEMENT, BONE BIOPSY performed by Federico Castro DPM at [facility] OR    [place] OFFICE/OUTPT VISIT,PROCEDURE ONLY Left 10/19/2018    PARTIAL LEFT CALCANECTOMY performed by Bettina Allen DPM at [facility] OR    WISDOM TOOTH EXTRACTION       Family History   Problem Relation Age of Onset   [facility] Alzheimer's Disease Mother     Other Father         pacemaker    Other Maternal Grandfather         aneurysm     Social History     Tobacco Use    Smoking status: Never Smoker    Smokeless tobacco: Never Used   Substance Use Topics    Alcohol use: No    Drug use: No     Allergies   Allergen Reactions    Codeine Nausea And Vomiting     Current Outpatient Medications on File Prior to Encounter   Medication Sig Dispense Refill    ferrous sulfate 325 (65 Fe) MG tablet Take 325 mg by mouth 2 times daily STOP PREOP MED      cyanocobalamin 1000 MCG/ML injection Inject 1,000 mcg into the muscle every 7 days      Lactobacillus-Inulin ([facility] DIGESTIVE HEALTH PO) Take 1 capsule by mouth every morning STOP PREOP MED      doxycycline hyclate (VIBRA-TABS) 100 MG tablet Take 100 mg by mouth 2 times daily      cefdinir (OMNICEF) 300 MG capsule Take 300 mg by mouth 2 times daily      pravastatin (PRAVACHOL) 40 MG tablet Take 20 mg by mouth every other day      gabapentin (NEURONTIN) 300 MG capsule Take 300 mg by mouth 2 times daily. .      glipiZIDE (GLUCOTROL XL) 10 MG extended release tablet Take 1 tablet by mouth daily      VELTASSA 8.4 g PACK Take 1 packet by mouth every other day   1    vitamin C (ASCORBIC ACID) 500 MG tablet

## 2019-07-15 ENCOUNTER — HOSPITAL ENCOUNTER (OUTPATIENT)
Dept: WOUND CARE | Age: 64
Discharge: HOME | End: 2019-07-15
Payer: MEDICARE

## 2019-07-15 VITALS
WEIGHT: 165 LBS | TEMPERATURE: 98.3 F | HEART RATE: 76 BPM | SYSTOLIC BLOOD PRESSURE: 130 MMHG | RESPIRATION RATE: 18 BRPM | DIASTOLIC BLOOD PRESSURE: 80 MMHG | HEIGHT: 66 IN | BODY MASS INDEX: 26.52 KG/M2

## 2019-07-15 DIAGNOSIS — L97.424 DIABETIC ULCER OF LEFT HEEL ASSOCIATED WITH TYPE 2 DIABETES MELLITUS, WITH NECROSIS OF BONE (HCC): ICD-10-CM

## 2019-07-15 DIAGNOSIS — E11.621 DIABETIC ULCER OF LEFT HEEL ASSOCIATED WITH TYPE 2 DIABETES MELLITUS, WITH NECROSIS OF BONE (HCC): ICD-10-CM

## 2019-07-15 PROCEDURE — 11045 DBRDMT SUBQ TISS EACH ADDL: CPT

## 2019-07-15 PROCEDURE — 97607 NEG PRS WND THR NDME<=50SQCM: CPT

## 2019-07-15 PROCEDURE — 11042 DBRDMT SUBQ TIS 1ST 20SQCM/<: CPT

## 2019-07-15 NOTE — PROGRESS NOTES
other day   1    vitamin C (ASCORBIC ACID) 500 MG tablet Take 500 mg by mouth daily STOP PREOP MED      sodium bicarbonate 325 MG tablet Take 650 mg by mouth 2 times daily       Cholecalciferol (VITAMIN D3) 2000 UNITS CAPS Take 4,000 Units by mouth daily STOP PREOP MED      metformin (GLUCOPHAGE) 500 MG tablet Take 1,000 mg by mouth 2 times daily (with meals)       levothyroxine (SYNTHROID) 25 MCG tablet Take 50 mcg by mouth daily       acetaminophen 650 MG TABS Take 650 mg by mouth every 4 hours as needed (Fever >100.5 F (38 C)). 120 tablet      No current facility-administered medications on file prior to encounter. REVIEW OF SYSTEMS See HPI    Objective:    /80   Pulse 76   Temp 98.3 °F (36.8 °C) (Oral)   Resp 18   Ht 5' 6\" (1.676 m)   Wt 165 lb (74.8 kg)   BMI 26.63 kg/m²   Wt Readings from Last 3 Encounters:   07/15/19 165 lb (74.8 kg)   07/08/19 165 lb (74.8 kg)   07/01/19 165 lb (74.8 kg)     PHYSICAL EXAM  CONSTITUTIONAL:   Awake, alert, cooperative   EYES:  lids and lashes normal   ENT: external ears and nose without lesions   NECK:  supple, symmetrical, trachea midline   SKIN:  Open wound Present    Assessment:     Problem List Items Addressed This Visit     Diabetic ulcer of left heel associated with type 2 diabetes mellitus, with necrosis of bone ([facility] [address]        Procedure Note  Indications:  Based on my examination of this patient's wound(s)/ulcer(s) today, debridement is required to promote healing and evaluate the wound base. Performed by: Sulaiman Solitario DPM    Consent obtained:  Yes    Time out taken:  Yes    Pain Control: Anesthetic  Anesthetic: 2% Lidocaine Gel Topical     Debridement:Excisional Debridement    Using curette and tissue nippers the wound(s)/ulcer(s) was/were sharply debrided down through and including the removal of subcutaneous tissue.         Devitalized Tissue Debrided:  biofilm, slough and exudate to stimulate bleeding to promote healing, post debridement / 10   Post Procedural Pain:  0 / 10     Response to treatment:  Well tolerated by patient. Plan:   Treatment Note please see attached Discharge Instructions    Written patient dismissal instructions given to patient and signed by patient or POA. Discharge Instructions       Visit Discharge/Physician Orders    Discharge condition: Stable     Assessment of pain at discharge:no     Anesthetic used: NONE     Discharge to: Home     Left via:Private automobile     Accompanied by:  spouse     ECF/HHA: [facility]- resume wound vac when available at 125 mmhg change 3 times a week      Dressing Orders: LEFT HEEL ULCER-cleanse with normal saline. Vac applied in wound care. Black foam applied to base. Bridged to side. Started at 125 mmhg continuous setting. May use thin douderm on periwound to apply drape to.     Treatment Orders:Eat a diet high in protein and vitamin C. Take a multiple vitamin daily unless contraindicated.     Keep heel clean and dry     [facility] followup visit :  ___________________1 WEEK DR. RODRIGUES_______________________  (Please note your next appointment above and if you are unable to keep, kindly give a 24 hour notice. Thank you.)    Physician signature:__________________________      If you experience any of the following, please [facility] during [facility] hours:    * Increase in Pain  * Temperature over 101  * Increase in drainage from your wound  * Drainage with a foul odor  * Bleeding  * Increase in swelling  * Need for compression bandage changes due to slippage, breakthrough drainage. If you need medical attention outside of the business hours of the [facility] please contact your PCP or go to the nearest emergency room.         Electronically signed by Mook Acosta DPM on 7/15/2019 at 1:38 PM

## 2019-07-22 ENCOUNTER — HOSPITAL ENCOUNTER (OUTPATIENT)
Dept: WOUND CARE | Age: 64
Discharge: HOME | End: 2019-07-22
Payer: MEDICARE

## 2019-07-22 VITALS
SYSTOLIC BLOOD PRESSURE: 120 MMHG | TEMPERATURE: 98.1 F | HEIGHT: 66 IN | RESPIRATION RATE: 16 BRPM | BODY MASS INDEX: 26.52 KG/M2 | DIASTOLIC BLOOD PRESSURE: 80 MMHG | WEIGHT: 165 LBS | HEART RATE: 110 BPM

## 2019-07-22 PROCEDURE — 11045 DBRDMT SUBQ TISS EACH ADDL: CPT

## 2019-07-22 PROCEDURE — 99213 OFFICE O/P EST LOW 20 MIN: CPT

## 2019-07-22 PROCEDURE — 97607 NEG PRS WND THR NDME<=50SQCM: CPT

## 2019-07-22 PROCEDURE — 11042 DBRDMT SUBQ TIS 1ST 20SQCM/<: CPT

## 2019-07-22 RX ORDER — LIDOCAINE HYDROCHLORIDE 20 MG/ML
JELLY TOPICAL ONCE
Status: DISCONTINUED | OUTPATIENT
Start: 2019-07-22 | End: 2019-07-23 | Stop reason: HOSPADM

## 2019-07-22 ASSESSMENT — PAIN DESCRIPTION - ORIENTATION: ORIENTATION: LEFT

## 2019-07-22 ASSESSMENT — PAIN DESCRIPTION - LOCATION: LOCATION: FOOT

## 2019-07-22 ASSESSMENT — PAIN DESCRIPTION - DESCRIPTORS: DESCRIPTORS: SORE

## 2019-07-22 ASSESSMENT — PAIN DESCRIPTION - PAIN TYPE: TYPE: CHRONIC PAIN

## 2019-07-22 ASSESSMENT — PAIN SCALES - GENERAL: PAINLEVEL_OUTOF10: 5

## 2019-07-22 NOTE — PROGRESS NOTES
Wound Healing Center Followup Visit Note    Referring Physician : DO Fred Herring [place]  MEDICAL RECORD NUMBER:  [id number]  AGE: 75 y.o. GENDER: female  : 1955  EPISODE DATE:  2019    Subjective:     Chief Complaint   Patient presents with    Wound Check     left heel DFU      HISTORY of PRESENT ILLNESS HPI   Colten Del Valle is a 75 y.o. female who presents today in regards to follow up evaluation and treatment of wound/ulcer. That patient's past medical, family and social hx were reviewed and changes were made if present.     History of Wound Context:  Follow up and debridement     Wound/Ulcer Pain Timing/Severity: none  Quality of pain: N/A  Severity:  0 / 10   Modifying Factors: None  Associated Signs/Symptoms: drainage    Ulcer Identification:  Ulcer Type: diabetic  Contributing Factors: diabetes    Diabetic/Pressure/Non Pressure Ulcers only:  Ulcer: Diabetic ulcer, fat layer exposed    Wound: Wound dehiscence        PAST MEDICAL HISTORY      Diagnosis Date    Diabetes mellitus ([address]     Diabetic ulcer of left heel associated with type 2 diabetes mellitus, with necrosis of bone ([address] 2018    Hx of blood clots     PE, FROM TAKING BC PILLS    Hyperlipemia     no med    Thyroid disease      Past Surgical History:   Procedure Laterality Date    COLONOSCOPY      DILATION AND CURETTAGE OF UTERUS      ECHO COMPL W DOP COLOR FLOW  2013         ECHOCARDIOGRAM TRANSESOPHAGEAL  2013         FOOT DEBRIDEMENT Left 2019    EXCISIONAL DEBRIDEMENT TO  & THRU MUSCLE LEFT HEEL performed by Fransico Lipscomb DPM at [address] Left 6/3/2019    EXCISIONAL  DEBRIDEMENT WITH GRAFT APPLICATION LEFT HEEL (INTEGRA ), WITH REAPPLICATION OF WOUND VAC performed by Fransico Lipscomb DPM at [address]  13    i&d left foot and ankle with bone biopsy    HYSTERECTOMY      ovaries removed Dr Kameron Glaser W

## 2019-07-29 ENCOUNTER — HOSPITAL ENCOUNTER (OUTPATIENT)
Dept: WOUND CARE | Age: 64
Discharge: HOME | End: 2019-07-29
Payer: MEDICARE

## 2019-07-29 VITALS
WEIGHT: 165 LBS | HEART RATE: 104 BPM | RESPIRATION RATE: 16 BRPM | DIASTOLIC BLOOD PRESSURE: 70 MMHG | BODY MASS INDEX: 26.52 KG/M2 | HEIGHT: 66 IN | TEMPERATURE: 98.7 F | SYSTOLIC BLOOD PRESSURE: 120 MMHG

## 2019-07-29 DIAGNOSIS — E11.621 DIABETIC ULCER OF LEFT HEEL ASSOCIATED WITH TYPE 2 DIABETES MELLITUS, WITH NECROSIS OF BONE (HCC): ICD-10-CM

## 2019-07-29 DIAGNOSIS — L97.424 DIABETIC ULCER OF LEFT HEEL ASSOCIATED WITH TYPE 2 DIABETES MELLITUS, WITH NECROSIS OF BONE (HCC): ICD-10-CM

## 2019-07-29 PROCEDURE — 99213 OFFICE O/P EST LOW 20 MIN: CPT

## 2019-07-29 NOTE — PROGRESS NOTES
removed Dr Rom Sparks PST LIP Left 8/16/2018    PARTIAL CALCANECTOMY LEFT FOOT, EXCISIONAL DEBRIDEMENT MUSCLE LEFT FOOT performed by Fransico Lipscomb DPM at [address]      [place] DEBRIDEMENT, SKIN, SUB-Q TISSUE,MUSCLE,BONE,=<20 SQ CM Left 9/20/2018    EXCISIONAL DEBRIDEMENT SUBQUTANEOUS MUSCLE , BONE LEFT HEEL performed by Fransico Lipscomb DPM at [facility] [address] Left 4/10/2018    LEFT HEEL DEBRIDEMENT, BONE BIOPSY performed by Sanju Gallo DPM at [facility] OR    [place] OFFICE/OUTPT VISIT,PROCEDURE ONLY Left 10/19/2018    PARTIAL LEFT CALCANECTOMY performed by Fransico Lipscomb DPM at [facility] OR    WISDOM TOOTH EXTRACTION       Family History   Problem Relation Age of Onset   [facility] Alzheimer's Disease Mother     Other Father         pacemaker    Other Maternal Grandfather         aneurysm     Social History     Tobacco Use    Smoking status: Never Smoker    Smokeless tobacco: Never Used   Substance Use Topics    Alcohol use: No    Drug use: No     Allergies   Allergen Reactions    Codeine Nausea And Vomiting     Current Outpatient Medications on File Prior to Encounter   Medication Sig Dispense Refill    cyanocobalamin 1000 MCG/ML injection Inject 1,000 mcg into the muscle every 7 days      Lactobacillus-Inulin ([facility] DIGESTIVE HEALTH PO) Take 1 capsule by mouth every morning STOP PREOP MED      doxycycline hyclate (VIBRA-TABS) 100 MG tablet Take 100 mg by mouth 2 times daily      cefdinir (OMNICEF) 300 MG capsule Take 300 mg by mouth 2 times daily      pravastatin (PRAVACHOL) 40 MG tablet Take 20 mg by mouth every other day      gabapentin (NEURONTIN) 300 MG capsule Take 300 mg by mouth 2 times daily. .      glipiZIDE (GLUCOTROL XL) 10 MG extended release tablet Take 1 tablet by mouth daily      VELTASSA 8.4 g PACK Take 1 packet by mouth every other day   1    vitamin C (ASCORBIC ACID) 500 MG tablet Take 500 mg by mouth daily

## 2019-08-05 ENCOUNTER — HOSPITAL ENCOUNTER (OUTPATIENT)
Dept: WOUND CARE | Age: 64
Discharge: HOME | End: 2019-08-05
Payer: MEDICARE

## 2019-08-05 VITALS
SYSTOLIC BLOOD PRESSURE: 128 MMHG | HEIGHT: 66 IN | DIASTOLIC BLOOD PRESSURE: 66 MMHG | BODY MASS INDEX: 26.52 KG/M2 | TEMPERATURE: 98.2 F | RESPIRATION RATE: 16 BRPM | WEIGHT: 165 LBS | HEART RATE: 68 BPM

## 2019-08-05 PROCEDURE — 11042 DBRDMT SUBQ TIS 1ST 20SQCM/<: CPT | Performed by: SURGERY

## 2019-08-05 PROCEDURE — 11045 DBRDMT SUBQ TISS EACH ADDL: CPT | Performed by: SURGERY

## 2019-08-05 PROCEDURE — 11042 DBRDMT SUBQ TIS 1ST 20SQCM/<: CPT

## 2019-08-05 PROCEDURE — 11045 DBRDMT SUBQ TISS EACH ADDL: CPT

## 2019-08-05 NOTE — PROGRESS NOTES
Note please see attached Discharge Instructions    Written patient dismissal instructions given to patient and signed by patient or POA. Discharge Instructions       Visit Discharge/Physician Orders     Discharge condition: Stable     Assessment of pain at discharge:no     Anesthetic used: NONE     Discharge to: Home     Left via:Private automobile     Accompanied by:  spouse     ECF/HHA: [facility]- continue wound vac when available at 125 mmhg change 3 times a week      Dressing Orders: LEFT HEEL ULCER-cleanse with normal saline.  Vac applied in wound care.  Black foam applied to base.  Bridged to side. Started at 125 mmhg continuous setting.  May use thin douderm on periwound to apply drape to.     Treatment Orders:Eat a diet high in protein and vitamin C. Take a multiple vitamin daily unless contraindicated.     Keep heel clean and dry     [facility] followup visit :  ___________________1 WEEK DR. RODRIGUES_______________________  (Please note your next appointment above and if you are unable to keep, kindly give a 24 hour notice.  Thank you.)     Physician signature:__________________________        If you experience any of the following, please call the [address] during business hours:     * Increase in Pain  * Temperature over 101  * Increase in drainage from your wound  * Drainage with a foul odor  * Bleeding  * Increase in swelling  * Need for compression bandage changes due to slippage, breakthrough drainage.     If you need medical attention outside of the business hours of the "[employer] [address] please contact your PCP or go to the nearest emergency room.                                Electronically signed by Farzaneh Fuentes MD on 8/5/2019 at 1:36 PM

## 2019-08-12 ENCOUNTER — HOSPITAL ENCOUNTER (OUTPATIENT)
Dept: WOUND CARE | Age: 64
Discharge: HOME | End: 2019-08-12
Payer: MEDICARE

## 2019-08-12 VITALS
HEIGHT: 66 IN | RESPIRATION RATE: 16 BRPM | BODY MASS INDEX: 26.52 KG/M2 | HEART RATE: 96 BPM | SYSTOLIC BLOOD PRESSURE: 98 MMHG | WEIGHT: 165 LBS | TEMPERATURE: 98.4 F | DIASTOLIC BLOOD PRESSURE: 60 MMHG

## 2019-08-12 PROCEDURE — 11042 DBRDMT SUBQ TIS 1ST 20SQCM/<: CPT

## 2019-08-12 NOTE — PROGRESS NOTES
TRIMALLEOLAR ANKLE FX W FIX PST LIP Left 8/16/2018    PARTIAL CALCANECTOMY LEFT FOOT, EXCISIONAL DEBRIDEMENT MUSCLE LEFT FOOT performed by Sulaiman Solitario DPM at [facility]      LA DEBRIDEMENT, SKIN, SUB-Q TISSUE,MUSCLE,BONE,=<20 SQ CM Left 9/20/2018    EXCISIONAL DEBRIDEMENT SUBQUTANEOUS MUSCLE , BONE LEFT HEEL performed by Sulaiman Solitario DPM at [address] [facility] FOOT INFEC,1 BURSA Left 4/10/2018    LEFT HEEL DEBRIDEMENT, BONE BIOPSY performed by Nara Ferguson DPM at [facility] OR    LA OFFICE/OUTPT VISIT,PROCEDURE ONLY Left 10/19/2018    PARTIAL LEFT CALCANECTOMY performed by Sulaiman Sol[facility]ario DPM at [facility]    WISDOM TOOTH EXTRACTION       Family History   Problem Relation Age of Onset   Chayeulalia Shiloh Alzheimer's Disease Mother     Other Father         pacemaker    Other Maternal Grandfather         aneurysm     Social History     Tobacco Use    Smoking status: Never Smoker    Smokeless tobacco: Never Used   Substance Use Topics    Alcohol use: No    Drug use: No     Allergies   Allergen Reactions    Codeine Nausea And Vomiting     Current Outpatient Medications on File Prior to Encounter   Medication Sig Dispense Refill    cyanocobalamin 1000 MCG/ML injection Inject 1,000 mcg into the muscle every 30 days       Lactobacillus-Inulin ([facility] PO) Take 1 capsule by mouth every morning STOP PREOP MED      doxycycline hyclate (VIBRA-TABS) 100 MG tablet Take 100 mg by mouth 2 times daily      cefdinir (OMNICEF) 300 MG capsule Take 300 mg by mouth 2 times daily      pravastatin (PRAVACHOL) 40 MG tablet Take 20 mg by mouth every other day      gabapentin (NEURONTIN) 300 MG capsule Take 300 mg by mouth 2 times daily. .      glipiZIDE (GLUCOTROL XL) 10 MG extended release tablet Take 1 tablet by mouth daily      VELTASSA 8.4 g PACK Take 1 packet by mouth every other day   1    vitamin C (ASCORBIC ACID) 500 MG tablet Take 500 mg by mouth daily STOP PREOP MED      Discharge Instructions       Visit Discharge/Physician Orders     Discharge condition: Stable     Assessment of pain at discharge:no     Anesthetic used: NONE     Discharge to: Home     Left via:Private automobile     Accompanied by:  spouse     ECF/HHA: [facility]- continue wound vac when available at 125 mmhg change 3 times a week      Dressing Orders: LEFT HEEL ULCER-cleanse with normal saline.  Vac applied in wound care.  Black foam applied to base.  Bridged to side. Started at 125 mmhg continuous setting.  May use thin douderm on periwound to apply drape to.     Treatment Orders:Eat a diet high in protein and vitamin C. Take a multiple vitamin daily unless contraindicated.     Keep heel clean and dry     [facility] followup visit :  ___________________1 WEEK DR. RODRIGUES_______________________  (Please note your next appointment above and if you are unable to keep, kindly give a 24 hour notice.  Thank you.)     Physician signature:__________________________        If you experience any of the following, please call the [facility] during business hours:     * Increase in Pain  * Temperature over 101  * Increase in drainage from your wound  * Drainage with a foul odor  * Bleeding  * Increase in swelling  * Need for compression bandage changes due to slippage, breakthrough drainage.     If you need medical attention outside of the business hours of the [facility] please contact your PCP or go to the nearest emergency room.                                                     Electronically signed by Steve Smith DPM on 8/12/2019 at 1:37 PM

## 2019-08-19 ENCOUNTER — HOSPITAL ENCOUNTER (OUTPATIENT)
Dept: WOUND CARE | Age: 64
Discharge: HOME | End: 2019-08-19
Payer: MEDICARE

## 2019-08-19 VITALS
WEIGHT: 165 LBS | SYSTOLIC BLOOD PRESSURE: 108 MMHG | RESPIRATION RATE: 18 BRPM | TEMPERATURE: 98.6 F | DIASTOLIC BLOOD PRESSURE: 62 MMHG | BODY MASS INDEX: 26.52 KG/M2 | HEIGHT: 66 IN | HEART RATE: 84 BPM

## 2019-08-19 DIAGNOSIS — L97.424 DIABETIC ULCER OF LEFT HEEL ASSOCIATED WITH TYPE 2 DIABETES MELLITUS, WITH NECROSIS OF BONE (HCC): ICD-10-CM

## 2019-08-19 DIAGNOSIS — E11.621 DIABETIC ULCER OF LEFT HEEL ASSOCIATED WITH TYPE 2 DIABETES MELLITUS, WITH NECROSIS OF BONE (HCC): ICD-10-CM

## 2019-08-19 PROCEDURE — 99213 OFFICE O/P EST LOW 20 MIN: CPT

## 2019-08-19 PROCEDURE — 11045 DBRDMT SUBQ TISS EACH ADDL: CPT

## 2019-08-19 PROCEDURE — 11042 DBRDMT SUBQ TIS 1ST 20SQCM/<: CPT

## 2019-08-26 ENCOUNTER — HOSPITAL ENCOUNTER (OUTPATIENT)
Dept: WOUND CARE | Age: 64
Discharge: HOME | End: 2019-08-26
Payer: MEDICARE

## 2019-08-26 VITALS
DIASTOLIC BLOOD PRESSURE: 66 MMHG | BODY MASS INDEX: 26.52 KG/M2 | HEIGHT: 66 IN | SYSTOLIC BLOOD PRESSURE: 118 MMHG | RESPIRATION RATE: 22 BRPM | WEIGHT: 165 LBS | HEART RATE: 101 BPM | TEMPERATURE: 99 F

## 2019-08-26 DIAGNOSIS — E11.621 DIABETIC ULCER OF LEFT HEEL ASSOCIATED WITH TYPE 2 DIABETES MELLITUS, WITH NECROSIS OF BONE (HCC): ICD-10-CM

## 2019-08-26 DIAGNOSIS — L97.424 DIABETIC ULCER OF LEFT HEEL ASSOCIATED WITH TYPE 2 DIABETES MELLITUS, WITH NECROSIS OF BONE (HCC): ICD-10-CM

## 2019-08-26 PROCEDURE — 11045 DBRDMT SUBQ TISS EACH ADDL: CPT

## 2019-08-26 PROCEDURE — 11042 DBRDMT SUBQ TIS 1ST 20SQCM/<: CPT

## 2019-08-26 PROCEDURE — 97606 NEG PRS WND THER DME>50 SQCM: CPT

## 2019-08-26 ASSESSMENT — PAIN SCALES - GENERAL: PAINLEVEL_OUTOF10: 0

## 2019-08-26 NOTE — PROGRESS NOTES
Wound Healing Center Followup Visit Note    Referring Physician : Norberta Oppenheim, DO Doy Deal Mary  MEDICAL RECORD NUMBER:  [id number]  AGE: 75 y.o. GENDER: female  : 1955  EPISODE DATE:  2019    Subjective:     Chief Complaint   Patient presents with    Wound Check     lt foot       HISTORY of PRESENT ILLNESS HPI   Xiomara Oliva is a 75 y.o. female who presents today in regards to follow up evaluation and treatment of wound/ulcer. That patient's past medical, family and social hx were reviewed and changes were made if present.     History of Wound Context:  Follow up and debridement     Wound/Ulcer Pain Timing/Severity: none  Quality of pain: N/A  Severity:  0 / 10   Modifying Factors: None  Associated Signs/Symptoms: drainage    Ulcer Identification:  Ulcer Type: diabetic  Contributing Factors: diabetes    Diabetic/Pressure/Non Pressure Ulcers only:  Ulcer: Non-Pressure ulcer, fat layer exposed    Wound: Wound dehiscence        PAST MEDICAL HISTORY      Diagnosis Date    Diabetes mellitus ([address]     Diabetic ulcer of left heel associated with type 2 diabetes mellitus, with necrosis of bone ([facility] 2018    Hx of blood clots     PE, FROM TAKING BC PILLS    Hyperlipemia     no med    Thyroid disease      Past Surgical History:   Procedure Laterality Date    COLONOSCOPY      DILATION AND CURETTAGE OF UTERUS      ECHO COMPL W DOP COLOR FLOW  2013         ECHOCARDIOGRAM TRANSESOPHAGEAL  2013         FOOT DEBRIDEMENT Left 2019    EXCISIONAL DEBRIDEMENT TO  & THRU MUSCLE LEFT HEEL performed by Ulises Covarrubias DPM at [address] [place] [address] Left 6/3/2019    EXCISIONAL  DEBRIDEMENT WITH GRAFT APPLICATION LEFT HEEL (INTEGRA ), WITH REAPPLICATION OF WOUND VAC performed by Ulises Covarrubias DPM at [address] [place] [address]  13    i&d left foot and ankle with bone biopsy    HYSTERECTOMY      ovaries removed Dr Saurabh WORTHY FIX PST LIP Left 8/16/2018    PARTIAL CALCANECTOMY LEFT FOOT, EXCISIONAL DEBRIDEMENT MUSCLE LEFT FOOT performed by Jhony Jacob DPM at [place] [address]      AL DEBRIDEMENT, SKIN, SUB-Q TISSUE,MUSCLE,BONE,=<20 SQ CM Left 9/20/2018    EXCISIONAL DEBRIDEMENT SUBQUTANEOUS MUSCLE , BONE LEFT HEEL performed by Jhony Jacob DPM at [facility] FOOT INFEC,1 BURSA Left 4/10/2018    LEFT HEEL DEBRIDEMENT, BONE BIOPSY performed by Ryan Mims DPM at [facility] OR    AL OFFICE/OUTPT VISIT,PROCEDURE ONLY Left 10/19/2018    PARTIAL LEFT CALCANECTOMY performed by Jhony Jacob DPM at [facility] OR    WISDOM TOOTH EXTRACTION       Family History   Problem Relation Age of Onset   [employer] Alzheimer's Disease Mother     Other Father         pacemaker    Other Maternal Grandfather         aneurysm     Social History     Tobacco Use    Smoking status: Never Smoker    Smokeless tobacco: Never Used   Substance Use Topics    Alcohol use: No    Drug use: No     Allergies   Allergen Reactions    Codeine Nausea And Vomiting     Current Outpatient Medications on File Prior to Encounter   Medication Sig Dispense Refill    cefdinir (OMNICEF) 300 MG capsule Take 1 capsule by mouth 2 times daily 60 capsule 1    doxycycline hyclate (VIBRA-TABS) 100 MG tablet Take 1 tablet by mouth 2 times daily 60 tablet 0    cyanocobalamin 1000 MCG/ML injection Inject 1,000 mcg into the muscle every 30 days       Lactobacillus-Inulin ([facility] DIGESTIVE HEALTH PO) Take 1 capsule by mouth every morning STOP PREOP MED      pravastatin (PRAVACHOL) 40 MG tablet Take 20 mg by mouth every other day      gabapentin (NEURONTIN) 300 MG capsule Take 300 mg by mouth 2 times daily. .      glipiZIDE (GLUCOTROL XL) 10 MG extended release tablet Take 1 tablet by mouth daily      VELTASSA 8.4 g PACK Take 1 packet by mouth every other day   1    vitamin C (ASCORBIC ACID) 500 MG tablet Take 500 mg by mouth daily STOP PREOP MED     

## 2019-09-09 ENCOUNTER — HOSPITAL ENCOUNTER (OUTPATIENT)
Dept: WOUND CARE | Age: 64
Discharge: HOME | End: 2019-09-09
Payer: MEDICARE

## 2019-09-09 VITALS
WEIGHT: 165 LBS | DIASTOLIC BLOOD PRESSURE: 70 MMHG | SYSTOLIC BLOOD PRESSURE: 118 MMHG | TEMPERATURE: 98.3 F | RESPIRATION RATE: 16 BRPM | HEIGHT: 66 IN | HEART RATE: 100 BPM | BODY MASS INDEX: 26.52 KG/M2

## 2019-09-09 DIAGNOSIS — M86.9 OSTEOMYELITIS OF ANKLE AND FOOT (HCC): ICD-10-CM

## 2019-09-09 PROCEDURE — 11042 DBRDMT SUBQ TIS 1ST 20SQCM/<: CPT

## 2019-09-09 NOTE — PROGRESS NOTES
Wound Healing Center Followup Visit Note    [facility]  MEDICAL RECORD NUMBER:  [id number]  AGE: 75 y.o. GENDER: female  : 1955  EPISODE DATE:  2019    Subjective:     Chief Complaint   Patient presents with    Wound Check     patient here for treatment of left heel ulcer      HISTORY of PRESENT ILLNESS JANE Forman is a 75 y.o. female who presents today in regards to follow up evaluation and treatment of wound/ulcer. That patient's past medical, family and social hx were reviewed and changes were made if present.     History of Wound Context:  Follow up ad debridement     Wound/Ulcer Pain Timing/Severity: none  Quality of pain: N/A  Severity:  0 / 10   Modifying Factors: None  Associated Signs/Symptoms: drainage    Ulcer Identification:  Ulcer Type: diabetic  Contributing Factors: diabetes    Diabetic/Pressure/Non Pressure Ulcers only:  Ulcer: Diabetic ulcer, fat layer exposed    Wound: Wound dehiscence        PAST MEDICAL HISTORY      Diagnosis Date    Diabetes mellitus [facility]     Diabetic ulcer of left heel associated with type 2 diabetes mellitus, with necrosis of bone ([facility] 2018    Hx of blood clots     PE, FROM TAKING BC PILLS    Hyperlipemia     no med    Thyroid disease      Past Surgical History:   Procedure Laterality Date    COLONOSCOPY      DILATION AND CURETTAGE OF UTERUS      ECHO COMPL W DOP COLOR FLOW  2013         ECHOCARDIOGRAM TRANSESOPHAGEAL  2013         FOOT DEBRIDEMENT Left 2019    EXCISIONAL DEBRIDEMENT TO  & THRU MUSCLE LEFT HEEL performed by Alvarez Valles DPM at [address] [place] [address] Left 6/3/2019    EXCISIONAL  DEBRIDEMENT WITH GRAFT APPLICATION LEFT HEEL (INTEGRA ), WITH REAPPLICATION OF WOUND VAC performed by Alvarez Valles DPM at [place] [address]  13    i&d left foot and ankle with bone biopsy    HYSTERECTOMY      ovaries removed Dr Abe Arora

## 2019-09-16 ENCOUNTER — HOSPITAL ENCOUNTER (OUTPATIENT)
Dept: WOUND CARE | Age: 64
Discharge: HOME | End: 2019-09-16
Payer: MEDICARE

## 2019-09-16 VITALS
BODY MASS INDEX: 26.52 KG/M2 | SYSTOLIC BLOOD PRESSURE: 110 MMHG | DIASTOLIC BLOOD PRESSURE: 78 MMHG | HEIGHT: 66 IN | WEIGHT: 165 LBS | RESPIRATION RATE: 16 BRPM | HEART RATE: 74 BPM | TEMPERATURE: 98.1 F

## 2019-09-16 PROCEDURE — 11042 DBRDMT SUBQ TIS 1ST 20SQCM/<: CPT

## 2019-09-16 NOTE — PROGRESS NOTES
FIX PST LIP Left 8/16/2018    PARTIAL CALCANECTOMY LEFT FOOT, EXCISIONAL DEBRIDEMENT MUSCLE LEFT FOOT performed by Shila Miner DPM at [address] [facility] [facility]      ME DEBRIDEMENT, SKIN, SUB-Q TISSUE,MUSCLE,BONE,=<20 SQ CM Left 9/20/2018    EXCISIONAL DEBRIDEMENT SUBQUTANEOUS MUSCLE , BONE LEFT HEEL performed by Shila Miner DPM at [address] [facility] FOOT INFEC,1 BURSA Left 4/10/2018    LEFT HEEL DEBRIDEMENT, BONE BIOPSY performed by Elva Galvin DPM at [facility] OR    ME OFFICE/OUTPT VISIT,PROCEDURE ONLY Left 10/19/2018    PARTIAL LEFT CALCANECTOMY performed by Shila Miner DPM at [facility] OR    WISDOM TOOTH EXTRACTION       Family History   Problem Relation Age of Onset   Ruma Gunning Alzheimer's Disease Mother     Other Father         pacemaker    Other Maternal Grandfather         aneurysm     Social History     Tobacco Use    Smoking status: Never Smoker    Smokeless tobacco: Never Used   Substance Use Topics    Alcohol use: No    Drug use: No     Allergies   Allergen Reactions    Codeine Nausea And Vomiting     Current Outpatient Medications on File Prior to Encounter   Medication Sig Dispense Refill    cefdinir (OMNICEF) 300 MG capsule Take 1 capsule by mouth 2 times daily 60 capsule 1    doxycycline hyclate (VIBRA-TABS) 100 MG tablet Take 1 tablet by mouth 2 times daily 60 tablet 0    cyanocobalamin 1000 MCG/ML injection Inject 1,000 mcg into the muscle every 30 days       Lactobacillus-Inulin ([facility] DIGESTIVE HEALTH PO) Take 1 capsule by mouth every morning STOP PREOP MED      pravastatin (PRAVACHOL) 40 MG tablet Take 20 mg by mouth every other day      gabapentin (NEURONTIN) 300 MG capsule Take 300 mg by mouth 2 times daily. .      glipiZIDE (GLUCOTROL XL) 10 MG extended release tablet Take 1 tablet by mouth daily      VELTASSA 8.4 g PACK Take 1 packet by mouth every other day   1    vitamin C (ASCORBIC ACID) 500 MG tablet Take 500 mg by mouth daily STOP PREOP MED     

## 2019-09-23 ENCOUNTER — HOSPITAL ENCOUNTER (OUTPATIENT)
Dept: WOUND CARE | Age: 64
Discharge: HOME | End: 2019-09-23
Payer: MEDICARE

## 2019-09-23 VITALS
WEIGHT: 165 LBS | BODY MASS INDEX: 26.52 KG/M2 | DIASTOLIC BLOOD PRESSURE: 66 MMHG | TEMPERATURE: 97.7 F | HEART RATE: 78 BPM | SYSTOLIC BLOOD PRESSURE: 106 MMHG | RESPIRATION RATE: 18 BRPM | HEIGHT: 66 IN

## 2019-09-23 DIAGNOSIS — E11.621 DIABETIC ULCER OF LEFT HEEL ASSOCIATED WITH TYPE 2 DIABETES MELLITUS, WITH NECROSIS OF BONE (HCC): ICD-10-CM

## 2019-09-23 DIAGNOSIS — L97.424 DIABETIC ULCER OF LEFT HEEL ASSOCIATED WITH TYPE 2 DIABETES MELLITUS, WITH NECROSIS OF BONE (HCC): ICD-10-CM

## 2019-09-23 PROCEDURE — 11042 DBRDMT SUBQ TIS 1ST 20SQCM/<: CPT

## 2019-09-23 ASSESSMENT — PAIN SCALES - GENERAL: PAINLEVEL_OUTOF10: 0

## 2019-09-23 NOTE — PROGRESS NOTES
mouth 2 times daily       Cholecalciferol (VITAMIN D3) 2000 UNITS CAPS Take 4,000 Units by mouth daily STOP PREOP MED      acetaminophen 650 MG TABS Take 650 mg by mouth every 4 hours as needed (Fever >100.5 F (38 C)). 120 tablet     metformin (GLUCOPHAGE) 500 MG tablet Take 1,000 mg by mouth 2 times daily (with meals)       levothyroxine (SYNTHROID) 25 MCG tablet Take 50 mcg by mouth daily        No current facility-administered medications on file prior to encounter. REVIEW OF SYSTEMS See HPI    Objective:    /66   Pulse 78   Temp 97.7 °F (36.5 °C) (Oral)   Resp 18   Ht 5' 6\" (1.676 m)   Wt 165 lb (74.8 kg)   BMI 26.63 kg/m²   Wt Readings from Last 3 Encounters:   09/23/19 165 lb (74.8 kg)   09/16/19 165 lb (74.8 kg)   09/09/19 165 lb (74.8 kg)     PHYSICAL EXAM  CONSTITUTIONAL:   Awake, alert, cooperative   EYES:  lids and lashes normal   ENT: external ears and nose without lesions   NECK:  supple, symmetrical, trachea midline   SKIN:  Open wound with 20% devitalized nonviable tissue. No purulence or odor. No surrounding erythema. Vascular intact. Loss of protective sensation    Assessment:     Diabetic. Neuropathy. Ulcer. Procedure Note  Indications:  Based on my examination of this patient's wound(s)/ulcer(s) today, debridement is required to promote healing and evaluate the wound base. Performed by: Teresita Fuentes DPM    Consent obtained:  Yes    Time out taken:  Yes    Pain Control: Anesthetic  Anesthetic: None     Debridement:Excisional Debridement    Using curette the wound(s)/ulcer(s) was/were sharply debrided down through and including the removal of subcutaneous tissue.         Devitalized Tissue Debrided:  fibrin, biofilm, slough and necrotic/eschar to stimulate bleeding to promote healing, post debridement good bleeding base and wound edges noted    Pre Debridement Measurements:  Are located in the [facility]  Documentation Flow Sheet    Wound/Ulcer #: 1    Post Debridement Measurements:  Wound/Ulcer Descriptions are Pre Debridement except measurements:    Wound 11/30/18 Heel Left;Plantar #1 (acquired 4-6-18) Grade 3 (Active)   Wound Image   9/9/2019  1:19 PM   Dressing Status Clean;Dry; Intact 9/16/2019  2:17 PM   Dressing Changed Changed/New 9/16/2019  2:17 PM   Dressing/Treatment Vacuum dressing 9/16/2019  2:17 PM   Wound Cleansed Rinsed/Irrigated with saline 9/16/2019  2:17 PM   Wound Length (cm) 7 cm 9/23/2019  2:03 PM   Wound Width (cm) 4.8 cm 9/23/2019  2:03 PM   Wound Depth (cm) 3 cm 9/23/2019  2:03 PM   Wound Surface Area (cm^2) 33.6 cm^2 9/23/2019  2:03 PM   Change in Wound Size % (l*w) 24.14 9/23/2019  2:03 PM   Wound Volume (cm^3) 100.8 cm^3 9/23/2019  2:03 PM   Wound Healing % 22 9/23/2019  2:03 PM   Post-Procedure Length (cm) 7 cm 9/23/2019  2:20 PM   Post-Procedure Width (cm) 4.8 cm 9/23/2019  2:20 PM   Post-Procedure Depth (cm) 3 cm 9/23/2019  2:20 PM   Post-Procedure Surface Area (cm^2) 33.6 cm^2 9/23/2019  2:20 PM   Post-Procedure Volume (cm^3) 100.8 cm^3 9/23/2019  2:20 PM   Distance Tunneling (cm) 0.6 cm 7/8/2019  1:23 PM   Tunneling Position ___ O'Clock 12 7/8/2019  1:23 PM   Undermining Starts ___ O'Clock 1 9/9/2019  1:19 PM   Undermining Ends___ O'Clock 5 9/9/2019  1:19 PM   Undermining Maxium Distance (cm) 2.0 at 1 o'clock 9/9/2019  1:19 PM   Wound Assessment Slough; Yellow;Red;Pink 9/23/2019  2:03 PM   Drainage Amount Copious 9/23/2019  2:03 PM   Drainage Description Serosanguinous; Yellow; Tan 9/23/2019  2:03 PM   Odor Mild 9/23/2019  2:03 PM   Exposed structure Bone 9/23/2019  2:03 PM   Kaya-wound Assessment Maceration; White 9/23/2019  2:03 PM   Number of days: 297     Percent of Wound/Ulcer Debrided: 50%    Total Surface Area Debrided:  15 sq cm     Estimated Blood Loss:  Minimal  Hemostasis Achieved:  by pressure    Procedural Pain:  0  / 10   Post Procedural Pain:  0 / 10     Response to treatment:  Well tolerated by patient.      Plan:   Treatment Note please see attached Discharge Instructions. Continue wound VAC. Encouraged no weight. Discussed possible complications such as deep infection, loss of limb. Written patient dismissal instructions given to patient and signed by patient or POA. Discharge Instructions         Visit Discharge/Physician Orders     Discharge condition: Stable     Assessment of pain at discharge:no     Anesthetic used: NONE     Discharge to: Home     Left via:Private automobile     Accompanied by:  spouse     ECF/HHA: [facility]-      continue wound vac at 125 mmhg change 3 times a week      Dressing Orders: LEFT HEEL ULCER-cleanse with normal saline.  Vac applied in wound care.  Black foam applied to base  Bridged to side. Started at 125 mmhg continuous setting.      Treatment Orders:Eat a diet high in protein and vitamin C. Take a multiple vitamin daily unless contraindicated.     Keep heel clean and dry     [facility] followup visit :  __________________1WEEK DR. RODRIGUES_______________________  (Please note your next appointment above and if you are unable to keep, kindly give a 24 hour notice.  Thank you.)     Physician signature:__________________________        If you experience any of the following, please call the [address] during business hours:     * Increase in Pain  * Temperature over 101  * Increase in drainage from your wound  * Drainage with a foul odor  * Bleeding  * Increase in swelling  * Need for compression bandage changes due to slippage, breakthrough drainage.     If you need medical attention outside of the business hours of the [address] please contact your PCP or go to the nearest emergency room.                                   Electronically signed by Lucien Carolina DPM on 9/23/2019 at 2:24 PM

## 2019-09-30 ENCOUNTER — HOSPITAL ENCOUNTER (OUTPATIENT)
Dept: WOUND CARE | Age: 64
Discharge: HOME | End: 2019-09-30
Payer: MEDICARE

## 2019-09-30 VITALS
SYSTOLIC BLOOD PRESSURE: 128 MMHG | BODY MASS INDEX: 26.52 KG/M2 | TEMPERATURE: 98.3 F | RESPIRATION RATE: 22 BRPM | DIASTOLIC BLOOD PRESSURE: 70 MMHG | WEIGHT: 165 LBS | HEIGHT: 66 IN | HEART RATE: 92 BPM

## 2019-09-30 PROCEDURE — 97607 NEG PRS WND THR NDME<=50SQCM: CPT

## 2019-09-30 PROCEDURE — 11042 DBRDMT SUBQ TIS 1ST 20SQCM/<: CPT

## 2019-10-07 ENCOUNTER — HOSPITAL ENCOUNTER (OUTPATIENT)
Dept: WOUND CARE | Age: 64
Discharge: HOME | End: 2019-10-07
Payer: MEDICARE

## 2019-10-07 VITALS
HEART RATE: 108 BPM | BODY MASS INDEX: 26.52 KG/M2 | WEIGHT: 165 LBS | DIASTOLIC BLOOD PRESSURE: 68 MMHG | HEIGHT: 66 IN | TEMPERATURE: 98 F | RESPIRATION RATE: 16 BRPM | SYSTOLIC BLOOD PRESSURE: 126 MMHG

## 2019-10-07 PROCEDURE — 11042 DBRDMT SUBQ TIS 1ST 20SQCM/<: CPT

## 2019-12-11 ENCOUNTER — HOSPITAL ENCOUNTER (OUTPATIENT)
Age: 64
Discharge: HOME | End: 2019-12-13
Payer: MEDICARE

## 2019-12-11 LAB
BACTERIA: ABNORMAL /HPF
BILIRUBIN URINE: NEGATIVE
BLOOD, URINE: NEGATIVE
CASTS 2: ABNORMAL /LPF
CASTS: ABNORMAL /LPF
CLARITY: CLEAR
COLOR: YELLOW
EPITHELIAL CELLS, UA: ABNORMAL /HPF
GLUCOSE URINE: 500 MG/DL
KETONES, URINE: NEGATIVE MG/DL
LEUKOCYTE ESTERASE, URINE: NEGATIVE
NITRITE, URINE: NEGATIVE
PH UA: 5 (ref 5–9)
PROTEIN UA: 30 MG/DL
RBC UA: ABNORMAL /HPF (ref 0–2)
SPECIFIC GRAVITY UA: 1.02 (ref 1–1.03)
UROBILINOGEN, URINE: 0.2 E.U./DL
WBC UA: ABNORMAL /HPF (ref 0–5)

## 2019-12-11 PROCEDURE — 87088 URINE BACTERIA CULTURE: CPT

## 2019-12-11 PROCEDURE — 81001 URINALYSIS AUTO W/SCOPE: CPT

## 2019-12-13 LAB — URINE CULTURE, ROUTINE: NORMAL

## 2019-12-30 ENCOUNTER — HOSPITAL ENCOUNTER (OUTPATIENT)
Dept: WOUND CARE | Age: 64
Discharge: HOME | End: 2019-12-30
Payer: MEDICARE

## 2020-01-13 ENCOUNTER — HOSPITAL ENCOUNTER (OUTPATIENT)
Dept: WOUND CARE | Age: 65
Discharge: HOME | End: 2020-01-13
Payer: MEDICARE

## 2020-01-13 VITALS
TEMPERATURE: 97.4 F | HEIGHT: 67 IN | RESPIRATION RATE: 18 BRPM | DIASTOLIC BLOOD PRESSURE: 64 MMHG | HEART RATE: 100 BPM | WEIGHT: 165 LBS | BODY MASS INDEX: 25.9 KG/M2 | SYSTOLIC BLOOD PRESSURE: 142 MMHG

## 2020-01-13 PROCEDURE — 11042 DBRDMT SUBQ TIS 1ST 20SQCM/<: CPT

## 2020-01-13 PROCEDURE — 87075 CULTR BACTERIA EXCEPT BLOOD: CPT

## 2020-01-13 PROCEDURE — 87070 CULTURE OTHR SPECIMN AEROBIC: CPT

## 2020-01-13 PROCEDURE — 99215 OFFICE O/P EST HI 40 MIN: CPT | Performed by: SURGERY

## 2020-01-13 PROCEDURE — 99215 OFFICE O/P EST HI 40 MIN: CPT

## 2020-01-13 PROCEDURE — 11042 DBRDMT SUBQ TIS 1ST 20SQCM/<: CPT | Performed by: SURGERY

## 2020-01-13 PROCEDURE — 11045 DBRDMT SUBQ TISS EACH ADDL: CPT | Performed by: SURGERY

## 2020-01-13 PROCEDURE — 11045 DBRDMT SUBQ TISS EACH ADDL: CPT

## 2020-01-13 NOTE — PROGRESS NOTES
Wound Healing Center /Hyperbarics   History and Physical/Consultation  Vascular    Referring Physician : DO Tanisha Weems [facility]  MEDICAL RECORD NUMBER:  [id number]  AGE: 75 y.o. GENDER: female  : 1955  EPISODE DATE:  2020  Subjective:     Chief Complaint   Patient presents with    Wound Check     left heel         HISTORY of PRESENT ILLNESS HPI     Donn Hidalgo is a 75 y.o. female who presents today for wound/ulcer evaluation. History of Wound Context:  The patient has had a wound of left foot which was first noted approximately for the last 5 years at least on and off, has undergone multiple operations at least 7 of them including wound debridements, skin grafting, partial calcanectomy, being recently treated with wound VAC therapy by Dr. Carmen Pickering, on his departure, patient was seen by Dr. Elfego Bennett, who noted that the wound in the plantar surface left foot are very unstable, had past history of osteomyelitis documented by x-rays as well as MRI, has diabetes mellitus with diabetic neuropathy, felt rather than treating her for several years as in the past with antibiotics wound VAC etc. the best option might be primary left below the knee amputation, prosthetic rehabilitation      . This has been treated . On their initial visit to the wound healing center, 20, the patient has noted that the wound has not been improving. The patient has had similar previous wounds in the past.      Pt is currently not on abx.       Wound/Ulcer Pain Timing/Severity: constant  Quality of pain: dull, aching, burning  Severity:  3 / 10   Modifying Factors: Pain worsens with walking  Associated Signs/Symptoms: numbness    Ulcer Identification:  Ulcer Type: diabetic, pressure and non-healing/non-surgical  Contributing Factors: edema, diabetes and chronic pressure    Diabetic/Pressure/Non Pressure Ulcers only:  Ulcer: Diabetic ulcer, bone necrosis    If patient has diabetic lower extremity wounds  Lopez Classification of diabetic lower extremity wounds:    Grade Description   []  0 No open wound   []  1 Superficial ulcer involving the full skin thickness   []  2 Deep ulcer involves ligament, tendon, joint capsule, or fascia  No bone involvement or abscess presence   []  3 Deep Ulcer with abcess formation and/or osteomyelitis   []  4 Localized gangrene   []  5 Extensive gangrene of the foot     Wound: Patient has a large wound of the plantar surface of the left foot, granulating, on probing the wound, the cure goes to at least 4 cm deep, with a finger, bones can be felt, on range of motion of the ankle joint unstable bones of the plantar arch, clinical evidence of osteomyelitis noted overall the wound looks clean there was some exudate without any cellulitis    PAST MEDICAL HISTORY      Diagnosis Date    Diabetes mellitus ([address]     Diabetic ulcer of left heel associated with type 2 diabetes mellitus, with necrosis of bone ([facility] [address] 5/9/2018    Hx of blood clots 1990's    PE, FROM TAKING BC PILLS    Hyperlipemia     no med    Thyroid disease      Past Surgical History:   Procedure Laterality Date    COLONOSCOPY      DILATION AND CURETTAGE OF UTERUS      ECHO COMPL W DOP COLOR FLOW  11/18/2013         ECHOCARDIOGRAM TRANSESOPHAGEAL  11/20/2013         FOOT DEBRIDEMENT Left 5/6/2019    EXCISIONAL DEBRIDEMENT TO  & THRU MUSCLE LEFT HEEL performed by Efraín Haynes DPM at [address] Left 6/3/2019    EXCISIONAL  DEBRIDEMENT WITH GRAFT APPLICATION LEFT HEEL (INTEGRA ), WITH REAPPLICATION OF WOUND VAC performed by Efraín Haynes DPM at [facility]  11/17/13    i&d left foot and ankle with bone biopsy    HYSTERECTOMY  1998    ovaries removed Dr Itzel Hendrix FX W FIX PST LIP Left 8/16/2018    PARTIAL CALCANECTOMY LEFT FOOT, EXCISIONAL DEBRIDEMENT MUSCLE LEFT FOOT performed by Efraín Haynes DPM at [facility]      VA cyanocobalamin 1000 MCG/ML injection Inject 1,000 mcg into the muscle every 30 days        No current facility-administered medications on file prior to encounter.         REVIEW OF SYSTEMS   ROS : All others Negative if blank [], Positive if [x]  General Urinary   [] Fevers [] Hematuria   [] Chills [] Dysuria   [] Weight Loss Vascular   Skin [] Claudication   [x] Tissue Loss [] Rest Pain   Eyes Neurologic   [] Wears Glasses/Contacts [] Stroke/TIA   [] Vision Changes [] Focal weakness   Respiratory [] Slurred Speech    [] Shortness of breath ENT   Cardiovascular [] Difficulty swallowing   [] Chest Pain Gastrointestinal   [] Shortness of breath with exertion [] Abdominal Pain    [] Melena      Patient does have diabetes mellitus with neuropathy with deformity of both feet, currently has a brace of the right ankle, past history of pulmonary embolus [] Hematochezia               Objective:    BP (!) 142/64   Pulse 100   Temp 97.4 °F (36.3 °C) (Temporal)   Resp 18   Ht 5' 6.5\" (1.689 m)   Wt 165 lb (74.8 kg)   BMI 26.23 kg/m²   Wt Readings from Last 3 Encounters:   01/13/20 165 lb (74.8 kg)   10/07/19 165 lb (74.8 kg)   09/30/19 165 lb (74.8 kg)       PHYSICAL EXAM  CONSTITUTIONAL:   Awake, alert, cooperative  PSYCHIATRIC :  Oriented to time, place and person      normal insight to disease process  ENT:  External ears and nose without lesions    Hearing deficits is not noted  NECK: Supple, symmetrical, trachea midline    Thyroid goiter not appreciated    Carotid bruit is not noted bilaterally  LUNGS:  No increased work of breathing                  Clear to auscultation bilaterally   CARDIOVASCULAR:  regular rate and rhythm   ABDOMEN:  soft, non-distended, non-tender    Hernias is not noted   Aorta is not palpable   Lymphatics : Cervical lymphadenopathy is not noted     Femoral lymphadenopathy is not noted  SKIN:   Skin color is normal   Texture and turgor is  normal   Induration is  noted  EXTREMITIES:   R antiplatelets were done more than 8 months ago, as such we will repeat ankle-brachial index, also x-ray of left ankle, and also venous ultrasound of both legs, as past history of poor members, patient is concerned about deep vein thrombosis etc. will obtain a baseline venous ultrasound study    Patient was advised to follow-up with Dr. Lester Brown, will reevaluate her after all the work-up is completed, all her x-rays were ordered        Treatment Note please see attached Discharge Instructions    Written patient dismissal instructions given to patient and signed by patient or POA. Discharge Instructions       Visit Discharge/Physician Orders    Discharge condition: Stable    Assessment of pain at discharge:  none    Anesthetic used:  4% lidocaine liquid    Discharge to: Home    Left via:Private automobile    Accompanied by: accompanied by spouse    [facility]F/[facility]: [facility]  Phone: [phone]    Dressing Orders:  Clean left heel ulcer with normal saline. APPLY THIN DOUDERM AROUND ULCER FOR PROTECTION. APPLY BLACK FOAM TO WOUND BASE. RESTART VAC AT HOME AT [address] [place] [address]. BRIDGE OFF TO SIDE. CHANGE 3 TIMES A WEEK. VAC NOT STARTED IN WOUND CARE TODAY. IN WOUND CARE CLEAN LEFT HEEL WITH NORMAL SALINE. APPLY SILVER ALGINATE, 4X4 GAUZE, ABD AND KERLEX TO SECURE. COVER WITH LARGE SPANDAGRIP DOUBLED OVER. Treatment Orders:  Eat a diet high in protein and vitamin C. Take a multiple vitamin daily unless contraindicated.     Keep heel clean and dry    Tissue C&S taken of left heel ulcer  Patient to get xray done of left heel  Patient to get blood flow study, arterial scheduled with  ankle brachial index    [facility] [facility] followup visit ________ Dr. Bob Pichardo two weeks, AFTER VASCULAR STUDIES ARE DONE__________________  (Please note your next appointment above and if you are unable to keep, kindly give a 24 hour notice.  Thank you.)    Physician signature:__________________________      If you experience any of the following, please call the [facility] during business hours:    * Increase in Pain  * Temperature over 101  * Increase in drainage from your wound  * Drainage with a foul odor  * Bleeding  * Increase in swelling  * Need for compression bandage changes due to slippage, breakthrough drainage. If you need medical attention outside of the business hours of the [facility] please contact your PCP or go to the nearest emergency room.         Electronically signed by Carlos Manuel Love MD on 1/13/2020 at 11:18 AM

## 2020-01-15 LAB — ANAEROBIC CULTURE: NORMAL

## 2020-01-16 ENCOUNTER — HOSPITAL ENCOUNTER (OUTPATIENT)
Age: 65
Discharge: HOME | End: 2020-01-18
Payer: MEDICARE

## 2020-01-16 ENCOUNTER — HOSPITAL ENCOUNTER (OUTPATIENT)
Dept: INTERVENTIONAL RADIOLOGY/VASCULAR | Age: 65
Discharge: HOME | End: 2020-01-18
Payer: MEDICARE

## 2020-01-16 ENCOUNTER — HOSPITAL ENCOUNTER (OUTPATIENT)
Dept: GENERAL RADIOLOGY | Age: 65
Discharge: HOME | End: 2020-01-18
Payer: MEDICARE

## 2020-01-16 ENCOUNTER — HOSPITAL ENCOUNTER (OUTPATIENT)
Dept: ULTRASOUND IMAGING | Age: 65
Discharge: HOME | End: 2020-01-18
Payer: MEDICARE

## 2020-01-16 LAB
ORGANISM: ABNORMAL
ORGANISM: ABNORMAL
WOUND/ABSCESS: ABNORMAL
WOUND/ABSCESS: ABNORMAL

## 2020-01-16 PROCEDURE — 93922 UPR/L XTREMITY ART 2 LEVELS: CPT

## 2020-01-16 PROCEDURE — 93970 EXTREMITY STUDY: CPT

## 2020-01-16 PROCEDURE — 73630 X-RAY EXAM OF FOOT: CPT

## 2020-01-17 ENCOUNTER — TELEPHONE (OUTPATIENT)
Dept: VASCULAR SURGERY | Age: 65
End: 2020-01-17

## 2020-01-27 ENCOUNTER — HOSPITAL ENCOUNTER (OUTPATIENT)
Dept: WOUND CARE | Age: 65
Discharge: HOME | End: 2020-01-27
Payer: MEDICARE

## 2020-01-27 VITALS
HEART RATE: 92 BPM | BODY MASS INDEX: 25.9 KG/M2 | RESPIRATION RATE: 16 BRPM | TEMPERATURE: 98.2 F | HEIGHT: 67 IN | SYSTOLIC BLOOD PRESSURE: 146 MMHG | WEIGHT: 165 LBS | DIASTOLIC BLOOD PRESSURE: 70 MMHG

## 2020-01-27 PROCEDURE — 11042 DBRDMT SUBQ TIS 1ST 20SQCM/<: CPT

## 2020-01-27 PROCEDURE — 11045 DBRDMT SUBQ TISS EACH ADDL: CPT

## 2020-01-27 PROCEDURE — 11042 DBRDMT SUBQ TIS 1ST 20SQCM/<: CPT | Performed by: SURGERY

## 2020-01-27 PROCEDURE — 11045 DBRDMT SUBQ TISS EACH ADDL: CPT | Performed by: SURGERY

## 2020-01-27 RX ORDER — LIDOCAINE HYDROCHLORIDE 40 MG/ML
SOLUTION TOPICAL ONCE
Status: DISCONTINUED | OUTPATIENT
Start: 2020-01-27 | End: 2020-01-27

## 2020-01-27 NOTE — FLOWSHEET NOTE
CONSULTED OUT, DR Netta Zaragoza ADVISING AMP, PATIENT TO GET 2ND OPINION.  FOLLOWING WITH PODIATRY UNTIL THEN

## 2020-01-27 NOTE — DISCHARGE INSTR - COC
scale):    Last Weight:   Wt Readings from Last 1 Encounters:   01/27/20 165 lb (74.8 kg)     Mental Status:  {IP PT MENTAL STATUS:13363}    IV Access:  [address] RICKEY IV ACCESS:[id number]}    Nursing Mobility/ADLs:  Walking   {CHP DME YVZW:712552679}  Transfer  {CHP DME RXYP:815976555}  Bathing  {CHP DME JUNX:394348506}  Dressing  {CHP DME FDWR:477958776}  Toileting  {CHP DME AMRM:131860309}  Feeding  {CHP DME JCCA:980957910}  Med Admin  {CHP DME OAXC:974379315}  Med Delivery   { RICKEY MED Delivery:631995626}    Wound Care Documentation and Therapy:  Wound 01/13/20 Heel Left wound #1 left heel acquired 4/1/2018 (Active)   Wound Diabetic 1/13/2020 10:43 AM   Dressing Status Clean;Dry; Intact 1/27/2020 10:51 AM   Dressing Changed Changed/New 1/27/2020 10:51 AM   Dressing/Treatment Alginate with Ag;ABD;Dry dressing 1/27/2020 10:51 AM   Wound Cleansed Rinsed/Irrigated with saline 1/27/2020 10:51 AM   Wound Length (cm) 7 cm 1/27/2020 10:16 AM   Wound Width (cm) 6 cm 1/27/2020 10:16 AM   Wound Depth (cm) 2.5 cm 1/27/2020 10:16 AM   Wound Surface Area (cm^2) 42 cm^2 1/27/2020 10:16 AM   Change in Wound Size % (l*w) -9.26 1/27/2020 10:16 AM   Wound Volume (cm^3) 105 cm^3 1/27/2020 10:16 AM   Wound Healing % -82 1/27/2020 10:16 AM   Post-Procedure Length (cm) 7.2 cm 1/27/2020 10:40 AM   Post-Procedure Width (cm) 6.4 cm 1/27/2020 10:40 AM   Post-Procedure Depth (cm) 2.7 cm 1/27/2020 10:40 AM   Post-Procedure Surface Area (cm^2) 46.08 cm^2 1/27/2020 10:40 AM   Post-Procedure Volume (cm^3) 124.42 cm^3 1/27/2020 10:40 AM   Undermining Starts ___ O'Clock 9 1/27/2020 10:16 AM   Undermining Ends___ O'Clock 10 1/27/2020 10:16 AM   Undermining Maxium Distance (cm) 3.8 1/27/2020 10:16 AM   Wound Assessment Pink;Red 1/27/2020 10:16 AM   Drainage Amount Copious 1/27/2020 10:16 AM   Drainage Description Serosanguinous; Serous 1/27/2020 10:16 AM   Odor None 1/27/2020 10:16 AM   Kaya-wound Assessment Maceration; White 1/27/2020 10:16 AM   Non-staged Wound Description Full thickness 2020 10:16 AM   Number of days: 14        Elimination:  Continence:   · Bowel: {YES / PP:19340}  · Bladder: {YES / SH:76756}  Urinary Catheter: {Urinary Catheter:144826829}   Colostomy/Ileostomy/Ileal Conduit: {YES / YJ:88578}       Date of Last BM: ***  No intake or output data in the 24 hours ending 20 1054  No intake/output data recorded.     Safety Concerns:     [address] [facility] Safety Concerns:494187891}    Impairments/Disabilities:      [address] [facility] Impairments/Disabilities:791955769}    Nutrition Therapy:  Current Nutrition Therapy:   [address] [facility] Diet List:556840881}    Routes of Feeding: {CHP DME Other Feedings:549099730}  Liquids: {Slp liquid thickness:13173}  Daily Fluid Restriction: {CHP DME Yes amt example:343008799}  Last Modified Barium Swallow with Video (Video Swallowing Test): {Done Not Done SSJJ:744252551}    Treatments at the Time of Hospital Discharge:   Respiratory Treatments: ***  Oxygen Therapy:  {Therapy; copd oxygen:02843}  Ventilator:    { CC Vent TSSL:277681965}    Rehab Therapies: {THERAPEUTIC INTERVENTION:9672448194}  Weight Bearing Status/Restrictions: [address] [facility] Weight Bearin}  Other Medical Equipment (for information only, NOT a DME order):  {EQUIPMENT:111997411}  Other Treatments: ***    Patient's personal belongings (please select all that are sent with patient):  {[facility] DME Belongings:952094301}    RN SIGNATURE:  {Esignature:312305286}    CASE MANAGEMENT/SOCIAL WORK SECTION    Inpatient Status Date: ***    Readmission Risk Assessment Score:  Readmission Risk              Risk of Unplanned Readmission:        0           Discharging to Facility/ Agency   · Name:   · Address:  · Phone:  · Fax:    Dialysis Facility (if applicable)   · Name:  · Address:  · Dialysis Schedule:  · Phone:  · Fax:    / signature: {Esignature:009737403}    PHYSICIAN SECTION    Prognosis: {Prognosis:1456886791}    Condition at Discharge: [address] Patient

## 2020-01-27 NOTE — PROGRESS NOTES
of diabetic lower extremity wounds:     Grade Description   []? 0 No open wound   []? 1 Superficial ulcer involving the full skin thickness   []? 2 Deep ulcer involves ligament, tendon, joint capsule, or fascia  No bone involvement or abscess presence   []? 3 Deep Ulcer with abcess formation and/or osteomyelitis   []? 4 Localized gangrene   []? 5 Extensive gangrene of the foot      Wound: Patient has a large wound of the plantar surface of the left foot, granulating, on probing the wound, the cure goes to at least 4 cm deep, with a finger, bones can be felt, on range of motion of the ankle joint unstable bones of the plantar arch, clinical evidence of osteomyelitis noted overall the wound looks clean there was some exudate without any cellulitis     January 27, 2020:  Wound over the left foot stable, patient still contemplating possibility of left below-knee amputation, saw her podiatrist Dr. Bailey Knows, she will go for a second opinion, referral made by her family member who is a prosthetist, she will call me PRN if she needs an annual referral elsewhere            PAST MEDICAL HISTORY      Diagnosis Date    Diabetes mellitus ([address]     Diabetic ulcer of left heel associated with type 2 diabetes mellitus, with necrosis of bone ([address] 5/9/2018    Hx of blood clots 1990's    PE, FROM TAKING BC PILLS    Hyperlipemia     no med    Thyroid disease      Past Surgical History:   Procedure Laterality Date    COLONOSCOPY      DILATION AND CURETTAGE OF UTERUS      ECHO COMPL W DOP COLOR FLOW  11/18/2013         ECHOCARDIOGRAM TRANSESOPHAGEAL  11/20/2013         FOOT DEBRIDEMENT Left 5/6/2019    EXCISIONAL DEBRIDEMENT TO  & THRU MUSCLE LEFT HEEL performed by Mike Morton DPM at [address] [place] [address] Left 6/3/2019    EXCISIONAL  DEBRIDEMENT WITH GRAFT APPLICATION LEFT HEEL (INTEGRA ), WITH REAPPLICATION OF WOUND VAC performed by Mike Morton DPM at [place] [address]  11/17/13    i&d left foot and ankle with bone biopsy    HYSTERECTOMY  1998    ovaries removed Dr Brody Knight PST LIP Left 8/16/2018    PARTIAL CALCANECTOMY LEFT FOOT, EXCISIONAL DEBRIDEMENT MUSCLE LEFT FOOT performed by Ferdinand Varghese DPM at [place] [address]., SKIN, SUB-Q TISSUE,MUSCLE,BONE,=<20 SQ CM Left 9/20/2018    EXCISIONAL DEBRIDEMENT SUBQUTANEOUS MUSCLE , BONE LEFT HEEL performed by Ferdinand Varghese DPM at [address] [place] [address] FOOT INFEC,1 BURSA Left 4/10/2018    LEFT HEEL DEBRIDEMENT, BONE BIOPSY performed by Gino Hurst DPM at [facility] OR    OH OFFICE/OUTPT VISIT,PROCEDURE ONLY Left 10/19/2018    PARTIAL LEFT CALCANECTOMY performed by Ferdinand Varghese DPM at [facility] OR    WISDOM TOOTH EXTRACTION       Family History   Problem Relation Age of Onset   [facility] Alzheimer's Disease Mother     Other Father         pacemaker    Other Maternal Grandfather         aneurysm     Social History     Tobacco Use    Smoking status: Never Smoker    Smokeless tobacco: Never Used   Substance Use Topics    Alcohol use: No    Drug use: No     Allergies   Allergen Reactions    Codeine Nausea And Vomiting     Current Outpatient Medications on File Prior to Encounter   Medication Sig Dispense Refill    cyanocobalamin 1000 MCG/ML injection Inject 1,000 mcg into the muscle every 30 days       Lactobacillus-Inulin (CULTUREE DIGESTIVE HEALTH PO) Take 1 capsule by mouth every morning STOP PREOP MED      pravastatin (PRAVACHOL) 40 MG tablet Take 20 mg by mouth every other day      gabapentin (NEURONTIN) 300 MG capsule Take 300 mg by mouth 2 times daily. .      glipiZIDE (GLUCOTROL XL) 10 MG extended release tablet Take 1 tablet by mouth daily      VELTASSA 8.4 g PACK Take 1 packet by mouth every other day   1    vitamin C (ASCORBIC ACID) 500 MG tablet Take 500 mg by mouth daily STOP PREOP MED      sodium bicarbonate 325 MG tablet Take 650 mg by mouth 2 times daily       Cholecalciferol (VITAMIN D3) 2000 UNITS CAPS Take 4,000 Units by mouth daily STOP PREOP MED      metformin (GLUCOPHAGE) 500 MG tablet Take 1,000 mg by mouth 2 times daily (with meals)       levothyroxine (SYNTHROID) 25 MCG tablet Take 50 mcg by mouth daily       acetaminophen 650 MG TABS Take 650 mg by mouth every 4 hours as needed (Fever >100.5 F (38 C)). 120 tablet      No current facility-administered medications on file prior to encounter. REVIEW OF SYSTEMS See HPI    Objective:    BP (!) 146/70   Pulse 92   Temp 98.2 °F (36.8 °C) (Temporal)   Resp 16   Ht 5' 6.5\" (1.689 m)   Wt 165 lb (74.8 kg)   BMI 26.23 kg/m²   Wt Readings from Last 3 Encounters:   01/27/20 165 lb (74.8 kg)   01/13/20 165 lb (74.8 kg)   10/07/19 165 lb (74.8 kg)     PHYSICAL EXAM  CONSTITUTIONAL:   Awake, alert, cooperative   EYES:  lids and lashes normal  ENT: external ears and nose without lesions  NECK:  supple, symmetrical, trachea midline  SKIN:  Open wound Present    Assessment:     Problem List Items Addressed This Visit     None        Procedure Note  Indications:  Based on my examination of this patient's wound(s)/ulcer(s) today, debridement is required to promote healing and evaluate the wound base. Performed by: Case Foster MD    Consent obtained:  Yes    Time out taken:  Yes    Pain Control: Anesthetic  Anesthetic: None     Debridement:Excisional Debridement    Using curette the wound(s)/ulcer(s) was/were sharply debrided down through and including the removal of subcutaneous tissue.         Devitalized Tissue Debrided:  fibrin to stimulate bleeding to promote healing, post debridement good bleeding base and wound edges noted    Pre Debridement Measurements:  Are located in the [facility]  Documentation Flow Sheet    Wound/Ulcer #: 1    Post Debridement Measurements:  Wound/Ulcer Descriptions are Pre Debridement except measurements:    Wound 01/13/20 Heel Left wound #1 left heel acquired 4/1/2018 (Active)   Wound Diabetic 1/13/2020 10:43 AM   Dressing Changed Changed/New 1/13/2020 11:02 AM   Dressing/Treatment Alginate with Ag;Dry dressing 1/13/2020 11:02 AM   Wound Cleansed Rinsed/Irrigated with saline 1/13/2020 11:02 AM   Wound Length (cm) 7 cm 1/27/2020 10:16 AM   Wound Width (cm) 6 cm 1/27/2020 10:16 AM   Wound Depth (cm) 2.5 cm 1/27/2020 10:16 AM   Wound Surface Area (cm^2) 42 cm^2 1/27/2020 10:16 AM   Change in Wound Size % (l*w) -9.26 1/27/2020 10:16 AM   Wound Volume (cm^3) 105 cm^3 1/27/2020 10:16 AM   Wound Healing % -82 1/27/2020 10:16 AM   Post-Procedure Length (cm) 7.2 cm 1/27/2020 10:40 AM   Post-Procedure Width (cm) 6.4 cm 1/27/2020 10:40 AM   Post-Procedure Depth (cm) 2.7 cm 1/27/2020 10:40 AM   Post-Procedure Surface Area (cm^2) 46.08 cm^2 1/27/2020 10:40 AM   Post-Procedure Volume (cm^3) 124.42 cm^3 1/27/2020 10:40 AM   Undermining Starts ___ O'Clock 9 1/27/2020 10:16 AM   Undermining Ends___ O'Clock 10 1/27/2020 10:16 AM   Undermining Maxium Distance (cm) 3.8 1/27/2020 10:16 AM   Wound Assessment Pink;Red 1/27/2020 10:16 AM   Drainage Amount Copious 1/27/2020 10:16 AM   Drainage Description Serosanguinous; Serous 1/27/2020 10:16 AM   Odor None 1/27/2020 10:16 AM   Kaya-wound Assessment Maceration; White 1/27/2020 10:16 AM   Non-staged Wound Description Full thickness 1/27/2020 10:16 AM   Number of days: 13     Percent of Wound/Ulcer Debrided: 60%    Total Surface Area Debrided:  30 sq cm     Estimated Blood Loss:  None  Hemostasis Achieved:  by pressure    Procedural Pain:  2  / 10   Post Procedural Pain:  3 / 10     Response to treatment:  Well tolerated by patient. Plan:   Treatment Note please see attached Discharge Instructions    Written patient dismissal instructions given to patient and signed by patient or POA.          Discharge Instructions       Visit Discharge/Physician Orders     Discharge condition: Stable     Assessment of pain at discharge:  none     Anesthetic used:  4%

## 2020-04-17 ENCOUNTER — TELEPHONE (OUTPATIENT)
Dept: VASCULAR SURGERY | Age: 65
End: 2020-04-17

## 2020-04-17 NOTE — TELEPHONE ENCOUNTER
Message left on pt's answering machine for a return call to schedule an evaluation with Dr. Betito Kenyon at [facility]

## 2020-04-20 ENCOUNTER — TELEPHONE (OUTPATIENT)
Dept: VASCULAR SURGERY | Age: 65
End: 2020-04-20

## 2020-04-21 ENCOUNTER — TELEPHONE (OUTPATIENT)
Dept: CARDIOLOGY CLINIC | Age: 65
End: 2020-04-21

## 2020-06-10 ENCOUNTER — TELEPHONE (OUTPATIENT)
Dept: VASCULAR SURGERY | Age: 65
End: 2020-06-10

## 2020-06-15 ENCOUNTER — HOSPITAL ENCOUNTER (OUTPATIENT)
Dept: WOUND CARE | Age: 65
Discharge: HOME | End: 2020-06-15
Payer: MEDICARE

## 2020-06-15 VITALS
RESPIRATION RATE: 18 BRPM | TEMPERATURE: 97.3 F | HEIGHT: 67 IN | HEART RATE: 96 BPM | DIASTOLIC BLOOD PRESSURE: 68 MMHG | BODY MASS INDEX: 25.9 KG/M2 | SYSTOLIC BLOOD PRESSURE: 140 MMHG | WEIGHT: 165 LBS

## 2020-06-15 PROBLEM — Z86.711 HISTORY OF PULMONARY EMBOLUS (PE): Status: ACTIVE | Noted: 2020-06-15

## 2020-06-15 PROCEDURE — 99215 OFFICE O/P EST HI 40 MIN: CPT | Performed by: SURGERY

## 2020-06-15 PROCEDURE — 99213 OFFICE O/P EST LOW 20 MIN: CPT

## 2020-06-15 PROCEDURE — 11045 DBRDMT SUBQ TISS EACH ADDL: CPT

## 2020-06-15 PROCEDURE — 11042 DBRDMT SUBQ TIS 1ST 20SQCM/<: CPT | Performed by: SURGERY

## 2020-06-15 PROCEDURE — 11042 DBRDMT SUBQ TIS 1ST 20SQCM/<: CPT

## 2020-06-15 NOTE — PROGRESS NOTES
tablet by mouth daily      VELTASSA 8.4 g PACK Take 1 packet by mouth every other day   1    vitamin C (ASCORBIC ACID) 500 MG tablet Take 500 mg by mouth daily STOP PREOP MED      sodium bicarbonate 325 MG tablet Take 650 mg by mouth 2 times daily       Cholecalciferol (VITAMIN D3) 2000 UNITS CAPS Take 4,000 Units by mouth daily STOP PREOP MED      metformin (GLUCOPHAGE) 500 MG tablet Take 1,000 mg by mouth 2 times daily (with meals)       levothyroxine (SYNTHROID) 25 MCG tablet Take 50 mcg by mouth daily       acetaminophen 650 MG TABS Take 650 mg by mouth every 4 hours as needed (Fever >100.5 F (38 C)). 120 tablet      No current facility-administered medications on file prior to encounter.         REVIEW OF SYSTEMS   ROS : All others Negative if blank [], Positive if [x]  General Urinary   [] Fevers [] Hematuria   [] Chills [] Dysuria   [] Weight Loss Vascular   Skin [] Claudication   [x] Tissue Loss [] Rest Pain   Eyes Neurologic   [] Wears Glasses/Contacts [] Stroke/TIA   [] Vision Changes [] Focal weakness   Respiratory [] Slurred Speech    [] Shortness of breath ENT   Cardiovascular [] Difficulty swallowing   [] Chest Pain Gastrointestinal   [] Shortness of breath with exertion [] Abdominal Pain    [] Melena      Diabetes mellitus with diabetic neuropathy, Charcot's right foot [] Hematochezia               Objective:    BP (!) 140/68   Pulse 96   Temp 97.3 °F (36.3 °C) (Temporal)   Resp 18   Ht 5' 6.5\" (1.689 m)   Wt 165 lb (74.8 kg)   BMI 26.23 kg/m²   Wt Readings from Last 3 Encounters:   06/15/20 165 lb (74.8 kg)   01/27/20 165 lb (74.8 kg)   01/13/20 165 lb (74.8 kg)       PHYSICAL EXAM  CONSTITUTIONAL:   Awake, alert, cooperative   PSYCHIATRIC :  Oriented to time, place and person      normal insight to disease process  ENT:  External ears and nose without lesions    Hearing deficits is not noted  NECK: Supple, symmetrical, trachea midline    Thyroid goiter not appreciated    Carotid bruit is not noted bilaterally  LUNGS:  No increased work of breathing                  Clear to auscultation bilaterally   CARDIOVASCULAR:  regular rate and rhythm   ABDOMEN:  soft, non-distended, non-tender    Hernias is not noted   Aorta is not palpable   Lymphatics : Cervical lymphadenopathy is not noted     Femoral lymphadenopathy is not noted  SKIN:   Skin color is normal   Texture and turgor is  normal   Induration is not noted  EXTREMITIES:   R UE Edema is not noted  L UE Edema is not noted  R LE Edema is not noted  L LE Edema is not noted      Patient has a large wound, granulating over the plantar surface of the left foot, please look at the wound care, photo for detailed measurements, has some exudate, no obvious cellulitis or purulent drainage      R femoral 2 L femoral 2   R dorsalis pedis 2 L dorsalis pedis 2   R posterior tibial 2 L posterior tibial 2     Assessment:     Problem List Items Addressed This Visit     Non-pressure chronic ulcer of left heel and midfoot with fat layer exposed ([address] - Primary    Hx of blood clots    History of pulmonary embolus (PE)    Diabetic ulcer of left heel associated with type 2 diabetes mellitus, with necrosis of bone (HCC)    Diabetic foot ulcer (HCC) (Chronic)          Pre Debridement Measurements:  Are located in the [place]  Documentation Flow Sheet  Post Debridement Measurements:  Wound/Ulcer Descriptions are Pre Debridement except measurements:     Wound 01/13/20 Heel Left wound #1 left heel acquired 4/1/2018 (Active)   Number of days: 154       Wound 06/15/20 Heel Left;Plantar #1 left plantar acquired 4/6/18 diabetic (Active)   Wound Image   6/15/2020  8:47 AM   Wound Diabetic 6/15/2020  8:47 AM   Offloading for Diabetic Foot Ulcers Post op shoe 6/15/2020  9:31 AM   Dressing Status Clean;Dry; Intact 6/15/2020  9:31 AM   Dressing Changed Changed/New 6/15/2020  9:31 AM   Dressing/Treatment Betadine swabs;Dry dressing;ABD 6/15/2020  9:31 AM   Wound Cleansed

## 2020-06-19 ENCOUNTER — TELEPHONE (OUTPATIENT)
Dept: VASCULAR SURGERY | Age: 65
End: 2020-06-19

## 2020-06-22 ENCOUNTER — HOSPITAL ENCOUNTER (OUTPATIENT)
Dept: WOUND CARE | Age: 65
Discharge: HOME | End: 2020-06-22
Payer: MEDICARE

## 2020-06-22 VITALS
WEIGHT: 165 LBS | HEART RATE: 88 BPM | DIASTOLIC BLOOD PRESSURE: 70 MMHG | HEIGHT: 67 IN | RESPIRATION RATE: 18 BRPM | TEMPERATURE: 97.6 F | SYSTOLIC BLOOD PRESSURE: 166 MMHG | BODY MASS INDEX: 25.9 KG/M2

## 2020-06-22 PROCEDURE — 87075 CULTR BACTERIA EXCEPT BLOOD: CPT

## 2020-06-22 PROCEDURE — 11042 DBRDMT SUBQ TIS 1ST 20SQCM/<: CPT

## 2020-06-22 PROCEDURE — 11045 DBRDMT SUBQ TISS EACH ADDL: CPT | Performed by: SURGERY

## 2020-06-22 PROCEDURE — 11045 DBRDMT SUBQ TISS EACH ADDL: CPT

## 2020-06-22 PROCEDURE — 87205 SMEAR GRAM STAIN: CPT

## 2020-06-22 PROCEDURE — 99213 OFFICE O/P EST LOW 20 MIN: CPT | Performed by: SURGERY

## 2020-06-22 PROCEDURE — 11042 DBRDMT SUBQ TIS 1ST 20SQCM/<: CPT | Performed by: SURGERY

## 2020-06-22 PROCEDURE — 87070 CULTURE OTHR SPECIMN AEROBIC: CPT

## 2020-06-22 RX ORDER — LOSARTAN POTASSIUM 25 MG/1
25 TABLET ORAL DAILY
COMMUNITY

## 2020-06-22 NOTE — PROGRESS NOTES
Wound Healing Center /Hyperbarics   History and Physical/Consultation  Vascular    Referring Physician : DO Dominic Martinez Taiwo Hernandez  MEDICAL RECORD NUMBER:  [id number]  AGE: 77 y.o. GENDER: female  : 1955  EPISODE DATE:  2020  Subjective:     Chief Complaint   Patient presents with    Wound Check     left foot         HISTORY of PRESENT ILLNESS JANE Alejandro is a 77 y.o. female who presents today for wound/ulcer evaluation. History of Wound Context:        History of Wound Context:  The patient has had a wound of left foot which was first noted approximately 7 years ago, underwent multiple procedures, at least some of them including wound debridement skin grafting partial calcanectomy, wound VAC therapy etc. by multiple podiatrists in the past, recently was seen by Dr. Wesley Black, who noted of the wound over the plantar surface of the foot is very unstable, with bony instability of the plantar arch, reviewed the situation, because of diabetes mellitus, with diabetic neuropathy, Charcot foot, recommended her to consider primary amputation of the left leg below the knee, in fact I have seen her about 6 months ago for the same situation, subsequently because of the virus and code situation patient did not want to come to the hospital, for now decided that she will proceed with amputation and came to the wound care for further evaluation and recommendations and the timing of amputation. This has been treated by multiple podiatrist in the past. On their initial visit to the wound healing center, today, [address],  the patient has noted that the wound has not been improving.   The patient has had similar previous wounds in the past.             Patient has seen Dr. Salima Talley, in 2019, underwent work-up, was told okay to proceed with amputation of the leg  without any additional, cardiac work-up        Patient denies any history of chest pain or palpitation        Many years ago she had will start her rehab, in consultation with social service, may need placement and eventually a prosthesis     All the risks, benefits, options and alternatives were clearly explained including cardiopulmonary complications, possibility of non-healing of the amputation that may necessitate additional surgeries at a proximal level which they understand and consent for surgery. All of their questions were answered.       PAST MEDICAL HISTORY      Diagnosis Date    Diabetes mellitus ([facility]     Diabetic ulcer of left heel associated with type 2 diabetes mellitus, with necrosis of bone ([facility] 5/9/2018    History of pulmonary embolus (PE) 6/15/2020    Hx of blood clots 1990's    PE, FROM TAKING BC PILLS    Hyperlipemia     no med    Thyroid disease      Past Surgical History:   Procedure Laterality Date    COLONOSCOPY      DILATION AND CURETTAGE OF UTERUS      ECHO COMPL W DOP COLOR FLOW  11/18/2013         ECHOCARDIOGRAM TRANSESOPHAGEAL  11/20/2013         FOOT DEBRIDEMENT Left 5/6/2019    EXCISIONAL DEBRIDEMENT TO  & THRU MUSCLE LEFT HEEL performed by Earl Burt DPM at [address] [place] [address] Left 6/3/2019    EXCISIONAL  DEBRIDEMENT WITH GRAFT APPLICATION LEFT HEEL (INTEGRA ), WITH REAPPLICATION OF WOUND VAC performed by Earl Burt DPM at [facility]  11/17/13    i&d left foot and ankle with bone biopsy    HYSTERECTOMY  1998    ovaries removed Dr Oliver [place] PST LIP Left 8/16/2018    PARTIAL CALCANECTOMY LEFT FOOT, EXCISIONAL DEBRIDEMENT MUSCLE LEFT FOOT performed by Earl Burt DPM at [facility]., SKIN, SUB-Q TISSUE,MUSCLE,BONE,=<20 SQ CM Left 9/20/2018    EXCISIONAL DEBRIDEMENT SUBQUTANEOUS MUSCLE , BONE LEFT HEEL performed by Earl Burt DPM at [address] [place] [address] Left 4/10/2018    LEFT HEEL DEBRIDEMENT, BONE BIOPSY performed by Camille Rich DPM at [place] [address]

## 2020-06-22 NOTE — PROGRESS NOTES
Patient agreed to covid testing at [address] [place] [address] to 6/25/20 between the hours [id number]. Patient asked to bring ID and to self quarantine until after procedure.

## 2020-06-24 ENCOUNTER — HOSPITAL ENCOUNTER (OUTPATIENT)
Dept: GENERAL RADIOLOGY | Age: 65
Discharge: HOME | End: 2020-06-26
Payer: MEDICARE

## 2020-06-24 ENCOUNTER — PREP FOR PROCEDURE (OUTPATIENT)
Dept: VASCULAR SURGERY | Age: 65
End: 2020-06-24

## 2020-06-24 ENCOUNTER — HOSPITAL ENCOUNTER (OUTPATIENT)
Dept: PREADMISSION TESTING | Age: 65
Discharge: HOME | End: 2020-06-24
Payer: MEDICARE

## 2020-06-24 VITALS
DIASTOLIC BLOOD PRESSURE: 67 MMHG | TEMPERATURE: 98.3 F | HEART RATE: 115 BPM | SYSTOLIC BLOOD PRESSURE: 122 MMHG | RESPIRATION RATE: 16 BRPM | OXYGEN SATURATION: 97 %

## 2020-06-24 DIAGNOSIS — L97.509 DIABETIC FOOT ULCER WITH OSTEOMYELITIS (HCC): Primary | ICD-10-CM

## 2020-06-24 DIAGNOSIS — E11.621 DIABETIC FOOT ULCER WITH OSTEOMYELITIS (HCC): Primary | ICD-10-CM

## 2020-06-24 DIAGNOSIS — E11.69 DIABETIC FOOT ULCER WITH OSTEOMYELITIS (HCC): Primary | ICD-10-CM

## 2020-06-24 DIAGNOSIS — M86.9 DIABETIC FOOT ULCER WITH OSTEOMYELITIS (HCC): Primary | ICD-10-CM

## 2020-06-24 LAB
ABO/RH: NORMAL
ALBUMIN SERPL-MCNC: 3.7 G/DL (ref 3.5–5.2)
ALP BLD-CCNC: 75 U/L (ref 35–104)
ALT SERPL-CCNC: 11 U/L (ref 0–32)
ANAEROBIC CULTURE: NORMAL
ANION GAP SERPL CALCULATED.3IONS-SCNC: 15 MMOL/L (ref 7–16)
ANTIBODY SCREEN: NORMAL
AST SERPL-CCNC: 16 U/L (ref 0–31)
BILIRUB SERPL-MCNC: 0.3 MG/DL (ref 0–1.2)
BUN BLDV-MCNC: 18 MG/DL (ref 8–23)
CALCIUM SERPL-MCNC: 10.1 MG/DL (ref 8.6–10.2)
CHLORIDE BLD-SCNC: 101 MMOL/L (ref 98–107)
CO2: 22 MMOL/L (ref 22–29)
CREAT SERPL-MCNC: 1.1 MG/DL (ref 0.5–1)
GFR AFRICAN AMERICAN: 60
GFR NON-AFRICAN AMERICAN: 50 ML/MIN/1.73
GLUCOSE BLD-MCNC: 198 MG/DL (ref 74–99)
GRAM STAIN RESULT: ABNORMAL
HCT VFR BLD CALC: 39.9 % (ref 34–48)
HEMOGLOBIN: 12.7 G/DL (ref 11.5–15.5)
MCH RBC QN AUTO: 30.8 PG (ref 26–35)
MCHC RBC AUTO-ENTMCNC: 31.8 % (ref 32–34.5)
MCV RBC AUTO: 96.6 FL (ref 80–99.9)
ORGANISM: ABNORMAL
ORGANISM: ABNORMAL
PDW BLD-RTO: 12.6 FL (ref 11.5–15)
PLATELET # BLD: 396 E9/L (ref 130–450)
PMV BLD AUTO: 9.3 FL (ref 7–12)
POTASSIUM REFLEX MAGNESIUM: 4.7 MMOL/L (ref 3.5–5)
RBC # BLD: 4.13 E12/L (ref 3.5–5.5)
REASON FOR REJECTION: NORMAL
REJECTED TEST: NORMAL
SODIUM BLD-SCNC: 138 MMOL/L (ref 132–146)
TOTAL PROTEIN: 8.2 G/DL (ref 6.4–8.3)
WBC # BLD: 12 E9/L (ref 4.5–11.5)
WOUND/ABSCESS: ABNORMAL
WOUND/ABSCESS: ABNORMAL

## 2020-06-24 PROCEDURE — 71046 X-RAY EXAM CHEST 2 VIEWS: CPT

## 2020-06-24 PROCEDURE — 86900 BLOOD TYPING SEROLOGIC ABO: CPT

## 2020-06-24 PROCEDURE — 80053 COMPREHEN METABOLIC PANEL: CPT

## 2020-06-24 PROCEDURE — 36415 COLL VENOUS BLD VENIPUNCTURE: CPT

## 2020-06-24 PROCEDURE — 86850 RBC ANTIBODY SCREEN: CPT

## 2020-06-24 PROCEDURE — 85027 COMPLETE CBC AUTOMATED: CPT

## 2020-06-24 PROCEDURE — 86901 BLOOD TYPING SEROLOGIC RH(D): CPT

## 2020-06-24 RX ORDER — SODIUM CHLORIDE 0.9 % (FLUSH) 0.9 %
10 SYRINGE (ML) INJECTION EVERY 12 HOURS SCHEDULED
Status: CANCELLED | OUTPATIENT
Start: 2020-06-24

## 2020-06-24 RX ORDER — SODIUM CHLORIDE 0.9 % (FLUSH) 0.9 %
10 SYRINGE (ML) INJECTION PRN
Status: CANCELLED | OUTPATIENT
Start: 2020-06-24

## 2020-06-24 RX ORDER — SODIUM CHLORIDE 9 MG/ML
INJECTION, SOLUTION INTRAVENOUS CONTINUOUS
Status: CANCELLED | OUTPATIENT
Start: 2020-06-24

## 2020-06-25 ENCOUNTER — HOSPITAL ENCOUNTER (OUTPATIENT)
Age: 65
Discharge: HOME | End: 2020-06-27
Payer: MEDICARE

## 2020-06-25 PROCEDURE — U0003 INFECTIOUS AGENT DETECTION BY NUCLEIC ACID (DNA OR RNA); SEVERE ACUTE RESPIRATORY SYNDROME CORONAVIRUS 2 (SARS-COV-2) (CORONAVIRUS DISEASE [COVID-19]), AMPLIFIED PROBE TECHNIQUE, MAKING USE OF HIGH THROUGHPUT TECHNOLOGIES AS DESCRIBED BY CMS-2020-01-R: HCPCS

## 2020-06-27 LAB
SARS-COV-2: NOT DETECTED
SOURCE: NORMAL

## 2020-06-29 ENCOUNTER — ANESTHESIA EVENT (OUTPATIENT)
Dept: OPERATING ROOM | Age: 65
DRG: 041 | End: 2020-06-29
Payer: MEDICARE

## 2020-06-29 NOTE — H&P
with walking  Associated Signs/Symptoms: edema, drainage, numbness and odor     Ulcer Identification:  Ulcer Type: diabetic, non-healing/non-surgical and neuropathic  Contributing Factors: diabetes     Diabetic/Pressure/Non Pressure Ulcers only:  Ulcer: Diabetic ulcer, bone necrosis     If patient has diabetic lower extremity wounds  Lopez Classification of diabetic lower extremity wounds:     Grade Description   []? ?  0 No open wound   []? ?  1 Superficial ulcer involving the full skin thickness   [x]? ?  2 Deep ulcer involves ligament, tendon, joint capsule, or fascia  No bone involvement or abscess presence   []? ?  3 Deep Ulcer with abcess formation and/or osteomyelitis   []? ?  4 Localized gangrene   []? ?  5 Extensive gangrene of the foot      Wound: Nonhealing surgical due to infection   Patient has a large wound, plantar surface left foot, please look at the wound care notes for detailed measurements in the photo, through the wound, bony protrusions can be felt in the depth of the wound     6/22/2020  · Patient did see Dr. Latanya Degroot, patient scheduled for amputation next week recently, her infectious disease doctor, overall doing well, has multiple questions, I have answered all of them     Explained to her again regarding left below-knee amputation, all options, risks benefits and alternatives were explained, patient was instructed to make sure that she does not take her diabetic medication that morning and based upon the wound cultures that are being done today, will fax them to her ID doctor regarding perioperative antibiotic therapy     She has multiple questions, informed her, once the surgical procedure is done, if the wound is healing well, will start her rehab, in consultation with social service, may need placement and eventually a prosthesis      All the risks, benefits, options and alternatives were clearly explained including cardiopulmonary complications, possibility of non-healing of the amputation that may necessitate additional surgeries at a proximal level which they understand and consent for surgery.  All of their questions were answered.        PAST MEDICAL HISTORY  Past Medical History             Diagnosis Date    Diabetes mellitus ([facility]      Diabetic ulcer of left heel associated with type 2 diabetes mellitus, with necrosis of bone ([facility] [facility] 5/9/2018    History of pulmonary embolus (PE) 6/15/2020    Hx of blood clots 1990's     PE, FROM TAKING BC PILLS    Hyperlipemia       no med    Thyroid disease           Past Surgical History         Past Surgical History:   Procedure Laterality Date    COLONOSCOPY        DILATION AND CURETTAGE OF UTERUS        ECHO COMPL W DOP COLOR FLOW   11/18/2013          ECHOCARDIOGRAM TRANSESOPHAGEAL   11/20/2013          FOOT DEBRIDEMENT Left 5/6/2019     EXCISIONAL DEBRIDEMENT TO  & THRU MUSCLE LEFT HEEL performed by Nicole Oshea DPM at [address] [place] [address] Left 6/3/2019     EXCISIONAL  DEBRIDEMENT WITH GRAFT APPLICATION LEFT HEEL (INTEGRA ), WITH REAPPLICATION OF WOUND VAC performed by Nicole Oshea DPM at [address] [place] [address] Drive   11/17/13     i&d left foot and ankle with bone biopsy    HYSTERECTOMY   1998     ovaries removed Dr Falk [place] PST LIP Left 8/16/2018     PARTIAL CALCANECTOMY LEFT FOOT, EXCISIONAL DEBRIDEMENT MUSCLE LEFT FOOT performed by Nicole Oshea DPM at [facility], SKIN, SUB-Q TISSUE,MUSCLE,BONE,=<20 SQ CM Left 9/20/2018     EXCISIONAL DEBRIDEMENT SUBQUTANEOUS MUSCLE , BONE LEFT HEEL performed by Nicole Oshea DPM at [facility] Left 4/10/2018     LEFT HEEL DEBRIDEMENT, BONE BIOPSY performed by Trey Douglas DPM at [facility] OFFICE/OUTPT VISIT,PROCEDURE ONLY Left 10/19/2018     PARTIAL LEFT CALCANECTOMY performed by Nicole Oshea DPM at [facility] OR    WISDOM TOOTH EXTRACTION             Family History Family History   Problem Relation Age of Onset    Alzheimer's Disease Mother      Other Father           pacemaker    Other Maternal Grandfather           aneurysm         Social History           Tobacco Use    Smoking status: Never Smoker    Smokeless tobacco: Never Used   Substance Use Topics    Alcohol use: No    Drug use: No           Allergies   Allergen Reactions    Codeine Nausea And Vomiting             Current Outpatient Medications on File Prior to Encounter   Medication Sig Dispense Refill    losartan (COZAAR) 25 MG tablet Take 25 mg by mouth daily        cefdinir (OMNICEF) 300 MG capsule Take 1 capsule by mouth 2 times daily 180 capsule 2    doxycycline hyclate (VIBRA-TABS) 100 MG tablet Take 1 tablet by mouth 2 times daily 180 tablet 2    Lactobacillus-Inulin (CULTUREE DIGESTIVE HEALTH PO) Take 1 capsule by mouth every morning STOP PREOP MED        pravastatin (PRAVACHOL) 40 MG tablet Take 20 mg by mouth every other day        gabapentin (NEURONTIN) 300 MG capsule Take 300 mg by mouth 2 times daily. .        glipiZIDE (GLUCOTROL XL) 10 MG extended release tablet Take 1 tablet by mouth daily        VELTASSA 8.4 g PACK Take 1 packet by mouth every other day    1    vitamin C (ASCORBIC ACID) 500 MG tablet Take 500 mg by mouth daily STOP PREOP MED        sodium bicarbonate 325 MG tablet Take 650 mg by mouth 2 times daily         Cholecalciferol (VITAMIN D3) 2000 UNITS CAPS Take 4,000 Units by mouth daily STOP PREOP MED        metformin (GLUCOPHAGE) 500 MG tablet Take 1,000 mg by mouth 2 times daily (with meals)         levothyroxine (SYNTHROID) 25 MCG tablet Take 50 mcg by mouth daily         acetaminophen 650 MG TABS Take 650 mg by mouth every 4 hours as needed (Fever >100.5 F (38 C)). 120 tablet        No current facility-administered medications on file prior to encounter.          REVIEW OF SYSTEMS   ROS : All others Negative if blank []?, Positive if [x]?   General Urinary measurements in the photo, through the wound, bony protrusions can be felt in the depth of the wound     R femoral 2 L femoral 2   R dorsalis pedis 2 L dorsalis pedis 2   R posterior tibial 2 L posterior tibial 2      Assessment:           Problem List Items Addressed This Visit           Osteomyelitis of ankle and foot ([facility]      Non-pressure chronic ulcer of left heel and midfoot with fat layer exposed ([facility] [address] - Primary        Plan: I had a long detailed discussion the patient again, as in the past, all options, risks benefits and alternatives were explained, and the patient had nonfunctional left foot, best proceed with left below the knee amputation     All the risks, benefits, options and alternatives were clearly explained including cardiopulmonary complications, possibility of non-healing of the amputation that may necessitate additional surgeries at a proximal level which they understand and consent for surgery. All of their questions were answered.     Addendum:    No changes noted in the history and physical examination the patient since my last last evaluation of the patient    Aurea Adkins

## 2020-06-30 ENCOUNTER — ANESTHESIA (OUTPATIENT)
Dept: OPERATING ROOM | Age: 65
DRG: 041 | End: 2020-06-30
Payer: MEDICARE

## 2020-06-30 ENCOUNTER — HOSPITAL ENCOUNTER (INPATIENT)
Age: 65
LOS: 7 days | Discharge: NURSING, REHAB OR LONG-TERM CARE | DRG: 041 | End: 2020-07-07
Attending: SURGERY | Admitting: SURGERY
Payer: MEDICARE

## 2020-06-30 VITALS
OXYGEN SATURATION: 100 % | SYSTOLIC BLOOD PRESSURE: 130 MMHG | DIASTOLIC BLOOD PRESSURE: 76 MMHG | RESPIRATION RATE: 8 BRPM | TEMPERATURE: 96.6 F

## 2020-06-30 PROBLEM — I96 GANGRENE (HCC): Status: ACTIVE | Noted: 2020-06-30

## 2020-06-30 LAB
ANION GAP SERPL CALCULATED.3IONS-SCNC: 10 MMOL/L (ref 7–16)
APTT: 32.2 SEC (ref 24.5–35.1)
BUN BLDV-MCNC: 14 MG/DL (ref 8–23)
CALCIUM SERPL-MCNC: 8.7 MG/DL (ref 8.6–10.2)
CHLORIDE BLD-SCNC: 105 MMOL/L (ref 98–107)
CO2: 24 MMOL/L (ref 22–29)
CREAT SERPL-MCNC: 1 MG/DL (ref 0.5–1)
GFR AFRICAN AMERICAN: >60
GFR NON-AFRICAN AMERICAN: 55 ML/MIN/1.73
GLUCOSE BLD-MCNC: 239 MG/DL (ref 74–99)
INR BLD: 1
METER GLUCOSE: 185 MG/DL (ref 74–99)
METER GLUCOSE: 284 MG/DL (ref 74–99)
METER GLUCOSE: 377 MG/DL (ref 74–99)
POTASSIUM SERPL-SCNC: 5.1 MMOL/L (ref 3.5–5)
PROTHROMBIN TIME: 11.3 SEC (ref 9.3–12.4)
SODIUM BLD-SCNC: 139 MMOL/L (ref 132–146)

## 2020-06-30 PROCEDURE — 6360000002 HC RX W HCPCS: Performed by: ANESTHESIOLOGY

## 2020-06-30 PROCEDURE — 2580000003 HC RX 258: Performed by: SURGERY

## 2020-06-30 PROCEDURE — 6370000000 HC RX 637 (ALT 250 FOR IP): Performed by: SURGERY

## 2020-06-30 PROCEDURE — 64447 NJX AA&/STRD FEMORAL NRV IMG: CPT | Performed by: ANESTHESIOLOGY

## 2020-06-30 PROCEDURE — 7100000001 HC PACU RECOVERY - ADDTL 15 MIN: Performed by: SURGERY

## 2020-06-30 PROCEDURE — 3700000001 HC ADD 15 MINUTES (ANESTHESIA): Performed by: SURGERY

## 2020-06-30 PROCEDURE — 2060000000 HC ICU INTERMEDIATE R&B

## 2020-06-30 PROCEDURE — 6360000002 HC RX W HCPCS: Performed by: SURGERY

## 2020-06-30 PROCEDURE — 3700000000 HC ANESTHESIA ATTENDED CARE: Performed by: SURGERY

## 2020-06-30 PROCEDURE — 36415 COLL VENOUS BLD VENIPUNCTURE: CPT

## 2020-06-30 PROCEDURE — 2580000003 HC RX 258: Performed by: INTERNAL MEDICINE

## 2020-06-30 PROCEDURE — 6360000002 HC RX W HCPCS

## 2020-06-30 PROCEDURE — 0Y6J0Z1 DETACHMENT AT LEFT LOWER LEG, HIGH, OPEN APPROACH: ICD-10-PCS | Performed by: SURGERY

## 2020-06-30 PROCEDURE — 93005 ELECTROCARDIOGRAM TRACING: CPT | Performed by: SURGERY

## 2020-06-30 PROCEDURE — 3E0T3BZ INTRODUCTION OF ANESTHETIC AGENT INTO PERIPHERAL NERVES AND PLEXI, PERCUTANEOUS APPROACH: ICD-10-PCS | Performed by: ANESTHESIOLOGY

## 2020-06-30 PROCEDURE — 3600000003 HC SURGERY LEVEL 3 BASE: Performed by: SURGERY

## 2020-06-30 PROCEDURE — 7100000000 HC PACU RECOVERY - FIRST 15 MIN: Performed by: SURGERY

## 2020-06-30 PROCEDURE — 3600000013 HC SURGERY LEVEL 3 ADDTL 15MIN: Performed by: SURGERY

## 2020-06-30 PROCEDURE — 80048 BASIC METABOLIC PNL TOTAL CA: CPT

## 2020-06-30 PROCEDURE — 85610 PROTHROMBIN TIME: CPT

## 2020-06-30 PROCEDURE — 82962 GLUCOSE BLOOD TEST: CPT

## 2020-06-30 PROCEDURE — 88307 TISSUE EXAM BY PATHOLOGIST: CPT

## 2020-06-30 PROCEDURE — L3650 SO 8 ABD RESTRAINT PRE OTS: HCPCS | Performed by: SURGERY

## 2020-06-30 PROCEDURE — 88311 DECALCIFY TISSUE: CPT

## 2020-06-30 PROCEDURE — 2709999900 HC NON-CHARGEABLE SUPPLY: Performed by: SURGERY

## 2020-06-30 PROCEDURE — 85730 THROMBOPLASTIN TIME PARTIAL: CPT

## 2020-06-30 PROCEDURE — 2500000003 HC RX 250 WO HCPCS

## 2020-06-30 RX ORDER — FENTANYL CITRATE 50 UG/ML
INJECTION, SOLUTION INTRAMUSCULAR; INTRAVENOUS PRN
Status: DISCONTINUED | OUTPATIENT
Start: 2020-06-30 | End: 2020-06-30 | Stop reason: SDUPTHER

## 2020-06-30 RX ORDER — ONDANSETRON 2 MG/ML
INJECTION INTRAMUSCULAR; INTRAVENOUS PRN
Status: DISCONTINUED | OUTPATIENT
Start: 2020-06-30 | End: 2020-06-30 | Stop reason: SDUPTHER

## 2020-06-30 RX ORDER — LACTOBACILLUS RHAMNOSUS GG 10B CELL
1 CAPSULE ORAL EVERY MORNING
Status: DISCONTINUED | OUTPATIENT
Start: 2020-07-01 | End: 2020-07-08 | Stop reason: HOSPADM

## 2020-06-30 RX ORDER — DEXTROSE MONOHYDRATE 50 MG/ML
100 INJECTION, SOLUTION INTRAVENOUS PRN
Status: DISCONTINUED | OUTPATIENT
Start: 2020-06-30 | End: 2020-07-08 | Stop reason: HOSPADM

## 2020-06-30 RX ORDER — DEXTROSE MONOHYDRATE 25 G/50ML
12.5 INJECTION, SOLUTION INTRAVENOUS PRN
Status: DISCONTINUED | OUTPATIENT
Start: 2020-06-30 | End: 2020-07-08 | Stop reason: HOSPADM

## 2020-06-30 RX ORDER — SODIUM CHLORIDE 0.9 % (FLUSH) 0.9 %
10 SYRINGE (ML) INJECTION PRN
Status: DISCONTINUED | OUTPATIENT
Start: 2020-06-30 | End: 2020-06-30 | Stop reason: HOSPADM

## 2020-06-30 RX ORDER — NICOTINE POLACRILEX 4 MG
15 LOZENGE BUCCAL PRN
Status: DISCONTINUED | OUTPATIENT
Start: 2020-06-30 | End: 2020-07-08 | Stop reason: HOSPADM

## 2020-06-30 RX ORDER — GABAPENTIN 300 MG/1
300 CAPSULE ORAL 2 TIMES DAILY
Status: DISCONTINUED | OUTPATIENT
Start: 2020-06-30 | End: 2020-07-08 | Stop reason: HOSPADM

## 2020-06-30 RX ORDER — FENTANYL CITRATE 50 UG/ML
100 INJECTION, SOLUTION INTRAMUSCULAR; INTRAVENOUS ONCE
Status: COMPLETED | OUTPATIENT
Start: 2020-06-30 | End: 2020-06-30

## 2020-06-30 RX ORDER — SODIUM CHLORIDE 0.9 % (FLUSH) 0.9 %
10 SYRINGE (ML) INJECTION EVERY 12 HOURS SCHEDULED
Status: DISCONTINUED | OUTPATIENT
Start: 2020-06-30 | End: 2020-06-30 | Stop reason: HOSPADM

## 2020-06-30 RX ORDER — DEXTROSE MONOHYDRATE 50 MG/ML
INJECTION, SOLUTION INTRAVENOUS
Status: DISPENSED
Start: 2020-06-30 | End: 2020-06-30

## 2020-06-30 RX ORDER — OXYCODONE HYDROCHLORIDE AND ACETAMINOPHEN 5; 325 MG/1; MG/1
1 TABLET ORAL EVERY 4 HOURS PRN
Status: DISCONTINUED | OUTPATIENT
Start: 2020-06-30 | End: 2020-07-08 | Stop reason: HOSPADM

## 2020-06-30 RX ORDER — MORPHINE SULFATE 4 MG/ML
3 INJECTION, SOLUTION INTRAMUSCULAR; INTRAVENOUS
Status: DISCONTINUED | OUTPATIENT
Start: 2020-06-30 | End: 2020-07-06

## 2020-06-30 RX ORDER — CHOLECALCIFEROL (VITAMIN D3) 50 MCG
4000 TABLET ORAL DAILY
Status: DISCONTINUED | OUTPATIENT
Start: 2020-06-30 | End: 2020-07-08 | Stop reason: HOSPADM

## 2020-06-30 RX ORDER — SODIUM CHLORIDE 9 MG/ML
INJECTION, SOLUTION INTRAVENOUS CONTINUOUS
Status: DISCONTINUED | OUTPATIENT
Start: 2020-06-30 | End: 2020-07-01

## 2020-06-30 RX ORDER — MIDAZOLAM HYDROCHLORIDE 1 MG/ML
2 INJECTION INTRAMUSCULAR; INTRAVENOUS ONCE
Status: COMPLETED | OUTPATIENT
Start: 2020-06-30 | End: 2020-06-30

## 2020-06-30 RX ORDER — PROPOFOL 10 MG/ML
INJECTION, EMULSION INTRAVENOUS PRN
Status: DISCONTINUED | OUTPATIENT
Start: 2020-06-30 | End: 2020-06-30 | Stop reason: SDUPTHER

## 2020-06-30 RX ORDER — LABETALOL HYDROCHLORIDE 5 MG/ML
5 INJECTION, SOLUTION INTRAVENOUS EVERY 10 MIN PRN
Status: DISCONTINUED | OUTPATIENT
Start: 2020-06-30 | End: 2020-06-30 | Stop reason: HOSPADM

## 2020-06-30 RX ORDER — MORPHINE SULFATE 2 MG/ML
2 INJECTION, SOLUTION INTRAMUSCULAR; INTRAVENOUS
Status: DISCONTINUED | OUTPATIENT
Start: 2020-06-30 | End: 2020-07-06

## 2020-06-30 RX ORDER — ROPIVACAINE HYDROCHLORIDE 5 MG/ML
INJECTION, SOLUTION EPIDURAL; INFILTRATION; PERINEURAL
Status: COMPLETED | OUTPATIENT
Start: 2020-06-30 | End: 2020-06-30

## 2020-06-30 RX ORDER — SODIUM CHLORIDE AND POTASSIUM CHLORIDE .9; .15 G/100ML; G/100ML
SOLUTION INTRAVENOUS CONTINUOUS
Status: DISCONTINUED | OUTPATIENT
Start: 2020-06-30 | End: 2020-06-30

## 2020-06-30 RX ORDER — NEOSTIGMINE METHYLSULFATE 1 MG/ML
INJECTION, SOLUTION INTRAVENOUS PRN
Status: DISCONTINUED | OUTPATIENT
Start: 2020-06-30 | End: 2020-06-30 | Stop reason: SDUPTHER

## 2020-06-30 RX ORDER — MEPERIDINE HYDROCHLORIDE 25 MG/ML
12.5 INJECTION INTRAMUSCULAR; INTRAVENOUS; SUBCUTANEOUS EVERY 5 MIN PRN
Status: DISCONTINUED | OUTPATIENT
Start: 2020-06-30 | End: 2020-06-30 | Stop reason: HOSPADM

## 2020-06-30 RX ORDER — PROMETHAZINE HYDROCHLORIDE 25 MG/ML
6.25 INJECTION, SOLUTION INTRAMUSCULAR; INTRAVENOUS
Status: DISCONTINUED | OUTPATIENT
Start: 2020-06-30 | End: 2020-06-30 | Stop reason: HOSPADM

## 2020-06-30 RX ORDER — ACETAMINOPHEN 325 MG/1
650 TABLET ORAL EVERY 4 HOURS PRN
Status: DISCONTINUED | OUTPATIENT
Start: 2020-06-30 | End: 2020-07-08 | Stop reason: HOSPADM

## 2020-06-30 RX ORDER — LEVOTHYROXINE SODIUM 0.05 MG/1
50 TABLET ORAL DAILY
Status: DISCONTINUED | OUTPATIENT
Start: 2020-06-30 | End: 2020-07-08 | Stop reason: HOSPADM

## 2020-06-30 RX ORDER — PRAVASTATIN SODIUM 20 MG
20 TABLET ORAL EVERY OTHER DAY
Status: DISCONTINUED | OUTPATIENT
Start: 2020-06-30 | End: 2020-07-08 | Stop reason: HOSPADM

## 2020-06-30 RX ORDER — LOSARTAN POTASSIUM 25 MG/1
25 TABLET ORAL DAILY
Status: DISCONTINUED | OUTPATIENT
Start: 2020-06-30 | End: 2020-07-08 | Stop reason: HOSPADM

## 2020-06-30 RX ORDER — HYDRALAZINE HYDROCHLORIDE 20 MG/ML
5 INJECTION INTRAMUSCULAR; INTRAVENOUS EVERY 10 MIN PRN
Status: DISCONTINUED | OUTPATIENT
Start: 2020-06-30 | End: 2020-06-30 | Stop reason: HOSPADM

## 2020-06-30 RX ORDER — SODIUM CHLORIDE 9 MG/ML
INJECTION, SOLUTION INTRAVENOUS CONTINUOUS
Status: DISCONTINUED | OUTPATIENT
Start: 2020-06-30 | End: 2020-06-30

## 2020-06-30 RX ORDER — ASCORBIC ACID 500 MG
500 TABLET ORAL DAILY
Status: DISCONTINUED | OUTPATIENT
Start: 2020-06-30 | End: 2020-07-08 | Stop reason: HOSPADM

## 2020-06-30 RX ORDER — LIDOCAINE HYDROCHLORIDE 20 MG/ML
INJECTION, SOLUTION INTRAVENOUS PRN
Status: DISCONTINUED | OUTPATIENT
Start: 2020-06-30 | End: 2020-06-30 | Stop reason: SDUPTHER

## 2020-06-30 RX ORDER — OXYCODONE HYDROCHLORIDE AND ACETAMINOPHEN 5; 325 MG/1; MG/1
2 TABLET ORAL EVERY 4 HOURS PRN
Status: DISCONTINUED | OUTPATIENT
Start: 2020-06-30 | End: 2020-07-06

## 2020-06-30 RX ORDER — OXYCODONE HYDROCHLORIDE AND ACETAMINOPHEN 5; 325 MG/1; MG/1
1 TABLET ORAL
Status: DISCONTINUED | OUTPATIENT
Start: 2020-06-30 | End: 2020-06-30 | Stop reason: HOSPADM

## 2020-06-30 RX ORDER — SODIUM BICARBONATE 650 MG/1
650 TABLET ORAL 2 TIMES DAILY
Status: DISCONTINUED | OUTPATIENT
Start: 2020-06-30 | End: 2020-07-08 | Stop reason: HOSPADM

## 2020-06-30 RX ORDER — DEXAMETHASONE SODIUM PHOSPHATE 10 MG/ML
INJECTION INTRAMUSCULAR; INTRAVENOUS PRN
Status: DISCONTINUED | OUTPATIENT
Start: 2020-06-30 | End: 2020-06-30 | Stop reason: SDUPTHER

## 2020-06-30 RX ORDER — ROCURONIUM BROMIDE 10 MG/ML
INJECTION, SOLUTION INTRAVENOUS PRN
Status: DISCONTINUED | OUTPATIENT
Start: 2020-06-30 | End: 2020-06-30 | Stop reason: SDUPTHER

## 2020-06-30 RX ORDER — ROPIVACAINE HYDROCHLORIDE 5 MG/ML
30 INJECTION, SOLUTION EPIDURAL; INFILTRATION; PERINEURAL ONCE
Status: COMPLETED | OUTPATIENT
Start: 2020-06-30 | End: 2020-06-30

## 2020-06-30 RX ORDER — GLYCOPYRROLATE 1 MG/5 ML
SYRINGE (ML) INTRAVENOUS PRN
Status: DISCONTINUED | OUTPATIENT
Start: 2020-06-30 | End: 2020-06-30 | Stop reason: SDUPTHER

## 2020-06-30 RX ADMIN — VANCOMYCIN HYDROCHLORIDE 1.5 G: 10 INJECTION, POWDER, LYOPHILIZED, FOR SOLUTION INTRAVENOUS at 09:26

## 2020-06-30 RX ADMIN — ROPIVACAINE HYDROCHLORIDE 30 ML: 5 INJECTION EPIDURAL; INFILTRATION; PERINEURAL at 11:09

## 2020-06-30 RX ADMIN — Medication 4000 UNITS: at 18:09

## 2020-06-30 RX ADMIN — PATIROMER 8.4 G: 8.4 POWDER, FOR SUSPENSION ORAL at 21:18

## 2020-06-30 RX ADMIN — Medication 500 MG: at 18:08

## 2020-06-30 RX ADMIN — PHENYLEPHRINE HYDROCHLORIDE 100 MCG: 10 INJECTION INTRAVENOUS at 12:57

## 2020-06-30 RX ADMIN — POTASSIUM CHLORIDE AND SODIUM CHLORIDE: 900; 150 INJECTION, SOLUTION INTRAVENOUS at 17:09

## 2020-06-30 RX ADMIN — PROPOFOL 100 MG: 10 INJECTION, EMULSION INTRAVENOUS at 12:00

## 2020-06-30 RX ADMIN — Medication 3 MG: at 13:19

## 2020-06-30 RX ADMIN — ONDANSETRON HYDROCHLORIDE 4 MG: 2 INJECTION, SOLUTION INTRAMUSCULAR; INTRAVENOUS at 13:19

## 2020-06-30 RX ADMIN — OXYCODONE AND ACETAMINOPHEN 1 TABLET: 5; 325 TABLET ORAL at 17:09

## 2020-06-30 RX ADMIN — PHENYLEPHRINE HYDROCHLORIDE 100 MCG: 10 INJECTION INTRAVENOUS at 12:16

## 2020-06-30 RX ADMIN — LOSARTAN POTASSIUM 25 MG: 25 TABLET, FILM COATED ORAL at 18:08

## 2020-06-30 RX ADMIN — PRAVASTATIN SODIUM 20 MG: 20 TABLET ORAL at 21:19

## 2020-06-30 RX ADMIN — ROPIVACAINE HYDROCHLORIDE 30 ML: 5 INJECTION EPIDURAL; INFILTRATION; PERINEURAL at 11:03

## 2020-06-30 RX ADMIN — SODIUM CHLORIDE: 9 INJECTION, SOLUTION INTRAVENOUS at 09:19

## 2020-06-30 RX ADMIN — LIDOCAINE HYDROCHLORIDE 40 MG: 20 INJECTION, SOLUTION INTRAVENOUS at 12:00

## 2020-06-30 RX ADMIN — MIDAZOLAM 2 MG: 1 INJECTION INTRAMUSCULAR; INTRAVENOUS at 10:50

## 2020-06-30 RX ADMIN — PIPERACILLIN AND TAZOBACTAM 3.38 G: 3; .375 INJECTION, POWDER, FOR SOLUTION INTRAVENOUS at 21:18

## 2020-06-30 RX ADMIN — GABAPENTIN 300 MG: 300 CAPSULE ORAL at 21:19

## 2020-06-30 RX ADMIN — INSULIN LISPRO 5 UNITS: 100 INJECTION, SOLUTION INTRAVENOUS; SUBCUTANEOUS at 21:17

## 2020-06-30 RX ADMIN — SODIUM CHLORIDE: 9 INJECTION, SOLUTION INTRAVENOUS at 18:12

## 2020-06-30 RX ADMIN — FENTANYL CITRATE 100 MCG: 50 INJECTION, SOLUTION INTRAMUSCULAR; INTRAVENOUS at 12:00

## 2020-06-30 RX ADMIN — FENTANYL CITRATE 50 MCG: 50 INJECTION, SOLUTION INTRAMUSCULAR; INTRAVENOUS at 12:33

## 2020-06-30 RX ADMIN — FENTANYL CITRATE 50 MCG: 50 INJECTION, SOLUTION INTRAMUSCULAR; INTRAVENOUS at 13:02

## 2020-06-30 RX ADMIN — SODIUM CHLORIDE: 9 INJECTION, SOLUTION INTRAVENOUS at 13:19

## 2020-06-30 RX ADMIN — SODIUM BICARBONATE 650 MG: 650 TABLET ORAL at 21:19

## 2020-06-30 RX ADMIN — Medication 0.6 MG: at 13:19

## 2020-06-30 RX ADMIN — DEXAMETHASONE SODIUM PHOSPHATE 10 MG: 10 INJECTION INTRAMUSCULAR; INTRAVENOUS at 12:00

## 2020-06-30 RX ADMIN — PIPERACILLIN AND TAZOBACTAM 3.38 G: 3; .375 INJECTION, POWDER, LYOPHILIZED, FOR SOLUTION INTRAVENOUS; PARENTERAL at 12:07

## 2020-06-30 RX ADMIN — INSULIN LISPRO 6 UNITS: 100 INJECTION, SOLUTION INTRAVENOUS; SUBCUTANEOUS at 17:13

## 2020-06-30 RX ADMIN — FENTANYL CITRATE 100 MCG: 50 INJECTION INTRAMUSCULAR; INTRAVENOUS at 10:52

## 2020-06-30 RX ADMIN — SODIUM CHLORIDE: 9 INJECTION, SOLUTION INTRAVENOUS at 11:52

## 2020-06-30 RX ADMIN — LEVOTHYROXINE SODIUM 50 MCG: 0.05 TABLET ORAL at 18:11

## 2020-06-30 RX ADMIN — ROCURONIUM BROMIDE 50 MG: 10 INJECTION, SOLUTION INTRAVENOUS at 12:00

## 2020-06-30 RX ADMIN — VANCOMYCIN HYDROCHLORIDE 1250 MG: 10 INJECTION, POWDER, LYOPHILIZED, FOR SOLUTION INTRAVENOUS at 23:02

## 2020-06-30 ASSESSMENT — PAIN DESCRIPTION - ORIENTATION
ORIENTATION: LEFT

## 2020-06-30 ASSESSMENT — PAIN SCALES - GENERAL
PAINLEVEL_OUTOF10: 0
PAINLEVEL_OUTOF10: 4
PAINLEVEL_OUTOF10: 0
PAINLEVEL_OUTOF10: 5
PAINLEVEL_OUTOF10: 0
PAINLEVEL_OUTOF10: 0
PAINLEVEL_OUTOF10: 5
PAINLEVEL_OUTOF10: 4

## 2020-06-30 ASSESSMENT — PAIN DESCRIPTION - PAIN TYPE
TYPE: SURGICAL PAIN;PHANTOM PAIN
TYPE: SURGICAL PAIN;PHANTOM PAIN
TYPE: SURGICAL PAIN

## 2020-06-30 ASSESSMENT — PAIN DESCRIPTION - LOCATION
LOCATION: LEG

## 2020-06-30 ASSESSMENT — PAIN DESCRIPTION - DESCRIPTORS
DESCRIPTORS: PRESSURE;CONSTANT
DESCRIPTORS: PRESSURE;CONSTANT

## 2020-06-30 ASSESSMENT — PAIN - FUNCTIONAL ASSESSMENT: PAIN_FUNCTIONAL_ASSESSMENT: 0-10

## 2020-06-30 NOTE — ANESTHESIA PRE PROCEDURE
Department of Anesthesiology  Preprocedure Note       Name:  Alejandro Overton   Age:  77 y.o.  :  1955                                          MRN:  [id number]         Date:  2020      Surgeon: Mortimer Gerlach):  Lakia Cee MD    Procedure: Procedure(s):  LEG AMPUTATION BELOW LEFT KNEE    Medications prior to admission:   Prior to Admission medications    Medication Sig Start Date End Date Taking? Authorizing Provider   losartan (COZAAR) 25 MG tablet Take 25 mg by mouth daily   Yes Historical Provider, MD   cefdinir (OMNICEF) 300 MG capsule Take 1 capsule by mouth 2 times daily 20 Yes Florecita Livingston MD   doxycycline hyclate (VIBRA-TABS) 100 MG tablet Take 1 tablet by mouth 2 times daily 20 Yes Florecita Livingston MD   Lactobacillus-Inulin ([address] PO) Take 1 capsule by mouth every morning STOP PREOP MED   Yes Historical Provider, MD   pravastatin (PRAVACHOL) 40 MG tablet Take 20 mg by mouth every other day   Yes Historical Provider, MD   gabapentin (NEURONTIN) 300 MG capsule Take 300 mg by mouth 2 times daily. . 5/3/18  Yes Historical Provider, MD   glipiZIDE (GLUCOTROL XL) 10 MG extended release tablet Take 1 tablet by mouth daily 18  Yes Historical Provider, MD   vitamin C (ASCORBIC ACID) 500 MG tablet Take 500 mg by mouth daily STOP PREOP MED   Yes Historical Provider, MD   sodium bicarbonate 325 MG tablet Take 650 mg by mouth 2 times daily    Yes Historical Provider, MD   Cholecalciferol (VITAMIN D3) 2000 UNITS CAPS Take 4,000 Units by mouth daily STOP PREOP MED   Yes Historical Provider, MD   metformin (GLUCOPHAGE) 500 MG tablet Take 1,000 mg by mouth 2 times daily (with meals)    Yes Historical Provider, MD   levothyroxine (SYNTHROID) 25 MCG tablet Take 50 mcg by mouth daily    Yes Historical Provider, MD   VELTASSA 8.4 g PACK Take 1 packet by mouth every other day  18   Historical Provider, MD   acetaminophen 650 MG TABS Take 650 mg by mouth every 4 hours as needed (Fever >100.5 F (38 C)). 11/19/13   Candelaria Ravi MD       Current medications:    Current Facility-Administered Medications   Medication Dose Route Frequency Provider Last Rate Last Dose    vancomycin 1.5 g in dextrose 5% 300 mL IVPB  1,500 mg Intravenous Once Dung Hagen  mL/hr at 06/30/20 0926 1.5 g at 06/30/20 0926    piperacillin-tazobactam (ZOSYN) 3.375 g in dextrose 5 % 100 mL IVPB extended infusion (mini-bag)  3.375 g Intravenous Once Dung Hagen MD        0.9 % sodium chloride infusion   Intravenous Continuous Dung Hagen MD 50 mL/hr at 06/30/20 0919      sodium chloride flush 0.9 % injection 10 mL  10 mL Intravenous 2 times per day Dung Hagen MD        sodium chloride flush 0.9 % injection 10 mL  10 mL Intravenous PRN Dung Hagen MD           Allergies:     Allergies   Allergen Reactions    Codeine Nausea And Vomiting       Problem List:    Patient Active Problem List   Diagnosis Code    Hypothyroidism E03.9    Diabetic foot ulcer ([address] E11.621, L97.509    Osteomyelitis of ankle and foot ([address] M86.9    Diabetes mellitus, type 2 ([address] E11.9    Hyperlipemia E78.5    Diabetic ulcer of left heel associated with type 2 diabetes mellitus, with necrosis of bone ([address] E11.621, L97.424    Non-pressure chronic ulcer of left heel and midfoot with fat layer exposed ([address] L97.422    Diabetic polyneuropathy (HCC) E11.42    History of pulmonary embolus (PE) Z86.711    Hx of blood clots Z86.718    Gangrene (HCC) I96       Past Medical History:        Diagnosis Date    Diabetes mellitus ([address]     Diabetic ulcer of left heel associated with type 2 diabetes mellitus, with necrosis of bone ([address] 5/9/2018    History of pulmonary embolus (PE) 6/15/2020    Hx of blood clots 1990's    PE, FROM TAKING BC PILLS    Hyperlipemia     no med    Thyroid disease        Past Surgical History:        Procedure Laterality Date   

## 2020-06-30 NOTE — PROGRESS NOTES
Cardiology consult placed via [facility] serve to Dr Radha Harman. Celso MARCANO taking messages and will receive it in the morning. Internal medicine consult placed to Dr Wandy Acosta.   Messaged left on VM with patient's information and unit number

## 2020-06-30 NOTE — ANESTHESIA POSTPROCEDURE EVALUATION
"        Gurinder Hernandez   2017 9:00 AM   Office Visit   MRN: [id number]    Department:  [facility]   Dept Phone:  [phone]    Description:  Male : 2015   Provider:  Eloina Montiel M.D.           Reason for Visit     Well Child           Allergies as of 2017     Allergen Noted Reactions    Latex 2015   Rash    Mother will have anaphylaxis if exposed      You were diagnosed with     Encounter for routine child health examination without abnormal findings   [366507]       Need for vaccination   [513379]       Speech delay   [059669]       Sleep concern   [942034]         Vital Signs     Blood Pressure Pulse Temperature Respirations Height Weight    98/70 mmHg 96 36.7 °C (98.1 °F) 24 1.054 m (3' 5.5\") 17.781 kg (39 lb 3.2 oz)    Body Mass Index                   16.01 kg/m2           Basic Information     Date Of Birth Sex Race Ethnicity Preferred Language    2015 Male White Non- English      Problem List              ICD-10-CM Priority Class Noted - Resolved    Recurrent sinus infections J32.9   Unknown - Present    Speech delay F80.9   2015 - Present    Recurrent otitis media of both ears H66.93   2015 - Present    Functional constipation K59.04   2016 - Present      Health Maintenance        Date Due Completion Dates    IMM INACTIVATED POLIO VACCINE <17 YO (4 of 4 - All IPV Series) 2017 2013, 2013, 2013    IMM VARICELLA (CHICKENPOX) VACCINE (2 of 2 - 2 Dose Childhood Series) 2017    IMM DTaP/Tdap/Td Vaccine (5 - DTaP) 2017, 2013, 2013, 2013    IMM MMR VACCINE (2 of 2) 2017    WELL CHILD ANNUAL VISIT 2017, 2015    IMM HPV VACCINE (1 of 3 - Male 3 Dose Series) 2024 ---    IMM MENINGOCOCCAL VACCINE (MCV4) (1 of 2) 2024 ---            Current Immunizations     13-VALENT PCV PREVNAR 2014, 2013, 2013, 2013    DTaP/IPV/HepB " Department of Anesthesiology  Postprocedure Note    Patient: Sonia Meza  MRN: [id number]  YOB: 1955  Date of evaluation: 6/30/2020  Time:  2:23 PM     Procedure Summary     Date:  06/30/20 Room / Location:  [facility] / [facility]    Anesthesia Start:  1152 Anesthesia Stop:  1400    Procedure:  LEG AMPUTATION BELOW LEFT KNEE (Left ) Diagnosis:  (GANGRENE LEFT FOOT)    Surgeon:  Carmelita Saravia MD Responsible Provider:  Jose J Simeon MD    Anesthesia Type:  general ASA Status:  3          Anesthesia Type: general    Gerri Phase I: Gerri Score: 8    Gerri Phase II:      Last vitals: Reviewed and per EMR flowsheets.        Anesthesia Post Evaluation    Patient location during evaluation: PACU  Patient participation: complete - patient participated  Level of consciousness: awake and alert  Airway patency: patent  Nausea & Vomiting: no nausea and no vomiting  Complications: no  Cardiovascular status: hemodynamically stable  Respiratory status: acceptable  Hydration status: euvolemic Combined Vaccine 2013, 2013    Dtap Vaccine 9/9/2014, 2013    Dtap/IPV Vaccine 4/25/2017    HIB Vaccine(PEDVAX) 5/22/2014, 2013, 2013, 2013    Hepatitis A Vaccine, Ped/Adol 3/17/2015, 5/22/2014    Hepatitis B Vaccine Non-Recombivax (Ped/Adol) 2013  4:35 PM    IPV 2013    Influenza TIV (IM) 5/22/2014, 3/11/2014    MMR/Varicella Combined Vaccine 4/25/2017, 5/22/2014    Rotavirus Pentavalent Vaccine (Rotateq) 2013, 2013      Below and/or attached are the medications your provider expects you to take. Review all of your home medications and newly ordered medications with your provider and/or pharmacist. Follow medication instructions as directed by your provider and/or pharmacist. Please keep your medication list with you and share with your provider. Update the information when medications are discontinued, doses are changed, or new medications (including over-the-counter products) are added; and carry medication information at all times in the event of emergency situations     Allergies:  LATEX - Rash               Medications  Valid as of: April 25, 2017 -  9:45 AM    Generic Name Brand Name Tablet Size Instructions for use    Acetaminophen (Suspension) TYLENOL 160 MG/5ML Take 15 mg/kg by mouth every four hours as needed.        Albuterol Sulfate (Aero Soln) albuterol 108 (90 BASE) MCG/ACT Inhale 2 Puffs by mouth every 6 hours as needed for Shortness of Breath.        Ibuprofen (Suspension) MOTRIN 100 MG/5ML Take  by mouth.        .                 Medicines prescribed today were sent to:     [facility] [id number] [place] [address], [place] - [address] [facility] [facility]    [address] [facility] [address]    Phone: [phone] Fax: [phone]    Open 24 Hours?: Yes      Medication refill instructions:       If your prescription bottle indicates you have medication refills left, it is not necessary to call your provider’s office. Please contact your  pharmacy and they will refill your medication.    If your prescription bottle indicates you do not have any refills left, you may request refills at any time through one of the following ways: The online [email or web address] system (except Urgent Care), by calling your provider’s office, or by asking your pharmacy to contact your provider’s office with a refill request. Medication refills are processed only during regular business hours and may not be available until the next business day. Your provider may request additional information or to have a follow-up visit with you prior to refilling your medication.   *Please Note: Medication refills are assigned a new Rx number when refilled electronically. Your pharmacy may indicate that no refills were authorized even though a new prescription for the same medication is available at the pharmacy. Please request the medicine by name with the pharmacy before contacting your provider for a refill.        Instructions    Well  - 2 Years Old  PHYSICAL DEVELOPMENT  Your 2-year-old should be able to:   · Hop on 1 foot and skip on 1 foot (gallop).    · Alternate feet while walking up and down stairs.    · Ride a tricycle.    · Dress with little assistance using zippers and buttons.    · Put shoes on the correct feet.  · Hold a fork and spoon correctly when eating.    · Cut out simple pictures with a scissors.  · Throw a ball overhand and catch.  SOCIAL AND EMOTIONAL DEVELOPMENT  Your 2-year-old:   · May discuss feelings and personal thoughts with parents and other caregivers more often than before.   · May have an imaginary friend.    · May believe that dreams are real.    · May be aggressive during group play, especially during physical activities.    · Should be able to play interactive games with others, share, and take turns.  · May ignore rules during a social game unless they provide him or her with an advantage.      · Should play cooperatively with other children and work  together with other children to achieve a common goal, such as building a road or making a pretend dinner.  · Will likely engage in make-believe play.     · May be curious about or touch his or her genitalia.  COGNITIVE AND LANGUAGE DEVELOPMENT  Your 2-year-old should:   · Know colors.    · Be able to recite a rhyme or sing a song.    · Have a fairly extensive vocabulary but may use some words incorrectly.  · Speak clearly enough so others can understand.  · Be able to describe recent experiences.   ENCOURAGING DEVELOPMENT  · Consider having your child participate in structured learning programs, such as  and sports.    · Read to your child.    · Provide play dates and other opportunities for your child to play with other children.    · Encourage conversation at mealtime and during other daily activities.    · Minimize television and computer time to 2 hours or less per day. Television limits a child's opportunity to engage in conversation, social interaction, and imagination. Supervise all television viewing. Recognize that children may not differentiate between fantasy and reality. Avoid any content with violence.    · Spend one-on-one time with your child on a daily basis. Vary activities.   RECOMMENDED IMMUNIZATION  · Hepatitis B vaccine. Doses of this vaccine may be obtained, if needed, to catch up on missed doses.  · Diphtheria and tetanus toxoids and acellular pertussis (DTaP) vaccine. The fifth dose of a 5-dose series should be obtained unless the fourth dose was obtained at age 4 years or older. The fifth dose should be obtained no earlier than 6 months after the fourth dose.  · Haemophilus influenzae type b (Hib) vaccine. Children who have missed a previous dose should obtain this vaccine.  · Pneumococcal conjugate (PCV13) vaccine. Children who have missed a previous dose should obtain this vaccine.  · Pneumococcal polysaccharide (PPSV23) vaccine. Children with certain high-risk conditions should  obtain the vaccine as recommended.  · Inactivated poliovirus vaccine. The fourth dose of a 4-dose series should be obtained at age 4-6 years. The fourth dose should be obtained no earlier than 6 months after the third dose.  · Influenza vaccine. Starting at age 6 months, all children should obtain the influenza vaccine every year. Individuals between the ages of 6 months and 8 years who receive the influenza vaccine for the first time should receive a second dose at least 4 weeks after the first dose. Thereafter, only a single annual dose is recommended.  · Measles, mumps, and rubella (MMR) vaccine. The second dose of a 2-dose series should be obtained at age 4-6 years.  · Varicella vaccine. The second dose of a 2-dose series should be obtained at age 4-6 years.  · Hepatitis A vaccine. A child who has not obtained the vaccine before 24 months should obtain the vaccine if he or she is at risk for infection or if hepatitis A protection is desired.  · Meningococcal conjugate vaccine. Children who have certain high-risk conditions, are present during an outbreak, or are traveling to a country with a high rate of meningitis should obtain the vaccine.  TESTING  Your child's hearing and vision should be tested. Your child may be screened for anemia, lead poisoning, high cholesterol, and tuberculosis, depending upon risk factors. Your child's health care provider will measure body mass index (BMI) annually to screen for obesity. Your child should have his or her blood pressure checked at least one time per year during a well-child checkup. Discuss these tests and screenings with your child's health care provider.   NUTRITION  · Decreased appetite and food jags are common at this age. A food jag is a period of time when a child tends to focus on a limited number of foods and wants to eat the same thing over and over.  · Provide a balanced diet. Your child's meals and snacks should be healthy.    · Encourage your child to eat  vegetables and fruits.      · Try not to give your child foods high in fat, salt, or sugar.    · Encourage your child to drink low-fat milk and to eat dairy products.    · Limit daily intake of juice that contains vitamin C to 4-6 oz (120-180 mL).  · Try not to let your child watch TV while eating.    · During mealtime, do not focus on how much food your child consumes.  ORAL HEALTH  · Your child should brush his or her teeth before bed and in the morning. Help your child with brushing if needed.    · Schedule regular dental examinations for your child.      · Give fluoride supplements as directed by your child's health care provider.    · Allow fluoride varnish applications to your child's teeth as directed by your child's health care provider.    · Check your child's teeth for brown or white spots (tooth decay).  VISION   Have your child's health care provider check your child's eyesight every year starting at age 3. If an eye problem is found, your child may be prescribed glasses. Finding eye problems and treating them early is important for your child's development and his or her readiness for school. If more testing is needed, your child's health care provider will refer your child to an eye specialist.  SKIN CARE  Protect your child from sun exposure by dressing your child in weather-appropriate clothing, hats, or other coverings. Apply a sunscreen that protects against UVA and UVB radiation to your child's skin when out in the sun. Use SPF 15 or higher and reapply the sunscreen every 2 hours. Avoid taking your child outdoors during peak sun hours. A sunburn can lead to more serious skin problems later in life.   SLEEP  · Children this age need 10-12 hours of sleep per day.  · Some children still take an afternoon nap. However, these naps will likely become shorter and less frequent. Most children stop taking naps between 3-5 years of age.  · Your child should sleep in his or her own bed.  · Keep your child's  "bedtime routines consistent.    · Reading before bedtime provides both a social bonding experience as well as a way to calm your child before bedtime.  · Nightmares and night terrors are common at this age. If they occur frequently, discuss them with your child's health care provider.  · Sleep disturbances may be related to family stress. If they become frequent, they should be discussed with your health care provider.  TOILET TRAINING  The majority of 4-year-olds are toilet trained and seldom have daytime accidents. Children at this age can clean themselves with toilet paper after a bowel movement. Occasional nighttime bed-wetting is normal. Talk to your health care provider if you need help toilet training your child or your child is showing toilet-training resistance.   PARENTING TIPS  · Provide structure and daily routines for your child.   · Give your child chores to do around the house.    · Allow your child to make choices.    · Try not to say \"no\" to everything.    · Correct or discipline your child in private. Be consistent and fair in discipline. Discuss discipline options with your health care provider.  · Set clear behavioral boundaries and limits. Discuss consequences of both good and bad behavior with your child. Praise and reward positive behaviors.  · Try to help your child resolve conflicts with other children in a fair and calm manner.  · Your child may ask questions about his or her body. Use correct terms when answering them and discussing the body with your child.  · Avoid shouting or spanking your child.  SAFETY  · Create a safe environment for your child.    ¨ Provide a tobacco-free and drug-free environment.    ¨ Install a gate at the top of all stairs to help prevent falls. Install a fence with a self-latching gate around your pool, if you have one.  ¨ Equip your home with smoke detectors and change their batteries regularly.    ¨ Keep all medicines, poisons, chemicals, and cleaning products " capped and out of the reach of your child.  ¨ Keep knives out of the reach of children.      ¨ If guns and ammunition are kept in the home, make sure they are locked away separately.    · Talk to your child about staying safe:    ¨ Discuss fire escape plans with your child.    ¨ Discuss street and water safety with your child.    ¨ Tell your child not to leave with a stranger or accept gifts or candy from a stranger.    ¨ Tell your child that no adult should tell him or her to keep a secret or see or handle his or her private parts. Encourage your child to tell you if someone touches him or her in an inappropriate way or place.  ¨ Warn your child about walking up on unfamiliar animals, especially to dogs that are eating.  · Show your child how to call local emergency services (911 in [place] in case of an emergency.    · Your child should be supervised by an adult at all times when playing near a street or body of water.  · Make sure your child wears a helmet when riding a bicycle or tricycle.  · Your child should continue to ride in a forward-facing car seat with a harness until he or she reaches the upper weight or height limit of the car seat. After that, he or she should ride in a belt-positioning booster seat. Car seats should be placed in the rear seat.  · Be careful when handling hot liquids and sharp objects around your child. Make sure that handles on the stove are turned inward rather than out over the edge of the stove to prevent your child from pulling on them.  · Know the number for poison control in your area and keep it by the phone.  · Decide how you can provide consent for emergency treatment if you are unavailable. You may want to discuss your options with your health care provider.  WHAT'S NEXT?  Your next visit should be when your child is 3 years old.     This information is not intended to replace advice given to you by your health care provider. Make sure you discuss any questions you have with  your health care provider.     Document Released: 11/15/2006 Document Revised: 01/08/2016 Document Reviewed: 08/29/2014  Elsevier Interactive Patient Education ©2016 [employer]

## 2020-06-30 NOTE — PROGRESS NOTES
COVID testing completed on:6/25/2020   Results of the test:negative   The patient verbally confirms that they did adhere to the self-quarantine guidelines. No signs or symptoms expressed or observed.

## 2020-07-01 LAB
EKG ATRIAL RATE: 99 BPM
EKG P AXIS: 43 DEGREES
EKG P-R INTERVAL: 154 MS
EKG Q-T INTERVAL: 364 MS
EKG QRS DURATION: 78 MS
EKG QTC CALCULATION (BAZETT): 467 MS
EKG R AXIS: -3 DEGREES
EKG T AXIS: 29 DEGREES
EKG VENTRICULAR RATE: 99 BPM
HCT VFR BLD CALC: 32 % (ref 34–48)
HEMOGLOBIN: 10.3 G/DL (ref 11.5–15.5)
MAGNESIUM: 1.8 MG/DL (ref 1.6–2.6)
MCH RBC QN AUTO: 30.7 PG (ref 26–35)
MCHC RBC AUTO-ENTMCNC: 32.2 % (ref 32–34.5)
MCV RBC AUTO: 95.2 FL (ref 80–99.9)
METER GLUCOSE: 280 MG/DL (ref 74–99)
METER GLUCOSE: 325 MG/DL (ref 74–99)
METER GLUCOSE: 365 MG/DL (ref 74–99)
METER GLUCOSE: 370 MG/DL (ref 74–99)
PDW BLD-RTO: 12.6 FL (ref 11.5–15)
PLATELET # BLD: 359 E9/L (ref 130–450)
PMV BLD AUTO: 9.5 FL (ref 7–12)
RBC # BLD: 3.36 E12/L (ref 3.5–5.5)
WBC # BLD: 19.9 E9/L (ref 4.5–11.5)

## 2020-07-01 PROCEDURE — 6360000002 HC RX W HCPCS: Performed by: SURGERY

## 2020-07-01 PROCEDURE — 2580000003 HC RX 258: Performed by: INTERNAL MEDICINE

## 2020-07-01 PROCEDURE — 83735 ASSAY OF MAGNESIUM: CPT

## 2020-07-01 PROCEDURE — 85027 COMPLETE CBC AUTOMATED: CPT

## 2020-07-01 PROCEDURE — 99222 1ST HOSP IP/OBS MODERATE 55: CPT | Performed by: INTERNAL MEDICINE

## 2020-07-01 PROCEDURE — 97166 OT EVAL MOD COMPLEX 45 MIN: CPT

## 2020-07-01 PROCEDURE — 93010 ELECTROCARDIOGRAM REPORT: CPT | Performed by: INTERNAL MEDICINE

## 2020-07-01 PROCEDURE — 2580000003 HC RX 258: Performed by: SURGERY

## 2020-07-01 PROCEDURE — 99221 1ST HOSP IP/OBS SF/LOW 40: CPT | Performed by: PHYSICAL MEDICINE & REHABILITATION

## 2020-07-01 PROCEDURE — 2060000000 HC ICU INTERMEDIATE R&B

## 2020-07-01 PROCEDURE — 82962 GLUCOSE BLOOD TEST: CPT

## 2020-07-01 PROCEDURE — 36415 COLL VENOUS BLD VENIPUNCTURE: CPT

## 2020-07-01 PROCEDURE — 97535 SELF CARE MNGMENT TRAINING: CPT

## 2020-07-01 PROCEDURE — APPSS60 APP SPLIT SHARED TIME 46-60 MINUTES: Performed by: NURSE PRACTITIONER

## 2020-07-01 PROCEDURE — 6370000000 HC RX 637 (ALT 250 FOR IP): Performed by: SURGERY

## 2020-07-01 PROCEDURE — 97162 PT EVAL MOD COMPLEX 30 MIN: CPT

## 2020-07-01 PROCEDURE — 97530 THERAPEUTIC ACTIVITIES: CPT

## 2020-07-01 RX ADMIN — PIPERACILLIN AND TAZOBACTAM 3.38 G: 3; .375 INJECTION, POWDER, FOR SOLUTION INTRAVENOUS at 15:30

## 2020-07-01 RX ADMIN — SODIUM CHLORIDE: 9 INJECTION, SOLUTION INTRAVENOUS at 01:18

## 2020-07-01 RX ADMIN — VANCOMYCIN HYDROCHLORIDE 1250 MG: 10 INJECTION, POWDER, LYOPHILIZED, FOR SOLUTION INTRAVENOUS at 12:47

## 2020-07-01 RX ADMIN — SODIUM BICARBONATE 650 MG: 650 TABLET ORAL at 21:39

## 2020-07-01 RX ADMIN — INSULIN LISPRO 6 UNITS: 100 INJECTION, SOLUTION INTRAVENOUS; SUBCUTANEOUS at 17:16

## 2020-07-01 RX ADMIN — Medication 4000 UNITS: at 08:24

## 2020-07-01 RX ADMIN — SODIUM CHLORIDE: 9 INJECTION, SOLUTION INTRAVENOUS at 08:33

## 2020-07-01 RX ADMIN — INSULIN LISPRO 8 UNITS: 100 INJECTION, SOLUTION INTRAVENOUS; SUBCUTANEOUS at 07:01

## 2020-07-01 RX ADMIN — SODIUM BICARBONATE 650 MG: 650 TABLET ORAL at 08:24

## 2020-07-01 RX ADMIN — METFORMIN HYDROCHLORIDE 1000 MG: 1000 TABLET ORAL at 08:45

## 2020-07-01 RX ADMIN — LEVOTHYROXINE SODIUM 50 MCG: 0.05 TABLET ORAL at 08:24

## 2020-07-01 RX ADMIN — GABAPENTIN 300 MG: 300 CAPSULE ORAL at 21:39

## 2020-07-01 RX ADMIN — Medication 500 MG: at 08:24

## 2020-07-01 RX ADMIN — INSULIN LISPRO 10 UNITS: 100 INJECTION, SOLUTION INTRAVENOUS; SUBCUTANEOUS at 11:46

## 2020-07-01 RX ADMIN — Medication 1 CAPSULE: at 08:27

## 2020-07-01 RX ADMIN — METFORMIN HYDROCHLORIDE 1000 MG: 1000 TABLET ORAL at 17:15

## 2020-07-01 RX ADMIN — GABAPENTIN 300 MG: 300 CAPSULE ORAL at 08:24

## 2020-07-01 RX ADMIN — PIPERACILLIN AND TAZOBACTAM 3.38 G: 3; .375 INJECTION, POWDER, FOR SOLUTION INTRAVENOUS at 05:57

## 2020-07-01 RX ADMIN — ENOXAPARIN SODIUM 40 MG: 40 INJECTION SUBCUTANEOUS at 08:24

## 2020-07-01 RX ADMIN — LOSARTAN POTASSIUM 25 MG: 25 TABLET, FILM COATED ORAL at 08:24

## 2020-07-01 RX ADMIN — SODIUM CHLORIDE: 9 INJECTION, SOLUTION INTRAVENOUS at 08:32

## 2020-07-01 RX ADMIN — INSULIN LISPRO 5 UNITS: 100 INJECTION, SOLUTION INTRAVENOUS; SUBCUTANEOUS at 21:50

## 2020-07-01 ASSESSMENT — PAIN SCALES - GENERAL
PAINLEVEL_OUTOF10: 0
PAINLEVEL_OUTOF10: 0

## 2020-07-01 NOTE — OP NOTE
a knee immobilizer and transferred to  recovery room in stable condition.         Galen Bryant MD    D: 06/30/2020 17:06:55       T: 06/30/2020 23:18:46     [email or web address]  Job#: [id number]     Doc#: [id number]    CC:

## 2020-07-01 NOTE — PROGRESS NOTES
Consult noted on PM&R list. Await PT and OT evals and then will discuss with Dr. Kay Mkcay. Will follow and advise.

## 2020-07-01 NOTE — CARE COORDINATION
Pt accepted to acute rehab, per Ernie Gonzalez pre-cert started today 7/1.   Discharge plan is our Acute Re[facility] pending insurance approval.

## 2020-07-01 NOTE — PROGRESS NOTES
Acute Rehab Pre-Admission Screen      Referral date: 7/1/2020  Onset/Hospital Admit Date: 6/30/2020  8:30 AM    Current Location: [facility]/2738-    Name: Dwain Room: 1955  Age: 77 y.o. Admitting Diagnosis: Left BKA   Address: [address], [place], [address]  Home Phone: [phone] (home)  Social Security #:     Sex: female  Race:   Marital Status:    Ethnic/Cultural/Zoroastrian Considerations: Presybeterian    Advanced Directives: [x] Full Code  [] [address] [] Medications only       [] Living Will  [] DPOA      []Organ donor      [] No mechanical breathing or ventilation     [] no tube feeding, nutrition or hydration      [x] Patient does not have advanced directives or living will     Copies in Chart: n/a    COVERAGE INFORMATION   Primary Insurer: [employer] medicare  Payor Contact:   Phone:   FAX:  Authorization #: DENIED    Medicare #:     Verified coverage: [] Patient  [] Family/caregiver    [x] financial department [] insurance carrier    MEDICAL UPDATE:  History of present admission: Presents 6/30/2020 for elective surgery. has had a wound of left foot which was first noted approximately 7 years ago, underwent multiple procedures, at least some of them including wound debridement skin grafting partial calcanectomy, wound VAC therapy etc. by multiple podiatrists in the past, recently was seen by Dr. Isaac Og, who noted of the wound over the plantar surface of the foot is very unstable, with bony instability of the plantar arch, reviewed the situation, because of diabetes mellitus, with diabetic neuropathy, Charcot foot, recommended her to consider primary amputation of the left leg below the knee. 6/30/2020  Left below-the-knee amputation.        PHYSICIAN / REFERRAL INFORMATION  Referring Physician: Daisy Gutierrez  Attending Physician: Corbin Garcia MD  Primary Care Physician: Dom Cisneros DO  Consultants/Opinions (see full consult notes on chart): SOCIAL INFORMATION  Primary  Contact: Ezqeuiel Hernandez   Relationship: spouse  Primary Phone: [phone]  Secondary Phone: [phone]  Secondary  Contact: Amanda Stoll. Relationship: son  Primary Phone: [phone]      Previous Community Services: Wound clinic  Caregiver available: [x] Yes [] No Hours per day available: to be determined  Patient previously employed:  [] Yes [] Part Time [] Full Time [x] No [x] Retired  Occupation/Profession:   Prior living arrangements: [x] Home  [] Assisted living  [] SNF [] Other  Lived with:  [] Alone  [x] Spouse  [] Family  [] Other  Lived with: Alma Barnes phone: [phone]  Home:  [address] home  1 entry steps  Rails: 1 - does have a ramp entry also  Steps:  flight to 2nd floor  Rails:  1   Bedroom: [] 1st floor  [x] 2nd floor    Bathroom:  [x] 1st floor 1/2 bath  [x] 2nd floor    Prior Functional Level: Independent for: for ADL's, gait   Assistance for:   Dependent for: driving  Dominant hand: [] Right  [x] Left    Previous Home Equipment:  [x] [employer] [] Grab bars [] Orthotic / prosthetic   [x] Shower chair [] Tub bench  [] 3-in-1 Commode [] Long handle sponge   [] Oxygen [] Sock aide  [x] Wheelchair  [] motorized wc/scooter  [] Wheelchair cushion   [] Crutches [] Long handle shoehorn  [] Reachers [] Toilet seat elevator [x] Rollator  [x]  Knee Walker(wheeled)   [x] Walker(standard) [] Mechanical lift    [] None of the above     Has patient had 2 or more falls in the past year or any fall with injury in the past year? [] yes   [x] no   []unknown    Has patient had major surgery during past 100 days prior to admission?    [x] yes   [] no Type/ Date: 6/30/2020  Left below-the-knee amputation    Surgical History:  Past Surgical History:   Procedure Laterality Date    COLONOSCOPY      DILATION AND CURETTAGE OF UTERUS      ECHO COMPL W DOP COLOR FLOW  11/18/2013         ECHOCARDIOGRAM TRANSESOPHAGEAL  11/20/2013         FOOT DEBRIDEMENT Left 5/6/2019 EXCISIONAL DEBRIDEMENT TO  & THRU MUSCLE LEFT HEEL performed by Hugo Angel DPM at [address] [facility] Left 6/3/2019    EXCISIONAL  DEBRIDEMENT WITH GRAFT APPLICATION LEFT HEEL (INTEGRA ), WITH REAPPLICATION OF WOUND VAC performed by Hugo Angel DPM at [facility] [place] [facility]  11/17/13    i&d left foot and ankle with bone biopsy    HYSTERECTOMY  1998    ovaries removed Dr Angie Che Left 6/30/2020    LEG AMPUTATION BELOW LEFT KNEE performed by Jazzmine Cisneros MD at [place] [address] FX W FIX PST LIP Left 8/16/2018    PARTIAL CALCANECTOMY LEFT FOOT, EXCISIONAL DEBRIDEMENT MUSCLE LEFT FOOT performed by Hugo Angel DPM at [facility]., SKIN, SUB-Q TISSUE,MUSCLE,BONE,=<20 SQ CM Left 9/20/2018    EXCISIONAL DEBRIDEMENT SUBQUTANEOUS MUSCLE , BONE LEFT HEEL performed by Hugo Angel DPM at [address] Left 4/10/2018    LEFT HEEL DEBRIDEMENT, BONE BIOPSY performed by Marcella Aguayo DPM at [facility] OR    HI OFFICE/OUTPT VISIT,PROCEDURE ONLY Left 10/19/2018    PARTIAL LEFT CALCANECTOMY performed by Hugo Angel DPM at [facility]    WISDOM TOOTH EXTRACTION         Past Medical:  Past Medical History:   Diagnosis Date    [facility]     Diabetic ulcer of left heel associated with type 2 diabetes mellitus, with necrosis of bone ([facility] 5/9/2018    History of pulmonary embolus (PE) 6/15/2020    Hx of blood clots 1990's    PE, FROM TAKING BC PILLS    Hyperlipemia     no med    Thyroid disease        Current Co-morbidities:  [] Alzheimer's   [] Dysphasia     [] Parkinsonism  [] Amputation   [] GERD  [] Peripheral artery disease   [] Anemia      [] Encephalopathy  [] Peripheral vascular disease  [] Anxiety   [] Gangrene   [] Pneumonia  [] Aphasia   [] Gout    [] Polyneuropathy  [] Asthma   [] Heart Failure (diastolic) [] Post-polio syndrome  [] Atrial Calculation (Bazett) 467 ms    P Axis 43 degrees    R Axis -3 degrees    T Axis 29 degrees   Basic Metabolic Panel    Collection Time: 06/30/20  2:50 PM   Result Value Ref Range    Sodium 139 132 - 146 mmol/L    Potassium 5.1 (H) 3.5 - 5.0 mmol/L    Chloride 105 98 - 107 mmol/L    CO2 24 22 - 29 mmol/L    Anion Gap 10 7 - 16 mmol/L    Glucose 239 (H) 74 - 99 mg/dL    BUN 14 8 - 23 mg/dL    CREATININE 1.0 0.5 - 1.0 mg/dL    GFR Non-African American 55 >=60 mL/min/1.73    GFR African American >60     Calcium 8.7 8.6 - 10.2 mg/dL   POCT Glucose    Collection Time: 06/30/20  5:12 PM   Result Value Ref Range    Meter Glucose 284 (H) 74 - 99 mg/dL   POCT Glucose    Collection Time: 06/30/20  9:16 PM   Result Value Ref Range    Meter Glucose 377 (H) 74 - 99 mg/dL   POCT Glucose    Collection Time: 07/01/20  6:32 AM   Result Value Ref Range    Meter Glucose 325 (H) 74 - 99 mg/dL     No results found.     Additional labs or diagnostic studies needed before admission to rehabilitation unit:  None noted    Medications:   metFORMIN  1,000 mg Oral BID WC    vitamin D  4,000 Units Oral Daily    gabapentin  300 mg Oral BID    lactobacillus  1 capsule Oral QAM    levothyroxine  50 mcg Oral Daily    losartan  25 mg Oral Daily    pravastatin  20 mg Oral Every Other Day    sodium bicarbonate  650 mg Oral BID    patiromer sorbitex calcium  1 packet Oral Every Other Day    vitamin C  500 mg Oral Daily    insulin lispro  0-12 Units Subcutaneous TID WC    insulin lispro  0-6 Units Subcutaneous Nightly    enoxaparin  40 mg Subcutaneous Daily    vancomycin  1,250 mg Intravenous Q12H    piperacillin-tazobactam  3.375 g Intravenous Q8H    And    sodium chloride   Intravenous Q8H      dextrose      sodium chloride 75 mL/hr at 07/01/20 8219     acetaminophen, glucose, dextrose, glucagon (rDNA), dextrose, morphine, morphine, oxyCODONE-acetaminophen **OR** oxyCODONE-acetaminophen    SPECIAL PRECAUTIONS: [x] No current precautions  [] Cardiac  [] Renal [] Sternal [] Respiratory      [] Neurological           [] Hip  [] Spinal [] Seizure  [] Aspiration  [] Isolation precautions:    [] Contact   [] Respiratory   [] Protective     [] Droplet    [x] Weight Bearing precautions:         [x] Non Weight Bearing : left BKA residual limb        [] Toe Touch Weight Bearing :        [] Partial Weight Bearing :         [] Weight Bearing as Tolerated :         [x] Fall Risk:   [] Recent history of falls [x] Falls risk level (Obrien Scale): high      [x] Bed Alarm    [x] Do not leave alone in the bathroom    [] Chair Alarm    [] Cognitive impairment      [] One to One supervision  [] Sitter / Tele sitter   [] Safety enclosure bed  [] Decreased balance     SPECIAL REHABILITATION NEEDS:   [x] IV Therapy: [x] PRN Adapter  [] Midline  [] PICC      [] Central Line    [] TPN       [] Oxygen: [] Trach [] Bi-PAP [] CPAP  [] Nasal cannula  [] Liters:      [x] Wound Care:   [] Pressure ulcers(stage and location) -    [] Wound vac   [x] Wound or incision care    [x] Pain Management (level of pain, meds): left leg phantom pain ranges 5-0, on percocet   [] Incontinence Bladder [] Torres  Insertion date:    []Hemodialysis and  Frequency:   [] Incontinence Bowel    [] Last bowel movement :     Substance use history: [] Yes  [x] No   [] Tobacco  [] Alcohol  [] Other     [] Ethnic  [] Cultural  [] Spiritual  [] Language [] Needs  [] Other than English  [] Hearing Impaired  [] Visually Impaired  [] Speaking Impaired  [] Blind  [] Special equipment:  [] Devices/Splints  [] Type   [] Brace   [] Type  [] Bariatric bed  [] Extra wide commode  [] Extra wide wheelchair [] Extra wide walker  [] Edgardo walker  [] Edgardo wheelchair  [] Transfer lift    [] Other equipment     FUNCTIONAL STATUS PT / Elieser Schumacher / Jacquelyn Campbell:  FIM / EVAL Discipline Initial: 7/1/2020 Follow Up:  Current:    Eating OT Independent      Grooming OT Stand by Assist      Bathing OT Moderate Assist Dressing Upper Extremity OT Stand by Assist      Dressing Lower Extremity OT Moderate Assist      Toileting OT Moderate Assist      Toilet Transfers OT Moderate Assist      Tub/Shower Transfers OT nt     Homemaking OT nt     Bed Mobility PT Minimum assistance      Bed/Wheelchair Transfers PT Moderate Assist to Minimum assistance      Locomotion Walk / Wheelchair  Device:  Distance: PT 1-2 hops with Wheeled walker Moderate Assist      Endurance PT      Expression SP      Social Interaction SP      Problem Solving SP      Memory SP      Comprehension SP      Swallowing SP      Bowel Management NSG      Bladder Management NSG        Comments on Functional Status:  Will benefit from 3 hours of acute rehab therapy daily , improve balance, mobility, self care to supervision    [x] Able to participate a minimum of 3 hours per day of therapy intervention    Required treatments/services: [x] Rehabilitation nursing [x] Dietitian / nurtition                 [x] Case management  [] Respiratory Therapy      [x] Social work   [] Other     Required Therapy:  Therapy Hours per Day Days per Week Therapeutic Interventions Required   [x] Physical Therapy 1.5 5-7 Gait,transfers, Safety, strength, education, endurance    [x] Occupational Therapy 1.5 5-7 ADL's, IADL's, Safety, strength, education, endurance    [] Speech Pathology      [x] Prosthetics / Orthotics  PRN  Prepare for prosthetics   []         Anticipated Discharge Plan:   Anticipated DME Needs:  [x] Home     [] Commode   [] Alone    [] Wheelchair   [x] Supervised    [] Ania Gowers   [] Assist    [] Oxygen        [] Hospital Bed  [] Assisted Living    [] Ramp        [x] To Be Determined    Anticipated Home Health Services:  Anticipated Outpatient Services:  [] PT       [] PT  [] OT      [] OT  [] Speech     [] Speech  [] Nursing     [] Dialysis  [] Aide      [x] To Be Determined  [x] To Be Determined    Anticipated support group:  [x] Amputation  [] Multiple Sclerosis  [] Stroke  [] Brain Injury  [] Spinal cord injury  [] Other     Barriers to discharge: none noted    Discharge Support: [] Patient lives alone and does not have a caregiver available     [x] Patient has a caregiver available     [x] Discharge plan has been verified with patient's caregiver      [] Caregiver is in agreement with the discharge plan     Expected functional status for safe discharge: supervision    Patient/support person goals: to go  Home and be independent    Expected length of stay: 7-10 days    Discussed expected length of stay and agreeable to IRF plan: [x] Yes   [] No    Impairment Group Category: 5.4    Etiological Diagnosis: Below knee amputation    Primary Rehabilitation Diagnosis: below knee amputation    Electronically signed by Josefina Garduno RN on 7/1/2020 at 9:15 AM    Prescreen completed __________________________________ (signature of prescreener)    Date:   7/1/2020 Time:  [address]:  Patient has suffered decline in functional abilities for gait, transfers,   ADL's and IADL's as well as endurance. Patient has functional deficits requiring intensive therapy across multiple disciplines in order to return home safely. Patient will need physician oversight for respiratory issues, abnormal vital signs, nutritional and hydration status, safety issues, medications and therapy modalities. PT, OT  will work on deficits as noted in evaluations. Case management and social work will provide services for DME and management of a safe discharge home.      RECOMMEND LEVEL OF CARE  Recommend inpatient rehabilitation: [] Yes   [x] No  If no indicate reason:  [] Functional level too high  [] Unmotivated  [x] No insurance carrier approval [] Unlikely to return to community  [] No medical necessity  [] Patient or family chose other facility  [] Too medically complex  [] Inadequate discharge plan  [] Rehabilitation bed unavailable [] Functional level too low  []

## 2020-07-01 NOTE — CONSULTS
PM&R Consult Note  Patient: Elida Crabtree  Age/sex: 77 y.o. female  Medical Record #: [id number]      Referring physician: Shahnaz Wilkins MD  Consulting physician: Dr. Bertrand Aguirre MD  Primary care provider: Michel Horner DO      Chief complaint:   Impairments and acitivities limitations in ADLs and mobility secondary to left below knee amputation. HPI:   Elida Crabtree is a 77 y.o. female who presented to [facility] on 6/30/2020 for left below knee amputation. 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 She has undergone multiple procedures and surgeries on this foot/ankle. She underwent left BKA on 6/30/2020. She reports pain is adequately controlled. Her blood sugars have been elevated. She has participated in therapy evaluations.          Past Medical History:   Diagnosis Date    Diabetes mellitus ([facility]     Diabetic ulcer of left heel associated with type 2 diabetes mellitus, with necrosis of bone ([facility] 5/9/2018    History of pulmonary embolus (PE) 6/15/2020    Hx of blood clots 1990's    PE, FROM TAKING BC PILLS    Hyperlipemia     no med    Thyroid disease      Past Surgical History:   Procedure Laterality Date    COLONOSCOPY      DILATION AND CURETTAGE OF UTERUS      ECHO COMPL W DOP COLOR FLOW  11/18/2013         ECHOCARDIOGRAM TRANSESOPHAGEAL  11/20/2013         FOOT DEBRIDEMENT Left 5/6/2019    EXCISIONAL DEBRIDEMENT TO  & THRU MUSCLE LEFT HEEL performed by Severa Rakers, DPM at [facility] Left 6/3/2019    EXCISIONAL  DEBRIDEMENT WITH GRAFT APPLICATION LEFT HEEL (INTEGRA ), WITH REAPPLICATION OF WOUND VAC performed by Severa Rakers, DPM at [place] [address]  11/17/13    i&d left foot and ankle with bone biopsy    HYSTERECTOMY  1998    ovaries removed Dr Cr Dalton Left 6/30/2020    LEG AMPUTATION BELOW LEFT KNEE performed by Shahnaz Wilkins MD at [facility] discomfort  Genitourinary: No Dysuria  Musculoskeletal: as per HPI  Neurologic: as per HPI   Psychiatric: No Depression, No anxiety      Physical Examination:  Vitals:   Vitals:    06/30/20 1600 06/30/20 2113 06/30/20 2300 07/01/20 0815   BP: 135/72 135/72 128/67 (!) 162/87   Pulse: 97 105 100 117   Resp: 20 18 18 18   Temp: 96.4 °F (35.8 °C) 97.6 °F (36.4 °C) 99.1 °F (37.3 °C) 97.7 °F (36.5 °C)   TempSrc: Temporal Temporal Temporal Temporal   SpO2: 95% 95% 95% 97%   Weight:       Height:           GEN: NAD, resting in bed   HEENT: NC/AT, PERRL, EOMI  RESP: CTAB, no R/R/W  CV: +S1 S2, RRR  ABD: soft, NT, ND, normal BS   EXT: Left BKA, dressing c/d/i, left KI donned. Right Charcot foot. Skin: incision, lacerations  Psych: Appropriate mood and affect     Neuro Exam:  Alert, oriented x4  Speech clear, content appropriate  Follow multi step commands    Cranial Nerves:  CN II-XII intact       Sensory:    LT: intact throughout       Motor:    Strength 5/5 throughout bilateral upper extremities and 4/5 throughout bilateral lower extremities.  Left BKA      No pathologic reflexes     Laboratory:    Lab Results   Component Value Date    WBC 19.9 (H) 07/01/2020    HGB 10.3 (L) 07/01/2020    HCT 32.0 (L) 07/01/2020    MCV 95.2 07/01/2020     07/01/2020     Lab Results   Component Value Date     06/30/2020    K 5.1 06/30/2020    K 4.7 06/24/2020     06/30/2020    CO2 24 06/30/2020    BUN 14 06/30/2020    CREATININE 1.0 06/30/2020    GLUCOSE 239 06/30/2020    CALCIUM 8.7 06/30/2020      Lab Results   Component Value Date    ALT 11 06/24/2020    AST 16 06/24/2020    ALKPHOS 75 06/24/2020    BILITOT 0.3 06/24/2020       Lab Results   Component Value Date    COLORU Yellow 12/11/2019    NITRU Negative 12/11/2019    GLUCOSEU 500 12/11/2019    KETUA Negative 12/11/2019    UROBILINOGEN 0.2 12/11/2019    BILIRUBINUR Negative 12/11/2019         IMPRESSION:  Shaan Armijo is a 77 y.o. female with impairments and acitivities limitations in ADLs and mobility secondary to left below knee amputation. Demonstrating impaired impaired strength, impaired ROM, impaired balance, decreased endurance, impaired functional mobility and impaired self care. Recommendations:   Patient appropriate and would benefit from Acute inpatient rehabilitation prior to returning home. She will require pre-cert. Thank you for the consult.     Aurora Jon MD  Physical Medicine and Rehabilitation

## 2020-07-01 NOTE — H&P
TRANSESOPHAGEAL  11/20/2013         FOOT DEBRIDEMENT Left 5/6/2019    EXCISIONAL DEBRIDEMENT TO  & THRU MUSCLE LEFT HEEL performed by Mathieu Fagan DPM at [facility] Left 6/3/2019    EXCISIONAL  DEBRIDEMENT WITH GRAFT APPLICATION LEFT HEEL (INTEGRA ), WITH REAPPLICATION OF WOUND VAC performed by Mathieu Fagan DPM at [facility]  11/17/13    i&d left foot and ankle with bone biopsy    HYSTERECTOMY  1998    ovaries removed Dr Randi Caal Left 6/30/2020    LEG AMPUTATION BELOW LEFT KNEE performed by Gardenia Wilson MD at [place] [address] FX W FIX PST LIP Left 8/16/2018    PARTIAL CALCANECTOMY LEFT FOOT, EXCISIONAL DEBRIDEMENT MUSCLE LEFT FOOT performed by Mathieu Fagan DPM at [place] [address]., SKIN, SUB-Q TISSUE,MUSCLE,BONE,=<20 SQ CM Left 9/20/2018    EXCISIONAL DEBRIDEMENT SUBQUTANEOUS MUSCLE , BONE LEFT HEEL performed by Mathieu Fagan DPM at [address] [place] [address] Left 4/10/2018    LEFT HEEL DEBRIDEMENT, BONE BIOPSY performed by Tabby Lilly DPM at [place] [address] OFFICE/OUTPT VISIT,PROCEDURE ONLY Left 10/19/2018    PARTIAL LEFT CALCANECTOMY performed by Mathieu [facility]agan DPM at [facility]         Medications Prior to Admission:    Medications Prior to Admission: losartan (COZAAR) 25 MG tablet, Take 25 mg by mouth daily  cefdinir (OMNICEF) 300 MG capsule, Take 1 capsule by mouth 2 times daily  doxycycline hyclate (VIBRA-TABS) 100 MG tablet, Take 1 tablet by mouth 2 times daily  Lactobacillus-Inulin ([facility] ), Take 1 capsule by mouth every morning STOP PREOP MED  pravastatin (PRAVACHOL) 40 MG tablet, Take 20 mg by mouth every other day  gabapentin (NEURONTIN) 300 MG capsule, Take 300 mg by mouth 2 times daily. Derril Kenneth   glipiZIDE (GLUCOTROL XL) 10 MG extended release tablet, Take 1 tablet by mouth daily  vitamin C (ASCORBIC ACID) 500 MG tablet, Take 500 mg by mouth daily STOP PREOP MED  sodium bicarbonate 325 MG tablet, Take 650 mg by mouth 2 times daily   Cholecalciferol (VITAMIN D3) 2000 UNITS CAPS, Take 4,000 Units by mouth daily STOP PREOP MED  metformin (GLUCOPHAGE) 500 MG tablet, Take 1,000 mg by mouth 2 times daily (with meals)   levothyroxine (SYNTHROID) 25 MCG tablet, Take 50 mcg by mouth daily   VELTASSA 8.4 g PACK, Take 1 packet by mouth every other day   acetaminophen 650 MG TABS, Take 650 mg by mouth every 4 hours as needed (Fever >100.5 F (38 C)). Allergies:    Codeine    Social History:    reports that she has never smoked. She has never used smokeless tobacco. She reports that she does not drink alcohol or use drugs. Family History:   family history includes Alzheimer's Disease in her mother; Other in her father and maternal grandfather. REVIEW OF SYSTEMS:  As above in the HPI, otherwise negative    PHYSICAL EXAM:    Vitals:  BP (!) 162/87   Pulse 117   Temp 97.7 °F (36.5 °C) (Temporal)   Resp 18   Ht 5' 6.5\" (1.689 m)   Wt 165 lb (74.8 kg)   SpO2 97%   BMI 26.23 kg/m²     General:  Awake, alert, oriented X 3. Well developed, well nourished, well groomed. No apparent distress. HEENT:  Normocephalic, atraumatic. Pupils equal, round, reactive to light. No scleral icterus. No conjunctival injection. Normal lips, teeth, and gums. No nasal discharge. Neck:  Supple  Heart:  RRR, no murmurs, gallops, rubs  Lungs:  CTA bilaterally, bilat symmetrical expansion, no wheeze, rales, or rhonchi  Abdomen:   Bowel sounds present, soft, nontender, no masses, no organomegaly, no peritoneal signs  Extremities: Left BKA  Skin:  Warm and dry, no open lesions or rash  Neuro:  Cranial nerves 2-12 intact, no focal deficits  Breast: deferred  Rectal: deferred  Genitalia:  deferred    LABS:    CBC with Differential:    Lab Results   Component Value Date    WBC 19.9 07/01/2020    RBC 3.36 07/01/2020    HGB 10.3 07/01/2020

## 2020-07-01 NOTE — PROGRESS NOTES
Physical Therapy Initial Assessment     Name: Bertrand Killian  : 1955  MRN: [id number]    Referring Provider:  Beth Mancia MD    Date of Service: 2020    Evaluating PT:  Marco Regan PT, DPT    Room #:  3101/8423-Z  Diagnosis:  Gangrene  PMHx/PSHx:  DM, Thyroid disease, Hyperlipemia, Diabetic ulcer L heel with necrosis of bone, PE, Multiple L foot surgeries with WV placement  Procedure/Surgery:  Left BKA 2020  Precautions:  Falls, Immobilizer to LLE, R Charcot foot  Equipment Needs:  Has [facility], [facility], Knee scooter    SUBJECTIVE:    Pt lives with her  in a 2 story home with 1 stairs or ramp to enter and 1 rail(s). Bed is on 2nd floor and bath is on 2nd floor with flight and 1 rail(s). Pt reported sleeping on the couch on the 1st floor with 1/2 bath. Pt scoots up the stairs on buttocks with 's assist to shower when able otherwise sponge bathes. Pt reported being mostly non-ambulatory and uses [facility] as seated scooter PTA. Pt reported independent with ADLs. Pt is not actively driving. OBJECTIVE:   Initial Evaluation  Date: 2020 Treatment  NA Short Term/ Long Term   Goals   AM-PAC 6 Clicks 86/40     Was pt agreeable to Eval/treatment? Yes      Does pt have pain? No c/o pain. Bed Mobility  Rolling: SBA  Supine to sit: Min A  Sit to supine: Min A  Scooting: Min A  SBA   Transfers Sit to stand: Min A  Stand to sit: Min A  Stand pivot: Mod A  SBA   Ambulation    1-2 hops x 2 with [facility] with Mod A  >10 feet with [facility] with SBA   Stair negotiation: ascended and descended  NT  TBA   ROM BUE:  WFL  BLE:  WFL     Strength BUE:  4/5  BLE:  RLE 4+/5, LLE 4/5  Increase 1/3 grade   Balance Sitting EOB:  Supervision  Dynamic Standing: Mod A with [facility]  Sitting EOB:  Independent  Dynamic Standing:  SBA with [facility]     Pt is A & O x 4  Sensation:  Pt reported numbness in R foot  Edema:   Moderate edema noted in R foot/ankle    Therapeutic Exercises:  Educated on QS, GS, STS x 4    Patient education  Pt educated on purpose of PT assessment, importance of mobility, safety with mobility, purpose of immobilizer, importance of knee extension, transfers, pre-gait    Patient response to education:   Pt verbalized understanding Pt demonstrated skill Pt requires further education in this area   Yes Partially with verbal cues and assist Yes      ASSESSMENT:    Comments:  Patient cleared by RN and agreeable to treatment. Patient found in semi Pulido's and reported wanting to get OOB for linen change. Patient able to perform bed mobility with assist with linen management. Patient reported no pain and demonstrated good mobility of LLE. Patient assisted to seated with LLE supported by neoprene immobilizer. Patient denied dizziness with positional change. Patient educated on safety with standing and gait prior to task due to recent amputation. Patient assisted to standing and demonstrated fair balance. Patient reported not ambulating much for the past 3 years due to L foot surgeries and WV placement. Patient initially shuffled on R foot, and only able to hop 1-2 times due to Charcot and not having boot or diabetic shoe donned. Patient assisted to seated in the bedside chair and immobilizer removed for MMT. Extensive education provided regarding importance of L knee extension and strengthening for future prosthetic fitting. Patient voiced understanding. LLE elevated on stool prior to leaving. Patient then requested to return to bed and assisted back to seated/supine with call light and tray table in reach. HOB elevated to comfort. Treatment:  Patient practiced and was instructed in the following treatment:     Bed mobility: Verbal/tactile cues for sequencing BUEs/BLEs for safe technique with rolling/supine<>sit task.  Transfer training: Verbal/tactile cues to facilitate proper hand placement, technique and safety during sit to stand task.     Gait training: Verbal and tactile cues to facilitate

## 2020-07-01 NOTE — PROGRESS NOTES
Met with patient and reviewed ARU program. Patient wants to stay at [facility] for rehab. Will accept pending precert.

## 2020-07-01 NOTE — CONSULTS
Inpatient Cardiology Consultation      Reason for Consult: Cardiac evaluation post left below-knee amputation (6/30/2020)    Consulting Physician: Dr. Aleena Summers    Requesting Physician:  Dr. Shaista Arreola     Date of Consultation: 7/1/2020    HISTORY OF PRESENT ILLNESS:   Ms. Tiffani Ureña is a 77year old female who is known to Dr. Aamir Henderson due to history of \"Abnormal EKG\" noted per PCP in 2014. However, patient was evaluated by Dr. Aamir Henderson and underwent a nonischemic Lexiscan (2015, 2018) and more recently TTE (2018) showing normal LV function and Stage I DD. Otherwise, she has been stable from cardiac standpoint. PMH: DM with history of LLE diabetic ulcer/charcot's foot s/p Left BKA (6/30/2020) and history of RLE charcot foot, hypothyroidism, history of PE/DVT (1998) while on birth control, HTN, CKD stage III. For the past several months, she has been unable to ambulate on her left foot which has limited her functional capacity. She is able to bath herself and slides herself up/down steps with no CP or SOB. She occasionally feels \"dizziness\" when getting out of a hot shower that quickly resolves. Patient was noted to first have a left foot ulcer approximately 7 years ago and underwent multiple procedures including multiple debridements. She was noted to have Charcot foot and was recommended by vascular surgery to have a primary amputation of the left leg below the knee. Therefore, on 6/30/2020 she underwent a left below the knee amputation. She was admitted to a telemetry monitored unit and cardiology was asked to see the patient for cardiac evaluation post surgery. Upon arrival to surgery: Blood pressure 130/77, heart rate 116, afebrile, 96% on room air. Labs: Sodium 139, potassium 5.1, BUN 14, creatinine 1.0, glucose 239. EKG: SR, NSSTT changes, rate 99 bpm.  She is currently on a telemetry monitored unit. Telemetry shows ST (low 100's). Denies CP and SOB. Continues to have LLE pain.      Please note: past medical records were reviewed per electronic medical record (EMR) - see detailed reports under Past Medical/ Surgical History. Past Medical History:    1.  ELAINA: 11/20/2013: Normal ELAINA, no evidence of endocarditis or cardiac source of embolus. 2.  TTE: 6/21/2017: (Dr. Ledy Estes): EF 80%, stage I diastolic dysfunction, trace MR, no wall motion abnormality. 3.  TTE: 8/10/2018: (Dr. Ledy Estes): EF 91%, stage I diastolic dysfunction, trace MR.  4.  Lexiscan MPS: 1/19/2015: No EKG changes, normal perfusion imaging, no wall motion abnormality, low risk pharmacological stress test.  5.  Lexiscan MPS: 8/10/2018 EF 65%. NWMA, Nonischemic. 6.  Hypothyroidism, on replacement therapy   7. Diabetes mellitus (diagnosed in 2016) with history of osteomyelitis and Left diabetic foot ulcer and history of right charcot foot (wears boot)  · Underwent multiple surgeries and hyperbaric sessions. · S/p Left BKA (6/30/2020)  8. Chronic kidney disease, stage III (baseline creatinine 1.1-1.3). Patient follows with Dr. Pk Mohamud and was last seen in outpatient 2/14/2020 (office note scanned in the media). 9.  Hypertension  10.  History of PE and DVT (1998) / while on birth control     Past Surgical History:    Past Surgical History:   Procedure Laterality Date    COLONOSCOPY      DILATION AND CURETTAGE OF UTERUS      ECHO COMPL W DOP COLOR FLOW  11/18/2013         ECHOCARDIOGRAM TRANSESOPHAGEAL  11/20/2013         FOOT DEBRIDEMENT Left 5/6/2019    EXCISIONAL DEBRIDEMENT TO  & THRU MUSCLE LEFT HEEL performed by Ar Jin DPM at [address] [place] [address] Left 6/3/2019    EXCISIONAL  DEBRIDEMENT WITH GRAFT APPLICATION LEFT HEEL (INTEGRA ), WITH REAPPLICATION OF WOUND VAC performed by Ar Jin DPM at [address]  11/17/13    i&d left foot and ankle with bone biopsy    HYSTERECTOMY  1998    ovaries removed Dr Elise Watters Left 6/30/2020    LEG AMPUTATION BELOW LEFT KNEE performed by Devora Lainez MD at [facility] 94 FX W FIX PST LIP Left 8/16/2018    PARTIAL CALCANECTOMY LEFT FOOT, EXCISIONAL DEBRIDEMENT MUSCLE LEFT FOOT performed by Asha Hastings DPM at [place] [address]., SKIN, SUB-Q TISSUE,MUSCLE,BONE,=<20 SQ CM Left 9/20/2018    EXCISIONAL DEBRIDEMENT SUBQUTANEOUS MUSCLE , BONE LEFT HEEL performed by Asha Hastings DPM at [facility] Left 4/10/2018    LEFT HEEL DEBRIDEMENT, BONE BIOPSY performed by Sidney Gauthier DPM at [address] [place] [address] OFFICE/OUTPT VISIT,PROCEDURE ONLY Left 10/19/2018    PARTIAL LEFT CALCANECTOMY performed by Asha Hastings DPM at [facility]         Medications Prior to admit:  Prior to Admission medications    Medication Sig Start Date End Date Taking? Authorizing Provider   losartan (COZAAR) 25 MG tablet Take 25 mg by mouth daily   Yes Historical Provider, MD   cefdinir (OMNICEF) 300 MG capsule Take 1 capsule by mouth 2 times daily 4/14/20 7/13/20 Yes Johnny Rincon MD   doxycycline hyclate (VIBRA-TABS) 100 MG tablet Take 1 tablet by mouth 2 times daily 4/14/20 7/13/20 Yes Johnny Rincon MD   Lactobacillus-Inulin ([address] PO) Take 1 capsule by mouth every morning STOP PREOP MED   Yes Historical Provider, MD   pravastatin (PRAVACHOL) 40 MG tablet Take 20 mg by mouth every other day   Yes Historical Provider, MD   gabapentin (NEURONTIN) 300 MG capsule Take 300 mg by mouth 2 times daily. . 5/3/18  Yes Historical Provider, MD   glipiZIDE (GLUCOTROL XL) 10 MG extended release tablet Take 1 tablet by mouth daily 6/19/18  Yes Historical Provider, MD   vitamin C (ASCORBIC ACID) 500 MG tablet Take 500 mg by mouth daily STOP PREOP MED   Yes Historical Provider, MD   sodium bicarbonate 325 MG tablet Take 650 mg by mouth 2 times daily    Yes Historical Provider, MD   Cholecalciferol (VITAMIN D3) 2000 UNITS CAPS Take 4,000 Units by mouth daily STOP PREOP MED   Yes Historical Provider, MD   metformin (GLUCOPHAGE) 500 MG tablet Take 1,000 mg by mouth 2 times daily (with meals)    Yes Historical Provider, MD   levothyroxine (SYNTHROID) 25 MCG tablet Take 50 mcg by mouth daily    Yes Historical Provider, MD   VELTASSA 8.4 g PACK Take 1 packet by mouth every other day  7/12/18   Historical Provider, MD   acetaminophen 650 MG TABS Take 650 mg by mouth every 4 hours as needed (Fever >100.5 F (38 C)).  11/19/13   Dino Chapa MD       Current Medications:    Current Facility-Administered Medications: metFORMIN (GLUCOPHAGE) tablet 1,000 mg, 1,000 mg, Oral, BID WC  acetaminophen (TYLENOL) tablet 650 mg, 650 mg, Oral, Q4H PRN  vitamin D tablet 4,000 Units, 4,000 Units, Oral, Daily  gabapentin (NEURONTIN) capsule 300 mg, 300 mg, Oral, BID  lactobacillus (CULTURELLE) capsule 1 capsule, 1 capsule, Oral, QAM  levothyroxine (SYNTHROID) tablet 50 mcg, 50 mcg, Oral, Daily  losartan (COZAAR) tablet 25 mg, 25 mg, Oral, Daily  pravastatin (PRAVACHOL) tablet 20 mg, 20 mg, Oral, Every Other Day  sodium bicarbonate tablet 650 mg, 650 mg, Oral, BID  patiromer sorbitex calcium (VELTASSA) packet 8.4 g, 1 packet, Oral, Every Other Day  vitamin C (ASCORBIC ACID) tablet 500 mg, 500 mg, Oral, Daily  insulin lispro (HUMALOG) injection vial 0-12 Units, 0-12 Units, Subcutaneous, TID WC  insulin lispro (HUMALOG) injection vial 0-6 Units, 0-6 Units, Subcutaneous, Nightly  glucose (GLUTOSE) 40 % oral gel 15 g, 15 g, Oral, PRN  dextrose 50 % IV solution, 12.5 g, Intravenous, PRN  glucagon (rDNA) injection 1 mg, 1 mg, Intramuscular, PRN  dextrose 5 % solution, 100 mL/hr, Intravenous, PRN  enoxaparin (LOVENOX) injection 40 mg, 40 mg, Subcutaneous, Daily  morphine (PF) injection 2 mg, 2 mg, Intravenous, Q2H PRN  morphine sulfate (PF) injection 3 mg, 3 mg, Intravenous, Q3H PRN  oxyCODONE-acetaminophen (PERCOCET) 5-325 MG per tablet 1 tablet, 1 tablet, Oral, Q4H PRN **OR** tingling  · Psychiatric: + Anxiety. Denies depression. · Endocrine: Denies temperature intolerance. No recent weight change. .  · Hematologic/Lymphatic: Denies abnormal bruising or bleeding. No swollen lymph nodes    PHYSICAL EXAM:   /67   Pulse 100   Temp 99.1 °F (37.3 °C) (Temporal)   Resp 18   Ht 5' 6.5\" (1.689 m)   Wt 165 lb (74.8 kg)   SpO2 95%   BMI 26.23 kg/m²   CONST:  Well developed, well nourished middle-aged obese female who appears of stated age. Awake, alert and cooperative. No apparent distress. HEENT:   Head- Normocephalic, atraumatic   Eyes- Conjunctivae pink, anicteric  Throat- Oral mucosa pink and moist  Neck-  No stridor, trachea midline, no jugular venous distention. No carotid bruit. CHEST: Chest symmetrical and non-tender to palpation. No accessory muscle use or intercostal retractions  RESPIRATORY: Lung sounds - clear throughout fields   CARDIOVASCULAR:     Heart Inspection- shows no noted pulsations  Heart Palpation- no heaves or thrills; PMI is non-displaced   Heart Ausculation-(tachycardia) regular rate and rhythm, no murmur. No s3, s4 or rub   PV: R LE 1+ pitting edema. Status post left BKA with brace. Right foot appears to be well perfused. Unable to assess left BKA stump due to brace and place. ABDOMEN: Soft, obese, non-tender to light palpation. Bowel sounds present. No palpable masses no organomegaly; no abdominal bruit  MS: Good muscle strength and tone. No atrophy or abnormal movements. : Deferred  SKIN: Warm and dry no statis dermatitis or ulcers   NEURO / PSYCH: Oriented to person, place and time. Speech clear and appropriate. Follows all commands. Pleasant affect     DATA:    ECG: See HPI.   Tele strips: Sinus rhythm/sinus tachycardia  Diagnostic:      Intake/Output Summary (Last 24 hours) at 7/1/2020 0735  Last data filed at 7/1/2020 1257  Gross per 24 hour   Intake 2115.95 ml   Output 1650 ml   Net 465.95 ml       Labs:   CBC: No results for input(s): WBC, HGB, HCT, PLT in the last 72 hours. BMP:   Recent Labs     06/30/20  1450      K 5.1*   CO2 24   BUN 14   CREATININE 1.0   LABGLOM 55   CALCIUM 8.7     PT/INR:   Recent Labs     06/30/20  0912   PROTIME 11.3   INR 1.0     APTT:  Recent Labs     06/30/20  0912   APTT 32.2     CXR: 6/24/2020  No acute cardiopulmonary abnormality. Assessment/plan: As per Dr. Jason Haddad:  1. Admitted for left lower extremity osteomyelitis/left diabetic ulcers status post left BKA (6/30/2020). 2.  Nonischemic Lexiscan MPS 8/10/2018. Clinically stable with no anginal equivalents. 3.  Normal LV function on TTE 8/10/2018 with no VHD. 4.  Chronic diastolic HFpEF: euvolemic. 5.  Diabetes mellitus /right Charcot's foot  6. Chronic kidney disease: Baseline creatinine (1.1-1.3): SCr 1.0   7. Hypertension  8. Hypothyroidism: On replacement therapy  9. History of PE/DVT (1998) while on birth control  10. Anemia    -No further cardiac testing needed or planned.  -Adequate pain control  -Recommend statin therapy due to history of diabetes mellitus  -Rest as per primary and other consultants  -Aggressive risk factor modification  -Cardiology will sign off; please call if needed. Above assessment and plan as per Dr. Jason Haddad.  Electronically signed by FALLON Goins CNP on 7/1/20 at 10:49 AM EDT    _________________________________________________________________________________  I independently interviewed and examined the patient. I have reviewed the above documentation completed by the DOREEN. Please see my additional contributions to the HPI, physical exam, and assessment / medical decision making. HPI, ROS, PMH, PSH, [facility], , and medications independently reviewed (agree; see above documentation)    History of Present Illness:  Currently with no chest pain, respiratory distress, palpitations. ST on telemetry.     Review of Systems:   Cardiac: As per HPI  General: No fever, chills  Pulmonary: As per HPI  HEENT:

## 2020-07-01 NOTE — PROGRESS NOTES
Vascular:    Sequence of events noted    Patient alert oriented resting comfortably    Left BKA dressing dry    Discussed the patient, will change dressing tomorrow, discontinue IVs    All her questions answered

## 2020-07-01 NOTE — PROGRESS NOTES
OCCUPATIONAL THERAPY INITIAL EVALUATION      Date:2020  Patient Name: Bertrand Killian  MRN: [id number]  : 1955  Room: [address] [place] [address]    Referring Provider: Beth Mancia MD    Evaluating OT: Chilo Gavin OTR/L #[id number]    AM-PAC Daily Activity Raw Score: 1624    Recommended Adaptive Equipment: BSC, hospital bed, extended tub bench all To be further assessed      Diagnosis: gangrene     Pertinent Medical History:  DM, Thyroid disease, Hyperlipemia, Diabetic ulcer L heel with necrosis of bone, PE, Multiple L foot surgeries with WV placement    Procedure/Surgery:  Left BKA 2020     Precautions:  Falls, Immobilizer to LLE, R Charcot foot, L BKA/NWB     Home Living: Pt lives with  in a 2 story with 1 step(s) to enter or portable ramp and 0 rail(s); bed/bath on . Pt has been sleeping on couch and using 1/2 bath on  for past 3 years. Bathroom setup: tub shower with shower chair and grab bars around shower and commode. Equipment owned: rollator, knee walker, quad cane, ww, w/c, shower chair  Prior Level of Function: Modified [place]  with ADLs , Modified [place]  with IADLs; using rollator as a seated scooter for mobility.    Driving: no  Occupation: retired     Pain Level: 0/10 at this time   Cognition: A&O: 4/4; Follows 2 step directions   Memory:  good    Sequencing:  good    Problem solving:  good    Judgement/safety:  good      Functional Assessment:   Initial Eval Status  Date: 20 Treatment Status  Date: STG/LTG  Frequency/duration  2-3x/week, 5-7 days   Feeding Independent       Grooming Stand by Assist   Independent    UB Dressing Stand by Assist   Modified [place]    LB Dressing Moderate Assist   To don/doff briefs  Modified [place]    Bathing Moderate Assist  Stand by Assist    Toileting Moderate Assist   To transfer to [facility] and for LE clothing management  Supervision    Bed Mobility  Supine to sit: Stand by Assist   Sit to supine: Stand by Assist   Supine to sit: Modified [place]   Sit to supine: Modified [place]    Functional Transfers Sit to stand: Minimal Assist   Stand to sit: Minimal Assist   Stand pivot: Moderate Assist bed<>commode  Supervision    Functional Mobility  (Ambulation) NT  TBD   Balance Sitting:     Static:  wfl    Dynamic:wfl  Standing: mod A     Activity Tolerance fair     Visual/  Perceptual Glasses: yes          BUE  ROM/Strength/  Fine motor Coordination RUE: ROM WFL     Strength: grossly 4/5      Strength:  WFL     Coordination: WFL    LUE: ROM  WFL     Strength: grossly 4/5      Strength:  WFL     Coordination: WFL       Hand dominance: L    Hearing: WFL  Sensation:  c/o numbness or tingling BLE  Tone:  WFL  Edema: None noted                          Treatment: OT treatment provided this date includes:    ADL-  Instruction/training on safety and adapted techniques for completion of ADLs: assist for toileting and LE dressing as noted above    Mobility-  Instruction/training on safety and improved independence with bed mobility/functional transfers     Sitting EOB x 20 minutes to improve dynamic sitting balance and activity tolerance during ADLs. Comments: Upon arrival, patient sitting EOB requesting to use SERA Tafoya At end of session, patient in bed with call light and phone within reach, all lines and tubes intact. Pt demonstrating good understanding of education/techniques. Pt motivated to go to acute rehab. Patient would benefit from continued skilled OT  to improve safety, functional independence and quality of life.     Assessment of current deficits   Functional mobility [x]  ADLs [x] Strength [x]  Cognition []  Functional transfers  [x] IADLs [] Safety Awareness [x]  Endurance [x]  Fine Motor Coordination [] Balance [x] Vision/perception [] Sensation []   Gross Motor Coordination [] ROM [] Delirium []                  Motor Control []    Plan of Care:   ADL retraining [x]    Equipment needs [x]   Neuromuscular re-education [x]  Energy Conservation Techniques [x]  Functional Transfer training [x]  Patient and/or Family Education [x]  Functional Mobility training [x]              Environmental Modifications [x]  Cognitive re-training []    Compensatory techniques for ADLs [x]  Splinting Needs []    Positioning to improve overall function [x]   Therapeutic Activity [x]               Therapeutic Exercise  [x]  Visual/Perceptual: []     Delirium prevention/treatment  []   Other:  []    Rehab Potential: Good for established goals    Patient / Family Goal: go to Acute rehab     Evaluation time includes thorough review of current medical information, gathering information on past medical & social history & PLOF, completion of standardized testing, informal observation of tasks, consultation with other medical professions/disciplines, assessment of data & development of POC/goals. · Evaluation Complexity: Mod Complexity  · History: Expanded review of medical records and additional review of physical, cognitive, or psychosocial history related to current functional performance  · Exam: 5+ performance deficits  · Assistance/Modification: mod/max assistance or modifications required to perform tasks. May have comorbidities that affect occupational performance.     Treatment Time In: 1520            Treatment Time Out:  1536             Treatment Charges: Mins Units   Ther Ex  59019     Manual Therapy 41348     Thera Activities 96445     ADL/Home Mgt 86020 16 1   Neuro Re-ed 84833     Group Therapy      Orthotic manage/training  82882     Non-Billable Time     Total Timed Treatment 16 [address] [place] [address] #[id number]

## 2020-07-02 LAB
METER GLUCOSE: 252 MG/DL (ref 74–99)
METER GLUCOSE: 275 MG/DL (ref 74–99)
METER GLUCOSE: 278 MG/DL (ref 74–99)
METER GLUCOSE: 317 MG/DL (ref 74–99)

## 2020-07-02 PROCEDURE — 6360000002 HC RX W HCPCS: Performed by: SURGERY

## 2020-07-02 PROCEDURE — 6370000000 HC RX 637 (ALT 250 FOR IP): Performed by: INTERNAL MEDICINE

## 2020-07-02 PROCEDURE — 6370000000 HC RX 637 (ALT 250 FOR IP): Performed by: SURGERY

## 2020-07-02 PROCEDURE — 97530 THERAPEUTIC ACTIVITIES: CPT

## 2020-07-02 PROCEDURE — 82962 GLUCOSE BLOOD TEST: CPT

## 2020-07-02 PROCEDURE — 97535 SELF CARE MNGMENT TRAINING: CPT

## 2020-07-02 PROCEDURE — 1200000000 HC SEMI PRIVATE

## 2020-07-02 RX ORDER — GLIPIZIDE 5 MG/1
10 TABLET ORAL
Status: DISCONTINUED | OUTPATIENT
Start: 2020-07-03 | End: 2020-07-08 | Stop reason: HOSPADM

## 2020-07-02 RX ADMIN — INSULIN LISPRO 9 UNITS: 100 INJECTION, SOLUTION INTRAVENOUS; SUBCUTANEOUS at 12:35

## 2020-07-02 RX ADMIN — SODIUM BICARBONATE 650 MG: 650 TABLET ORAL at 08:39

## 2020-07-02 RX ADMIN — SODIUM BICARBONATE 650 MG: 650 TABLET ORAL at 20:51

## 2020-07-02 RX ADMIN — PRAVASTATIN SODIUM 20 MG: 20 TABLET ORAL at 20:51

## 2020-07-02 RX ADMIN — GABAPENTIN 300 MG: 300 CAPSULE ORAL at 08:38

## 2020-07-02 RX ADMIN — INSULIN LISPRO 9 UNITS: 100 INJECTION, SOLUTION INTRAVENOUS; SUBCUTANEOUS at 16:51

## 2020-07-02 RX ADMIN — INSULIN LISPRO 6 UNITS: 100 INJECTION, SOLUTION INTRAVENOUS; SUBCUTANEOUS at 06:52

## 2020-07-02 RX ADMIN — ENOXAPARIN SODIUM 40 MG: 40 INJECTION SUBCUTANEOUS at 08:38

## 2020-07-02 RX ADMIN — LOSARTAN POTASSIUM 25 MG: 25 TABLET, FILM COATED ORAL at 08:38

## 2020-07-02 RX ADMIN — INSULIN LISPRO 6 UNITS: 100 INJECTION, SOLUTION INTRAVENOUS; SUBCUTANEOUS at 20:53

## 2020-07-02 RX ADMIN — Medication 500 MG: at 08:40

## 2020-07-02 RX ADMIN — PATIROMER 8.4 G: 8.4 POWDER, FOR SUSPENSION ORAL at 08:45

## 2020-07-02 RX ADMIN — METFORMIN HYDROCHLORIDE 1000 MG: 1000 TABLET ORAL at 08:39

## 2020-07-02 RX ADMIN — LEVOTHYROXINE SODIUM 50 MCG: 0.05 TABLET ORAL at 06:47

## 2020-07-02 RX ADMIN — METFORMIN HYDROCHLORIDE 1000 MG: 1000 TABLET ORAL at 19:19

## 2020-07-02 RX ADMIN — Medication 4000 UNITS: at 08:38

## 2020-07-02 RX ADMIN — Medication 1 CAPSULE: at 08:39

## 2020-07-02 RX ADMIN — GABAPENTIN 300 MG: 300 CAPSULE ORAL at 20:51

## 2020-07-02 ASSESSMENT — PAIN SCALES - GENERAL
PAINLEVEL_OUTOF10: 0

## 2020-07-02 NOTE — PROGRESS NOTES
Vascular: Sequence of events noted        Patient doing well    Left BKA incision looks clean and healing    Discussed with the patient, may be discharged when a bed is available at the rehab center

## 2020-07-02 NOTE — PROGRESS NOTES
with [employer]       Patient education  Pt educated on hand placement with transfers     Patient response to education:   Pt verbalized understanding Pt demonstrated skill Pt requires further education in this area   x X with verbal cues  x     ASSESSMENT:    Comments: Nursing cleared pt for physical therapy. Pt in bed upon arrival and agreed to participate in therapy. Pt completed functional mobility as noted above. Pt stated having a boot for R LE due to charcot foot. Pt not using UE support to assist with clearing floor  with R LE. Pt completed stand pivot with ww as noted above with shuffling pattern. Recommend wc level at this time due to safety and protection of R LE. Pt left in chair with call light in reach. Treatment:  Patient practiced and was instructed in the following treatment:     Bed mobility:verbal instruction with technique with bed mobility   Transfer training:verbal instruction to facilitate proper hand placement, technique and safety during sit <> stand . Assistance required to complete    Gait training: verbal instruction required  to facilitate upright posture and safety. Assistance required to complete. Time in  1415  Time out 1440    Total Treatment Time 25 minutes     Evaluation Time includes thorough review of current medical information, gathering information on past medical history/social history and prior level of function, completion of standardized testing/informal observation of tasks, assessment of data and education on plan of care and goals.     CPT codes:  [] Low Complexity PT evaluation 12321  [] Moderate Complexity PT evaluation 07993  [] High Complexity PT evaluation 49782  [] PT Re-evaluation 77270  [] Gait training 61777 - minutes  [] Manual therapy 45946 - minutes  [x] Therapeutic activities 28237 25 minutes  [] Therapeutic exercises 33758 - minutes  [] Neuromuscular reeducation 91750 - minutes   Jodie Tomas [id number]

## 2020-07-02 NOTE — PROGRESS NOTES
Occupational Therapy  OT BEDSIDE TREATMENT NOTE      Date:2020  Patient Name: Sally Mcwilliams  MRN: [id number]  : 1955  Room: [address] [place] [address]     Referring Provider: Aneesh Lombardi MD     Evaluating OT: Luba Cobian OTR/L #[id number]     AM-PAC Daily Activity Raw Score: 1724     Recommended Adaptive Equipment: BSC, hospital bed, extended tub bench all To be further assessed       Diagnosis: gangrene      Pertinent Medical History:  DM, Thyroid disease, Hyperlipemia, Diabetic ulcer L heel with necrosis of bone, PE, Multiple L foot surgeries with WV placement     Procedure/Surgery:  Left BKA 2020     Precautions:  Falls, Immobilizer to LLE, R Charcot foot, L BKA/NWB     Home Living: Pt lives with  in a 2 story with 1 step(s) to enter or portable ramp and 0 rail(s); bed/bath on . Pt has been sleeping on couch and using 1/2 bath on  for past 3 years. Bathroom setup: tub shower with shower chair and grab bars around shower and commode. Equipment owned: rollator, knee walker, quad cane, ww, w/c, shower chair  Prior Level of Function: Modified [place]  with ADLs , Modified [place]  with IADLs; using rollator as a seated scooter for mobility. Driving: no  Occupation: retired      Pain Level: 0/10 at this time    Cognition: A&O: 4/4; Follows 2 step directions              Memory:  good               Sequencing:  good               Problem solving:  good               Judgement/safety:  good                 Functional Assessment:    Initial Eval Status  Date: 20 Treatment Status  Date: 20 STG/LTG  Frequency/duration  2-3x/week, 5-7 days   Feeding Independent  independent      Grooming Stand by Assist  Set up  For simple grooming task seated  Independent    UB Dressing Stand by Assist   SBA  seated Modified [place]    LB Dressing Moderate Assist   To don/doff briefs  Min A  To don/doff right sock.  Pt demonstrates trunk flexion to thread shorts over

## 2020-07-02 NOTE — PROGRESS NOTES
Subjective: The patient is awake and alert. No acute events overnight. Denies chest pain, angina, SOB     Objective:    /69   Pulse 114   Temp 97.2 °F (36.2 °C) (Temporal)   Resp 13   Ht 5' 6.5\" (1.689 m)   Wt 165 lb (74.8 kg)   SpO2 97%   BMI 26.23 kg/m²     In: 840 [P.O.:840]  Out: 1952     HEENT: NCAT,  PERRLA, No JVD  Heart:  RRR, no murmurs, gallops, or rubs.   Lungs:  CTA bilaterally, no wheeze, rales or rhonchi  Abd: bowel sounds present, nontender, nondistended, no masses  Extrem:  No clubbing, cyanosis, or edema     Recent Labs     07/01/20  0933   WBC 19.9*   HGB 10.3*   HCT 32.0*          Recent Labs     06/30/20  1450      K 5.1*      CO2 24   BUN 14   CREATININE 1.0   CALCIUM 8.7       Assessment:    Patient Active Problem List   Diagnosis    Hypothyroidism    Diabetic foot ulcer (HCC)    Osteomyelitis of ankle and foot ([address]    Diabetes mellitus, type 2 ([address]    Hyperlipemia    Diabetic ulcer of left heel associated with type 2 diabetes mellitus, with necrosis of bone (HCC)    Non-pressure chronic ulcer of left heel and midfoot with fat layer exposed ([address]    Diabetic polyneuropathy ([address]    History of pulmonary embolus (PE)    Hx of blood clots    Gangrene ([address]       Plan:    Admit to telemetry for evaluation of osteomyelitis left foot/ankle, nonhealing ulcer of left foot  Broad-spectrum IV antibiotic therapy with Vanco/Zosyn  Leukocytosis 20,000, partially reactive, afebrile, will likely need ongoing long-term antibiotic therapy for osteo-  Status post left BKA by vascular service 6/30/2020   Pain control-adequately controlled  vascular following  Infectious disease following     Uncontrolled diabetes mellitus type 2  Check hemoglobin A1c  Recommend dietary discretion primarily and weight loss  Resume home vacation, plan to initiate insulin will be based on hemoglobin A1c result  Insulin sliding scale now-increase to high dose     Discharge plan per admitting service-patient would like to go to acute rehab    DVT Prophylaxis   PT/OT  Discharge planning       All consultants notes reviewed    Chuckie Hicks MD  10:08 AM  7/2/2020

## 2020-07-03 LAB
METER GLUCOSE: 145 MG/DL (ref 74–99)
METER GLUCOSE: 201 MG/DL (ref 74–99)
METER GLUCOSE: 271 MG/DL (ref 74–99)
METER GLUCOSE: 99 MG/DL (ref 74–99)

## 2020-07-03 PROCEDURE — 1200000000 HC SEMI PRIVATE

## 2020-07-03 PROCEDURE — 97530 THERAPEUTIC ACTIVITIES: CPT

## 2020-07-03 PROCEDURE — 6370000000 HC RX 637 (ALT 250 FOR IP): Performed by: SURGERY

## 2020-07-03 PROCEDURE — 82962 GLUCOSE BLOOD TEST: CPT

## 2020-07-03 PROCEDURE — 6360000002 HC RX W HCPCS: Performed by: SURGERY

## 2020-07-03 PROCEDURE — 6370000000 HC RX 637 (ALT 250 FOR IP): Performed by: INTERNAL MEDICINE

## 2020-07-03 PROCEDURE — 99024 POSTOP FOLLOW-UP VISIT: CPT | Performed by: SURGERY

## 2020-07-03 RX ADMIN — METFORMIN HYDROCHLORIDE 1000 MG: 1000 TABLET ORAL at 17:49

## 2020-07-03 RX ADMIN — SODIUM BICARBONATE 650 MG: 650 TABLET ORAL at 08:33

## 2020-07-03 RX ADMIN — GABAPENTIN 300 MG: 300 CAPSULE ORAL at 21:10

## 2020-07-03 RX ADMIN — INSULIN LISPRO 9 UNITS: 100 INJECTION, SOLUTION INTRAVENOUS; SUBCUTANEOUS at 08:33

## 2020-07-03 RX ADMIN — Medication 1 CAPSULE: at 08:33

## 2020-07-03 RX ADMIN — LEVOTHYROXINE SODIUM 50 MCG: 0.05 TABLET ORAL at 06:19

## 2020-07-03 RX ADMIN — ACETAMINOPHEN 650 MG: 325 TABLET ORAL at 21:21

## 2020-07-03 RX ADMIN — GABAPENTIN 300 MG: 300 CAPSULE ORAL at 08:40

## 2020-07-03 RX ADMIN — GLIPIZIDE 10 MG: 5 TABLET ORAL at 06:19

## 2020-07-03 RX ADMIN — METFORMIN HYDROCHLORIDE 1000 MG: 1000 TABLET ORAL at 08:33

## 2020-07-03 RX ADMIN — SODIUM BICARBONATE 650 MG: 650 TABLET ORAL at 21:09

## 2020-07-03 RX ADMIN — LOSARTAN POTASSIUM 25 MG: 25 TABLET, FILM COATED ORAL at 08:33

## 2020-07-03 RX ADMIN — INSULIN LISPRO 2 UNITS: 100 INJECTION, SOLUTION INTRAVENOUS; SUBCUTANEOUS at 21:08

## 2020-07-03 RX ADMIN — ENOXAPARIN SODIUM 40 MG: 40 INJECTION SUBCUTANEOUS at 08:40

## 2020-07-03 RX ADMIN — INSULIN LISPRO 3 UNITS: 100 INJECTION, SOLUTION INTRAVENOUS; SUBCUTANEOUS at 12:51

## 2020-07-03 RX ADMIN — Medication 4000 UNITS: at 08:32

## 2020-07-03 RX ADMIN — Medication 500 MG: at 08:32

## 2020-07-03 ASSESSMENT — PAIN DESCRIPTION - DESCRIPTORS: DESCRIPTORS: ACHING

## 2020-07-03 ASSESSMENT — PAIN DESCRIPTION - ORIENTATION: ORIENTATION: LEFT

## 2020-07-03 ASSESSMENT — PAIN DESCRIPTION - LOCATION: LOCATION: LEG

## 2020-07-03 ASSESSMENT — PAIN DESCRIPTION - ONSET: ONSET: ON-GOING

## 2020-07-03 ASSESSMENT — PAIN DESCRIPTION - FREQUENCY: FREQUENCY: CONTINUOUS

## 2020-07-03 ASSESSMENT — PAIN DESCRIPTION - PAIN TYPE: TYPE: SURGICAL PAIN

## 2020-07-03 ASSESSMENT — PAIN SCALES - GENERAL
PAINLEVEL_OUTOF10: 0
PAINLEVEL_OUTOF10: 2
PAINLEVEL_OUTOF10: 0
PAINLEVEL_OUTOF10: 8

## 2020-07-03 NOTE — PLAN OF CARE
Problem: Falls - Risk of:  Goal: Will remain free from falls  7/3/2020 1510 by Gigi Lewis RN  Outcome: Met This Shift  7/3/2020 0851 by Gigi Lewis RN  Outcome: Met This Shift  Goal: Absence of physical injury  7/3/2020 1510 by Gigi Lewis RN  Outcome: Met This Shift  7/3/2020 0851 by Gigi Lewis RN  Outcome: Met This Shift

## 2020-07-03 NOTE — PROGRESS NOTES
Physical Therapy  Facility/Department: [facility]  Daily Treatment Note  NAME: Shaan Armijo  : 1955  MRN: [id number]    Date of Service: 7/3/2020          Patient Diagnosis(es): The primary encounter diagnosis was COVID-19. A diagnosis of Pre-operative laboratory examination was also pertinent to this visit. has a past medical history of Diabetes mellitus ([facility] Diabetic ulcer of left heel associated with type 2 diabetes mellitus, with necrosis of bone ([facility] History of pulmonary embolus (PE), Hx of blood clots, Hyperlipemia, and Thyroid disease. has a past surgical history that includes Foot surgery (13); ECHO Compl W Dop Color Flow (2013); ECHO Transesophageal (2013); Dilation and curettage of uterus; [place] tooth extraction; pr deep dissec foot infec,1 bursa (Left, 4/10/2018); open tx trimalleolar ankle fx w fix pst lip (Left, 2018); other surgical history; pr debridement, skin, sub-q tissue,muscle,bone,=<20 sq cm (Left, 2018); Hysterectomy (); Colonoscopy; pr office/outpt visit,procedure only (Left, 10/19/2018); Foot Debridement (Left, 2019); Foot Debridement (Left, 6/3/2019); and Leg amputation below knee (Left, 2020). Referring Provider:  Yovana Mahmood MD        Evaluating PT:  Cristo Pichardo PT, DPT     Room #: 0236M  Diagnosis:  Gangrene  PMHx/PSHx:  DM, Thyroid disease, Hyperlipemia, Diabetic ulcer L heel with necrosis of bone, PE, Multiple L foot surgeries with WV placement  Procedure/Surgery:  Left BKA 2020  Precautions:  Falls, Immobilizer to LLE, R Charcot foot  Equipment Needs:  Has [employer], Rollator [employer], Knee scooter     SUBJECTIVE:     Pt lives with her  in a 2 story home with 1 stairs or ramp to enter and 1 rail(s). Bed is on 2nd floor and bath is on 2nd floor with flight and 1 rail(s). Pt reported sleeping on the couch on the 1st floor with 1/2 bath.  Pt scoots up the stairs on buttocks with 's assist to shower when able otherwise sponge bathes. Pt reported being mostly non-ambulatory and uses [employer] as seated scooter PTA. Pt reported independent with ADLs. Pt is not actively driving.     OBJECTIVE:    Initial Evaluation  Date: 7/1/2020 Treatment  7/3/2020 Short Term/ Long Term   Goals   AM-PAC 6 Clicks 21/19 37/33     Was pt agreeable to Eval/treatment? Yes  Yes      Does pt have pain? No c/o pain. None      Bed Mobility  Rolling: SBA  Supine to sit: Min A  Sit to supine: Min A  Scooting: Min A Supine to sit SBA  Sit to supine SBA  Scooting SBA    SBA   Transfers Sit to stand: Min A  Stand to sit: Min A  Stand pivot: Mod A Sit to stand min A   Stand to sit min to max A  Stand pivot with ww with mod A  SBA   Ambulation    1-2 hops x 2 with [employer] with Mod A 5 feet x 1 and 3 feet x 1  with ww with min A progressing to max assist  >10 feet with [employer] with SBA   Stair negotiation: ascended and descended  NT NT TBA   ROM BUE:  WFL  BLE:  WFL       Strength BUE:  4/5  BLE:  RLE 4+/5, LLE 4/5   Increase 1/3 grade   Balance Sitting EOB:  Supervision  Dynamic Standing: Mod A with [employer]   Sitting EOB:  Independent  Dynamic Standing:  SBA with [employer]         Patient education  Pt educated on hand placement with transfers, safety with mobility      Patient response to education:   Pt verbalized understanding Pt demonstrated skill Pt requires further education in this area   x X with verbal cues  x      ASSESSMENT:     Comments: Pt agreeable to treatment . Initially required min assist for a few hip steps. Progressed to max assist . Pt at high fall risk due to difficulty with mobility, weakness and impaired sensation. Increased time required between bouts of gait      Treatment:  Patient practiced and was instructed in the following treatment:    ·   · Transfer training:verbal instruction to facilitate proper hand placement, technique and safety during sit <> stand .    · Gait training: verbal instruction required  to facilitate upright posture and safety.        Time in  1014  Time out 1050     Total Treatment Time 30 minutes           CPT codes:  []? Low Complexity PT evaluation [id number]  []? Moderate Complexity PT evaluation 19693  []? High Complexity PT evaluation [id number]  []? PT Re-evaluation [id number]  []? Gait training 58321 - minutes  []? Manual therapy 96559 - minutes  [x]? Therapeutic activities 36483 30 minutes  []? Therapeutic exercises 60654 - minutes  []?  Neuromuscular reeducation 48908 - minutes     Tiffany Garcia   PT 8619

## 2020-07-03 NOTE — PROGRESS NOTES
Vascular Surgery Progress Note    Pt is being seen in f/u today regarding status post amputation of the left lower extremity    Subjective: Aditya Gu is a 77 y.o. female overall she is doing well. She has some pain and discomfort associated with the amputation site. But otherwise in good spirits. She denies any active chest pain shortness of breath or discomfort. Current Medications:    dextrose        acetaminophen, glucose, dextrose, glucagon (rDNA), dextrose, morphine, morphine, oxyCODONE-acetaminophen **OR** oxyCODONE-acetaminophen    insulin lispro  0-18 Units Subcutaneous TID WC    insulin lispro  0-9 Units Subcutaneous Nightly    glipiZIDE  10 mg Oral QAM AC    metFORMIN  1,000 mg Oral BID WC    vitamin D  4,000 Units Oral Daily    gabapentin  300 mg Oral BID    lactobacillus  1 capsule Oral QAM    levothyroxine  50 mcg Oral Daily    losartan  25 mg Oral Daily    pravastatin  20 mg Oral Every Other Day    sodium bicarbonate  650 mg Oral BID    patiromer sorbitex calcium  1 packet Oral Every Other Day    vitamin C  500 mg Oral Daily    enoxaparin  40 mg Subcutaneous Daily        PHYSICAL EXAM:    /72   Pulse 104   Temp 97 °F (36.1 °C) (Temporal)   Resp 16   Ht 5' 6.5\" (1.689 m)   Wt 165 lb (74.8 kg)   SpO2 98%   BMI 26.23 kg/m²     Intake/Output Summary (Last 24 hours) at 7/3/2020 1631  Last data filed at 7/3/2020 1437  Gross per 24 hour   Intake 840 ml   Output 325 ml   Net 515 ml          General: Alert oriented answers questions appropriately no acute distress  Skin: Warm and dry no change in turgor no jaundice no scleral icterus   HEENT: Normocephalic atraumatic trach is midline no jugular venous distention  CVS: Currently regular rate and rhythm  Resp: Clear to auscultation bilaterally no wheezes rales or rhonchi  Abd: Soft nontender no rebound or guarding  Extremities: Currently she is sitting in the chair. Her left leg is a extended. She is in an immobilizer.

## 2020-07-04 LAB
METER GLUCOSE: 132 MG/DL (ref 74–99)
METER GLUCOSE: 176 MG/DL (ref 74–99)
METER GLUCOSE: 214 MG/DL (ref 74–99)
METER GLUCOSE: 220 MG/DL (ref 74–99)

## 2020-07-04 PROCEDURE — 97530 THERAPEUTIC ACTIVITIES: CPT

## 2020-07-04 PROCEDURE — 6360000002 HC RX W HCPCS: Performed by: SURGERY

## 2020-07-04 PROCEDURE — 6370000000 HC RX 637 (ALT 250 FOR IP): Performed by: SURGERY

## 2020-07-04 PROCEDURE — 1200000000 HC SEMI PRIVATE

## 2020-07-04 PROCEDURE — 82962 GLUCOSE BLOOD TEST: CPT

## 2020-07-04 PROCEDURE — 6370000000 HC RX 637 (ALT 250 FOR IP): Performed by: INTERNAL MEDICINE

## 2020-07-04 RX ADMIN — PATIROMER 8.4 G: 8.4 POWDER, FOR SUSPENSION ORAL at 18:30

## 2020-07-04 RX ADMIN — Medication 4000 UNITS: at 08:32

## 2020-07-04 RX ADMIN — LOSARTAN POTASSIUM 25 MG: 25 TABLET, FILM COATED ORAL at 08:32

## 2020-07-04 RX ADMIN — GABAPENTIN 300 MG: 300 CAPSULE ORAL at 08:41

## 2020-07-04 RX ADMIN — GLIPIZIDE 10 MG: 5 TABLET ORAL at 08:32

## 2020-07-04 RX ADMIN — Medication 1 CAPSULE: at 08:32

## 2020-07-04 RX ADMIN — SODIUM BICARBONATE 650 MG: 650 TABLET ORAL at 21:51

## 2020-07-04 RX ADMIN — PRAVASTATIN SODIUM 20 MG: 20 TABLET ORAL at 21:51

## 2020-07-04 RX ADMIN — ENOXAPARIN SODIUM 40 MG: 40 INJECTION SUBCUTANEOUS at 08:34

## 2020-07-04 RX ADMIN — INSULIN LISPRO 3 UNITS: 100 INJECTION, SOLUTION INTRAVENOUS; SUBCUTANEOUS at 21:52

## 2020-07-04 RX ADMIN — GABAPENTIN 300 MG: 300 CAPSULE ORAL at 21:51

## 2020-07-04 RX ADMIN — INSULIN LISPRO 6 UNITS: 100 INJECTION, SOLUTION INTRAVENOUS; SUBCUTANEOUS at 08:32

## 2020-07-04 RX ADMIN — METFORMIN HYDROCHLORIDE 1000 MG: 1000 TABLET ORAL at 08:32

## 2020-07-04 RX ADMIN — METFORMIN HYDROCHLORIDE 1000 MG: 1000 TABLET ORAL at 17:18

## 2020-07-04 RX ADMIN — LEVOTHYROXINE SODIUM 50 MCG: 0.05 TABLET ORAL at 06:50

## 2020-07-04 RX ADMIN — INSULIN LISPRO 3 UNITS: 100 INJECTION, SOLUTION INTRAVENOUS; SUBCUTANEOUS at 12:16

## 2020-07-04 RX ADMIN — SODIUM BICARBONATE 650 MG: 650 TABLET ORAL at 08:32

## 2020-07-04 ASSESSMENT — PAIN SCALES - GENERAL
PAINLEVEL_OUTOF10: 0

## 2020-07-04 NOTE — PROGRESS NOTES
Stop by to see the patient. Her family is currently in the room. She is sitting in a chair. Change the dressings tomorrow she has no current complaints will change dressings tomorrow.   She has no current complaints currently in a knee immobilizer

## 2020-07-04 NOTE — PROGRESS NOTES
Physical Therapy  Facility/Department: [facility]  Daily Treatment Note  NAME: Allison Harris  : 1955  MRN: [id number]    Date of Service: 2020          Patient Diagnosis(es): The primary encounter diagnosis was COVID-19. A diagnosis of Pre-operative laboratory examination was also pertinent to this visit. has a past medical history of Diabetes mellitus ([facility] Diabetic ulcer of left heel associated with type 2 diabetes mellitus, with necrosis of bone ([facility] History of pulmonary embolus (PE), Hx of blood clots, Hyperlipemia, and Thyroid disease. has a past surgical history that includes Foot surgery (13); ECHO Compl W Dop Color Flow (2013); ECHO Transesophageal (2013); Dilation and curettage of uterus; [place] tooth extraction; pr deep dissec foot infec,1 bursa (Left, 4/10/2018); open tx trimalleolar ankle fx w fix pst lip (Left, 2018); other surgical history; pr debridement, skin, sub-q tissue,muscle,bone,=<20 sq cm (Left, 2018); Hysterectomy (); Colonoscopy; pr office/outpt visit,procedure only (Left, 10/19/2018); Foot Debridement (Left, 2019); Foot Debridement (Left, 6/3/2019); and Leg amputation below knee (Left, 2020). Referring Provider:  Jenn Kuhn MD        Evaluating PT:  Xochitl Franco PT, DPT     Room #: 7722L  Diagnosis:  Gangrene  PMHx/PSHx:  DM, Thyroid disease, Hyperlipemia, Diabetic ulcer L heel with necrosis of bone, PE, Multiple L foot surgeries with WV placement  Procedure/Surgery:  Left BKA 2020  Precautions:  Falls, Immobilizer to LLE, R Charcot foot  Equipment Needs:  Has [facility], Rollator [facility], Knee scooter     SUBJECTIVE:     Pt lives with her  in a 2 story home with 1 stairs or ramp to enter and 1 rail(s). Bed is on 2nd floor and bath is on 2nd floor with flight and 1 rail(s). Pt reported sleeping on the couch on the 1st floor with 1/2 bath.  Pt scoots up the stairs on buttocks with 's assist to shower when able otherwise sponge bathes. Pt reported being mostly non-ambulatory and uses [employer] as seated scooter PTA. Pt reported independent with ADLs. Pt is not actively driving.     OBJECTIVE:    Initial Evaluation  Date: 7/1/2020 Treatment  7/4/2020 Short Term/ Long Term   Goals   AM-PAC 6 Clicks 72/00      Was pt agreeable to Eval/treatment? Yes  Yes      Does pt have pain? No c/o pain. None      Bed Mobility  Rolling: SBA  Supine to sit: Min A  Sit to supine: Min A  Scooting: Min A Supine to sit SBA  Sit to supine SBA  Scooting SBA    SBA   Transfers Sit to stand: Min A  Stand to sit: Min A  Stand pivot: Mod A Sit to stand min A   Stand to sit max  A  Stand pivot with ww with mod A  to max  SBA   Ambulation    1-2 hops x 2 with [employer] with Mod A NT  >10 feet with [employer] with SBA   Stair negotiation: ascended and descended  NT NT TBA   ROM BUE:  WFL  BLE:  WFL       Strength BUE:  4/5  BLE:  RLE 4+/5, LLE 4/5   Increase 1/3 grade   Balance Sitting EOB:  Supervision  Dynamic Standing: Mod A with [employer]  sitting eob  Supervision  Dynamic standing mod/max Sitting EOB:  Independent  Dynamic Standing:  SBA with [employer]         Patient education  Pt educated on hand placement with transfers, safety with mobility      Patient response to education:   Pt verbalized understanding Pt demonstrated skill Pt requires further education in this area   x X with verbal cues  x      ASSESSMENT:     Comments: Pt agreeable to treatment . Pt applied boot to right le pre transfer to chair  Pt had difficulty with understanding turning on the pivot and requiring increase assist with max cues. nitially required min assist for a few hip steps. Progressed to max assist . Pt at high fall risk due to difficulty with mobility.        Treatment:  Patient practiced and was instructed in the following treatment:    ·   · Transfer training:verbal instruction to facilitate proper hand placement, technique and safety during sit <> stand . · .        Time in  840   Time out 854      Total Treatment Time 15          CPT codes:  []? Low Complexity PT evaluation [id number]  []? Moderate Complexity PT evaluation 88600  []? High Complexity PT evaluation [id number]  []? PT Re-evaluation R220750  []? Gait training 38224 - minutes  []? Manual therapy 17312 - minutes  [x]? Therapeutic activities 03017 15 minutes  []? Therapeutic exercises 24273 - minutes  []?  Neuromuscular reeducation 69560 - minutes     [place], [place]  [id number]

## 2020-07-04 NOTE — PROGRESS NOTES
Subjective: The patient is awake and alert. No acute events overnight. Denies chest pain, angina, SOB     Objective:    /73   Pulse 97   Temp 98 °F (36.7 °C) (Temporal)   Resp 18   Ht 5' 6.5\" (1.689 m)   Wt 165 lb (74.8 kg)   SpO2 98%   BMI 26.23 kg/m²     No intake/output data recorded. HEENT: NCAT,  PERRLA, No JVD  Heart:  RRR, no murmurs, gallops, or rubs. Lungs:  CTA bilaterally, no wheeze, rales or rhonchi  Abd: bowel sounds present, nontender, nondistended, no masses  Extrem:  No clubbing, cyanosis, or edema     No results for input(s): WBC, HGB, HCT, PLT in the last 72 hours. No results for input(s): NA, K, CL, CO2, BUN, CREATININE, CALCIUM in the last 72 hours.     Invalid input(s): GLU    Assessment:    Patient Active Problem List   Diagnosis    Hypothyroidism    Diabetic foot ulcer ([address]    Osteomyelitis of ankle and foot ([address]    Diabetes mellitus, type 2 ([address]    Hyperlipemia    Diabetic ulcer of left heel associated with type 2 diabetes mellitus, with necrosis of bone ([address]    Non-pressure chronic ulcer of left heel and midfoot with fat layer exposed ([address]    Diabetic polyneuropathy ([address]    History of pulmonary embolus (PE)    Hx of blood clots    Gangrene (HCC)     No sepsis   Plan:    Admit to telemetry for evaluation of osteomyelitis left foot/ankle, nonhealing ulcer of left foot  Broad-spectrum IV antibiotic therapy with Vanco/Zosyn  Leukocytosis 20,000, partially reactive, afebrile, will likely need ongoing long-term antibiotic therapy for osteo-  Status post left BKA by vascular service 6/30/2020   Pain control-adequately controlled  vascular following  Infectious disease following     Uncontrolled diabetes mellitus type 2- better   Check hemoglobin A1c  Recommend dietary discretion primarily and weight loss  Resume home vacation, plan to initiate insulin will be based on hemoglobin A1c result  Insulin sliding scale now-increase to high dose     Discharge plan per admitting service-patient would like to go to acute rehab  awaiting accept ace     DVT Prophylaxis   PT/OT  Discharge planning       All consultants notes reviewed    Mando Kern MD  12:13 PM  7/4/2020

## 2020-07-05 LAB
METER GLUCOSE: 144 MG/DL (ref 74–99)
METER GLUCOSE: 160 MG/DL (ref 74–99)
METER GLUCOSE: 161 MG/DL (ref 74–99)
METER GLUCOSE: 175 MG/DL (ref 74–99)

## 2020-07-05 PROCEDURE — 6370000000 HC RX 637 (ALT 250 FOR IP): Performed by: INTERNAL MEDICINE

## 2020-07-05 PROCEDURE — 6370000000 HC RX 637 (ALT 250 FOR IP): Performed by: SURGERY

## 2020-07-05 PROCEDURE — 1200000000 HC SEMI PRIVATE

## 2020-07-05 PROCEDURE — 82962 GLUCOSE BLOOD TEST: CPT

## 2020-07-05 PROCEDURE — 6360000002 HC RX W HCPCS: Performed by: SURGERY

## 2020-07-05 PROCEDURE — 99024 POSTOP FOLLOW-UP VISIT: CPT | Performed by: SURGERY

## 2020-07-05 RX ADMIN — Medication 500 MG: at 09:05

## 2020-07-05 RX ADMIN — LEVOTHYROXINE SODIUM 50 MCG: 0.05 TABLET ORAL at 06:24

## 2020-07-05 RX ADMIN — SODIUM BICARBONATE 650 MG: 650 TABLET ORAL at 09:05

## 2020-07-05 RX ADMIN — INSULIN LISPRO 3 UNITS: 100 INJECTION, SOLUTION INTRAVENOUS; SUBCUTANEOUS at 17:00

## 2020-07-05 RX ADMIN — INSULIN LISPRO 3 UNITS: 100 INJECTION, SOLUTION INTRAVENOUS; SUBCUTANEOUS at 12:08

## 2020-07-05 RX ADMIN — SODIUM BICARBONATE 650 MG: 650 TABLET ORAL at 20:55

## 2020-07-05 RX ADMIN — GLIPIZIDE 10 MG: 5 TABLET ORAL at 06:24

## 2020-07-05 RX ADMIN — ENOXAPARIN SODIUM 40 MG: 40 INJECTION SUBCUTANEOUS at 09:05

## 2020-07-05 RX ADMIN — INSULIN LISPRO 2 UNITS: 100 INJECTION, SOLUTION INTRAVENOUS; SUBCUTANEOUS at 20:56

## 2020-07-05 RX ADMIN — LOSARTAN POTASSIUM 25 MG: 25 TABLET, FILM COATED ORAL at 09:05

## 2020-07-05 RX ADMIN — METFORMIN HYDROCHLORIDE 1000 MG: 1000 TABLET ORAL at 08:11

## 2020-07-05 RX ADMIN — GABAPENTIN 300 MG: 300 CAPSULE ORAL at 09:04

## 2020-07-05 RX ADMIN — GABAPENTIN 300 MG: 300 CAPSULE ORAL at 20:56

## 2020-07-05 RX ADMIN — METFORMIN HYDROCHLORIDE 1000 MG: 1000 TABLET ORAL at 17:00

## 2020-07-05 RX ADMIN — Medication 4000 UNITS: at 09:05

## 2020-07-05 RX ADMIN — INSULIN LISPRO 3 UNITS: 100 INJECTION, SOLUTION INTRAVENOUS; SUBCUTANEOUS at 08:10

## 2020-07-05 RX ADMIN — Medication 1 CAPSULE: at 09:04

## 2020-07-05 ASSESSMENT — PAIN SCALES - GENERAL: PAINLEVEL_OUTOF10: 0

## 2020-07-05 NOTE — PROGRESS NOTES
Vascular Surgery Progress Note    Pt is being seen in f/u today regarding left below-knee amputation    Subjective: Joni Barone is a 77 y.o. female left below-knee amputation overall she states she is doing well. Current Medications:    dextrose        acetaminophen, glucose, dextrose, glucagon (rDNA), dextrose, morphine, morphine, oxyCODONE-acetaminophen **OR** oxyCODONE-acetaminophen    insulin lispro  0-18 Units Subcutaneous TID WC    insulin lispro  0-9 Units Subcutaneous Nightly    glipiZIDE  10 mg Oral QAM AC    metFORMIN  1,000 mg Oral BID WC    vitamin D  4,000 Units Oral Daily    gabapentin  300 mg Oral BID    lactobacillus  1 capsule Oral QAM    levothyroxine  50 mcg Oral Daily    losartan  25 mg Oral Daily    pravastatin  20 mg Oral Every Other Day    sodium bicarbonate  650 mg Oral BID    patiromer sorbitex calcium  1 packet Oral Every Other Day    vitamin C  500 mg Oral Daily    enoxaparin  40 mg Subcutaneous Daily        PHYSICAL EXAM:    /78   Pulse 93   Temp 96.5 °F (35.8 °C) (Temporal)   Resp 16   Ht 5' 6.5\" (1.689 m)   Wt 165 lb (74.8 kg)   SpO2 98%   BMI 26.23 kg/m²     Intake/Output Summary (Last 24 hours) at 7/5/2020 1111  Last data filed at 7/5/2020 1049  Gross per 24 hour   Intake 660 ml   Output --   Net 660 ml          Extremity: Left below-knee amputation site was evaluated. The flap is pink. Well-perfused. The incision is clean dry and intact. There is no fluctuance    LABS:    Lab Results   Component Value Date    WBC 19.9 (H) 07/01/2020    HGB 10.3 (L) 07/01/2020    HCT 32.0 (L) 07/01/2020     07/01/2020    PROTIME 11.3 06/30/2020    INR 1.0 06/30/2020    APTT 32.2 06/30/2020    K 5.1 (H) 06/30/2020    BUN 14 06/30/2020    CREATININE 1.0 06/30/2020       RADIOLOGY:  No orders to display       ASSESSMENT/PLAN:   · Status post left below-knee amputation. Overall she is doing well.   Plan for rehab        Electronically signed by Lissa Bryan Gaby Atkins MD on 7/5/2020 at 11:11 AM

## 2020-07-06 LAB
METER GLUCOSE: 151 MG/DL (ref 74–99)
METER GLUCOSE: 151 MG/DL (ref 74–99)
METER GLUCOSE: 260 MG/DL (ref 74–99)
METER GLUCOSE: 96 MG/DL (ref 74–99)

## 2020-07-06 PROCEDURE — 82962 GLUCOSE BLOOD TEST: CPT

## 2020-07-06 PROCEDURE — 6370000000 HC RX 637 (ALT 250 FOR IP): Performed by: SURGERY

## 2020-07-06 PROCEDURE — 6370000000 HC RX 637 (ALT 250 FOR IP): Performed by: INTERNAL MEDICINE

## 2020-07-06 PROCEDURE — 97530 THERAPEUTIC ACTIVITIES: CPT

## 2020-07-06 PROCEDURE — 27880 AMPUTATION OF LOWER LEG: CPT | Performed by: SURGERY

## 2020-07-06 PROCEDURE — 1200000000 HC SEMI PRIVATE

## 2020-07-06 PROCEDURE — 6360000002 HC RX W HCPCS: Performed by: SURGERY

## 2020-07-06 RX ADMIN — INSULIN LISPRO 3 UNITS: 100 INJECTION, SOLUTION INTRAVENOUS; SUBCUTANEOUS at 12:40

## 2020-07-06 RX ADMIN — GLIPIZIDE 10 MG: 5 TABLET ORAL at 06:31

## 2020-07-06 RX ADMIN — INSULIN LISPRO 4 UNITS: 100 INJECTION, SOLUTION INTRAVENOUS; SUBCUTANEOUS at 20:25

## 2020-07-06 RX ADMIN — PATIROMER 8.4 G: 8.4 POWDER, FOR SUSPENSION ORAL at 14:33

## 2020-07-06 RX ADMIN — Medication 1 CAPSULE: at 08:34

## 2020-07-06 RX ADMIN — LEVOTHYROXINE SODIUM 50 MCG: 0.05 TABLET ORAL at 06:31

## 2020-07-06 RX ADMIN — Medication 4000 UNITS: at 08:33

## 2020-07-06 RX ADMIN — METFORMIN HYDROCHLORIDE 1000 MG: 1000 TABLET ORAL at 08:33

## 2020-07-06 RX ADMIN — Medication 500 MG: at 08:33

## 2020-07-06 RX ADMIN — GABAPENTIN 300 MG: 300 CAPSULE ORAL at 08:34

## 2020-07-06 RX ADMIN — SODIUM BICARBONATE 650 MG: 650 TABLET ORAL at 08:33

## 2020-07-06 RX ADMIN — GABAPENTIN 300 MG: 300 CAPSULE ORAL at 20:25

## 2020-07-06 RX ADMIN — METFORMIN HYDROCHLORIDE 1000 MG: 1000 TABLET ORAL at 17:59

## 2020-07-06 RX ADMIN — INSULIN LISPRO 6 UNITS: 100 INJECTION, SOLUTION INTRAVENOUS; SUBCUTANEOUS at 08:34

## 2020-07-06 RX ADMIN — LOSARTAN POTASSIUM 25 MG: 25 TABLET, FILM COATED ORAL at 08:33

## 2020-07-06 RX ADMIN — PRAVASTATIN SODIUM 20 MG: 20 TABLET ORAL at 20:25

## 2020-07-06 RX ADMIN — SODIUM BICARBONATE 650 MG: 650 TABLET ORAL at 20:25

## 2020-07-06 RX ADMIN — ENOXAPARIN SODIUM 40 MG: 40 INJECTION SUBCUTANEOUS at 08:33

## 2020-07-06 ASSESSMENT — PAIN SCALES - GENERAL: PAINLEVEL_OUTOF10: 0

## 2020-07-06 NOTE — PROGRESS NOTES
Precertification denied by payor source for ARU. Patient is at min assist for PT but SBA for most of OT and does not meet medical necessity requirement for ARU for payor source. Will not pursue peer to peer review. Jared Paniagua, social work notified.

## 2020-07-06 NOTE — PROGRESS NOTES
Vascular:    Sequence of events noted    Patient is doing well    Left below the knee amputation stump healing well    Dressing changed    Discussed with nursing staff regarding transfer to rehab, okay from a vascular point

## 2020-07-06 NOTE — CARE COORDINATION
Pt's insurance denied Acute Rehab. Discussed discharge plan, The pt has chosen [facility]. Referral made. Will follow.  Renetta Helton RN BSN

## 2020-07-06 NOTE — PROGRESS NOTES
Physical Therapy  Facility/Department: [facility]  Daily Treatment Note  NAME: Hayley Lucas  : 1955  MRN: [id number]    Date of Service: 2020      Patient Diagnosis(es): The primary encounter diagnosis was COVID-19. A diagnosis of Pre-operative laboratory examination was also pertinent to this visit.      has a past medical history of Diabetes mellitus ([facility] Diabetic ulcer of left heel associated with type 2 diabetes mellitus, with necrosis of bone ([facility] History of pulmonary embolus (PE), Hx of blood clots, Hyperlipemia, and Thyroid disease. has a past surgical history that includes Foot surgery (13); ECHO Compl W Dop Color Flow (2013); ECHO Transesophageal (2013); Dilation and curettage of uterus; [place] tooth extraction; pr deep dissec foot infec,1 bursa (Left, 4/10/2018); open tx trimalleolar ankle fx w fix pst lip (Left, 2018); other surgical history; pr debridement, skin, sub-q tissue,muscle,bone,=<20 sq cm (Left, 2018); Hysterectomy (); Colonoscopy; pr office/outpt visit,procedure only (Left, 10/19/2018); Foot Debridement (Left, 2019); Foot Debridement (Left, 6/3/2019); and Leg amputation below knee (Left, 2020).    Referring Provider: Kiki Diaz MD      Evaluating PT: Danii Buenrostro PT, DPT     Room #: 8414S  Diagnosis:  Gangrene  PMHx/PSHx:  DM, Thyroid disease, Hyperlipemia, Diabetic ulcer L heel with necrosis of bone, PE, Multiple L foot surgeries with WV placement  Procedure/Surgery:  Left BKA 2020  Precautions:  Falls, Immobilizer to LLE, R Charcot foot  Equipment Needs:  Has [employer], Rollator [employer], Knee scooter     SUBJECTIVE:     Pt lives with her  in a 2 story home with 1 stairs or ramp to enter and 1 rail(s).  Bed is on 2nd floor and bath is on 2nd floor with flight and 1 rail(s). Pt reported sleeping on the couch on the 1st floor with 1/2 bath.  Pt scoots up the stairs on buttocks with 's assist to shower when able otherwise sponge bathes.  Pt reported being mostly non-ambulatory and uses Rollator [employer] as seated scooter PTA. Pt reported independent with ADLs. Pt is not actively driving.     OBJECTIVE:    Initial Evaluation  Date: 7/1/2020 Treatment  7/6/2020 Short Term/ Long Term   Goals   AM-PAC 6 Clicks 21/23 98/60      Was pt agreeable to Eval/treatment? Aliya Shasha     Does pt have pain? No c/o pain. No c/o pain     Bed Mobility  Rolling: SBA  Supine to sit: Min A  Sit to supine: Min A  Scooting: Min A Supine to sit SBA  Sit to supine SBA  Scooting SBA    Independent   Transfers Sit to stand: Min A  Stand to sit: Min A  Stand pivot: Mod A Sit to stand Kalyan   Stand to sit Kalyan  Stand pivot: Kalyan [employer]  SBA   Ambulation    1-2 hops x 2 with [employer] with Mod A 2' Kalyan WW  >10 feet with [employer] with SBA   Stair negotiation: ascended and descended  NT NT TBA   ROM BUE:  WFL  BLE:  WFL       Strength BUE:  4/5  BLE:  RLE 4+/5, LLE 4/5   Increase 1/3 grade   Balance Sitting EOB:  Supervision  Dynamic Standing:  Mod A with [employer]  sitting eob  Supervision  Dynamic standing Kalyan [employer] Sitting EOB:  Independent  Dynamic Standing:  SBA with [employer]     Pt is A & O x 4  Sensation:  Pt denies numbness and tingling to extremities  Edema:  WNL        Therapeutic Exercises:  1x 10 chair push up      Patient education  Pt educated on role of PT    Patient response to education:   Pt verbalized understanding Pt demonstrated skill Pt requires further education in this area   x x x     ASSESSMENT:    Comments:  Pt received in supine agreeable to PT. Pt performing bed mobility without assistance. Pt requiring steadying assistance for functional transfers and ambulation. Pt demonstrates difficulty advancing RLE using WW due to strength and balance deficits. Pt with poor eccentric control from stand to sit. Pt educated to perform chair push up to facilitate independence with functional mobility. PT will continue with plan of care, and progress pt as able. Treatment:  Patient practiced and was instructed in the following treatment:     Functional transfers-Verbal cues for proper positioning and sequencing to perform transfers safely with maximum independence.  Gait training-Verbal cues for proper positioning and sequencing using assistive device to maximize functional mobility independence.  Therapeutic exercise- as above. PLAN:    Patient is making good progress towards established goals. Will continue with current POC.       Time in  0830  Time out  0848    Total Treatment Time  18 minutes     CPT codes:  [] Gait training 69510 0 minutes  [] Manual therapy 90580 0 minutes  [x] Therapeutic activities 70061 18 minutes  [] Therapeutic exercises 95704 0 minutes  [] Neuromuscular reeducation 79411 0 minutes    Angelica Antoine PT, DPT  [id number]

## 2020-07-06 NOTE — CARE COORDINATION
[facility] accepted the pt and insurance precert has been initiated. HENS, ambulance form and envelope in soft chart.  Hector June RN -[phone]

## 2020-07-06 NOTE — DISCHARGE INSTR - COC
BELOW KNEE Left 6/30/2020    LEG AMPUTATION BELOW LEFT KNEE performed by Sanju Gonzáles MD at [facility] 94 FX W FIX PST LIP Left 8/16/2018    PARTIAL CALCANECTOMY LEFT FOOT, EXCISIONAL DEBRIDEMENT MUSCLE LEFT FOOT performed by Governor Kyle DPM at [facility]. 2., SKIN, SUB-Q TISSUE,MUSCLE,BONE,=<20 SQ CM Left 9/20/2018    EXCISIONAL DEBRIDEMENT SUBQUTANEOUS MUSCLE , BONE LEFT HEEL performed by Governor Kyle DPM at [facility] Left 4/10/2018    LEFT HEEL DEBRIDEMENT, BONE BIOPSY performed by Stacie Soto DPM at [address] OFFICE/OUTPT VISIT,PROCEDURE ONLY Left 10/19/2018    PARTIAL LEFT CALCANECTOMY performed by Governor Kyle DPM at [address] EXTRACTION         Immunization History: There is no immunization history on file for this patient.     Active Problems:  Patient Active Problem List   Diagnosis Code    Hypothyroidism E03.9    Diabetic foot ulcer ([address] E11.621, L97.509    Osteomyelitis of ankle and foot ([address] M86.9    Diabetes mellitus, type 2 ([address] E11.9    Hyperlipemia E78.5    Diabetic ulcer of left heel associated with type 2 diabetes mellitus, with necrosis of bone ([address] E11.621, L97.424    Non-pressure chronic ulcer of left heel and midfoot with fat layer exposed ([address] L97.422    Diabetic polyneuropathy ([address] E11.42    History of pulmonary embolus (PE) Z86.711    Hx of blood clots Z86.718    Gangrene ([address] I96       Isolation/Infection:   Isolation          No Isolation        Patient Infection Status     Infection Onset Added Last Indicated Last Indicated By Review Planned Expiration Resolved Resolved By    None active    Resolved    COVID-19 Rule Out 06/25/20 06/25/20 06/25/20 Covid-19 Ambulatory (Ordered)   06/27/20 Rule-Out Test Resulted    CRE (Carbapenem-Resistant Enterobacteriaceae)  08/15/18 08/15/18 Wai Wilson RN   04/24/19 Wai Wilson RN Proteus mirabils Wound-Heel 8/13/2018          Nurse Assessment:  Last Vital Signs: BP (!) 155/85   Pulse 100   Temp 97.3 °F (36.3 °C) (Temporal)   Resp 16   Ht 5' 6.5\" (1.689 m)   Wt 165 lb (74.8 kg)   SpO2 97%   BMI 26.23 kg/m²     Last documented pain score (0-10 scale): Pain Level: 0  Last Weight:   Wt Readings from Last 1 Encounters:   06/30/20 165 lb (74.8 kg)     Mental Status:  oriented and alert    IV Access:  - None    Nursing Mobility/ADLs:  Walking   Assisted  Transfer  Assisted  Bathing  Assisted  Dressing  Independent  Toileting  Independent  Feeding  Independent  Med Admin  Assisted  Med Delivery   whole    Wound Care Documentation and Therapy:        Elimination:  Continence:   · Bowel: Yes  · Bladder: Yes  Urinary Catheter: None   Colostomy/Ileostomy/Ileal Conduit: No       Date of Last BM: 07/07/2020    Intake/Output Summary (Last 24 hours) at 7/6/2020 1320  Last data filed at 7/6/2020 0800  Gross per 24 hour   Intake 480 ml   Output --   Net 480 ml     I/O last 3 completed shifts: In: 180 [P.O.:180]  Out: -     Safety Concerns:     None    Impairments/Disabilities:      None    Nutrition Therapy:  Current Nutrition Therapy:   - Oral Diet:  General    Routes of Feeding: Oral  Liquids: Thin Liquids  Daily Fluid Restriction: no  Last Modified Barium Swallow with Video (Video Swallowing Test): not done    Treatments at the Time of Hospital Discharge:   Respiratory Treatments: ***  Oxygen Therapy:  is not on home oxygen therapy.   Ventilator:    - No ventilator support    Rehab Therapies: Physical Therapy and Occupational Therapy  Weight Bearing Status/Restrictions: No weight bearing restirctions  Other Medical Equipment (for information only, NOT a DME order):  walker  Other Treatments: ***    Patient's personal belongings (please select all that are sent with patient):  None    RN SIGNATURE:  Electronically signed by Velia Pena RN on 7/7/20 at 4:39 PM EDT    CASE MANAGEMENT/SOCIAL WORK SECTION    Inpatient Status Date: ***    Readmission Risk Assessment Score:  Readmission Risk              Risk of Unplanned Readmission:        10           Discharging to Facility/ Agency   · Name: [facility]  · Address:  · Phone:  · Fax:    Dialysis Facility (if applicable)   · Name:  · Address:  · Dialysis Schedule:  · Phone:  · Fax:    / signature: Electronically signed by SAVANNA Spencer on 7/6/2020 at 1:21 PM      PHYSICIAN SECTION    Prognosis: {Prognosis:4420743088}    Condition at Discharge: [address] [place] [address] Patient Condition:[id number]}    Rehab Potential (if transferring to Rehab): {Prognosis:8645409273}    Recommended Labs or Other Treatments After Discharge: ***    Physician Certification: I certify the above information and transfer of Aditya Gu  is necessary for the continuing treatment of the diagnosis listed and that she requires Ty Cain for less 30 days.      Update Admission H&P: {CHP DME Changes in LGYVY:910023379}    PHYSICIAN SIGNATURE:  {Esignature:801974502}

## 2020-07-06 NOTE — PROGRESS NOTES
Occupational Therapy  OT BEDSIDE TREATMENT NOTE      Date:2020  Patient Name: Raheem Suggs  MRN: [id number]  : 1955  Room: [address] [place] [address]-A     Referring Provider: Taylor Barrios MD     Evaluating OT: Ty Beltrán OTR/L #[id number]     AM-PAC Daily Activity Raw Score: 1824     Recommended Adaptive Equipment: BSC, hospital bed, extended tub bench all To be further assessed       Diagnosis: gangrene      Pertinent Medical History:  DM, Thyroid disease, Hyperlipemia, Diabetic ulcer L heel with necrosis of bone, PE, Multiple L foot surgeries with WV placement     Procedure/Surgery:  Left BKA 2020     Precautions:  Falls, Immobilizer to LLE, R Charcot foot, L BKA/NWB     Home Living: Pt lives with  in a 2 story with 1 step(s) to enter or portable ramp and 0 rail(s); bed/bath on . Pt has been sleeping on couch and using 1/2 bath on  for past 3 years. Bathroom setup: tub shower with shower chair and grab bars around shower and commode. Equipment owned: rollator, knee walker, quad cane, ww, w/c, shower chair  Prior Level of Function: Modified [place]  with ADLs , Modified [place]  with IADLs; using rollator as a seated scooter for mobility. Driving: no  Occupation: retired      Pain Level: 0/10 at this time    Cognition: A&O: 4/4; Follows 2 step directions              Memory:  good               Sequencing:  good               Problem solving:  good               Judgement/safety:  good                 Functional Assessment:    Initial Eval Status  Date: 20 Treatment Status  Date: 20 STG/LTG  Frequency/duration  2-3x/week, 5-7 days   Feeding Independent  independent      Grooming Stand by Assist  Set up  For simple grooming task seated  Independent    UB Dressing Stand by Assist   Set up  To don/doff gown while seated Modified [place]    LB Dressing Moderate Assist   To don/doff briefs  SBA  To don/doff right sock.  Pt demonstrates trunk flexion to thread shorts over LLE. Modified [place]    Bathing Moderate Assist Min A  simulated  Stand by Assist    Toileting Moderate Assist   To transfer to [facility] and for LE clothing management  Min A  Per last tx Supervision    Bed Mobility  Supine to sit: Stand by Assist   Sit to supine: Stand by Assist   SBA- supine to sit Supine to sit: Modified [place]   Sit to supine: Modified [place]    Functional Transfers Sit to stand: Minimal Assist   Stand to sit: Minimal Assist   Stand pivot: Moderate Assist bed<>commode Min A- sit<->stand  Cuing for hand placement  Min A- stand pivot transfer using w/w    Supervision    Functional Mobility  (Ambulation) NT Min A  Less than home distance using w/w  TBD   Balance Sitting:     Static:  wfl    Dynamic:wfl  Standing: mod A Sitting:     Static:  wfl    Dynamic:wfl  Standing: Min A      Activity Tolerance fair Fair+      Visual/  Perceptual Glasses: yes             BUE  ROM/Strength/  Fine motor Coordination RUE: ROM WFL     Strength: grossly 4/5      Strength:  WFL     Coordination: WFL     LUE: ROM  WFL     Strength: grossly 4/5      Strength:  WFL     Coordination: WFL  RUE: ROM WFL     Strength: grossly 4/5      Strength:  WFL     Coordination: WFL     LUE: ROM  WFL     Strength: grossly 4/5      Strength:  WFL     Coordination: WFL         Comments: Upon arrival pt supine in bed. Pt educated on techniques to increase independence and safety during ADL's, bed mobility, and functional transfers. At end of session pt left seated in bedside chair, LLE elevated, call light within reach. · Pt has made fair progress towards set goals.      · Continue with current plan of care    Treatment Time In: 8:30            Treatment Time Out: 8:45             Treatment Charges: Mins Units   Ther Ex  95174     Manual Therapy [id number]     Thera Activities 10637 15 1   ADL/Home Mgt 27872     Neuro Re-ed 70667     Group Therapy      Orthotic manage/training  00048 Non-Billable Time     Total Timed Treatment 15 [address], Andriy Hess

## 2020-07-07 VITALS
TEMPERATURE: 97.4 F | RESPIRATION RATE: 16 BRPM | SYSTOLIC BLOOD PRESSURE: 127 MMHG | DIASTOLIC BLOOD PRESSURE: 63 MMHG | OXYGEN SATURATION: 96 % | WEIGHT: 165 LBS | HEIGHT: 67 IN | BODY MASS INDEX: 25.9 KG/M2 | HEART RATE: 98 BPM

## 2020-07-07 LAB
METER GLUCOSE: 141 MG/DL (ref 74–99)
METER GLUCOSE: 179 MG/DL (ref 74–99)
METER GLUCOSE: 209 MG/DL (ref 74–99)
SARS-COV-2, NAAT: NOT DETECTED

## 2020-07-07 PROCEDURE — 82962 GLUCOSE BLOOD TEST: CPT

## 2020-07-07 PROCEDURE — 6370000000 HC RX 637 (ALT 250 FOR IP): Performed by: SURGERY

## 2020-07-07 PROCEDURE — 6370000000 HC RX 637 (ALT 250 FOR IP): Performed by: INTERNAL MEDICINE

## 2020-07-07 PROCEDURE — 6360000002 HC RX W HCPCS: Performed by: SURGERY

## 2020-07-07 PROCEDURE — U0002 COVID-19 LAB TEST NON-CDC: HCPCS

## 2020-07-07 RX ORDER — OXYCODONE HYDROCHLORIDE AND ACETAMINOPHEN 5; 325 MG/1; MG/1
1 TABLET ORAL EVERY 6 HOURS PRN
Qty: 28 TABLET | Refills: 0 | Status: SHIPPED | OUTPATIENT
Start: 2020-07-07 | End: 2020-07-14

## 2020-07-07 RX ADMIN — METFORMIN HYDROCHLORIDE 1000 MG: 1000 TABLET ORAL at 08:46

## 2020-07-07 RX ADMIN — LEVOTHYROXINE SODIUM 50 MCG: 0.05 TABLET ORAL at 06:17

## 2020-07-07 RX ADMIN — LOSARTAN POTASSIUM 25 MG: 25 TABLET, FILM COATED ORAL at 08:45

## 2020-07-07 RX ADMIN — GLIPIZIDE 10 MG: 5 TABLET ORAL at 06:17

## 2020-07-07 RX ADMIN — METFORMIN HYDROCHLORIDE 1000 MG: 1000 TABLET ORAL at 17:45

## 2020-07-07 RX ADMIN — ENOXAPARIN SODIUM 40 MG: 40 INJECTION SUBCUTANEOUS at 08:47

## 2020-07-07 RX ADMIN — GABAPENTIN 300 MG: 300 CAPSULE ORAL at 08:46

## 2020-07-07 RX ADMIN — INSULIN LISPRO 3 UNITS: 100 INJECTION, SOLUTION INTRAVENOUS; SUBCUTANEOUS at 12:20

## 2020-07-07 RX ADMIN — SODIUM BICARBONATE 650 MG: 650 TABLET ORAL at 08:46

## 2020-07-07 RX ADMIN — Medication 4000 UNITS: at 08:46

## 2020-07-07 RX ADMIN — Medication 500 MG: at 08:45

## 2020-07-07 RX ADMIN — Medication 1 CAPSULE: at 08:46

## 2020-07-07 RX ADMIN — INSULIN LISPRO 6 UNITS: 100 INJECTION, SOLUTION INTRAVENOUS; SUBCUTANEOUS at 17:41

## 2020-07-07 ASSESSMENT — PAIN SCALES - GENERAL
PAINLEVEL_OUTOF10: 0

## 2020-07-07 NOTE — PLAN OF CARE
Problem: Falls - Risk of:  Goal: Will remain free from falls  Description: Will remain free from falls  Outcome: Met This Shift     Problem: Falls - Risk of:  Goal: Absence of physical injury  Description: Absence of physical injury  Outcome: Met This Shift     Problem: Pain:  Goal: Control of acute pain  Description: Control of acute pain  Outcome: Met This Shift     Problem: Skin Integrity:  Goal: Will show no infection signs and symptoms  Description: Will show no infection signs and symptoms  Outcome: Met This Shift     Problem: Skin Integrity:  Goal: Absence of new skin breakdown  Description: Absence of new skin breakdown  Outcome: Met This Shift

## 2020-07-07 NOTE — CARE COORDINATION
Insurance precert received for [facility]. Transportation has been arranged for 8PM [facility]. Covid-19 test provided to assigned nurse. Family, nursing and facility aware of the time.  Marianela Reilly RN -[phone]

## 2020-07-07 NOTE — PROGRESS NOTES
Pt being discharged to [facility]. nURSE TO NURSE given at 5pm. Negative covid and orders faxed to [phone]. No IV access or abdi in place. Pt made aware.  Transport picking pt up at 830

## 2020-07-09 ENCOUNTER — TELEPHONE (OUTPATIENT)
Dept: CARDIOLOGY CLINIC | Age: 65
End: 2020-07-09

## 2020-07-10 ENCOUNTER — TELEPHONE (OUTPATIENT)
Dept: VASCULAR SURGERY | Age: 65
End: 2020-07-10

## 2020-07-10 NOTE — TELEPHONE ENCOUNTER
----- Message from Andre Irwin MD sent at 7/7/2020  4:28 PM EDT -----  Patient went to [facility], please give her an appointment to see me in 4 weeks, in the first week of August

## 2020-07-10 NOTE — DISCHARGE SUMMARY
Discharge Summary    Patient ID: Janet Ying   Patient : 1955  Patient's PCP: Ml Hall DO    Admit Date: 2020   Admitting Physician: Alma Rosa Kuo MD    Discharge Date:  7/10/2020  Discharge Physician: Alma Rosa Kuo MD   Discharge Condition: Stable  Discharge Disposition: [facility] course in brief:  (Please refer to daily progress notes for a comprehensive review of the hospitalization by requesting medical records)  This patient, had longstanding nonhealing diabetic foot ulcer with osteomyelitis, Charcot's left foot for many years, finally after multiple opinions, patient agreed for left below the knee amputation    Patient has unstable left foot with osteomyelitis of the left foot with satisfactory arterial circulation    Patient underwent left below the knee amputation, healing well, initially wanted to do acute rehab at [facility], did not qualify based upon insurance criteria, patient was discharged to [facility] for continued physical therapy and rehab    Consults:   IP CONSULT TO CARDIOLOGY  IP CONSULT TO INTERNAL MEDICINE  IP CONSULT TO [facility] [address]    Discharge Diagnoses: Active Problems:    Diabetic foot ulcer ([address]    Osteomyelitis of ankle and foot ([address]    Diabetic ulcer of left heel associated with type 2 diabetes mellitus, with necrosis of bone (HCC)    Non-pressure chronic ulcer of left heel and midfoot with fat layer exposed ([address]    Gangrene ([address]  Resolved Problems:    * No resolved hospital problems.  *      Discharge Instructions / Follow up:    Patient was instructed to continue all her medications as before and the pain medication for which patient was given prescription, Percocet 1 tablet every 6 hours as needed for pain, and also after discharge, from the skilled care facility patient was instructed, to contact all the physicians involved in her care, for follow-up as an outpatient and to call me PRN if any problem with the healing of the stump    Activity: activity as tolerated    Significant labs:  CBC:   No results for input(s): WBC, RBC, HGB, HCT, MCV, RDW, PLT in the last 72 hours. BMP: No results for input(s): NA, K, CL, CO2, BUN, CREATININE, MG, PHOS in the last 72 hours. Invalid input(s): CA  LFT:  No results for input(s): PROT, ALB, ALKPHOS, ALT, AST, BILITOT, AMYLASE, LIPASE in the last 72 hours. PT/INR: No results for input(s): INR, APTT in the last 72 hours. BNP: No results for input(s): BNP in the last 72 hours. Hgb A1C: No results found for: LABA1C  Folate and B12: No results found for: PMRIHEAM97, No results found for: FOLATE  Thyroid Studies: No results found for: TSH, E8PICMV, W9MLXIV, THYROIDAB    Urinalysis:    Lab Results   Component Value Date    NITRU Negative 2019    WBCUA 1-3 2019    BACTERIA RARE 2019    RBCUA NONE 2019    RBCUA NONE 2013    BLOODU Negative 2019    SPECGRAV 1.025 2019    GLUCOSEU 500 2019       Imaging:  Xr Chest Standard (2 Vw)    Result Date: 2020  Patient MRN: [id number] : 1955 Age:  77 years Gender: Female Order Date: 2020 11:28 AM Exam: XR CHEST (2 VW) Indication:  E11.621 Diabetic foot ulcer with osteomyelitis (HCC) R/O CHF/Mass Comparison: Chest radiograph, 2013 . FINDINGS: The lungs are clear. The heart is normal in size. The aorta is mildly tortuous. No pulmonary vascular congestion. No pneumothorax or pleural effusion. Evaluation of the bones reveals no fracture or destructive lesion. No acute cardiopulmonary abnormality.        Discharge Medications:      Medication List      START taking these medications    enoxaparin 40 MG/0.4ML injection  Commonly known as:  LOVENOX  Inject 0.4 mLs into the skin daily     oxyCODONE-acetaminophen 5-325 MG per tablet  Commonly known as:  PERCOCET  Take 1 tablet by mouth every 6 hours as needed for Pain for up to 7 days.        CONTINUE taking these medications    Acetaminophen 650 MG Tabs     [facility] PO     gabapentin 300 MG capsule  Commonly known as:  NEURONTIN     glipiZIDE 10 MG extended release tablet  Commonly known as:  GLUCOTROL XL     levothyroxine 25 MCG tablet  Commonly known as:  SYNTHROID     losartan 25 MG tablet  Commonly known as:  COZAAR     metFORMIN 500 MG tablet  Commonly known as:  GLUCOPHAGE     pravastatin 40 MG tablet  Commonly known as:  PRAVACHOL     sodium bicarbonate 325 MG tablet     Veltassa 8.4 g Pack packet  Generic drug:  patiromer sorbitex calcium     vitamin C 500 MG tablet  Commonly known as:  ASCORBIC ACID     Vitamin D3 50 MCG (2000 UT) Caps        STOP taking these medications    cefdinir 300 MG capsule  Commonly known as:  OMNICEF     doxycycline hyclate 100 MG tablet  Commonly known as:  VIBRA-TABS           Where to Get Your Medications      You can get these medications from any pharmacy    Bring a paper prescription for each of these medications  · oxyCODONE-acetaminophen 5-325 MG per tablet     Information about where to get these medications is not yet available    Ask your nurse or doctor about these medications  · enoxaparin 40 MG/0.4ML injection         +++++++++++++++++++++++++++++++++++++++++++++++++  Aliyah Machado MD  +++++++++++++++++++++++++++++++++++++++++++++++++  NOTE: This report was transcribed using voice recognition software. Every effort was made to ensure accuracy; however, inadvertent computerized transcription errors may be present.

## 2020-08-06 ENCOUNTER — OFFICE VISIT (OUTPATIENT)
Dept: VASCULAR SURGERY | Age: 65
End: 2020-08-06
Payer: MEDICARE

## 2020-08-06 PROBLEM — S88.112A: Status: ACTIVE | Noted: 2020-08-06

## 2020-08-06 PROCEDURE — L8440 SHRINKER BELOW KNEE: HCPCS | Performed by: SURGERY

## 2020-08-06 PROCEDURE — 99024 POSTOP FOLLOW-UP VISIT: CPT | Performed by: SURGERY

## 2020-09-17 ENCOUNTER — OFFICE VISIT (OUTPATIENT)
Dept: VASCULAR SURGERY | Age: 65
End: 2020-09-17

## 2020-09-17 PROBLEM — I96 GANGRENE (HCC): Status: RESOLVED | Noted: 2020-06-30 | Resolved: 2020-09-17

## 2020-09-17 PROBLEM — E11.621 DIABETIC ULCER OF LEFT HEEL ASSOCIATED WITH TYPE 2 DIABETES MELLITUS, WITH NECROSIS OF BONE (HCC): Status: RESOLVED | Noted: 2018-05-09 | Resolved: 2020-09-17

## 2020-09-17 PROBLEM — L97.424 DIABETIC ULCER OF LEFT HEEL ASSOCIATED WITH TYPE 2 DIABETES MELLITUS, WITH NECROSIS OF BONE (HCC): Status: RESOLVED | Noted: 2018-05-09 | Resolved: 2020-09-17

## 2020-09-17 PROCEDURE — 99024 POSTOP FOLLOW-UP VISIT: CPT | Performed by: SURGERY

## 2020-09-17 NOTE — PROGRESS NOTES
Status post left below the knee amputation    Patient is doing well    Well-healed left below the knee amputation stump noted, patient using a straight cane, being fitted for a prosthesis      Discussed the patient, patient has stable arterial circulation, no vascular issues, patient will call me PRN if any problems, all her questions were answered

## 2020-09-28 ENCOUNTER — TELEPHONE (OUTPATIENT)
Dept: CARDIOLOGY CLINIC | Age: 65
End: 2020-09-28

## 2020-09-29 ENCOUNTER — OFFICE VISIT (OUTPATIENT)
Dept: PHYSICAL MEDICINE AND REHAB | Age: 65
End: 2020-09-29
Payer: MEDICARE

## 2020-09-29 VITALS — DIASTOLIC BLOOD PRESSURE: 78 MMHG | SYSTOLIC BLOOD PRESSURE: 124 MMHG | TEMPERATURE: 98.1 F

## 2020-09-29 PROCEDURE — 99214 OFFICE O/P EST MOD 30 MIN: CPT | Performed by: PHYSICAL MEDICINE & REHABILITATION

## 2020-09-29 NOTE — PROGRESS NOTES
Marcella Osman M.D. [address] [facility] PHYSICAL MEDICINE AND REHABILITAION  [address]  [place] [address]  Dept: [phone]  Dept Fax: [phone]    PCP: Armando Negron DO  Date of visit: 9/29/20    Chief Complaint   Patient presents with    New Patient     L BKA 6/30/2020. Is being fitted for prosthetics needs set up for PT         Hernan Castle is a 77 y.o.  female who presents for further evaluation of rehab needs. Patient is known to me from rehab consultation in the hospital, as I saw her for rehab recommendations after her surgery. 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 She has undergone multiple procedures and surgeries on this foot/ankle. She then ultimately underwent left BKA on 6/30/2020. She was discharged to [facility] for therapy for about 1 month and then and completed home health PT. Her left lower extremity surgical site is well healed at this time with no open areas or areas of hypersensitivity. She denies phantom pains. She is utilizing prosthetic compression socks for limb volume reduction and shaping in preparation for a prosthesis. She is being fitted for a left temporary endoskeletal transtibial below knee prosthesis (BKP) to allow early introduction of prosthetic gait training as well as to provide additional information regarding appropriate design for her definitive prosthesis. She also has history of a right Charcot foot/ankle and requires a CROW walker on the right lower extremity, which complicates the situation. Her CROW AFO is 14 years old she reports. Her CROW AFO no longer fits well due to worsened deformity of the right foot. She will require a new right CROW walker. Prior to her foot infection patient was independent for ADLs and mobility. Prior to the left below knee amputation she was Independent - Mod I for ADLs.  She did require assistance for iADLs for the last 3 years due to weight bearing restrictions to the left lower extremity. She has been mostly non-ambulatory for the last 3 years due to left lower extremity weight bearing restrictions, but was independent - Mod I for gait prior to that. Currently patient is functioning at [address] level for ADLs. She is at Mod I level for stand-pivot transfers. She is utilizing a manual wheelchair for mobility. She has the potential for ambulation. A left lower extremity prosthesis will allow the patient to function at a K2 level. The below listed comorbidities also impact patient's function.      Past Medical History:   Diagnosis Date    Complete below knee amputation of lower extremity, left, initial encounter ([facility] [address] 8/6/2020    Diabetes mellitus ([facility]     Diabetic ulcer of left heel associated with type 2 diabetes mellitus, with necrosis of bone ([facility] [address] 5/9/2018    History of pulmonary embolus (PE) 6/15/2020    Hx of blood clots 1990's    PE, FROM TAKING BC PILLS    Hyperlipemia     no med    Thyroid disease        Past Surgical History:   Procedure Laterality Date    COLONOSCOPY      DILATION AND CURETTAGE OF UTERUS      ECHO COMPL W DOP COLOR FLOW  11/18/2013         ECHOCARDIOGRAM TRANSESOPHAGEAL  11/20/2013         FOOT DEBRIDEMENT Left 5/6/2019    EXCISIONAL DEBRIDEMENT TO  & THRU MUSCLE LEFT HEEL performed by Brain Hurley DPM at [address] [place] [address] Left 6/3/2019    EXCISIONAL  DEBRIDEMENT WITH GRAFT APPLICATION LEFT HEEL (INTEGRA ), WITH REAPPLICATION OF WOUND VAC performed by Brain Hurley DPM at [place] [address]  11/17/13    i&d left foot and ankle with bone biopsy    HYSTERECTOMY  1998    ovaries removed Dr Monisha Sandra Left 6/30/2020    LEG AMPUTATION BELOW LEFT KNEE performed by Elisha Marrero MD at [place] [address] FX W FIX PST LIP Left 8/16/2018    PARTIAL CALCANECTOMY LEFT FOOT, EXCISIONAL Problem Relation Age of Onset    Alzheimer's Disease Mother     Other Father         pacemaker    Other Maternal Grandfather         aneurysm       Allergies   Allergen Reactions    Codeine Nausea And Vomiting       Current Outpatient Medications   Medication Sig Dispense Refill    losartan (COZAAR) 25 MG tablet Take 25 mg by mouth daily      Lactobacillus-Inulin ([facility] PO) Take 1 capsule by mouth every morning STOP PREOP MED      pravastatin (PRAVACHOL) 40 MG tablet Take 20 mg by mouth every other day      gabapentin (NEURONTIN) 300 MG capsule Take 300 mg by mouth 2 times daily. .      glipiZIDE (GLUCOTROL XL) 10 MG extended release tablet Take 1 tablet by mouth daily      VELTASSA 8.4 g PACK Take 1 packet by mouth every other day   1    vitamin C (ASCORBIC ACID) 500 MG tablet Take 500 mg by mouth daily STOP PREOP MED      sodium bicarbonate 325 MG tablet Take 650 mg by mouth 2 times daily       Cholecalciferol (VITAMIN D3) 2000 UNITS CAPS Take 4,000 Units by mouth daily STOP PREOP MED      metformin (GLUCOPHAGE) 500 MG tablet Take 1,000 mg by mouth 2 times daily (with meals)       levothyroxine (SYNTHROID) 25 MCG tablet Take 50 mcg by mouth daily       acetaminophen 650 MG TABS Take 650 mg by mouth every 4 hours as needed (Fever >100.5 F (38 C)). 120 tablet      No current facility-administered medications for this visit. Review of Systems  General: No chills, fatigue, fever, malaise, night sweats, weight gain,  weight loss. Psychological: No anxiety, depression, suicidal ideation   Ophthalmic: No blurry vision, decreased vision, double vision, loss of vision  Ear Nose Throat: No hearing loss, tinnitus, phonophobia, sensitivity to smells, vertigo, or vocal changes. Allergy/Immunology: No watery eyes, itchy eyes, frequent infections.     Hematological and Lymphatic: No bleeding problems, blood clots, bruising  Endocrine:  No polydypsia, polyuria, temperature intolerance. Respiratory: No cough, shortness of breath, wheezing. Cardiovascular: No syncope, chest pain, dyspnea on exertion,palpitations. Gastrointestinal: No abdominal pain, hematemesis, melena, nausea, vomiting, stool incontinence  Genito-Urinary: No dysuria, hematuria, incontinence   Musculoskeletal: Per HPI  Neurological: Per HPI  Dermatological:  No rash      Physical Exam:   Blood pressure 124/78, temperature 98.1 °F (36.7 °C). General: The patient is in no apparent distress. HEENT: No rhinorrhea, sneezing, yawning, or lacrimation. No scleral icterus or conjunctival injection. SKIN: No piloerection. No track marks. No rash. Normal turgor. No erythema or ecchymosis. Psychological: Mood and affect are appropriate. Hygiene is appropriate. Cardiovascular:  Heart is regular rate. Respiratory: Respirations are regular and unlabored. There is no cyanosis. Lymphatic: There is no lymphadenopathy. Gastrointestinal: Soft abdomen  Genitourinary: No costovertebral angle tenderness. MSK: Left lower extremity - Left BKA. Residual left lower extremity well healed. There is no erythema, scabs, or open areas to the LLE. There is no tenderness or hypersensitivity of the residual LLE. Residual LLE with slightly bullous distal end, but no significant edema. Full AROM at the left knee, good ROM at the left hip. Residual limb is well vascularized. Right lower extremity - Right Charcot foot/ankle, mild edema of R ankle,  Right foot/ankle angles externally 45 degrees. Neurologic: Awake, alert and oriented in three planes. Speech is fluent. No facial weakness. Tongue is midline. Hearing is intact for conversation. Pupils are equal and round. Extraocular muscles are intact. Shoulder shrug symmetric.      Strength:   R  L  Deltoid   5  5  Biceps   5  5  Triceps  5  5  Wrist Ext  5  5  Finger Abd  5  5  Hip Flex  5  5  Knee Ext  5  5  Ankle dorsi  4    EHL   4   Ankle Plantar  4      Sensory:  Intact for light touch in all upper and lower extremity dermatomes. Reflexes:   R  L  Patellar  0 0   Ankle Jerk  0 0      Gait: in w/c, gait deferred         Impression:   Left below knee amputation  Right Charcot foot/ankle      Plan:   · Patient to be fitted for left temporary endoskeletal transtibial below knee prosthesis (BKP) to allow early introduction of prosthetic gait training. She will then require fitting for permanent K2 level custom left below knee prosthesis. · PT/OT for prosthetic gait training and ADLs once temporary prosthesis and CROW walker are obtained. · Continue use of prosthetic compression socks for limb volume reduction and shaping in preparation for a prosthesis. · Continue daily stretching/ROM. Reviewed importance of maintaining range of motion at the hip and knee. · Patient will require new right CROW AFO  The patient was educated about the diagnosis, prognosis, indications, risks and benefits of treatment. An opportunity to ask questions was given to the patient and questions were answered. The patient agreed to proceed with the recommended treatment as described above. Follow up 6 weeks or sooner if needed     Thank you for the consultation and for allowing me to participate in the care of this patient. Sincerely,         Angie Obrien M.D.   Physical Medicine and Rehabilitation

## 2020-10-15 ENCOUNTER — TELEPHONE (OUTPATIENT)
Dept: PHYSICAL MEDICINE AND REHAB | Age: 65
End: 2020-10-15

## 2020-10-26 ENCOUNTER — TELEPHONE (OUTPATIENT)
Dept: PHYSICAL MEDICINE AND REHAB | Age: 65
End: 2020-10-26

## 2020-11-16 ENCOUNTER — OFFICE VISIT (OUTPATIENT)
Dept: PHYSICAL MEDICINE AND REHAB | Age: 65
End: 2020-11-16
Payer: MEDICARE

## 2020-11-16 VITALS — DIASTOLIC BLOOD PRESSURE: 62 MMHG | SYSTOLIC BLOOD PRESSURE: 118 MMHG | TEMPERATURE: 96.8 F

## 2020-11-16 PROCEDURE — 99214 OFFICE O/P EST MOD 30 MIN: CPT | Performed by: PHYSICAL MEDICINE & REHABILITATION

## 2020-11-16 NOTE — PROGRESS NOTES
Elina Acosta M.D. [facility] PHYSICAL MEDICINE AND REHABILITAION  [address] [place] [id number]  Dept: [phone]  Dept Fax: [phone]    PCP: Cristina Barth DO  Date of visit: 11/16/20    Chief Complaint   Patient presents with    Follow-up     F/u currently in PT 3X a week, states she is doing well and is pushing herself, sees Skylar Quevedo this week for any adjustments         Kathryn Preston is a 77 y.o.  female who presents for follow up of rehab needs. HPI:   Patient is a 78 y/o female a history of a nonhealing left heel ulcer with osteomyelitis of the left ankle and foot. She has undergone multiple procedures and surgeries on this foot/ankle. She then ultimately underwent left BKA on 6/30/2020. She was discharged to [facility] for therapy for about 1 month and then and completed home health PT. She was fitted for a left temporary endoskeletal transtibial below knee prosthesis (BKP) to allow early introduction of prosthetic gait training as well as to provide additional information regarding appropriate design for her definitive prosthesis. She also has history of a right Charcot foot/ankle and requires a CROW walker on the right lower extremity. Currently patient is functioning at [address] level for ADLs. She is at Mod I level for stand-pivot transfers. She has the potential for ambulation. Interval history:   Since last visit patient received her right CROW boot and left lower extremity temporary prosthesis and these are fitting well. She denies skin breakdown. She reports occasional phantom pain, and reports Tylenol helps and she has had to take rarely. She started PT and reports therapy is going well so far, she enjoys therapy. She is walking short community distances with her walker and prosthesis now without hands on assist. She uses her wheelchair for longer community distances.  She is making excellent progress in therapy. She is at Mod I level for transfers and Mod I for ADLs. She is not yet able to navigate steps.            Past Medical History:   Diagnosis Date    Complete below knee amputation of lower extremity, left, initial encounter ([facility] [address] 8/6/2020    Diabetes mellitus ([facility] [address]     Diabetic ulcer of left heel associated with type 2 diabetes mellitus, with necrosis of bone ([facility] [address] 5/9/2018    History of pulmonary embolus (PE) 6/15/2020    Hx of blood clots 1990's    PE, FROM TAKING BC PILLS    Hyperlipemia     no med    Thyroid disease        Past Surgical History:   Procedure Laterality Date    COLONOSCOPY      DILATION AND CURETTAGE OF UTERUS      ECHO COMPL W DOP COLOR FLOW  11/18/2013         ECHOCARDIOGRAM TRANSESOPHAGEAL  11/20/2013         FOOT DEBRIDEMENT Left 5/6/2019    EXCISIONAL DEBRIDEMENT TO  & THRU MUSCLE LEFT HEEL performed by Susie Solano DPM at [address] Left 6/3/2019    EXCISIONAL  DEBRIDEMENT WITH GRAFT APPLICATION LEFT HEEL (INTEGRA ), WITH REAPPLICATION OF WOUND VAC performed by Susie Solano DPM at [address] [place] [address] Rd  11/17/13    i&d left foot and ankle with bone biopsy    HYSTERECTOMY  1998    ovaries removed Dr Kristopher Obrien Left 6/30/2020    LEG AMPUTATION BELOW LEFT KNEE performed by Hafsa Cotto MD at [place] [address] FX W FIX PST LIP Left 8/16/2018    PARTIAL CALCANECTOMY LEFT FOOT, EXCISIONAL DEBRIDEMENT MUSCLE LEFT FOOT performed by Susie Solano DPM at [facility]. 2., SKIN, SUB-Q TISSUE,MUSCLE,BONE,=<20 SQ CM Left 9/20/2018    EXCISIONAL DEBRIDEMENT SUBQUTANEOUS MUSCLE , BONE LEFT HEEL performed by Susie Solano DPM at [facility] Left 4/10/2018    LEFT HEEL DEBRIDEMENT, BONE BIOPSY performed by Pedro Garcia DPM at [facility] OFFICE/OUTPT VISIT,PROCEDURE ONLY Left 10/19/2018    PARTIAL LEFT CALCANECTOMY performed by Prabhu Flores DPM at [facility]     Socioeconomic History    Marital status:      Spouse name: Not on file    Number of children: Not on file    Years of education: 12    Highest education level: Not on file   Occupational History    Occupation:    Social Needs    Financial resource strain: Not on file    Food insecurity     Worry: Not on file     Inability: Not on file   [employer] needs     Medical: Not on file     Non-medical: Not on file   Tobacco Use    Smoking status: Never Smoker    Smokeless tobacco: Never Used   Substance and Sexual Activity    Alcohol use: No    Drug use: No    Sexual activity: Not on file   Lifestyle    Physical activity     Days per week: Not on file     Minutes per session: Not on file    Stress: Not on file   Relationships    Social connections     Talks on phone: Not on file     Gets together: Not on file     Attends Orthodox service: Not on file     Active member of club or organization: Not on file     Attends meetings of clubs or organizations: Not on file     Relationship status: Not on file    Intimate partner violence     Fear of current or ex partner: Not on file     Emotionally abused: Not on file     Physically abused: Not on file     Forced sexual activity: Not on file   Other Topics Concern    Not on file   Social History Narrative    Not on file       Family History   Problem Relation Age of Onset    Alzheimer's Disease Mother     Other Father         pacemaker    Other Maternal Grandfather         aneurysm       Allergies   Allergen Reactions    Codeine Nausea And Vomiting       Current Outpatient Medications   Medication Sig Dispense Refill    losartan (COZAAR) 25 MG tablet Take 25 mg by mouth daily      Lactobacillus-Inulin ([address] PO) Take 1 capsule by mouth every morning STOP PREOP MED      pravastatin (PRAVACHOL) 40 MG tablet Take 20 mg by mouth every other day      gabapentin (NEURONTIN) 300 MG capsule Take 300 mg by mouth 2 times daily. .      glipiZIDE (GLUCOTROL XL) 10 MG extended release tablet Take 1 tablet by mouth daily      VELTASSA 8.4 g PACK Take 1 packet by mouth every other day   1    vitamin C (ASCORBIC ACID) 500 MG tablet Take 500 mg by mouth daily STOP PREOP MED      sodium bicarbonate 325 MG tablet Take 650 mg by mouth 2 times daily       Cholecalciferol (VITAMIN D3) 2000 UNITS CAPS Take 4,000 Units by mouth daily STOP PREOP MED      acetaminophen 650 MG TABS Take 650 mg by mouth every 4 hours as needed (Fever >100.5 F (38 C)). 120 tablet     metformin (GLUCOPHAGE) 500 MG tablet Take 1,000 mg by mouth 2 times daily (with meals)       levothyroxine (SYNTHROID) 25 MCG tablet Take 50 mcg by mouth daily        No current facility-administered medications for this visit. Review of Systems  General: No chills, fatigue, fever, malaise, night sweats, weight gain,  weight loss. Psychological: No anxiety, depression, suicidal ideation   Ophthalmic: No blurry vision, decreased vision, double vision, loss of vision  Ear Nose Throat: No hearing loss, tinnitus, phonophobia, sensitivity to smells, vertigo, or vocal changes. Allergy/Immunology: No watery eyes, itchy eyes, frequent infections. Hematological and Lymphatic: No bleeding problems, blood clots, bruising  Endocrine:  No polydypsia, polyuria, temperature intolerance. Respiratory: No cough, shortness of breath, wheezing. Cardiovascular: No syncope, chest pain, dyspnea on exertion,palpitations.    Gastrointestinal: No abdominal pain, hematemesis, melena, nausea, vomiting, stool incontinence  Genito-Urinary: No dysuria, hematuria, incontinence   Musculoskeletal: Per HPI  Neurological: Per HPI  Dermatological:  No rash      Physical Exam:   Vitals:    11/16/20 1038   BP: 118/62   Temp: 96.8 °F (36 °C)      General: The patient is in no apparent distress. HEENT: No rhinorrhea, sneezing, yawning, or lacrimation. No scleral icterus or conjunctival injection. SKIN: No piloerection. No track marks. No rash. Normal turgor. No erythema or ecchymosis. Psychological: Mood and affect are appropriate. Hygiene is appropriate. Cardiovascular:  Heart is regular rate. Respiratory: Respirations are regular and unlabored. There is no cyanosis. Lymphatic: There is no lymphadenopathy. Gastrointestinal: Soft abdomen  Genitourinary: No costovertebral angle tenderness. MSK: Left lower extremity - Left BKA. Residual left lower extremity well healed. There is slight erythema over the anterior L tibia, blanchable. There is no scabs, or open areas to the LLE. There is no tenderness or hypersensitivity of the residual LLE. Residual LLE with slightly bullous distal end, but no significant edema. Full AROM at the left knee, good ROM at the left hip. Residual limb is well vascularized. Right lower extremity - Right Charcot foot/ankle, mild edema of R ankle,  Right foot/ankle angles externally 45 degrees. Neurologic: Awake, alert and oriented in three planes. Speech is fluent. No facial weakness. Tongue is midline. Hearing is intact for conversation. Pupils are equal and round. Extraocular muscles are intact. Shoulder shrug symmetric. Strength:   R  L  Deltoid   5  5  Biceps   5  5  Triceps  5  5  Wrist Ext  5  5  Finger Abd  5  5  Hip Flex  5  5  Knee Ext  5  5  Ankle dorsi  4    EHL   4   Ankle Plantar  4      Sensory:  Intact for light touch in all upper and lower extremity dermatomes.        Reflexes:   R  L  Patellar  0 0   Ankle Jerk  0 0      Gait: in w/c, patient could not get her prosthesis to don correctly after exam, so I was unable to observer her ambulating today       Impression:   Left below knee amputation  Right Charcot foot/ankle      Plan:   · Patient received left temporary endoskeletal transtibial below knee prosthesis (BKP) to allow early introduction of prosthetic gait training. She also received right CROW walker. She reports these are fitting and functioning well for her. Plan for permanent left below knee prosthesis in the future. · Continue PT for prosthetic gait training, patient is making excellent progress. Will request updated notes from her therapist.   · Continue use of prosthetic compression socks for limb volume reduction and shaping. · Continue daily stretching/ROM. Reviewed importance of maintaining range of motion at the hip and knee. · Advised patient to continue diligently monitoring her skin  The patient was educated about the diagnosis, prognosis, indications, risks and benefits of treatment. An opportunity to ask questions was given to the patient and questions were answered. The patient agreed to proceed with the recommended treatment as described above. Follow up 2-3 months or sooner if needed      Gabriela Viramontes M.D.   Physical Medicine and Rehabilitation

## 2021-02-15 ENCOUNTER — OFFICE VISIT (OUTPATIENT)
Dept: PHYSICAL MEDICINE AND REHAB | Age: 66
End: 2021-02-15
Payer: MEDICARE

## 2021-02-15 VITALS — TEMPERATURE: 97.2 F | SYSTOLIC BLOOD PRESSURE: 164 MMHG | DIASTOLIC BLOOD PRESSURE: 90 MMHG

## 2021-02-15 DIAGNOSIS — M14.671 CHARCOT ANKLE, RIGHT: ICD-10-CM

## 2021-02-15 DIAGNOSIS — S88.112A BELOW-KNEE AMPUTATION OF LEFT LOWER EXTREMITY (HCC): Primary | ICD-10-CM

## 2021-02-15 PROCEDURE — 99214 OFFICE O/P EST MOD 30 MIN: CPT | Performed by: PHYSICAL MEDICINE & REHABILITATION

## 2021-02-15 NOTE — PROGRESS NOTES
her well and is not sturdy enough to support her. The walker she comes to clinic with today is too narrow for her and does not fit her properly. She has not filed a police report. She feels she is losing ground in therapy due to not having a properly fitting walker. No falls. She reports some skin irritation over the left tibia, no open areas reported. She reports occasional phantom pain, and reports Tylenol helps and she has had to take rarely. She continues PT.          Past Medical History:   Diagnosis Date    Complete below knee amputation of lower extremity, left, initial encounter ([facility] 8/6/2020    Diabetes mellitus ([facility]     Diabetic ulcer of left heel associated with type 2 diabetes mellitus, with necrosis of bone ([facility] 5/9/2018    History of pulmonary embolus (PE) 6/15/2020    Hx of blood clots 1990's    PE, FROM TAKING BC PILLS    Hyperlipemia     no med    Thyroid disease        Past Surgical History:   Procedure Laterality Date    COLONOSCOPY      DILATION AND CURETTAGE OF UTERUS      ECHO COMPL W DOP COLOR FLOW  11/18/2013         ECHOCARDIOGRAM TRANSESOPHAGEAL  11/20/2013         FOOT DEBRIDEMENT Left 5/6/2019    EXCISIONAL DEBRIDEMENT TO  & THRU MUSCLE LEFT HEEL performed by Rosa Sanders DPM at [facility] Left 6/3/2019    EXCISIONAL  DEBRIDEMENT WITH GRAFT APPLICATION LEFT HEEL (INTEGRA ), WITH REAPPLICATION OF WOUND VAC performed by Rosa Sanders DPM at [address] [place] [address]  11/17/13    i&d left foot and ankle with bone biopsy    HYSTERECTOMY  1998    ovaries removed Dr Gaurav Abernathy Left 6/30/2020    LEG AMPUTATION BELOW LEFT KNEE performed by Ale Chapin MD at [place] [id number] FX W FIX PST LIP Left 8/16/2018    PARTIAL CALCANECTOMY LEFT FOOT, EXCISIONAL DEBRIDEMENT MUSCLE LEFT FOOT performed by Rosa Sanders DPM at [facility]. 2., SKIN, SUB-Q TISSUE,MUSCLE,BONE,=<20 SQ CM Left 9/20/2018    EXCISIONAL DEBRIDEMENT SUBQUTANEOUS MUSCLE , BONE LEFT HEEL performed by Kamron Ewing DPM at [facility] Left 4/10/2018    LEFT HEEL DEBRIDEMENT, BONE BIOPSY performed by Edwar Silva DPM at [address] OFFICE/OUTPT VISIT,PROCEDURE ONLY Left 10/19/2018    PARTIAL LEFT CALCANECTOMY performed by Kamron Ewing DPM at [facility] History     Socioeconomic History    Marital status:      Spouse name: Not on file    Number of children: Not on file    Years of education: 12    Highest education level: Not on file   Occupational History    Occupation:    Social Needs    Financial resource strain: Not on file    Food insecurity     Worry: Not on file     Inability: Not on file   [employer] needs     Medical: Not on file     Non-medical: Not on file   Tobacco Use    Smoking status: Never Smoker    Smokeless tobacco: Never Used   Substance and Sexual Activity    Alcohol use: No    Drug use: No    Sexual activity: Not on file   Lifestyle    Physical activity     Days per week: Not on file     Minutes per session: Not on file    Stress: Not on file   Relationships    Social connections     Talks on phone: Not on file     Gets together: Not on file     Attends Gnosticist service: Not on file     Active member of club or organization: Not on file     Attends meetings of clubs or organizations: Not on file     Relationship status: Not on file    Intimate partner violence     Fear of current or ex partner: Not on file     Emotionally abused: Not on file     Physically abused: Not on file     Forced sexual activity: Not on file   Other Topics Concern    Not on file   Social History Narrative    Not on file       Family History   Problem Relation Age of Onset    Alzheimer's Disease Mother     Other Father         pacemaker    Other Maternal Grandfather aneurysm       Allergies   Allergen Reactions    Codeine Nausea And Vomiting       Current Outpatient Medications   Medication Sig Dispense Refill    losartan (COZAAR) 25 MG tablet Take 25 mg by mouth daily      Lactobacillus-Inulin (CULTUREClustrix PO) Take 1 capsule by mouth every morning STOP PREOP MED      pravastatin (PRAVACHOL) 40 MG tablet Take 20 mg by mouth every other day      gabapentin (NEURONTIN) 300 MG capsule Take 300 mg by mouth 2 times daily. .      glipiZIDE (GLUCOTROL XL) 10 MG extended release tablet Take 1 tablet by mouth daily      VELTASSA 8.4 g PACK Take 1 packet by mouth every other day   1    vitamin C (ASCORBIC ACID) 500 MG tablet Take 500 mg by mouth daily STOP PREOP MED      sodium bicarbonate 325 MG tablet Take 650 mg by mouth 2 times daily       Cholecalciferol (VITAMIN D3) 2000 UNITS CAPS Take 4,000 Units by mouth daily STOP PREOP MED      acetaminophen 650 MG TABS Take 650 mg by mouth every 4 hours as needed (Fever >100.5 F (38 C)). 120 tablet     metformin (GLUCOPHAGE) 500 MG tablet Take 1,000 mg by mouth 2 times daily (with meals)       levothyroxine (SYNTHROID) 25 MCG tablet Take 50 mcg by mouth daily        No current facility-administered medications for this visit. Review of Systems  General: No chills, fatigue, fever, malaise, night sweats, weight gain,  weight loss. Psychological: No anxiety, depression, suicidal ideation   Ophthalmic: No blurry vision, decreased vision, double vision, loss of vision  Ear Nose Throat: No hearing loss, tinnitus, phonophobia, sensitivity to smells, vertigo, or vocal changes. Allergy/Immunology: No watery eyes, itchy eyes, frequent infections. Hematological and Lymphatic: No bleeding problems, blood clots, bruising  Endocrine:  No polydypsia, polyuria, temperature intolerance. Respiratory: No cough, shortness of breath, wheezing. Cardiovascular: No syncope, chest pain, dyspnea on exertion,palpitations. Gastrointestinal: No abdominal pain, hematemesis, melena, nausea, vomiting, stool incontinence  Genito-Urinary: No dysuria, hematuria, incontinence   Musculoskeletal: Per HPI  Neurological: Per HPI  Dermatological:  No rash      Physical Exam:   Vitals:    02/15/21 1110   BP: (!) 164/90   Temp: 97.2 °F (36.2 °C)      General: The patient is in no apparent distress. HEENT: No rhinorrhea, sneezing, yawning, or lacrimation. No scleral icterus or conjunctival injection. SKIN: No piloerection. No track marks. No rash. Normal turgor. No erythema or ecchymosis. Psychological: Mood and affect are appropriate. Hygiene is appropriate. Cardiovascular:  Heart is regular rate. Respiratory: Respirations are regular and unlabored. There is no cyanosis. Lymphatic: There is no lymphadenopathy. Gastrointestinal: Soft abdomen  Genitourinary: No costovertebral angle tenderness. MSK: Left lower extremity - Left BKA. Residual left lower extremity well healed. There is an area of blanchable erythema over the anterior L tibial prominence. There are no scabs, or open areas to the LLE. There is no tenderness or hypersensitivity of the residual LLE. Residual LLE with slightly bullous distal end, but no significant edema. Full AROM at the left knee, good ROM at the left hip. Residual limb is well vascularized. Right lower extremity - Right Charcot foot/ankle, mild edema of R ankle,  Right foot/ankle angles externally 45 degrees. Neurologic: Awake, alert and oriented in three planes. Speech is fluent. No facial weakness. Tongue is midline. Hearing is intact for conversation. Pupils are equal and round. Extraocular muscles are intact. Shoulder shrug symmetric.      Strength:   R  L  Deltoid   5  5  Biceps   5  5  Triceps  5  5  Wrist Ext  5  5  Finger Abd  5  5  Hip Flex  5  5  Knee Ext  5  5  Ankle dorsi  4    EHL   4   Ankle Plantar  4      Sensory:  Intact for light touch in all upper and lower extremity dermatomes. Reflexes:   R  L  Patellar  0   Ankle Jerk  0       Gait: in w/c         Impression:   Left below knee amputation  Right Charcot foot/ankle      Plan:   · Patient utilizing left temporary endoskeletal transtibial below knee prosthesis (BKP) --to allow early introduction of prosthetic gait training. She is also utilizing right CROW walker. Plan for permanent left below knee prosthesis in the future. · Continue PT for prosthetic gait training, patient is making excellent progress. · Continue use of prosthetic compression socks for limb volume reduction and shaping. · Continue daily stretching/ROM. Reviewed importance of maintaining range of motion at the hip and knee. · Advised patient to continue diligently monitoring her skin. She will follow up with prosthetist to evaluate left lower extremity prosthesis fit and adjust to get pressure off of the tibial prominence. · Hopefully she will be able to get her walker replaced as the one she is currently using does not fit her well and is not meeting her needs. The patient was educated about the diagnosis, prognosis, indications, risks and benefits of treatment. An opportunity to ask questions was given to the patient and questions were answered. The patient agreed to proceed with the recommended treatment as described above. Follow up 3-4 months or sooner if needed. Griselda Cordero M.D.   Physical Medicine and Rehabilitation

## 2021-03-02 ENCOUNTER — TELEPHONE (OUTPATIENT)
Dept: PHYSICAL MEDICINE AND REHAB | Age: 66
End: 2021-03-02

## 2021-03-02 DIAGNOSIS — M14.671 CHARCOT ANKLE, RIGHT: ICD-10-CM

## 2021-03-02 DIAGNOSIS — S88.112A BELOW-KNEE AMPUTATION OF LEFT LOWER EXTREMITY (HCC): Primary | ICD-10-CM

## 2021-03-02 NOTE — TELEPHONE ENCOUNTER
Patient called stating she would like a order for a bariatrics heavy duty folding walker with wheels-extra wide front wheeled walker.  Once order is in, I will fax over to [facility]  Thanks

## 2021-03-02 NOTE — TELEPHONE ENCOUNTER
Order placed. Please verify patient weight prior to sending order, I went by most recent weight in the chart. Thanks.

## 2021-03-02 NOTE — TELEPHONE ENCOUNTER
Spoke with patient and verified weight at 182lb corrected on order and faxed over to [facility] d/t [facility] not having walker

## 2021-03-23 ENCOUNTER — TELEPHONE (OUTPATIENT)
Dept: PHYSICAL MEDICINE AND REHAB | Age: 66
End: 2021-03-23

## 2021-05-17 ENCOUNTER — OFFICE VISIT (OUTPATIENT)
Dept: PHYSICAL MEDICINE AND REHAB | Age: 66
End: 2021-05-17
Payer: MEDICARE

## 2021-05-17 VITALS — SYSTOLIC BLOOD PRESSURE: 160 MMHG | DIASTOLIC BLOOD PRESSURE: 84 MMHG

## 2021-05-17 DIAGNOSIS — S88.112A BELOW-KNEE AMPUTATION OF LEFT LOWER EXTREMITY (HCC): Primary | ICD-10-CM

## 2021-05-17 DIAGNOSIS — M14.671 CHARCOT ANKLE, RIGHT: ICD-10-CM

## 2021-05-17 PROCEDURE — 99214 OFFICE O/P EST MOD 30 MIN: CPT | Performed by: PHYSICAL MEDICINE & REHABILITATION

## 2021-05-17 NOTE — PROGRESS NOTES
Leatha Steven M.D. [address] [place] [address] PHYSICAL MEDICINE AND REHABILITAION  [address] [place] [address]  Dept: [phone]  Dept Fax: [phone]    PCP: Pricila Saleem DO  Date of visit: 5/17/21    Chief Complaint   Patient presents with    Follow-up     Currently in therapy 3x a week and sees prosthetics once every 2 weeks , states things are going well        Lupillo Garcia is a 74 y.o.  female who presents for follow up of rehab needs. HPI:   Patient is a 78 y/o female a history of a nonhealing left heel ulcer with osteomyelitis of the left ankle and foot. She has undergone multiple procedures and surgeries on this foot/ankle. She then ultimately underwent left BKA on 6/30/2020. She was discharged to [facility] for therapy for about 1 month and then and completed home health PT. She was fitted for a left temporary endoskeletal transtibial below knee prosthesis (BKP) to allow early introduction of prosthetic gait training as well as to provide additional information regarding appropriate design for her definitive prosthesis. She also has history of a right Charcot foot/ankle and requires a CROW walker on the right lower extremity. Interval history:   Since last visit patient reports that she is doing well. He still has her temporary LLE prosthesis and reports that she will be getting her permanent prosthesis soon. She was able to obtain a new walker that fits her more appropriately and is now doing well again in PT. She is ambulating mod I with her prosthesis and the walker. She is able to a send and descend stairs. She denies falls. She denies any skin breakdown. She states that adjustments were made to her prosthesis and the red area over the distal residual left lower extremity that we previously saw has now resolved.   She reports that her phantom pain has now resolved and she stopped taking the Socioeconomic History    Marital status:      Spouse name: Not on file    Number of children: Not on file    Years of education: 12    Highest education level: Not on file   Occupational History    Occupation:    Tobacco Use    Smoking status: Never Smoker    Smokeless tobacco: Never Used   Vaping Use    Vaping Use: Never used   Substance and Sexual Activity    Alcohol use: No    Drug use: No    Sexual activity: Not on file   Other Topics Concern    Not on file   Social History Narrative    Not on file     Social Determinants of Health     Financial Resource Strain:     Difficulty of Paying Living Expenses:    Food Insecurity:     Worried About [address] in the Last Year:     [address] in the Last Year:    Transportation Needs:     Lack of Transportation (Medical):      Lack of Transportation (Non-Medical):    Physical Activity:     Days of Exercise per Week:     Minutes of Exercise per Session:    Stress:     Feeling of Stress :    Social Connections:     Frequency of Communication with Friends and Family:     Frequency of Social Gatherings with Friends and Family:     Attends Roman [employer] Services:     Active Member of Clubs or Organizations:     Attends Club or Organization Meetings:     Marital Status:    Intimate Partner Violence:     Fear of Current or Ex-Partner:     Emotionally Abused:     Physically Abused:     Sexually Abused:        Family History   Problem Relation Age of Onset    Alzheimer's Disease Mother     Other Father         pacemaker    Other Maternal Grandfather         aneurysm       Allergies   Allergen Reactions    Codeine Nausea And Vomiting       Current Outpatient Medications   Medication Sig Dispense Refill    losartan (COZAAR) 25 MG tablet Take 25 mg by mouth daily      Lactobacillus-Inulin ([address] PO) Take 1 capsule by mouth every morning STOP PREOP MED      pravastatin (PRAVACHOL) 40 MG tablet Take 20 mg by mouth every other day      glipiZIDE (GLUCOTROL XL) 10 MG extended release tablet Take 1 tablet by mouth daily      VELTASSA 8.4 g PACK Take 1 packet by mouth every other day   1    vitamin C (ASCORBIC ACID) 500 MG tablet Take 500 mg by mouth daily STOP PREOP MED      sodium bicarbonate 325 MG tablet Take 650 mg by mouth 2 times daily       Cholecalciferol (VITAMIN D3) 2000 UNITS CAPS Take 4,000 Units by mouth daily STOP PREOP MED      acetaminophen 650 MG TABS Take 650 mg by mouth every 4 hours as needed (Fever >100.5 F (38 C)). 120 tablet     metformin (GLUCOPHAGE) 500 MG tablet Take 1,000 mg by mouth 2 times daily (with meals)       levothyroxine (SYNTHROID) 25 MCG tablet Take 50 mcg by mouth daily       gabapentin (NEURONTIN) 300 MG capsule Take 300 mg by mouth 2 times daily. . (Patient not taking: Reported on 5/17/2021)       No current facility-administered medications for this visit. Review of Systems  General: No chills, fatigue, fever, malaise, night sweats, weight gain,  weight loss. Psychological: No anxiety, depression, suicidal ideation   Ophthalmic: No blurry vision, decreased vision, double vision, loss of vision  Ear Nose Throat: No hearing loss, tinnitus, phonophobia, sensitivity to smells, vertigo, or vocal changes. Allergy/Immunology: No watery eyes, itchy eyes, frequent infections. Hematological and Lymphatic: No bleeding problems, blood clots, bruising  Endocrine:  No polydypsia, polyuria, temperature intolerance. Respiratory: No cough, shortness of breath, wheezing. Cardiovascular: No syncope, chest pain, dyspnea on exertion,palpitations.    Gastrointestinal: No abdominal pain, hematemesis, melena, nausea, vomiting, stool incontinence  Genito-Urinary: No dysuria, hematuria, incontinence   Musculoskeletal: Per HPI  Neurological: Per HPI  Dermatological:  No rash      Physical Exam:   Vitals:    05/17/21 0903   BP: (!) 160/84      General: The patient is in no apparent distress. HEENT: No rhinorrhea, sneezing, yawning, or lacrimation. No scleral icterus or conjunctival injection. SKIN: No piloerection. No track marks. No rash. Normal turgor. No erythema or ecchymosis. Psychological: Mood and affect are appropriate. Hygiene is appropriate. Cardiovascular:  Heart is regular rate. Respiratory: Respirations are regular and unlabored. There is no cyanosis. Lymphatic: There is no lymphadenopathy. Genitourinary: No costovertebral angle tenderness. MSK: Left lower extremity - Left BKA. Residual left lower extremity well healed. There is no erythema or skin breakdown over the residual LLE. There is no tenderness or hypersensitivity of the residual LLE. Residual LLE with slightly bullous distal end, but no significant edema. Full AROM at the left knee, good ROM at the left hip. Residual limb is well vascularized. Right lower extremity - Right Charcot foot/ankle, mild edema of R ankle,  Right foot/ankle angles externally 45 degrees. Neurologic: Awake, alert and oriented in three planes. Speech is fluent. No facial weakness. Tongue is midline. Hearing is intact for conversation. Pupils are equal and round. Extraocular muscles are intact. Shoulder shrug symmetric. Strength:   R  L  Deltoid   5  5  Biceps   5  5  Triceps  5  5  Wrist Ext  5  5  Finger Abd  5  5  Hip Flex  5  5  Knee Ext  5  5  Ankle dorsi  4    EHL   4   Ankle Plantar  4      Sensory:  Intact for light touch in all upper and lower extremity dermatomes. Reflexes:   R  L  Patellar  0   Ankle Jerk  0       Gait: Reciprocal gait with a left lower extremity prosthesis and walker        Impression:   Left below knee amputation  Right Charcot foot/ankle      Plan:   · Patient utilizing left temporary endoskeletal transtibial below knee prosthesis (BKP) --to allow early introduction of prosthetic gait training. She is also utilizing right CROW walker.   Plan for permanent left below knee prosthesis in the near future. · Continue PT for prosthetic gait training, patient is making excellent progress. She may decrease PT to 1x per week if she wishes until she obtains her permanent prosthesis  · Continue use of prosthetic compression socks for limb volume reduction and shaping. · Continue daily stretching/ROM. Reviewed importance of maintaining range of motion at the hip and knee. · Advised patient to continue diligently monitoring her skin. · Fortunately she was able to get her walker replaced   · Phantom pain has resolved and she no longer requires gabapentin   The patient was educated about the diagnosis, prognosis, indications, risks and benefits of treatment. An opportunity to ask questions was given to the patient and questions were answered. The patient agreed to proceed with the recommended treatment as described above. Follow up 4 months or sooner if needed. Mitesh Nair M.D.   Physical Medicine and Rehabilitation

## 2021-08-16 NOTE — PROGRESS NOTES
CHIEF COMPLAINT: Abnormal EKG/Pre-op    HISTORY OF PRESENT ILLNESS: Patient is a 70 y.o. female seen at the request of Vaughn Ospina DO. Patient presents for abnormal EKG and pre-op risk stratification. Patient has DM and lipid issues in past. She has had significant issue in the distant past with PE. She had a significant MRSA infection of her left foot that lead to osteomyelitis at which time she had an unremarkable TTE and ELAINA. Very low functional capacity. No CP. Some HUSTON. Past Medical History:   Diagnosis Date    Abnormal EKG     Diabetes mellitus ([address]     Hx of blood clots     PE, FROM TAKING BC PILLS    Hyperlipemia     Thyroid disease        Patient Active Problem List   Diagnosis    Hypothyroidism    Diabetic foot ulcer ([address]    Osteomyelitis of ankle and foot ([address]    Diabetes mellitus, type 2 (HCC)    Staphylococcus aureus bacteremia    Hypokalemia    Abnormal EKG    Thyroid disease    Diabetes mellitus ([address]    Hyperlipemia    Cellulitis    Diabetic ulcer of left heel associated with type 2 diabetes mellitus, with bone involvement without evidence of necrosis (HCC)    Non-pressure chronic ulcer of left heel and midfoot with fat layer exposed (HCC)    Diabetic polyneuropathy (HCC)       Allergies   Allergen Reactions    Codeine Nausea And Vomiting       Current Outpatient Prescriptions   Medication Sig Dispense Refill    gabapentin (NEURONTIN) 300 MG capsule Take 300 mg by mouth 2 times daily. .      glipiZIDE (GLUCOTROL XL) 10 MG extended release tablet Take 1 tablet by mouth daily      VELTASSA 8.4 g PACK Take 1 packet by mouth daily  1    vitamin C (ASCORBIC ACID) 500 MG tablet Take 500 mg by mouth daily       pravastatin (PRAVACHOL) 20 MG tablet Take 40 mg by mouth nightly ALTERNATES 20MG AND 40MG EVERY OTHER DAY      Lactobacillus Rhamnosus, GG, (CULTUREE IMMUNITY SUPPORT) CAPS Take 1 capsule by mouth daily      sodium bicarbonate 325 MG tablet Take tracheal deviation present. No thyromegaly present. Cardiovascular: Normal rate, regular rhythm, normal heart sounds and intact distal pulses. Exam reveals no gallop and no friction rub. No murmur heard. Pulmonary/Chest: Effort normal and breath sounds normal. No respiratory distress. No wheezes. No rales. No tenderness. Abdominal: Soft. Bowel sounds are normal. No distension and no mass. No tenderness. No rebound and no guarding. Musculoskeletal: Normal range of motion. No edema and no tenderness. Lymphadenopathy:   No cervical adenopathy. No groin adenopathy. Neurological: Alert and oriented to person, place, and time. Skin: Skin is warm and dry. No rash noted. Not diaphoretic. No erythema. Psychiatric: Normal mood and affect. Behavior is normal.     EKG:  normal sinus rhythm, RBBB, nonspecific ST and T waves changes. ASSESSMENT AND PLAN:  Patient Active Problem List   Diagnosis    Hypothyroidism    Diabetic foot ulcer ([address]    Osteomyelitis of ankle and foot ([address]    Diabetes mellitus, type 2 (HCC)    Staphylococcus aureus bacteremia    Hypokalemia    Abnormal EKG    Thyroid disease    Diabetes mellitus ([address]    Hyperlipemia    Cellulitis    Diabetic ulcer of left heel associated with type 2 diabetes mellitus, with bone involvement without evidence of necrosis ([address]    Non-pressure chronic ulcer of left heel and midfoot with fat layer exposed ([address]    Diabetic polyneuropathy ([address]     1. Abnormal EKG/HUSTON: Evaluate by pharm stress. 2. MRSA infection: Echo. Mau Brown D.O.   Cardiologist  Cardiology, [facility] Male

## 2021-09-14 ENCOUNTER — OFFICE VISIT (OUTPATIENT)
Dept: PHYSICAL MEDICINE AND REHAB | Age: 66
End: 2021-09-14
Payer: MEDICARE

## 2021-09-14 VITALS
WEIGHT: 175 LBS | SYSTOLIC BLOOD PRESSURE: 128 MMHG | TEMPERATURE: 98.8 F | HEIGHT: 67 IN | BODY MASS INDEX: 27.47 KG/M2 | DIASTOLIC BLOOD PRESSURE: 72 MMHG

## 2021-09-14 DIAGNOSIS — M14.671 CHARCOT ANKLE, RIGHT: ICD-10-CM

## 2021-09-14 DIAGNOSIS — S88.112A BELOW-KNEE AMPUTATION OF LEFT LOWER EXTREMITY (HCC): Primary | ICD-10-CM

## 2021-09-14 PROCEDURE — 99214 OFFICE O/P EST MOD 30 MIN: CPT | Performed by: PHYSICAL MEDICINE & REHABILITATION

## 2021-09-14 NOTE — PROGRESS NOTES
Carlos Webster M.D. [facility] PHYSICAL MEDICINE AND REHABILITAION  [address]  [facility] [address]  Dept: [phone]  Dept Fax: [phone]    PCP: Andria Chester DO  Date of visit: 9/14/21    Chief Complaint   Patient presents with    Leg Problem     Below left knee amputation. Patient states she is doing really well. Today received a new shoe left foot. Currently attending PT 3x a week. Viki Delgado is a 74 y.o.  female who presents for follow up of rehab needs. HPI:   Patient is a 78 y/o female a history of a nonhealing left heel ulcer with osteomyelitis of the left ankle and foot. She has undergone multiple procedures and surgeries on this foot/ankle. She then ultimately underwent left BKA on 6/30/2020. She was discharged to [facility] for therapy for about 1 month and then and completed home health PT. She was fitted for a left temporary endoskeletal transtibial below knee prosthesis (BKP) to allow early introduction of prosthetic gait training as well as to provide additional information regarding appropriate design for her definitive prosthesis. She also has history of a right Charcot foot/ankle and requires a CROW walker on the right lower extremity. Interval history:   Since last visit patient reports that she is doing well. She received her permanent LLE prosthesis and reports that it is fitting well and functioning well for her. She denies any issues with the new prosthesis. She is ambulating Mod I with her prosthesis and the walker. She is ambulating with a cane in PT. She is still attending PT 3x per week. She is able to ascend and descend stairs. She denies falls. She denies any skin breakdown. She denies phantom pain. She has occasional phantom sensations, that are not painful or bothersome. No other changes reported.        Past Medical History:   Diagnosis Date    Complete below knee amputation of lower extremity, left, initial encounter ([facility] 8/6/2020    Diabetes mellitus ([facility]     Diabetic ulcer of left heel associated with type 2 diabetes mellitus, with necrosis of bone ([facility] 5/9/2018    History of pulmonary embolus (PE) 6/15/2020    Hx of blood clots 1990's    PE, FROM TAKING BC PILLS    Hyperlipemia     no med    Thyroid disease        Past Surgical History:   Procedure Laterality Date    COLONOSCOPY      DILATION AND CURETTAGE OF UTERUS      ECHO COMPL W DOP COLOR FLOW  11/18/2013         ECHOCARDIOGRAM TRANSESOPHAGEAL  11/20/2013         FOOT DEBRIDEMENT Left 5/6/2019    EXCISIONAL DEBRIDEMENT TO  & THRU MUSCLE LEFT HEEL performed by Francisco Powell DPM at [facility] Left 6/3/2019    EXCISIONAL  DEBRIDEMENT WITH GRAFT APPLICATION LEFT HEEL (INTEGRA ), WITH REAPPLICATION OF WOUND VAC performed by Francisco Powell DPM at [place] [address]  11/17/13    i&d left foot and ankle with bone biopsy    HYSTERECTOMY  1998    ovaries removed Dr Moni Gonsales Left 6/30/2020    LEG AMPUTATION BELOW LEFT KNEE performed by Elissa Cornell MD at [place] [address] FX W FIX PST LIP Left 8/16/2018    PARTIAL CALCANECTOMY LEFT FOOT, EXCISIONAL DEBRIDEMENT MUSCLE LEFT FOOT performed by Francisco Powell DPM at [facility] [address] SKIN, SUB-Q TISSUE,MUSCLE,BONE,=<20 SQ CM Left 9/20/2018    EXCISIONAL DEBRIDEMENT SUBQUTANEOUS MUSCLE , BONE LEFT HEEL performed by Francisco Powell DPM at [address] Left 4/10/2018    LEFT HEEL DEBRIDEMENT, BONE BIOPSY performed by Shea Gunderson DPM at [facility] OFFICE/OUTPT VISIT,PROCEDURE ONLY Left 10/19/2018    PARTIAL LEFT CALCANECTOMY performed by Francisco Powell DPM at [facility] History     Socioeconomic History    Marital status:      Spouse name: Not on file    Number of children: Not on file    Years of education: 12    Highest education level: Not on file   Occupational History    Occupation:    Tobacco Use    Smoking status: Never Smoker    Smokeless tobacco: Never Used   Vaping Use    Vaping Use: Never used   Substance and Sexual Activity    Alcohol use: No    Drug use: No    Sexual activity: Not on file   Other Topics Concern    Not on file   Social History Narrative    Not on file     Social Determinants of Health     Financial Resource Strain:     Difficulty of Paying Living Expenses:    Food Insecurity:     Worried About [address] in the Last Year:     [address] in the Last Year:    Transportation Needs:     Lack of Transportation (Medical):      Lack of Transportation (Non-Medical):    Physical Activity:     Days of Exercise per Week:     Minutes of Exercise per Session:    Stress:     Feeling of Stress :    Social Connections:     Frequency of Communication with Friends and Family:     Frequency of Social Gatherings with Friends and Family:     Attends [employer] Services:     Active Member of Clubs or Organizations:     Attends Club or Organization Meetings:     Marital Status:    Intimate Partner Violence:     Fear of Current or Ex-Partner:     Emotionally Abused:     Physically Abused:     Sexually Abused:        Family History   Problem Relation Age of Onset    Alzheimer's Disease Mother     Other Father         pacemaker    Other Maternal Grandfather         aneurysm       Allergies   Allergen Reactions    Codeine Nausea And Vomiting       Current Outpatient Medications   Medication Sig Dispense Refill    losartan (COZAAR) 25 MG tablet Take 25 mg by mouth daily      Lactobacillus-Inulin ([address] PO) Take 1 capsule by mouth every morning STOP PREOP MED      pravastatin (PRAVACHOL) 40 MG tablet Take 20 mg by mouth every other day      gabapentin (NEURONTIN) 300 MG capsule Take 300 conjunctival injection. SKIN: No piloerection. No track marks. No rash. Normal turgor. No erythema or ecchymosis. Psychological: Mood and affect are appropriate. Hygiene is appropriate. Cardiovascular:  No edema   Respiratory: Respirations are regular and unlabored. There is no cyanosis. Genitourinary: No costovertebral angle tenderness. MSK: Left lower extremity - Left BKA. Residual left lower extremity well healed. There is slight erythema over the distal tibia of the residual limb, resolves within a few minutes of removing the brace. No skin breakdown over the residual LLE. There is no tenderness or hypersensitivity of the residual LLE. Residual LLE with slightly bullous distal end, but no edema. Full AROM at the left knee, good ROM at the left hip. Residual limb is well vascularized. Right lower extremity - Right Charcot foot/ankle, mild edema of R ankle,  Right foot/ankle angles externally 45 degrees. Neurologic: Awake, alert and oriented in three planes. Speech is fluent. No facial weakness. Tongue is midline. Hearing is intact for conversation. Pupils are equal and round. Extraocular muscles are intact. Shoulder shrug symmetric. Strength:   R  L  Deltoid   5  5  Biceps   5  5  Triceps  5  5  Wrist Ext  5  5  Finger Abd  5  5  Hip Flex  5  5  Knee Ext  5  5  Ankle dorsi  4    EHL   4   Ankle Plantar  4      Sensory:  Intact for light touch in all upper and lower extremity dermatomes. Reflexes:   R  L  Patellar  0   Ankle Jerk  0       Gait: Reciprocal gait with a left lower extremity prosthesis and walker        Impression:   Left below knee amputation  Right Charcot foot/ankle      Plan:   · Patient is now utilizing her new permanent left lower extremity prosthesis and doing well with this. She is also utilizing a right CROW walker. · Continue PT for prosthetic gait training, patient is making excellent progress.   · Continue use of prosthetic compression socks for limb

## 2024-03-26 NOTE — OP NOTE
[address]                   [address] [facility], [address] [place] [address]                                 OPERATIVE REPORT    PATIENT NAME: Emma Jewell                  :        1955  MED REC NO:   [id number]                            ROOM:  ACCOUNT NO:   [de-identified]                           ADMIT DATE: 2018  PROVIDER:     Krystal Blanco DPM    DATE OF PROCEDURE:  2018    PREOPERATIVE DIAGNOSIS:  Osteomyelitis, left calcaneus with soft tissue  abscess. POSTOPERATIVE DIAGNOSIS:  Osteomyelitis, left calcaneus with soft tissue  abscess. PROCEDURE:  Partial calcanectomy with excisional debridement through and  through the muscular layer of the left foot heel plantar fascia region. SURGEON:  Krystal Blanco DPM    ASSISTANT:  Roxann assistant. DESCRIPTION OF PROCEDURE:  The patient was taken to the operating room and  placed in the supine position, at which time sedation was applied by the  Department of Anesthesia at [facility]. At this time, the left heel  was infiltrated with approximately 20 mL of 0.5% Marcaine prior to the  procedure. When local anesthesia was ensured which the patient was mainly  insensate, the following procedure was performed. Attention was directed  to the previous ulceration in the plantar posterior aspect of the heel. The incision was made to convert two semi-elliptical incisions around the  original ulceration to completely excise the ulcer. The incision was  deepened down to bone from the Achilles tendon all the way to the distal  aspect of the calcaneus. The area was debrided free of all necrotic tissue  exposing the bone. The bone was very soft and ragged at the bottom and had  been infiltrated and destroyed by bacteria at this point. A sagittal saw  was utilized to remove the posterior half of the calcaneus down to healthy  looking bone.   All further necrotic tissue was debrided both in
How Severe Is This Condition?: moderate

## (undated) DEVICE — TRAP,MUCUS SPECIMEN,40CC: Brand: MEDLINE

## (undated) DEVICE — STANDARD HYPODERMIC NEEDLE,ALUMINUM HUB: Brand: MONOJECT

## (undated) DEVICE — EXTRAS AMPUTATION I

## (undated) DEVICE — SURGICAL PROCEDURE PACK BASIC

## (undated) DEVICE — CONVERTORS STOCKINETTE: Brand: CONVERTORS

## (undated) DEVICE — DRIP REDUCTION MANIFOLD

## (undated) DEVICE — GLOVE SURG SZ 75 L12IN FNGR THK94MIL TRNSLUC YEL LTX

## (undated) DEVICE — CONTROL SYRINGE LUER-LOCK TIP: Brand: MONOJECT

## (undated) DEVICE — DRILL SYSTEM 7

## (undated) DEVICE — BANDAGE,GAUZE,CONFORMING,3"X75",STRL,LF: Brand: MEDLINE

## (undated) DEVICE — SET ORTHO STD STORTSTD2

## (undated) DEVICE — DRESSING WND 6X6IN EXTRA HYDRFBR AQUACEL ADVANTAGE

## (undated) DEVICE — BLADE CLIPPER GEN PURP NS

## (undated) DEVICE — STANDARD HYPODERMIC NEEDLE,POLYPROPYLENE HUB: Brand: MONOJECT

## (undated) DEVICE — DRAPE,EXTREMITY,89X128,STERILE: Brand: MEDLINE

## (undated) DEVICE — INTENDED FOR TISSUE SEPARATION, AND OTHER PROCEDURES THAT REQUIRE A SHARP SURGICAL BLADE TO PUNCTURE OR CUT.: Brand: BARD-PARKER ® STAINLESS STEEL BLADES

## (undated) DEVICE — 6 X 9  1.75MIL 4-WALL LABGUARD: Brand: MINIGRIP COMMERCIAL LLC

## (undated) DEVICE — TOWEL,OR,DSP,ST,BLUE,STD,6/PK,12PK/CS: Brand: MEDLINE

## (undated) DEVICE — GAUZE,SPONGE,4"X4",8PLY,STRL,LF,10/TRAY: Brand: MEDLINE

## (undated) DEVICE — SET SURG BASIN MAYO REUSABLE

## (undated) DEVICE — GAUZE,PACKING STRIP,PLAIN,1/2"X5YD,STRL: Brand: CURAD

## (undated) DEVICE — STERILE HOOK LOCK LATEX FREE ELASTIC BANDAGE 4INX5YD: Brand: HOOK LOCK™

## (undated) DEVICE — GLOVE SURG SZ 7.5 L11.73IN FNGR THK9.8MIL STRW LTX POLYMER

## (undated) DEVICE — PATIENT RETURN ELECTRODE, SINGLE-USE, CONTACT QUALITY MONITORING, ADULT, WITH 9FT CORD, FOR PATIENTS WEIGING OVER 33LBS. (15KG): Brand: MEGADYNE

## (undated) DEVICE — GAUZE,SPONGE,AVANT,4"X4",4PLY,STRL,10/TR: Brand: MEDLINE

## (undated) DEVICE — SOLUTION IV IRRIG POUR BRL 0.9% SODIUM CHL 2F7124

## (undated) DEVICE — SWAB SPEC COLL SHFT L5.25IN POLYUR FOAM TIP SFT DBL MEDIA

## (undated) DEVICE — PLATE ES AD W 9FT CRD 2

## (undated) DEVICE — HANDPIECE SET WITH BONE CLEANING TIP AND SUCTION TUBE: Brand: INTERPULSE

## (undated) DEVICE — PACK PROCEDURE SURG GEN CUST

## (undated) DEVICE — BANDAGE,GAUZE,4.5"X4.1YD,STERILE,LF: Brand: MEDLINE

## (undated) DEVICE — READY WET SKIN SCRUB TRAY-LF: Brand: MEDLINE INDUSTRIES, INC.

## (undated) DEVICE — APPLICATOR MEDICATED 26 CC SOLUTION HI LT ORNG CHLORAPREP

## (undated) DEVICE — BANDAGE COMPR W4INXL5YD WHT BGE POLY COT M E WRP WV HK AND

## (undated) DEVICE — SPLINT KNEE UNIV FOR LESS THAN 36IN L24IN FOAM LAM E CNTCT

## (undated) DEVICE — DRESSING ANTIMIC ALG OPTICELL GELLING FBR 6X6IN

## (undated) DEVICE — CONTAINER VACUTAINER ANAER CULTURE SWAB

## (undated) DEVICE — SOLUTION IV IRRIG WATER 1000ML POUR BRL 2F7114

## (undated) DEVICE — ELECTRODE PT RET AD L9FT HI MOIST COND ADH HYDRGEL CORDED

## (undated) DEVICE — RECIPROCATING BLADE HEAVY DUTY (52.6 X 0.64MM)

## (undated) DEVICE — BANDAGE,GAUZE,BULKEE II,4.5"X4.1YD,STRL: Brand: MEDLINE

## (undated) DEVICE — GOWN,AURORA,NONREINF,RAGLAN,L,STERILE: Brand: MEDLINE

## (undated) DEVICE — BANDAGE COMPR W4INXL10YD WHITE/BEIGE E MTRX HK LOOP CLSR

## (undated) DEVICE — APPLICATOR PREP 26ML 0.7% IOD POVACRYLEX 74% ISO ALC ST

## (undated) DEVICE — GLOVE ORANGE PI 8   MSG9080

## (undated) DEVICE — SET ORTHO MINI STD

## (undated) DEVICE — TUBE IRRIG HNDPC HI FLO TP INTRPULS W/SUCTION TUBE

## (undated) DEVICE — SPONGE LAP W18XL18IN WHT COT 4 PLY FLD STRUNG RADPQ DISP ST

## (undated) DEVICE — Z CONVERTED USE 2275207 CLOTH PREP W7.5XL7.5IN 2% CHG SKIN ALC AND RNS FREE

## (undated) DEVICE — TRAP SPEC MUCUS FOR SUCT

## (undated) DEVICE — PRECISION THIN (9.0 X 0.38 X 31.0MM)

## (undated) DEVICE — 4-PORT MANIFOLD: Brand: NEPTUNE 2

## (undated) DEVICE — DRESSING PETRO W3XL8IN OIL EMUL N ADH GZ KNIT IMPREG CELOS

## (undated) DEVICE — DRESSING ALG W0.75XL18IN REINF GEL FBR CHYTOFORM TECHNOLOGY

## (undated) DEVICE — TRAP,MUCUS,8FR,DELEE,W/VALVE: Brand: MEDLINE

## (undated) DEVICE — SAW RECEIP STRYKER SYS 8

## (undated) DEVICE — GLOVE SURG SZ 8 L12IN FNGR THK94MIL TRNSLUC YEL LTX HYDRGEL

## (undated) DEVICE — SET ORTHO STD STORTSTD1

## (undated) DEVICE — NEEDLE BX 14GA LAIN 20MM SPEC NOTCH SCALP SHRP SURG STL CUT

## (undated) DEVICE — LABEL MED 4 IN SURG PANEL W/ PEN STRL

## (undated) DEVICE — GOWN,SIRUS,FABRNF,2XL,18/CS: Brand: MEDLINE

## (undated) DEVICE — GOWN,SIRUS,FABRNF,XL,20/CS: Brand: MEDLINE

## (undated) DEVICE — DOUBLE BASIN SET: Brand: MEDLINE INDUSTRIES, INC.

## (undated) DEVICE — Device

## (undated) DEVICE — GLOVE ORANGE PI 7 1/2   MSG9075

## (undated) DEVICE — TUBING SUCT 12FR MAL ALUM SHFT FN CAP VENT UNIV CONN W/ OBT

## (undated) DEVICE — CLOTH SURG PREP PREOPERATIVE CHLORHEXIDINE GLUC 2% READYPREP

## (undated) DEVICE — EXTRAS AMPUTATION II

## (undated) DEVICE — 1.5L THIN WALL CAN: Brand: CRD

## (undated) DEVICE — PAD,ABDOMINAL,5"X9",ST,LF,25/BX: Brand: MEDLINE INDUSTRIES, INC.